# Patient Record
Sex: FEMALE | Race: BLACK OR AFRICAN AMERICAN | NOT HISPANIC OR LATINO | Employment: FULL TIME | URBAN - METROPOLITAN AREA
[De-identification: names, ages, dates, MRNs, and addresses within clinical notes are randomized per-mention and may not be internally consistent; named-entity substitution may affect disease eponyms.]

---

## 2017-06-19 DIAGNOSIS — N89.8 VAGINAL DISCHARGE: ICD-10-CM

## 2017-06-20 ENCOUNTER — OFFICE VISIT (OUTPATIENT)
Dept: OBSTETRICS AND GYNECOLOGY | Facility: CLINIC | Age: 32
End: 2017-06-20
Payer: MEDICAID

## 2017-06-20 VITALS
DIASTOLIC BLOOD PRESSURE: 70 MMHG | SYSTOLIC BLOOD PRESSURE: 120 MMHG | HEIGHT: 64 IN | BODY MASS INDEX: 19.12 KG/M2 | WEIGHT: 112 LBS

## 2017-06-20 DIAGNOSIS — N89.8 VAGINAL DISCHARGE: ICD-10-CM

## 2017-06-20 DIAGNOSIS — Z11.3 SCREENING FOR STDS (SEXUALLY TRANSMITTED DISEASES): ICD-10-CM

## 2017-06-20 DIAGNOSIS — N91.1 AMENORRHEA, SECONDARY: ICD-10-CM

## 2017-06-20 DIAGNOSIS — Z01.419 VISIT FOR GYNECOLOGIC EXAMINATION: Primary | ICD-10-CM

## 2017-06-20 DIAGNOSIS — Z30.431 INTRAUTERINE DEVICE SURVEILLANCE: ICD-10-CM

## 2017-06-20 DIAGNOSIS — Z87.42 HISTORY OF ABNORMAL CERVICAL PAP SMEAR: ICD-10-CM

## 2017-06-20 LAB
CANDIDA RRNA VAG QL PROBE: NEGATIVE
G VAGINALIS RRNA GENITAL QL PROBE: POSITIVE
T VAGINALIS RRNA GENITAL QL PROBE: NEGATIVE

## 2017-06-20 PROCEDURE — 99999 PR PBB SHADOW E&M-EST. PATIENT-LVL III: CPT | Mod: PBBFAC,,, | Performed by: NURSE PRACTITIONER

## 2017-06-20 PROCEDURE — 88141 CYTOPATH C/V INTERPRET: CPT | Mod: ,,, | Performed by: PATHOLOGY

## 2017-06-20 PROCEDURE — 88175 CYTOPATH C/V AUTO FLUID REDO: CPT | Performed by: PATHOLOGY

## 2017-06-20 PROCEDURE — 99213 OFFICE O/P EST LOW 20 MIN: CPT | Mod: PBBFAC | Performed by: NURSE PRACTITIONER

## 2017-06-20 PROCEDURE — 87480 CANDIDA DNA DIR PROBE: CPT

## 2017-06-20 PROCEDURE — 99395 PREV VISIT EST AGE 18-39: CPT | Mod: S$PBB,,, | Performed by: NURSE PRACTITIONER

## 2017-06-20 PROCEDURE — 87624 HPV HI-RISK TYP POOLED RSLT: CPT

## 2017-06-20 PROCEDURE — 87591 N.GONORRHOEAE DNA AMP PROB: CPT

## 2017-06-20 RX ORDER — METRONIDAZOLE 500 MG/1
TABLET ORAL
Qty: 14 TABLET | Refills: 0 | OUTPATIENT
Start: 2017-06-20

## 2017-06-20 RX ORDER — METRONIDAZOLE 500 MG/1
500 TABLET ORAL 2 TIMES DAILY
Qty: 10 TABLET | Refills: 1 | Status: SHIPPED | OUTPATIENT
Start: 2017-06-20 | End: 2017-06-25

## 2017-06-20 NOTE — PROGRESS NOTES
HISTORY OF PRESENT ILLNESS:    Madyson Calle is a 31 y.o. female, , No LMP recorded. Patient is not currently having periods (Reason: Birth Control).,  presents for a routine exam, STD screening and c/o BV.  -Reports a fishy smelling vaginal discharge not associated with fever, pelvic pain, itching, UTI sx or AUB and denies use of vulvovaginal irritants.   -Thinks trigger is unprotected intercourse.    Past Medical History:   Diagnosis Date    Abnormal cervical Papanicolaou smear 2010    Colposcopy    Scoliosis        PAST SURGICAL HISTORY:  History reviewed. No pertinent surgical history.    MEDICATIONS AND ALLERGIES:  Mirena IUD  Review of patient's allergies indicates:  No Known Allergies    Family History   Problem Relation Age of Onset    Breast cancer Neg Hx     Colon cancer Neg Hx     Ovarian cancer Neg Hx        Social History     Social History    Marital status: Single     Spouse name: N/A    Number of children: N/A    Years of education: N/A     Occupational History    Not on file.     Social History Main Topics    Smoking status: Never Smoker    Smokeless tobacco: Never Used    Alcohol use Yes    Drug use: No    Sexual activity: Yes     Partners: Male     Birth control/ protection: None, IUD     Other Topics Concern    Not on file     Social History Narrative    No narrative on file       OB HISTORY: Number of vaginal deliveries:1.     COMPREHENSIVE GYN HISTORY:  PAP History: Reports abnormal Paps. LAST  ASCUS + HR HPV. Treatment Colposcopy with mild dysplasia, ECC neg.  Infection History: Reports STDs: HPV. Denies PID.  Benign History: Denies uterine fibroids. Denies ovarian cysts. Denies endometriosis. Denies other conditions.  Cancer History: Denies cervical cancer. Denies uterine cancer or hyperplasia. Denies ovarian cancer. Denies vulvar cancer or pre-cancer. Denies vaginal cancer or pre-cancer. Denies breast cancer. Denies colon cancer.  Sexual Activity History:  "Reports currently being sexually active  Menstrual History: Denies menses.   Dysmenorrhea History: Denies dysmenorrhea.   Contraception: Mirena IUD 2-26-15 ()    ROS:  GENERAL: No weight changes. No swelling. No fatigue. No fever.  CARDIOVASCULAR: No chest pain. No shortness of breath. No leg cramps.   NEUROLOGICAL: No headaches. No vision changes.  BREASTS: No pain. No lumps. No discharge.  ABDOMEN: No pain. No nausea. No vomiting. No diarrhea. No constipation.  REPRODUCTIVE: No abnormal bleeding.   VULVA: No pain. No lesions. No itching.  VAGINA: No relaxation. No itching. + ODOR and D/C. No lesions.  URINARY: No incontinence. No nocturia. No frequency. No dysuria.    /70   Ht 5' 4" (1.626 m)   Wt 50.8 kg (112 lb)   BMI 19.22 kg/m²     PE:  APPEARANCE: Well nourished, well developed, in no acute distress.  AFFECT: WNL, alert and oriented x 3.  SKIN: No acne or hirsutism.  NECK: Neck symmetric, without masses or thyromegaly.  NODES: No inguinal, cervical, axillary or femoral lymph node enlargement.  CHEST: Good respiratory effort.   ABDOMEN: Soft. No tenderness or masses. No hepatosplenomegaly. No hernias.  BREASTS: Symmetrical, no skin changes, visible lesions, palpable masses or nipple discharge bilaterally.  PELVIC: External female genitalia without lesions.  Female hair distribution. Adequate perineal body, Normal urethral meatus. Vagina moist and well rugated without lesions. FISHY YELLOW D/C present. No significant cystocele or rectocele present. Cervix pink without lesions, discharge or tenderness. IUD STRINGS VISUALIZED AT OS. Uterus is 4-6 week size, regular, mobile and nontender. Adnexa without masses or tenderness.  EXTREMITIES: No edema    DIAGNOSIS:  1. Visit for gynecologic examination    2. Intrauterine device surveillance    3. Amenorrhea, secondary    4. Vaginal discharge    5. Screening for STDs (sexually transmitted diseases)    6. History of abnormal cervical Pap smear  "       PLAN:    Orders Placed This Encounter    C. trachomatis/N. gonorrhoeae by AMP DNA Cervix    Vaginosis Screen by DNA Probe    HPV High Risk Genotypes, PCR    Liquid-based pap smear, screening    metronidazole (FLAGYL) 500 MG tablet   Declined other STD tests    COUNSELING:  The patient was counseled today on:  -IUD danger signs;  -vaginitis and STDs and prevention same as note 1-31-15;  -Flagyl use and potential side effects;  -A.C.S. Pap and pelvic exam guidelines (pap every 3 years);  -to follow up with her PCP for other health maintenance.    FOLLOW-UP with me pending test results and annually.

## 2017-06-21 LAB
C TRACH DNA SPEC QL NAA+PROBE: NOT DETECTED
N GONORRHOEA DNA SPEC QL NAA+PROBE: NOT DETECTED

## 2017-06-23 LAB
HPV HR 12 DNA CVX QL NAA+PROBE: POSITIVE
HPV16 DNA SPEC QL NAA+PROBE: NEGATIVE
HPV18 DNA SPEC QL NAA+PROBE: NEGATIVE

## 2017-07-13 ENCOUNTER — TELEPHONE (OUTPATIENT)
Dept: OBSTETRICS AND GYNECOLOGY | Facility: CLINIC | Age: 32
End: 2017-07-13

## 2017-07-13 ENCOUNTER — PATIENT MESSAGE (OUTPATIENT)
Dept: OBSTETRICS AND GYNECOLOGY | Facility: CLINIC | Age: 32
End: 2017-07-13

## 2017-07-17 ENCOUNTER — TELEPHONE (OUTPATIENT)
Dept: OBSTETRICS AND GYNECOLOGY | Facility: CLINIC | Age: 32
End: 2017-07-17

## 2017-07-25 ENCOUNTER — TELEPHONE (OUTPATIENT)
Dept: OBSTETRICS AND GYNECOLOGY | Facility: CLINIC | Age: 32
End: 2017-07-25

## 2017-07-25 ENCOUNTER — PROCEDURE VISIT (OUTPATIENT)
Dept: OBSTETRICS AND GYNECOLOGY | Facility: CLINIC | Age: 32
End: 2017-07-25
Payer: MEDICAID

## 2017-07-25 VITALS
DIASTOLIC BLOOD PRESSURE: 60 MMHG | HEIGHT: 63 IN | BODY MASS INDEX: 20.55 KG/M2 | SYSTOLIC BLOOD PRESSURE: 120 MMHG | WEIGHT: 116 LBS

## 2017-07-25 DIAGNOSIS — R87.810 ASCUS WITH POSITIVE HIGH RISK HPV CERVICAL: Primary | ICD-10-CM

## 2017-07-25 DIAGNOSIS — R87.610 ASCUS WITH POSITIVE HIGH RISK HPV CERVICAL: Primary | ICD-10-CM

## 2017-07-25 PROCEDURE — 88305 TISSUE EXAM BY PATHOLOGIST: CPT | Performed by: PATHOLOGY

## 2017-07-25 PROCEDURE — 57454 BX/CURETT OF CERVIX W/SCOPE: CPT | Mod: PBBFAC | Performed by: NURSE PRACTITIONER

## 2017-07-25 PROCEDURE — 88342 IMHCHEM/IMCYTCHM 1ST ANTB: CPT | Mod: 26,,, | Performed by: PATHOLOGY

## 2017-07-25 PROCEDURE — 88341 IMHCHEM/IMCYTCHM EA ADD ANTB: CPT | Mod: 26,,, | Performed by: PATHOLOGY

## 2017-07-25 PROCEDURE — 57454 BX/CURETT OF CERVIX W/SCOPE: CPT | Mod: S$PBB,,, | Performed by: NURSE PRACTITIONER

## 2017-07-25 PROCEDURE — 88305 TISSUE EXAM BY PATHOLOGIST: CPT | Mod: 26,,, | Performed by: PATHOLOGY

## 2017-07-25 NOTE — PROCEDURES
Colposcopy W/BIOPSY AND ECC- Today  Date/Time: 2017 10:49 AM  Performed by: RAJ DALEY  Authorized by: RAJ DALEY   Preparation: Patient was prepped and draped in the usual sterile fashion.  Local anesthesia used: no    Anesthesia:  Local anesthesia used: no    Sedation:  Patient sedated: no  Patient tolerance: Patient tolerated the procedure well with no immediate complications      COLPOSCOPY:    Madyson Calle is a 31 y.o. female  No LMP recorded. Patient is not currently having periods (Reason: Birth Control).  presents for colposcopy.  Her most recent papsmear showed ASCUS + HR HPV on 17  She has prior history of abnormal paps () previously treated with colposcopy biopsies and repeat paps.    PRE-COLPOSCOPY PROCEDURE COUNSELING:  Discussed the abnormal pap test findings, HPV, need for colposcopy and possible biopsies to determine a diagnosis and plan of care, treatments available, the minimal risks of bleeding and infection with a colposcopy, alternatives to colposcopy and she agrees to proceed.    COLPOSCOPY EXAM:   UPT negative  A time out was performed  There were were no vulvar lesions suggestive of condyloma present. The cervix was visualized with a speculum. Acetic acid was applied.  The entire tranformation zone could could be adequately visualized.  Squamous Atrophic mucosa was present.  White feathery irregular epithelium was present from 12 noon to 2:00 o'clock.  White flat epithelium was not present.  White thick epithelium was not present.  Cobblestone appearing epithelium was not present.  Mosaic pattern was not present.  Punctation was not present.  Abnormal vessels were not present.  Biopsy performed: Location: 11:00 o'clock.  ECC - Done.  Specimens were placed in formalin and sent to pathology. Monsel's solution was applied at biopsy sites to stop bleeding.  The speculum was removed.  The patient tolerated the procedure      .    IMPRESSION:   ASCUS + HR HPV  PAP  Colposcopy Impression:  Satisfactory: PHYLLIS 1    POST COLPOSCOPY COUNSELING:   Manage post colposcopy cramping with NSAIDs, Tylenol or Rx per [employer].  Avoid anything in vagina (intercourse, douching, tampons) one week after the procedure.  Expect a clumpy blackish vaginal discharge (Monsel's solution) for several days.  Report bleeding heavier than a period, worsening pain, fever > 101.0 F, or foul-smelling vaginal discharge.  HPV vaccine recommended according to FDA age guidelines.  Importance of follow-up stressed.    FOLLOW-UP:   Based on biopsy results.

## 2017-08-07 ENCOUNTER — TELEPHONE (OUTPATIENT)
Dept: OBSTETRICS AND GYNECOLOGY | Facility: CLINIC | Age: 32
End: 2017-08-07

## 2017-08-08 NOTE — TELEPHONE ENCOUNTER
----- Message from Rowena Palacios sent at 8/7/2017 12:44 PM CDT -----  _X  1st Request  _  2nd Request  _  3rd Request        Who: JON ADDISON [[id number]]    Why: Pt is returning a call from Yesenia Pena. Please call back.    What Number to Call Back:[phone]    When to Expect a call back: (With in 24 hours)      PHONE CALL: Advised of HPV effect on biopsies, no cancer or precancer. Repeat pap and HPV test in one year. Yesenia Pena NP

## 2017-08-19 PROCEDURE — 99285 EMERGENCY DEPT VISIT HI MDM: CPT | Mod: ,,, | Performed by: EMERGENCY MEDICINE

## 2017-08-19 PROCEDURE — 99285 EMERGENCY DEPT VISIT HI MDM: CPT | Mod: 25

## 2017-08-20 ENCOUNTER — HOSPITAL ENCOUNTER (EMERGENCY)
Facility: HOSPITAL | Age: 32
Discharge: HOME | End: 2017-08-20
Attending: EMERGENCY MEDICINE
Payer: MEDICAID

## 2017-08-20 VITALS
TEMPERATURE: 99 F | BODY MASS INDEX: 20.38 KG/M2 | DIASTOLIC BLOOD PRESSURE: 86 MMHG | HEIGHT: 63 IN | HEART RATE: 58 BPM | OXYGEN SATURATION: 100 % | WEIGHT: 115 LBS | SYSTOLIC BLOOD PRESSURE: 130 MMHG | RESPIRATION RATE: 15 BRPM

## 2017-08-20 DIAGNOSIS — R10.9 LEFT FLANK PAIN: Primary | ICD-10-CM

## 2017-08-20 LAB
ALBUMIN SERPL BCP-MCNC: 4.4 G/DL
ALP SERPL-CCNC: 44 U/L
ALT SERPL W/O P-5'-P-CCNC: 12 U/L
ANION GAP SERPL CALC-SCNC: 9 MMOL/L
AST SERPL-CCNC: 19 U/L
B-HCG UR QL: NEGATIVE
BACTERIA GENITAL QL WET PREP: ABNORMAL
BASOPHILS # BLD AUTO: 0.03 K/UL
BASOPHILS NFR BLD: 0.6 %
BILIRUB SERPL-MCNC: 0.7 MG/DL
BILIRUB UR QL STRIP: NEGATIVE
BUN SERPL-MCNC: 11 MG/DL
C TRACH DNA SPEC QL NAA+PROBE: NOT DETECTED
CALCIUM SERPL-MCNC: 9.5 MG/DL
CHLORIDE SERPL-SCNC: 105 MMOL/L
CLARITY UR REFRACT.AUTO: CLEAR
CLUE CELLS VAG QL WET PREP: ABNORMAL
CO2 SERPL-SCNC: 26 MMOL/L
COLOR UR AUTO: YELLOW
CREAT SERPL-MCNC: 0.9 MG/DL
CRP SERPL-MCNC: 0.8 MG/L
CTP QC/QA: YES
DIFFERENTIAL METHOD: ABNORMAL
EOSINOPHIL # BLD AUTO: 0.1 K/UL
EOSINOPHIL NFR BLD: 1.8 %
ERYTHROCYTE [DISTWIDTH] IN BLOOD BY AUTOMATED COUNT: 12.1 %
EST. GFR  (AFRICAN AMERICAN): >60 ML/MIN/1.73 M^2
EST. GFR  (NON AFRICAN AMERICAN): >60 ML/MIN/1.73 M^2
FILAMENT FUNGI VAG WET PREP-#/AREA: ABNORMAL
GLUCOSE SERPL-MCNC: 78 MG/DL
GLUCOSE UR QL STRIP: NEGATIVE
HCT VFR BLD AUTO: 37.4 %
HGB BLD-MCNC: 13.1 G/DL
HGB UR QL STRIP: NEGATIVE
KETONES UR QL STRIP: ABNORMAL
LEUKOCYTE ESTERASE UR QL STRIP: NEGATIVE
LYMPHOCYTES # BLD AUTO: 2.8 K/UL
LYMPHOCYTES NFR BLD: 50.7 %
MCH RBC QN AUTO: 31 PG
MCHC RBC AUTO-ENTMCNC: 35 G/DL
MCV RBC AUTO: 88 FL
MICROSCOPIC COMMENT: NORMAL
MONOCYTES # BLD AUTO: 0.4 K/UL
MONOCYTES NFR BLD: 8.1 %
N GONORRHOEA DNA SPEC QL NAA+PROBE: NOT DETECTED
NEUTROPHILS # BLD AUTO: 2.1 K/UL
NEUTROPHILS NFR BLD: 38.6 %
NITRITE UR QL STRIP: NEGATIVE
PH UR STRIP: 5 [PH] (ref 5–8)
PLATELET # BLD AUTO: 255 K/UL
PMV BLD AUTO: 10.6 FL
POTASSIUM SERPL-SCNC: 4.1 MMOL/L
PROT SERPL-MCNC: 8.7 G/DL
PROT UR QL STRIP: NEGATIVE
RBC # BLD AUTO: 4.23 M/UL
RBC #/AREA URNS AUTO: 1 /HPF (ref 0–4)
SODIUM SERPL-SCNC: 140 MMOL/L
SP GR UR STRIP: 1.03 (ref 1–1.03)
SPECIMEN SOURCE: ABNORMAL
SQUAMOUS #/AREA URNS AUTO: 4 /HPF
T VAGINALIS GENITAL QL WET PREP: ABNORMAL
URN SPEC COLLECT METH UR: ABNORMAL
UROBILINOGEN UR STRIP-ACNC: NEGATIVE EU/DL
WBC # BLD AUTO: 5.53 K/UL
WBC #/AREA URNS AUTO: 1 /HPF (ref 0–5)
WBC #/AREA VAG WET PREP: ABNORMAL
YEAST GENITAL QL WET PREP: ABNORMAL

## 2017-08-20 PROCEDURE — 85025 COMPLETE CBC W/AUTO DIFF WBC: CPT

## 2017-08-20 PROCEDURE — 86140 C-REACTIVE PROTEIN: CPT

## 2017-08-20 PROCEDURE — 25000003 PHARM REV CODE 250: Performed by: EMERGENCY MEDICINE

## 2017-08-20 PROCEDURE — 80053 COMPREHEN METABOLIC PANEL: CPT

## 2017-08-20 PROCEDURE — 81001 URINALYSIS AUTO W/SCOPE: CPT

## 2017-08-20 PROCEDURE — 87210 SMEAR WET MOUNT SALINE/INK: CPT

## 2017-08-20 PROCEDURE — 81025 URINE PREGNANCY TEST: CPT | Performed by: EMERGENCY MEDICINE

## 2017-08-20 PROCEDURE — 87591 N.GONORRHOEAE DNA AMP PROB: CPT

## 2017-08-20 RX ORDER — IBUPROFEN 600 MG/1
600 TABLET ORAL EVERY 6 HOURS PRN
Qty: 20 TABLET | Refills: 0 | Status: SHIPPED | OUTPATIENT
Start: 2017-08-20 | End: 2017-11-24

## 2017-08-20 RX ORDER — IBUPROFEN 600 MG/1
600 TABLET ORAL
Status: COMPLETED | OUTPATIENT
Start: 2017-08-20 | End: 2017-08-20

## 2017-08-20 RX ADMIN — IBUPROFEN 600 MG: 600 TABLET, FILM COATED ORAL at 02:08

## 2017-08-20 NOTE — ED NOTES
The patient is awake, alert and acting age appropriately. Family at bedside.    No apparent distress noted. Airway is open and patent.  Respirations with normal effort and rate noted. Explanation of care provided to family and patient. No needs at this time. Will continue to monitor.

## 2017-08-20 NOTE — ED TRIAGE NOTES
Madyson Calle, a 31 y.o. female presents to the ED bed 12      Chief Complaint   Patient presents with    Flank Pain     left sided flank pain x 2 days.  denies hematuria. frequency, and dysuria     Review of patient's allergies indicates:  No Known Allergies  Past Medical History:   Diagnosis Date    Abnormal cervical Papanicolaou smear 2010    Colposcopy    Scoliosis

## 2017-08-20 NOTE — ED PROVIDER NOTES
Encounter Date: 8/19/2017       History     Chief Complaint   Patient presents with    Flank Pain     left sided flank pain x 2 days.  denies hematuria. frequency, and dysuria     30yo female with intermittent LLQ pain.  No flank pain.  No urinary complaint.  No vb/vd.  Has an iud, doesn't have periods.  Had culposcopy about a month ago d/t abnl pap.  No fevers.  No n/v/d.  No blood in stools.  Hasn't taken anything for pain.  It has been intermittent since last night.   No specific alleviating/exacerbating factors.           Review of patient's allergies indicates:  No Known Allergies  Past Medical History:   Diagnosis Date    Abnormal cervical Papanicolaou smear 2010    Colposcopy    Scoliosis      History reviewed. No pertinent surgical history.  Family History   Problem Relation Age of Onset    Breast cancer Neg Hx     Colon cancer Neg Hx     Ovarian cancer Neg Hx      Social History   Substance Use Topics    Smoking status: Never Smoker    Smokeless tobacco: Never Used    Alcohol use Yes     Review of Systems   Constitutional: Negative for chills and fever.   HENT: Negative for sore throat.    Respiratory: Negative for shortness of breath.    Cardiovascular: Negative for chest pain.   Gastrointestinal: Positive for abdominal pain. Negative for diarrhea and vomiting.   Genitourinary: Negative for dysuria, frequency, hematuria and vaginal discharge.   Musculoskeletal: Negative for back pain.   Skin: Negative for rash.       Physical Exam     Initial Vitals [08/19/17 2250]   BP Pulse Resp Temp SpO2   137/89 73 18 98.7 °F (37.1 °C) 100 %      MAP       105         Physical Exam    Constitutional: She appears well-developed and well-nourished. She is not diaphoretic. No distress.   HENT:   Head: Normocephalic and atraumatic.   Right Ear: External ear normal.   Left Ear: External ear normal.   Mouth/Throat: Oropharynx is clear and moist.   Eyes: Conjunctivae are normal. Pupils are equal, round, and reactive  to light. Right eye exhibits no discharge. Left eye exhibits no discharge. No scleral icterus.   Cardiovascular: Normal rate, regular rhythm and intact distal pulses.   No murmur heard.  Pulmonary/Chest: Breath sounds normal. No respiratory distress. She has no wheezes. She has no rhonchi. She has no rales.   Abdominal: Soft. Bowel sounds are normal. She exhibits no distension and no mass. There is tenderness (mild LLQ pain, no peritoneal signs). There is no rebound and no guarding.   Genitourinary: Vagina normal and uterus normal. No vaginal discharge found.   Genitourinary Comments: RN assisting  No bleeding, no discharge, nl external genitalia  No CMT  No adnexal masses, no midline tenderness, L adnexa tender   Musculoskeletal: Normal range of motion. She exhibits no edema or tenderness.   No cvat   Neurological: She is alert and oriented to person, place, and time.   Skin: Skin is warm and dry. No rash noted. No erythema.   Psychiatric: She has a normal mood and affect.         ED Course   Procedures  Labs Reviewed   CBC W/ AUTO DIFFERENTIAL - Abnormal; Notable for the following:        Result Value    Lymph% 50.7 (*)     All other components within normal limits   COMPREHENSIVE METABOLIC PANEL - Abnormal; Notable for the following:     Total Protein 8.7 (*)     Alkaline Phosphatase 44 (*)     All other components within normal limits   URINALYSIS, REFLEX TO URINE CULTURE - Abnormal; Notable for the following:     Ketones, UA Trace (*)     All other components within normal limits   VAGINAL SCREEN - Abnormal; Notable for the following:     Bacteria - Vaginal Screen Moderate (*)     All other components within normal limits   C. TRACHOMATIS/N. GONORRHOEAE BY AMP DNA   C-REACTIVE PROTEIN   URINALYSIS MICROSCOPIC   COMPREHENSIVE METABOLIC PANEL   CBC W/ AUTO DIFFERENTIAL   URINALYSIS, REFLEX TO URINE CULTURE   C-REACTIVE PROTEIN   POCT URINE PREGNANCY             Medical Decision Making:   Initial Assessment:    Left lower abd pain/tenderness, no gi or uti sxs.  No cvat.  ? Stone, colitis, msk, ovarian, other.   Will start with screening labs, determine what imaging to proceed with.       CT abd/pelv w/o acute pathology.  On pelvic has L adnexal tenderness.  Will obtain u/w r/o cyst/torsion/toa.      US w/o acute findings.  Cause remains unclear but abd is now nontender, pt feeling better.  Advised of unclear dx and need to return in a timely manner should new/worse sxs develop.  Did have some constipation on CT though I doubt this explains the pain - advised on how to manage this as well.                     ED Course     Clinical Impression:   The encounter diagnosis was Left flank pain.                           Steven Castañeda MD  08/22/17 1936

## 2017-11-23 ENCOUNTER — PATIENT MESSAGE (OUTPATIENT)
Dept: OBSTETRICS AND GYNECOLOGY | Facility: CLINIC | Age: 32
End: 2017-11-23

## 2017-11-24 ENCOUNTER — OFFICE VISIT (OUTPATIENT)
Dept: OBSTETRICS AND GYNECOLOGY | Facility: CLINIC | Age: 32
End: 2017-11-24
Payer: MEDICAID

## 2017-11-24 VITALS
SYSTOLIC BLOOD PRESSURE: 126 MMHG | WEIGHT: 117.19 LBS | DIASTOLIC BLOOD PRESSURE: 74 MMHG | BODY MASS INDEX: 20.77 KG/M2 | HEIGHT: 63 IN

## 2017-11-24 DIAGNOSIS — Z30.431 FAMILY PLANNING, IUD (INTRAUTERINE DEVICE) CHECK/REINSERTION/REMOVAL: Primary | ICD-10-CM

## 2017-11-24 PROCEDURE — 99999 PR PBB SHADOW E&M-EST. PATIENT-LVL III: CPT | Mod: PBBFAC,,, | Performed by: ADVANCED PRACTICE MIDWIFE

## 2017-11-24 PROCEDURE — 99213 OFFICE O/P EST LOW 20 MIN: CPT | Mod: S$PBB,,, | Performed by: ADVANCED PRACTICE MIDWIFE

## 2017-11-24 PROCEDURE — 99213 OFFICE O/P EST LOW 20 MIN: CPT | Mod: PBBFAC | Performed by: ADVANCED PRACTICE MIDWIFE

## 2017-11-24 NOTE — PROGRESS NOTES
"Madyson Calle is a 32 y.o. female  [facility] with report of her IUD falling out. Pt reports that she found her IUD in her bed 3-4 hrs after intercourse. Pt denies pain, cramping or bleeding. Pt reports taking a Plan B today. Pt desires another IUD. Pt has IUD in bag- IUD appears intact.    Pt sees JUAN CARLOS Pena NP for her OB/GYN care.    ROS:  GENERAL: No fever, chills, fatigability or weight loss.  VULVAR: No pain, no lesions and no itching.  VAGINAL: No relaxation, no abnormal bleeding and no lesions. Reports IUD out  ABDOMEN: No abdominal pain. Denies nausea. Denies vomiting. No diarrhea. No constipation  BREAST: Denies pain. No lumps. No discharge.  URINARY: No incontinence, no nocturia, no frequency and no dysuria.  CARDIOVASCULAR: No chest pain. No shortness of breath. No leg cramps.  NEUROLOGICAL: No headaches. No vision changes.      Review of patient's allergies indicates:  No Known Allergies  No current outpatient prescriptions on file.    Past Medical History:   Diagnosis Date    Abnormal cervical Papanicolaou smear     Colposcopy    Scoliosis      History reviewed. No pertinent surgical history.  Social History   Substance Use Topics    Smoking status: Never Smoker    Smokeless tobacco: Never Used    Alcohol use Yes     OB History    Para Term  AB Living   1 1 1     1   SAB TAB Ectopic Multiple Live Births           1      # Outcome Date GA Lbr Pedro/2nd Weight Sex Delivery Anes PTL Lv   1 Term 11    F Vag-Spont   SIDRA          /74   Ht 5' 3" (1.6 m)   Wt 53.1 kg (117 lb 2.8 oz)   LMP  (LMP Unknown)   BMI 20.76 kg/m²     PHYSICAL EXAM:  GENERAL: Calm and appropriate affect, alert, oriented x4  SKIN: Color appropriate for race, warm and dry, clean and intact with no rashes.  RESP: Even, unlabored breathing  ABDOMEN: Soft, nontender, no masses.  :   Normal external female genitalia without lesions. Normal hair distribution. Adequate perineal body, " normal urethral meatus.  Vagina pink and well rugated, no lesions, vaginal discharge - normal  No significant cystocele or rectocele.  Cervix pink without discharge or lesions, no cervical motion tenderness. No evidence of trauma to tissue  Uterus 4-6 weeks size, regular, mobile and nontender.  Adnexa: normal adnexa in size, nontender and no masses      ASSESSMENT / PLAN:  IUD check    Patient was counseled today on risk of pregnancy. Advised condom use until office appointment for IUD placement.:      FOLLOW UP:   As above

## 2017-12-04 ENCOUNTER — PATIENT MESSAGE (OUTPATIENT)
Dept: OBSTETRICS AND GYNECOLOGY | Facility: CLINIC | Age: 32
End: 2017-12-04

## 2017-12-04 ENCOUNTER — TELEPHONE (OUTPATIENT)
Dept: OBSTETRICS AND GYNECOLOGY | Facility: CLINIC | Age: 32
End: 2017-12-04

## 2017-12-08 ENCOUNTER — TELEPHONE (OUTPATIENT)
Dept: OBSTETRICS AND GYNECOLOGY | Facility: CLINIC | Age: 32
End: 2017-12-08

## 2017-12-08 NOTE — TELEPHONE ENCOUNTER
Phyllis w/ pt and explained to her that I would have to call her on Monday to let her know if she was approved by Mi[employer] for a replacement IUD. Pt verbalized her understanding.

## 2017-12-08 NOTE — TELEPHONE ENCOUNTER
----- Message from Sandrita Mitchell sent at 12/8/2017 11:50 AM CST -----  _x  1st Request  _  2nd Request  _  3rd Request        Who: corwin    Why: pt. Would like to speak with nurse regarding birth control rx.please call to discuss    What Number to Call Back:[phone]      When to Expect a call back: (Before the end of the day)   -- if the call is after 12:00, the call back will be tomorrow.

## 2017-12-19 ENCOUNTER — TELEPHONE (OUTPATIENT)
Dept: OBSTETRICS AND GYNECOLOGY | Facility: CLINIC | Age: 32
End: 2017-12-19

## 2017-12-19 NOTE — TELEPHONE ENCOUNTER
----- Message from Nela Mccray sent at 12/19/2017  8:27 AM CST -----  Contact: pt  x_ 1st Request  _ 2nd Request  _ 3rd Request    Who: pt    Why: is checking the status of her approval for her Mirena. Pt states she has been waiting for a response for 2 weeks with no call back as of yet    What Number to Call Back: [phone]    When to Expect a call back: (Before the end of the day)  -- if call after 3:00 call back will be tomorrow.

## 2017-12-20 ENCOUNTER — TELEPHONE (OUTPATIENT)
Dept: OBSTETRICS AND GYNECOLOGY | Facility: CLINIC | Age: 32
End: 2017-12-20

## 2017-12-20 NOTE — TELEPHONE ENCOUNTER
----- Message from Marylou Webber sent at 12/19/2017  1:28 PM CST -----  Contact: Patient  X _1st Request  _  2nd Request  _  3rd Request    Who:JON ADDISON [[id number]]    Why:Patient was returning a missed call back from the staff     What Number to Call Back:[phone]    When to Expect a call back: (Before the end of the day)   -- if call after 3:00 call back will be tomorrow.

## 2017-12-21 DIAGNOSIS — N88.2 CERVICAL STENOSIS (UTERINE CERVIX): Primary | ICD-10-CM

## 2017-12-21 RX ORDER — MISOPROSTOL 200 UG/1
TABLET ORAL
Qty: 2 TABLET | Refills: 0 | Status: SHIPPED | OUTPATIENT
Start: 2017-12-21 | End: 2018-01-04

## 2017-12-29 ENCOUNTER — PROCEDURE VISIT (OUTPATIENT)
Dept: OBSTETRICS AND GYNECOLOGY | Facility: CLINIC | Age: 32
End: 2017-12-29
Payer: MEDICAID

## 2017-12-29 VITALS
DIASTOLIC BLOOD PRESSURE: 80 MMHG | BODY MASS INDEX: 20.46 KG/M2 | SYSTOLIC BLOOD PRESSURE: 132 MMHG | WEIGHT: 115.5 LBS | HEIGHT: 63 IN

## 2017-12-29 DIAGNOSIS — Z30.430 ENCOUNTER FOR IUD INSERTION: Primary | ICD-10-CM

## 2017-12-29 LAB
B-HCG UR QL: NEGATIVE
CTP QC/QA: YES

## 2017-12-29 PROCEDURE — 81025 URINE PREGNANCY TEST: CPT | Mod: PBBFAC | Performed by: NURSE PRACTITIONER

## 2017-12-29 PROCEDURE — 58300 INSERT INTRAUTERINE DEVICE: CPT | Mod: S$PBB,,, | Performed by: NURSE PRACTITIONER

## 2017-12-29 PROCEDURE — 58300 INSERT INTRAUTERINE DEVICE: CPT | Mod: PBBFAC | Performed by: NURSE PRACTITIONER

## 2017-12-29 RX ADMIN — LEVONORGESTREL 1 INTRA UTERINE DEVICE: 52 INTRAUTERINE DEVICE INTRAUTERINE at 10:12

## 2017-12-29 NOTE — PROCEDURES
INSERTION OF INTRAUTERINE DEVICE  Date/Time: 2017 8:00 AM  Performed by: RAJ DALEY  Authorized by: RAJ DALEY   Preparation: Patient was prepped and draped in the usual sterile fashion.  Local anesthesia used: no    Anesthesia:  Local anesthesia used: no    Sedation:  Patient sedated: no  Patient tolerance: Patient tolerated the procedure well with no immediate complications      IUD PLACEMENT:       Madyson Calle is a 32 y.o. female  Patient's last menstrual period was 2017.presents for an IUD placement. Prior IUD fell out during intercourse.     PRE-IUD PLACEMENT COUNSELING:  All contraceptive options were reviewed and the patient chooses an IUD.  The patient's history was reviewed and there are no contraindications to an IUD. The procedure and minimal risks of pain, bleeding, perforation and infection at the insertion and spontaneous expulsion within the first two weeks was discussed. The benefits of amenorrhea and no systemic side effects were explained. All questions were answered and the patient agrees to proceed. Consent was signed (scanned into computer).    PROCEDURE:  UPT negative  A time out was performed.    PELVIC: External female genitalia without lesions.  Female hair distribution. Adequate perineal body, Normal urethral meatus. Vagina moist and well rugated without lesions or discharge. Cervix is pink without lesions, discharge or tenderness. Uterus is 8 week size, AV position, regular, mobile and nontender. Adnexa without masses or tenderness.    PROCEDURE:  A single tooth tenaculum was placed on the anterior cervical lip.  The uterus was sounded to 6 cm using sterile technique.  A  Mirena IUD was loaded and placed high in uterine fundus without difficulty using sterile technique.  The string was cut to 2cm length from exo cervix.  The tenaculum and speculum were removed.   The patient tolerated the procedure well.    ASSESSMENT:  1. Contraception management / IUD  insertion.     POST IUD PLACEMENT COUNSELING:  Manage post IUD placement pain with NSAIDs, Tylenol or Rx per [employer].  IUD danger signs and how to check the strings.  Removal in 5 years for Mirena IUD and in 10 years for Copper IUD.    FOLLOW-UP: With me in two weeks for a string check.

## 2018-01-04 ENCOUNTER — OFFICE VISIT (OUTPATIENT)
Dept: OBSTETRICS AND GYNECOLOGY | Facility: CLINIC | Age: 33
End: 2018-01-04
Payer: MEDICAID

## 2018-01-04 VITALS
HEIGHT: 63 IN | DIASTOLIC BLOOD PRESSURE: 80 MMHG | SYSTOLIC BLOOD PRESSURE: 132 MMHG | WEIGHT: 115 LBS | BODY MASS INDEX: 20.38 KG/M2

## 2018-01-04 DIAGNOSIS — Z30.431 INTRAUTERINE DEVICE SURVEILLANCE: Primary | ICD-10-CM

## 2018-01-04 PROCEDURE — 99999 PR PBB SHADOW E&M-EST. PATIENT-LVL II: CPT | Mod: PBBFAC,,, | Performed by: NURSE PRACTITIONER

## 2018-01-04 PROCEDURE — 99024 POSTOP FOLLOW-UP VISIT: CPT | Mod: ,,, | Performed by: NURSE PRACTITIONER

## 2018-01-04 PROCEDURE — 99212 OFFICE O/P EST SF 10 MIN: CPT | Mod: PBBFAC | Performed by: NURSE PRACTITIONER

## 2018-01-04 NOTE — PROGRESS NOTES
IUD CHECK:    Madyson Calle is a 32 y.o. female  presents for IUD check following insertion on 17 ()  . She is not having any problems with bleeding, cramping, fever or discharge. She is able to feel the strings.    EXAM:  External female genitalia is without lesions.  Vagina is without lesions or discharge.  Cervix is pink without lesions, discharge or tenderness; IUD strings are visible.  Uterus is nontender and strings are palpable.  Adnexa are nontender and without masses.    ASSESSMENT:  1. IUD check / IUD in situ.    COUNSELING:  IUD danger signs and how to check the strings reviewed.    FOLLOW-UP for annual gyn exam or prn.

## 2018-01-12 ENCOUNTER — PATIENT MESSAGE (OUTPATIENT)
Dept: OBSTETRICS AND GYNECOLOGY | Facility: CLINIC | Age: 33
End: 2018-01-12

## 2018-01-12 DIAGNOSIS — Z30.011 ENCOUNTER FOR INITIAL PRESCRIPTION OF CONTRACEPTIVE PILLS: Primary | ICD-10-CM

## 2018-01-12 RX ORDER — LEVONORGESTREL AND ETHINYL ESTRADIOL 0.15-0.03
1 KIT ORAL DAILY
Qty: 90 TABLET | Refills: 4 | Status: SHIPPED | OUTPATIENT
Start: 2018-01-12 | End: 2018-07-19

## 2018-01-19 ENCOUNTER — OFFICE VISIT (OUTPATIENT)
Dept: URGENT CARE | Facility: CLINIC | Age: 33
End: 2018-01-19
Payer: MEDICAID

## 2018-01-19 VITALS
HEIGHT: 63 IN | SYSTOLIC BLOOD PRESSURE: 138 MMHG | BODY MASS INDEX: 20.38 KG/M2 | RESPIRATION RATE: 18 BRPM | WEIGHT: 115 LBS | TEMPERATURE: 99 F | OXYGEN SATURATION: 98 % | DIASTOLIC BLOOD PRESSURE: 92 MMHG | HEART RATE: 88 BPM

## 2018-01-19 DIAGNOSIS — S39.012A LUMBAR STRAIN, INITIAL ENCOUNTER: ICD-10-CM

## 2018-01-19 DIAGNOSIS — S16.1XXA CERVICAL STRAIN, ACUTE, INITIAL ENCOUNTER: Primary | ICD-10-CM

## 2018-01-19 DIAGNOSIS — S29.019A STRAIN OF THORACIC SPINE, INITIAL ENCOUNTER: ICD-10-CM

## 2018-01-19 DIAGNOSIS — S30.0XXA LUMBAR CONTUSION, INITIAL ENCOUNTER: ICD-10-CM

## 2018-01-19 DIAGNOSIS — S00.03XA CONTUSION OF SCALP, INITIAL ENCOUNTER: ICD-10-CM
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
## 2018-01-19 PROCEDURE — 99214 OFFICE O/P EST MOD 30 MIN: CPT | Mod: S$GLB,,, | Performed by: NURSE PRACTITIONER

## 2018-01-19 RX ORDER — KETOROLAC TROMETHAMINE 30 MG/ML
15 INJECTION, SOLUTION INTRAMUSCULAR; INTRAVENOUS
Status: COMPLETED | OUTPATIENT
Start: 2018-01-19 | End: 2018-01-19

## 2018-01-19 RX ORDER — METHOCARBAMOL 500 MG/1
500 TABLET, FILM COATED ORAL 3 TIMES DAILY
Qty: 30 TABLET | Refills: 0 | Status: SHIPPED | OUTPATIENT
Start: 2018-01-19 | End: 2018-01-29

## 2018-01-19 RX ORDER — NAPROXEN 500 MG/1
500 TABLET ORAL 2 TIMES DAILY WITH MEALS
Qty: 20 TABLET | Refills: 0 | Status: SHIPPED | OUTPATIENT
Start: 2018-01-19 | End: 2018-01-29

## 2018-01-19 RX ADMIN — KETOROLAC TROMETHAMINE 15 MG: 30 INJECTION, SOLUTION INTRAMUSCULAR; INTRAVENOUS at 04:01

## 2018-01-19 NOTE — PATIENT INSTRUCTIONS
SEE HEAD INJURY SHEET FOR REASONS TO GO TO THE EMERGENCY ROOM  Please return here or go to the Emergency Department for any concerns or worsening of condition.  If you were prescribed antibiotics, please take them to completion.  If you were prescribed a narcotic medication, do not drive or operate heavy equipment or machinery while taking these medications.  Please drink plenty of fluids.  Please get plenty of rest.  If not allergic, please take over the counter Tylenol (Acetaminophen) and/or Motrin (Ibuprofen) as directed for control of pain and/or fever.  Please follow up with your primary care doctor or specialist as needed.    If you  smoke, please stop smoking.  Back Sprain or Strain    Injury to the muscles (strain) or ligaments (sprain) around the spine can be troubling. Injury may occur after a sudden forceful twisting or bending force such as in a car accident, after a simple awkward movement, or after lifting something heavy with poor body positioning. In any case, muscle spasm is often present and adds to the pain.  Thankfully, most people feel better in 1 to 2 weeks, and most of the rest in 1 to 2 months. Most people can remain active. Unless you had a forceful or traumatic physical injury such as a car accident or fall, X-rays may not be ordered for the first evaluation of a back sprain or strain. If pain continues and does not respond to medical treatment, your healthcare provider may then order X-rays and other tests.  Home care  The following guidelines will help you care for your injury at home:  · When in bed, try to find a comfortable position. A firm mattress is best. Try lying flat on your back with pillows under your knees. You can also try lying on your side with your knees bent up toward your chest and a pillow between your knees.  · Don't sit for long periods. Try not to take long car rides or take other trips that have you sitting for a long time. This puts more stress on the lower back  than standing or walking.  · During the first 24 to 72 hours after an injury or flare-up, apply an ice pack to the painful area for 20 minutes. Then remove it for 20 minutes. Do this for 60 to 90 minutes, or several times a day. This will reduce swelling and pain. Be sure to wrap the ice pack in a thin towel or plastic to protect your skin.  · You can start with ice, then switch to heat. Heat from a hot shower, hot bath, or heating pad reduces pain and works well for muscle spasms. Put heat on the painful area for 20 minutes, then remove for 20 minutes. Do this for 60 to 90 minutes, or several times a day. Do not use a heating pad while sleeping. It can burn the skin.  · You can alternate the ice and heat. Talk with your healthcare provider to find out the best treatment or therapy for your back pain.  · Therapeutic massage will help relax the back muscles without stretching them.  · Be aware of safe lifting methods. Do not lift anything over 15 pounds until all of the pain is gone.  Medicines  Talk to your healthcare provider before using medicines, especially if you have other health problems or are taking other medicines.  · You may use acetaminophen or ibuprofen to control pain, unless another pain medicine was prescribed. If you have chronic conditions like diabetes, liver or kidney disease, stomach ulcers, or gastrointestinal bleeding, or are taking blood-thinner medicines, talk with your doctor before taking any medicines.  · Be careful if you are given prescription medicines, narcotics, or medicine for muscle spasm. They can cause drowsiness, and affect your coordination, reflexes, and judgment. Do not drive or operate heavy machinery when taking these types of medicines. Only take pain medicine as prescribed by your healthcare provider.  Follow-up care  Follow up with your healthcare provider, or as advised. You may need physical therapy or more tests if your symptoms get worse.  If you had X-rays your  healthcare provider may be checking for any broken bones, breaks, or fractures. Bruises and sprains can sometimes hurt as much as a fracture. These injuries can take time to heal completely. If your symptoms dont improve or they get worse, talk with your healthcare provider. You may need a repeat X-ray or other tests.  Call 911  Call for emergency care if any of the following occur:  · Trouble breathing  · Confused  · Very drowsy or trouble awakening  · Fainting or loss of consciousness  · Rapid or very slow heart rate  · Loss of bowel or bladder control  When to seek medical advice  Call your healthcare provider right away if any of the following occur:  · Pain gets worse or spreads to your arms or legs  · Weakness or numbness in one or both arms or legs  · Numbness in the groin or genital area  Date Last Reviewed: 6/1/2016 © 2000-2017 [employer]. [address] [place] [address]. All rights reserved. This information is not intended as a substitute for professional medical care. Always follow your healthcare professional's instructions.        First Aid: Head Injuries  A strong blow to the head may cause swelling and bleeding inside the skull. The resulting pressure can injure the brain (concussion). If you have any doubts identifying a concussion, have a healthcare provider check the victim.  Seek medical help if any of the following is true:  · The victim loses consciousness.  · The victim has convulsions or seizures.  · The victim has unequal pupil size (the black part in the center of th eye is bigger one one side than the other)  · The victim shows any of the following signs of concussion:  ¨ confusion or inability to follow normal conversation  ¨ slurred speech  ¨ dizziness or vision problems  ¨ nausea or vomiting  ¨ muscle weakness or loss of mobility  ¨ memory loss  ¨ sensitivity to noise  ¨ fatigue  Call 911 immediately if the victim has any of the following:  · Prolonged loss of  consciousness  · A depressed or spongy area in the skull, or visible bone fragments  · Clear fluid draining out of  the ears or nose  While you wait for help:  1. Reassure the person.  2. Treat for shock by maintaining body temperature and keeping the victim calm.  3. Provide rescue breathing or CPR, if needed.  4. If the person has neck or back pain or is unconscious, he or she might have a spine fracture. They should only  be moved with great precaution and if it is absolutely necessary.   Step 1: Control bleeding  · Apply direct pressure to control bleeding. (Wear gloves or use other protection to avoid contact with victim's blood.)  · Wash a minor surface injury with soap and water after the bleeding stops or is reduced.  · Cover the wound with a clean dressing and bandage.  Step 2: Ice bumps and bruises  · Place a cold pack or ice on the injury to reduce swelling and pain. Placing a cloth between the injury and the ice pack helps prevent tissue damage from severe cold.  Step 3: Observe the victim  · Watch for vomiting or changes in mood or alertness. If you notice changes, call for medical help. Signs of concussion may not appear for up to 48 hours.  · Tell the person's partner, parent, or roommate about the injury so he or she can continue to observe the victim.  Stitches  If a cut is deep or continues to bleed, or the edges of skin do not stay together evenly, the wound may need to be closed with stitches, tape, staples, or medical glue. All can help speed healing and reduce the risk of infection and the size of the scar. These may be especially important concerns with large wounds, and wounds on the head or other visible body parts.  If you think a wound may need medical care, visit a health care professional as soon as possible. If stitches are needed, they must be applied in the first few hours. A wound that is not properly closed is at risk of serious infection.  Date Last Reviewed: 10/19/2015  ©  5994-1627 [employer]. [address] [place] [address], [place] [address]. All rights reserved. This information is not intended as a substitute for professional medical care. Always follow your healthcare professional's instructions.
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
## 2018-07-19 ENCOUNTER — TELEPHONE (OUTPATIENT)
Dept: OBSTETRICS AND GYNECOLOGY | Facility: CLINIC | Age: 33
End: 2018-07-19

## 2018-07-19 ENCOUNTER — OFFICE VISIT (OUTPATIENT)
Dept: OBSTETRICS AND GYNECOLOGY | Facility: CLINIC | Age: 33
End: 2018-07-19
Payer: MEDICAID

## 2018-07-19 VITALS
SYSTOLIC BLOOD PRESSURE: 132 MMHG | HEIGHT: 63 IN | WEIGHT: 119.06 LBS | BODY MASS INDEX: 21.09 KG/M2 | DIASTOLIC BLOOD PRESSURE: 88 MMHG

## 2018-07-19 DIAGNOSIS — Z01.419 WELL WOMAN EXAM WITH ROUTINE GYNECOLOGICAL EXAM: Primary | ICD-10-CM

## 2018-07-19 DIAGNOSIS — Z11.3 SCREENING FOR STDS (SEXUALLY TRANSMITTED DISEASES): ICD-10-CM

## 2018-07-19 DIAGNOSIS — Z87.42 HISTORY OF ABNORMAL CERVICAL PAP SMEAR: ICD-10-CM

## 2018-07-19 DIAGNOSIS — Z30.431 INTRAUTERINE DEVICE SURVEILLANCE: ICD-10-CM

## 2018-07-19 PROCEDURE — 99999 PR PBB SHADOW E&M-EST. PATIENT-LVL II: CPT | Mod: PBBFAC,,, | Performed by: NURSE PRACTITIONER

## 2018-07-19 PROCEDURE — 99395 PREV VISIT EST AGE 18-39: CPT | Mod: S$PBB,,, | Performed by: NURSE PRACTITIONER

## 2018-07-19 PROCEDURE — 87624 HPV HI-RISK TYP POOLED RSLT: CPT

## 2018-07-19 PROCEDURE — 99212 OFFICE O/P EST SF 10 MIN: CPT | Mod: PBBFAC | Performed by: NURSE PRACTITIONER

## 2018-07-19 PROCEDURE — 88175 CYTOPATH C/V AUTO FLUID REDO: CPT

## 2018-07-19 PROCEDURE — 87491 CHLMYD TRACH DNA AMP PROBE: CPT

## 2018-07-19 NOTE — PROGRESS NOTES
HISTORY OF PRESENT ILLNESS:    Madyson Calle is a 32 y.o. female, , No LMP recorded. Patient is not currently having periods (Reason: Birth Control).,  presents for a routine exam and has no complaints.  -no further break through bleeding - no longer on OCPs.   -PCP did labs for STDs, requests Genprobe.    Past Medical History:   Diagnosis Date    Abnormal cervical Papanicolaou smear , , 2017    Colposcopy    Scoliosis        PAST SURGICAL HISTORY:  History reviewed. No pertinent surgical history.    MEDICATIONS AND ALLERGIES:  No current outpatient prescriptions on file.    Review of patient's allergies indicates:  No Known Allergies    Family History   Problem Relation Age of Onset    Breast cancer Neg Hx     Colon cancer Neg Hx     Ovarian cancer Neg Hx        Social History     Social History    Marital status: Single     Spouse name: N/A    Number of children: N/A    Years of education: N/A     Occupational History    Not on file.     Social History Main Topics    Smoking status: Never Smoker    Smokeless tobacco: Never Used    Alcohol use Yes    Drug use: No    Sexual activity: Yes     Partners: Male     Birth control/ protection: None     Other Topics Concern    Not on file     Social History Narrative    No narrative on file       OB HISTORY: Number of vaginal deliveries:1.      COMPREHENSIVE GYN HISTORY:  PAP History: Reports abnormal Paps. 2010 PAP 3-1-16 ASCUS + HR HPV. Treatment Colposcopy: mild dysplasia, ECC neg. LAST PAP 6-20-17 ASCUS + HR HPV. Treatment: Colposcopy   Infection History: Reports STDs: HPV. Denies PID.  Benign History: Denies uterine fibroids. Denies ovarian cysts. Denies endometriosis. Denies other conditions.  Cancer History: Denies cervical cancer. Denies uterine cancer or hyperplasia. Denies ovarian cancer. Denies vulvar cancer or pre-cancer. Denies vaginal cancer or pre-cancer. Denies breast cancer. Denies colon cancer.  Sexual Activity History:  "Reports currently being sexually active  Menstrual History: Denies menses.   Dysmenorrhea History: Denies dysmenorrhea.   Contraception: Mirena IUD 2-26-15 () and 12-19-17 ().       ROS:  GENERAL: No weight changes. No swelling. No fatigue. No fever.  CARDIOVASCULAR: No chest pain. No shortness of breath. No leg cramps.   NEUROLOGICAL: No headaches. No vision changes.  BREASTS: No pain. No lumps. No discharge.  ABDOMEN: No pain. No nausea. No vomiting. No diarrhea. No constipation.  REPRODUCTIVE: No abnormal bleeding.   VULVA: No pain. No lesions. No itching.  VAGINA: No relaxation. No itching. No odor. No discharge. No lesions.  URINARY: No incontinence. No nocturia. No frequency. No dysuria.    /88   Ht 5' 3" (1.6 m)   Wt 54 kg (119 lb 0.8 oz)   BMI 21.09 kg/m²     PE:  APPEARANCE: Well nourished, well developed, in no acute distress.  AFFECT: WNL, alert and oriented x 3.  SKIN: No acne or hirsutism.  NECK: Neck symmetric, without masses or thyromegaly.  NODES: No inguinal, cervical, axillary or femoral lymph node enlargement.  CHEST: Good respiratory effort.   ABDOMEN: Soft. No tenderness or masses.   BREASTS: Symmetrical, no skin changes, visible lesions, palpable masses or nipple discharge bilaterally.  PELVIC: External female genitalia without lesions.  Female hair distribution. Adequate perineal body, Normal urethral meatus. Vagina moist and well rugated without lesions or discharge.  No significant cystocele or rectocele present. Cervix pink without lesions, discharge or tenderness. IUD STRINGS VISUALIZED AT OS. Uterus is 4-6 week size, regular, mobile and nontender. Adnexa without masses or tenderness.  EXTREMITIES: No edema    DIAGNOSIS:  1. Well woman exam with routine gynecological exam    2. Intrauterine device surveillance    3. History of abnormal cervical Pap smear    4. Screening for STDs (sexually transmitted diseases)        PLAN:    Orders Placed This Encounter    HPV High Risk " Genotypes, PCR    C. trachomatis/N. gonorrhoeae by AMP DNA Cervix    Liquid-based pap smear, screening       COUNSELING:  The patient was counseled today on:  -IUD danger signs;  -A.C.S. Pap and pelvic exam guidelines (pap every 3 years pending this pap result), recomendations for yearly mammogram;  -to follow up with her PCP for other health maintenance.    FOLLOW-UP with me pending test results and annually.

## 2018-07-22 LAB
C TRACH DNA SPEC QL NAA+PROBE: NOT DETECTED
N GONORRHOEA DNA SPEC QL NAA+PROBE: NOT DETECTED

## 2018-07-24 ENCOUNTER — PATIENT MESSAGE (OUTPATIENT)
Dept: OBSTETRICS AND GYNECOLOGY | Facility: CLINIC | Age: 33
End: 2018-07-24

## 2018-07-24 ENCOUNTER — HOSPITAL ENCOUNTER (EMERGENCY)
Facility: HOSPITAL | Age: 33
Discharge: HOME | End: 2018-07-24
Attending: EMERGENCY MEDICINE
Payer: MEDICAID

## 2018-07-24 VITALS
DIASTOLIC BLOOD PRESSURE: 97 MMHG | SYSTOLIC BLOOD PRESSURE: 172 MMHG | TEMPERATURE: 98 F | BODY MASS INDEX: 21.64 KG/M2 | HEIGHT: 63 IN | WEIGHT: 122.13 LBS | RESPIRATION RATE: 18 BRPM | HEART RATE: 78 BPM | OXYGEN SATURATION: 100 %

## 2018-07-24 DIAGNOSIS — I10 HYPERTENSION: ICD-10-CM

## 2018-07-24 DIAGNOSIS — N89.8 VAGINAL DISCHARGE: Primary | ICD-10-CM

## 2018-07-24 DIAGNOSIS — F41.9 ANXIETY: Primary | ICD-10-CM

## 2018-07-24 LAB
ALBUMIN SERPL BCP-MCNC: 3.8 G/DL
ALP SERPL-CCNC: 45 U/L
ALT SERPL W/O P-5'-P-CCNC: 13 U/L
ANION GAP SERPL CALC-SCNC: 8 MMOL/L
AST SERPL-CCNC: 19 U/L
BACTERIA #/AREA URNS AUTO: NORMAL /HPF
BASOPHILS # BLD AUTO: 0.04 K/UL
BASOPHILS NFR BLD: 0.6 %
BILIRUB SERPL-MCNC: 0.8 MG/DL
BILIRUB UR QL STRIP: NEGATIVE
BUN SERPL-MCNC: 10 MG/DL
CALCIUM SERPL-MCNC: 9.3 MG/DL
CHLORIDE SERPL-SCNC: 105 MMOL/L
CLARITY UR REFRACT.AUTO: CLEAR
CO2 SERPL-SCNC: 25 MMOL/L
COLOR UR AUTO: ABNORMAL
CREAT SERPL-MCNC: 0.8 MG/DL
DIFFERENTIAL METHOD: ABNORMAL
EOSINOPHIL # BLD AUTO: 0 K/UL
EOSINOPHIL NFR BLD: 0.6 %
ERYTHROCYTE [DISTWIDTH] IN BLOOD BY AUTOMATED COUNT: 12.3 %
EST. GFR  (AFRICAN AMERICAN): >60 ML/MIN/1.73 M^2
EST. GFR  (NON AFRICAN AMERICAN): >60 ML/MIN/1.73 M^2
GLUCOSE SERPL-MCNC: 152 MG/DL
GLUCOSE UR QL STRIP: ABNORMAL
HCT VFR BLD AUTO: 34.6 %
HGB BLD-MCNC: 11.8 G/DL
HGB UR QL STRIP: ABNORMAL
IMM GRANULOCYTES # BLD AUTO: 0.01 K/UL
IMM GRANULOCYTES NFR BLD AUTO: 0.2 %
KETONES UR QL STRIP: NEGATIVE
LEUKOCYTE ESTERASE UR QL STRIP: NEGATIVE
LYMPHOCYTES # BLD AUTO: 2.8 K/UL
LYMPHOCYTES NFR BLD: 42.7 %
MCH RBC QN AUTO: 30.9 PG
MCHC RBC AUTO-ENTMCNC: 34.1 G/DL
MCV RBC AUTO: 91 FL
MICROSCOPIC COMMENT: NORMAL
MONOCYTES # BLD AUTO: 0.6 K/UL
MONOCYTES NFR BLD: 9.4 %
NEUTROPHILS # BLD AUTO: 3 K/UL
NEUTROPHILS NFR BLD: 46.5 %
NITRITE UR QL STRIP: NEGATIVE
NRBC BLD-RTO: 0 /100 WBC
PH UR STRIP: 7 [PH] (ref 5–8)
PLATELET # BLD AUTO: 237 K/UL
PMV BLD AUTO: 11.4 FL
POTASSIUM SERPL-SCNC: 3.5 MMOL/L
PROT SERPL-MCNC: 7.5 G/DL
PROT UR QL STRIP: NEGATIVE
RBC # BLD AUTO: 3.82 M/UL
RBC #/AREA URNS AUTO: 3 /HPF (ref 0–4)
SODIUM SERPL-SCNC: 138 MMOL/L
SP GR UR STRIP: 1 (ref 1–1.03)
SQUAMOUS #/AREA URNS AUTO: 1 /HPF
URN SPEC COLLECT METH UR: ABNORMAL
UROBILINOGEN UR STRIP-ACNC: NEGATIVE EU/DL
WBC # BLD AUTO: 6.51 K/UL
WBC #/AREA URNS AUTO: 0 /HPF (ref 0–5)

## 2018-07-24 PROCEDURE — 99283 EMERGENCY DEPT VISIT LOW MDM: CPT | Mod: ,,, | Performed by: PHYSICIAN ASSISTANT

## 2018-07-24 PROCEDURE — 93005 ELECTROCARDIOGRAM TRACING: CPT

## 2018-07-24 PROCEDURE — 93010 ELECTROCARDIOGRAM REPORT: CPT | Mod: ,,, | Performed by: INTERNAL MEDICINE

## 2018-07-24 PROCEDURE — 96360 HYDRATION IV INFUSION INIT: CPT

## 2018-07-24 PROCEDURE — 81001 URINALYSIS AUTO W/SCOPE: CPT

## 2018-07-24 PROCEDURE — 25000003 PHARM REV CODE 250: Performed by: PHYSICIAN ASSISTANT

## 2018-07-24 PROCEDURE — 85025 COMPLETE CBC W/AUTO DIFF WBC: CPT

## 2018-07-24 PROCEDURE — 99284 EMERGENCY DEPT VISIT MOD MDM: CPT | Mod: 25

## 2018-07-24 PROCEDURE — 80053 COMPREHEN METABOLIC PANEL: CPT

## 2018-07-24 RX ORDER — BUSPIRONE HYDROCHLORIDE 15 MG/1
15 TABLET ORAL 3 TIMES DAILY
COMMUNITY
End: 2019-02-07

## 2018-07-24 RX ORDER — OMEPRAZOLE 40 MG/1
40 CAPSULE, DELAYED RELEASE ORAL
Status: ON HOLD | COMMUNITY
End: 2024-03-12 | Stop reason: SDUPTHER

## 2018-07-24 RX ORDER — PROPRANOLOL HYDROCHLORIDE 10 MG/1
20 TABLET ORAL
Status: COMPLETED | OUTPATIENT
Start: 2018-07-24 | End: 2018-07-24

## 2018-07-24 RX ORDER — AMLODIPINE BESYLATE 2.5 MG/1
2.5 TABLET ORAL DAILY
COMMUNITY
End: 2020-10-20

## 2018-07-24 RX ORDER — PROPRANOLOL HYDROCHLORIDE 20 MG/1
10 TABLET ORAL 2 TIMES DAILY
COMMUNITY
End: 2020-10-20

## 2018-07-24 RX ORDER — METRONIDAZOLE 500 MG/1
500 TABLET ORAL 2 TIMES DAILY
Qty: 14 TABLET | Refills: 1 | Status: SHIPPED | OUTPATIENT
Start: 2018-07-24 | End: 2018-07-31

## 2018-07-24 RX ADMIN — SODIUM CHLORIDE 1000 ML: 0.9 INJECTION, SOLUTION INTRAVENOUS at 03:07

## 2018-07-24 RX ADMIN — PROPRANOLOL HYDROCHLORIDE 20 MG: 10 TABLET ORAL at 02:07

## 2018-07-24 NOTE — DISCHARGE INSTRUCTIONS
Follow-up with your primary care doctor  Take all of your prescribed medications including propanolol and Norvasc as directed  Return to the ER for any new symptoms      Our goal in the emergency department is to always give you outstanding care and exceptional service. You may receive a survey by mail or e-mail in the next week regarding your experience in our ED. We would greatly appreciate your completing and returning the survey. Your feedback provides us with a way to recognize our staff who give very good care and it helps us learn how to improve when your experience was below our aspiration of excellence.

## 2018-07-24 NOTE — ED PROVIDER NOTES
"Encounter Date: 7/24/2018       History     Chief Complaint   Patient presents with    Hypertension     pt states her "pressure is high" and her left arm feels numb/tingling      32-year-old female presents to the ER for evaluation high blood pressure and tachycardia.  Patient reports similar presentation earlier this year at outside facility, [facility].  At that time she was started on Norvasc and Inderal, 10 mg and 10 mg respectively.  Since then her Norvasc has been decreased to 2.5 but her Inderal is still at 10.  She reports noncompliance with both these medications.  She is also supposed to be taking buspirone 15 mg which she is not.     Patient reports earlier today headache for which she took Fioricet with some improvement.  She noticed that her blood pressure was elevated this afternoon and subsequently continued to check it through the evening and the blood pressure continued to elevate.  She became more concerned about this prompting the visit to the ER.  Upon arrival the patient's blood pressure is elevated in the 180s and her heart rate in the 120s.     On my initial assessment the patient appears well and nontoxic.  She does endorse anxiety and endorses a panic attack earlier today.  She reports improvement in her headache.  She has no chest pain or shortness of breath at this time.          Review of patient's allergies indicates:  No Known Allergies  Past Medical History:   Diagnosis Date    Abnormal cervical Papanicolaou smear 2010, 2016, 2017    Colposcopy    Anxiety     GERD (gastroesophageal reflux disease)     Hypertension     Scoliosis      History reviewed. No pertinent surgical history.  Family History   Problem Relation Age of Onset    Breast cancer Neg Hx     Colon cancer Neg Hx     Ovarian cancer Neg Hx      Social History   Substance Use Topics    Smoking status: Never Smoker    Smokeless tobacco: Never Used    Alcohol use Yes     Review of Systems "   Constitutional: Negative for fever.   HENT: Negative for sore throat.    Respiratory: Negative for shortness of breath.    Cardiovascular: Positive for palpitations. Negative for chest pain.   Gastrointestinal: Negative for nausea.   Genitourinary: Negative for dysuria.   Musculoskeletal: Negative for back pain.   Skin: Negative for rash.   Neurological: Positive for headaches. Negative for weakness.   Hematological: Does not bruise/bleed easily.   Psychiatric/Behavioral: Negative for suicidal ideas. The patient is nervous/anxious.        Physical Exam     Initial Vitals [07/24/18 0152]   BP Pulse Resp Temp SpO2   (!) 189/107 (!) 127 19 98.3 °F (36.8 °C) 100 %      MAP       --         Physical Exam    Constitutional: Vital signs are normal. She appears well-developed and well-nourished. She is not diaphoretic. No distress.   HENT:   Head: Normocephalic and atraumatic.   Right Ear: External ear normal.   Eyes: Conjunctivae are normal.   Cardiovascular: Regular rhythm and normal heart sounds. Tachycardia present.  Exam reveals no gallop and no friction rub.    No murmur heard.  Pulmonary/Chest: No respiratory distress. She has no wheezes. She has no rhonchi. She has no rales. She exhibits no tenderness.   Abdominal: Soft. Normal appearance and bowel sounds are normal.   Musculoskeletal: Normal range of motion.   Neurological: She is alert and oriented to person, place, and time.   Skin: Skin is warm and intact.   Psychiatric: She has a normal mood and affect. Her speech is normal and behavior is normal. Cognition and memory are normal.         ED Course   Procedures  Labs Reviewed   CBC W/ AUTO DIFFERENTIAL   COMPREHENSIVE METABOLIC PANEL   URINALYSIS, REFLEX TO URINE CULTURE   POCT URINE PREGNANCY          Imaging Results    None          Medical Decision Making:   ED Management:  32-year-old female with high blood pressure and tachycardia and anxiety  I suspect symptoms are secondary to medication noncompliance  and anxiety induced  Symptoms much improved in the ED.  Heart rate improved to the 70s.  Blood pressure is still elevated at 170  Patient no longer has a headache  Plan:  Discharge home for the patient to take her routine medications as directed and follow up with her primary care doctor                      Clinical Impression:   The primary encounter diagnosis was Anxiety. A diagnosis of Hypertension was also pertinent to this visit.      Disposition:   Disposition: Discharged  Condition: Stable                        Mike Lorenzo PA-C  07/24/18 0609

## 2018-07-24 NOTE — PROVIDER PROGRESS NOTES - EMERGENCY DEPT.
Encounter Date: 7/24/2018    ED Physician Progress Notes        Physician Note:   I, Betina Shaikh, am scribing for, and in the presence of, Dr. Lopez. I performed the above scribed service and the documentation accurately describes the services I performed. I attest to the accuracy of the note.      EKG - STEMI Decision  Initial Reading: No STEMI present.

## 2018-07-24 NOTE — ED TRIAGE NOTES
"Madyson Calle, a 32 y.o. female presents to the ED complaining of high blood pressure and left arm tingling and numbness. Patient says she was recently diagnosed with anxiety and htn and prescribed medications. She is awake, alert and oriented. Respirations are even and unlabored with no  Acute distress noted.       Chief Complaint   Patient presents with    Hypertension     pt states her "pressure is high" and her left arm feels numb/tingling      Review of patient's allergies indicates:  No Known Allergies  Past Medical History:   Diagnosis Date    Abnormal cervical Papanicolaou smear 2010, 2016, 2017    Colposcopy    Scoliosis      "

## 2018-07-26 LAB
HPV HR 12 DNA CVX QL NAA+PROBE: NEGATIVE
HPV16 AG SPEC QL: NEGATIVE
HPV18 DNA SPEC QL NAA+PROBE: NEGATIVE

## 2019-02-07 ENCOUNTER — HOSPITAL ENCOUNTER (EMERGENCY)
Facility: HOSPITAL | Age: 34
Discharge: HOME | End: 2019-02-07
Attending: EMERGENCY MEDICINE
Payer: MEDICAID

## 2019-02-07 VITALS
OXYGEN SATURATION: 100 % | TEMPERATURE: 99 F | HEIGHT: 63 IN | RESPIRATION RATE: 16 BRPM | BODY MASS INDEX: 20.38 KG/M2 | SYSTOLIC BLOOD PRESSURE: 188 MMHG | HEART RATE: 67 BPM | WEIGHT: 115 LBS | DIASTOLIC BLOOD PRESSURE: 118 MMHG

## 2019-02-07 DIAGNOSIS — S60.00XA CONTUSION OF FINGER OF RIGHT HAND, INITIAL ENCOUNTER: Primary | ICD-10-CM

## 2019-02-07 DIAGNOSIS — M79.643 HAND PAIN: ICD-10-CM

## 2019-02-07 PROCEDURE — 99284 EMERGENCY DEPT VISIT MOD MDM: CPT | Mod: ,,, | Performed by: EMERGENCY MEDICINE

## 2019-02-07 PROCEDURE — 99284 PR EMERGENCY DEPT VISIT,LEVEL IV: ICD-10-PCS | Mod: ,,, | Performed by: EMERGENCY MEDICINE

## 2019-02-07 PROCEDURE — 25000003 PHARM REV CODE 250: Performed by: EMERGENCY MEDICINE

## 2019-02-07 PROCEDURE — 99283 EMERGENCY DEPT VISIT LOW MDM: CPT

## 2019-02-07 RX ORDER — IBUPROFEN 400 MG/1
800 TABLET ORAL
Status: COMPLETED | OUTPATIENT
Start: 2019-02-07 | End: 2019-02-07

## 2019-02-07 RX ADMIN — IBUPROFEN 800 MG: 400 TABLET, FILM COATED ORAL at 05:02

## 2019-02-07 NOTE — DISCHARGE INSTRUCTIONS
Take tylenol or motrin if needed for pain.  Rest and ice as needed for comfort  Follow up with your doctor.  Return to ED for worsening pain or any other concerns.

## 2019-02-07 NOTE — ED NOTES
Patient identifiers verified and correct for Ms Chugach  C/C Pain to right hand   APPEARANCE: awake and alert in NAD.  SKIN: warm, dry and intact. No breakdown or bruising.  MUSCULOSKELETAL: Patient moving all extremities spontaneously, no obvious swelling or deformities noted. Ambulates independently.  RESPIRATORY: Denies shortness of breath.Respirations unlabored.   CARDIAC: Denies CP, 2+ distal pulses; no peripheral edema  ABDOMEN: S/ND/NT, Denies nausea  : voids spontaneously, denies difficulty  Neurologic: AAO x 4; follows commands equal strength in all extremities; denies numbness/tingling. Denies dizziness Denies weakness, Positive sensation, mvmt and radial pulses.

## 2019-02-07 NOTE — ED TRIAGE NOTES
"Patient states pain to right hand after "play fighting" yesterday, main concern of "stiffness" to 4th and 5th finger.  "

## 2019-02-07 NOTE — ED PROVIDER NOTES
"Encounter Date: 2/7/2019    SCRIBE #1 NOTE: I, Son Leslee, am scribing for, and in the presence of,  Dr. Fu. I have scribed the following portions of the note - Other sections scribed: MADAN BELL .       History     Chief Complaint   Patient presents with    Hand Pain     right hand pain after "play fighting"     Time patient was seen by the provider: 4:17 PM      The patient is a 33 y.o. female with past medical history of anxiety, HTN, scoliosis who presents to the ED with a complaint of right hand pain. Patient states that on Tuesday night she was "play fighting" and accidentally punched someone's elbow. She states that she didn't feel any pain at that time; however, the following day she woke up with severe pain to her hand. She denies any swelling or bruises to the area, but notes that it has been stiff since then. Denies headache or decreased sensation.      The history is provided by the patient and medical records.     Review of patient's allergies indicates:  No Known Allergies  Past Medical History:   Diagnosis Date    Abnormal cervical Papanicolaou smear 2010, 2016, 2017    Colposcopy    Anxiety     Anxiety     GERD (gastroesophageal reflux disease)     Hypertension     Scoliosis      History reviewed. No pertinent surgical history.  Family History   Problem Relation Age of Onset    Breast cancer Neg Hx     Colon cancer Neg Hx     Ovarian cancer Neg Hx      Social History     Tobacco Use    Smoking status: Never Smoker    Smokeless tobacco: Never Used   Substance Use Topics    Alcohol use: Yes     Comment: frequent,3-4 times per week.     Drug use: No     Review of Systems   Constitutional: Negative for chills and fever.   HENT: Negative for congestion.    Eyes: Negative for visual disturbance.   Respiratory: Negative for shortness of breath.    Cardiovascular: Negative for chest pain.   Gastrointestinal: Negative for diarrhea, nausea and vomiting.   Genitourinary: Negative for dysuria. "   Musculoskeletal:        +right hand pain    Skin: Negative for rash.   Neurological: Negative for headaches.       Physical Exam     Initial Vitals [02/07/19 1607]   BP Pulse Resp Temp SpO2   (!) 195/117 89 18 98.3 °F (36.8 °C) 100 %      MAP       --         Physical Exam    Nursing note and vitals reviewed.  Constitutional: She appears well-developed and well-nourished. No distress.   HENT:   Head: Normocephalic and atraumatic.   Eyes: EOM are normal. Pupils are equal, round, and reactive to light.   Neck: Normal range of motion. Neck supple.   Cardiovascular: Normal rate, regular rhythm, normal heart sounds and intact distal pulses.   Pulmonary/Chest: Breath sounds normal. No respiratory distress.   Abdominal: Soft. There is no tenderness.   Musculoskeletal: She exhibits tenderness. She exhibits no edema.   Tenderness to palpation to the right distal 5th metacarpal, no ecchymosis, no deformity   No rotation    Neurological: She is alert and oriented to person, place, and time. She has normal strength. No sensory deficit. GCS score is 15. GCS eye subscore is 4. GCS verbal subscore is 5. GCS motor subscore is 6.   Sensation intact    Skin: Skin is warm and dry. No ecchymosis noted.         ED Course   Procedures  Labs Reviewed - No data to display       Imaging Results          X-Ray Hand 2 View Right (Final result)  Result time 02/07/19 17:54:56    Final result by Mike Gutierrez MD (02/07/19 17:54:56)                 Impression:      1. No acute displaced fracture or dislocation of the hand.      Electronically signed by: Mike Gutierrez MD  Date:    02/07/2019  Time:    17:54             Narrative:    EXAMINATION:  XR HAND 2 VIEW RIGHT    CLINICAL HISTORY:  Pain in unspecified hand    COMPARISON:  None    FINDINGS:  Two views.    No acute displaced fracture or dislocation of the hand.  No radiopaque foreign body.                              X-Rays:   Independently Interpreted Readings:   Other  Readings:  X-ray right hand no acute fracture    Medical Decision Making:   History:   Old Medical Records: I decided to obtain old medical records.  Initial Assessment:   34 y/o F Rt hand dominant with pain after punching and elbow.  Ddx: boxers fracture, fracture, contusion, strain    Markedly elevated BP- pt asymptomatic. Follow up PCP for BP check in 1 week.  Independently Interpreted Test(s):   I have ordered and independently interpreted X-rays - see prior notes.  Clinical Tests:   Radiological Study: Ordered and Reviewed            Scribe Attestation:   Scribe #1: I performed the above scribed service and the documentation accurately describes the services I performed. I attest to the accuracy of the note.    Attending Attestation:             Attending ED Notes:   5:58 PM   X-ray right hand showed no acute fracture. Will discharge home.              Clinical Impression:   The primary encounter diagnosis was Contusion of finger of right hand, initial encounter. A diagnosis of Hand pain was also pertinent to this visit.      Disposition:   Disposition: Discharged  Condition: Stable                        Ada Fu MD  02/08/19 0225

## 2019-05-08 ENCOUNTER — PATIENT MESSAGE (OUTPATIENT)
Dept: OBSTETRICS AND GYNECOLOGY | Facility: CLINIC | Age: 34
End: 2019-05-08

## 2019-05-09 ENCOUNTER — OFFICE VISIT (OUTPATIENT)
Dept: OBSTETRICS AND GYNECOLOGY | Facility: CLINIC | Age: 34
End: 2019-05-09
Payer: MEDICAID

## 2019-05-09 VITALS
SYSTOLIC BLOOD PRESSURE: 151 MMHG | BODY MASS INDEX: 20.73 KG/M2 | WEIGHT: 117 LBS | HEIGHT: 63 IN | DIASTOLIC BLOOD PRESSURE: 107 MMHG

## 2019-05-09 DIAGNOSIS — N89.8 VAGINAL DISCHARGE: Primary | ICD-10-CM

## 2019-05-09 DIAGNOSIS — Z11.3 SCREENING FOR STDS (SEXUALLY TRANSMITTED DISEASES): ICD-10-CM

## 2019-05-09 LAB
C TRACH DNA SPEC QL NAA+PROBE: NOT DETECTED
N GONORRHOEA DNA SPEC QL NAA+PROBE: NOT DETECTED

## 2019-05-09 PROCEDURE — 99213 OFFICE O/P EST LOW 20 MIN: CPT | Mod: S$PBB,,, | Performed by: NURSE PRACTITIONER

## 2019-05-09 PROCEDURE — 99213 PR OFFICE/OUTPT VISIT, EST, LEVL III, 20-29 MIN: ICD-10-PCS | Mod: S$PBB,,, | Performed by: NURSE PRACTITIONER

## 2019-05-09 PROCEDURE — 99213 OFFICE O/P EST LOW 20 MIN: CPT | Mod: PBBFAC | Performed by: NURSE PRACTITIONER

## 2019-05-09 PROCEDURE — 87491 CHLMYD TRACH DNA AMP PROBE: CPT

## 2019-05-09 PROCEDURE — 87510 GARDNER VAG DNA DIR PROBE: CPT

## 2019-05-09 PROCEDURE — 99999 PR PBB SHADOW E&M-EST. PATIENT-LVL III: CPT | Mod: PBBFAC,,, | Performed by: NURSE PRACTITIONER

## 2019-05-09 PROCEDURE — 87480 CANDIDA DNA DIR PROBE: CPT

## 2019-05-09 PROCEDURE — 99999 PR PBB SHADOW E&M-EST. PATIENT-LVL III: ICD-10-PCS | Mod: PBBFAC,,, | Performed by: NURSE PRACTITIONER

## 2019-05-09 RX ORDER — FLUCONAZOLE 150 MG/1
TABLET ORAL
Qty: 2 TABLET | Refills: 4 | Status: SHIPPED | OUTPATIENT
Start: 2019-05-09 | End: 2019-08-15

## 2019-05-09 NOTE — PROGRESS NOTES
"HISTORY OF PRESENT ILLNESS:    Madyson Calle is a 33 y.o. female  No LMP recorded. (Menstrual status: Birth Control). (has Mirena IUD ) presents today complaining of vaginal infection.  -Began itching with white vaginal d/c after intercourse with latex condom, so thinks this is the trigger because has not used any other vulvovaginal irritants.  -Denies associated fever, odor, UTI symptoms, bleeding.   -Vagisil helps.   -Reports left mid quadrant and flank pain with constipation and has an abdominal US scheduled.  -Did not take her BP medication today.    Past Medical History:   Diagnosis Date    Abnormal cervical Papanicolaou smear , , 2017    Colposcopy    Anxiety     Anxiety     GERD (gastroesophageal reflux disease)     Hypertension     Scoliosis        PAST SURGICAL HISTORY:  History reviewed. No pertinent surgical history.    MEDICATIONS AND ALLERGIES:      Current Outpatient Medications:     amLODIPine (NORVASC) 2.5 MG tablet, Take 2.5 mg by mouth once daily., Disp: , Rfl:     fluconazole (DIFLUCAN) 150 MG Tab, Take one tablet by mouth once and may retreat in 3 days, Disp: 2 tablet, Rfl: 4    omeprazole (PRILOSEC) 40 MG capsule, Take 40 mg by mouth once daily., Disp: , Rfl:     propranolol (INDERAL) 20 MG tablet, Take 10 mg by mouth 2 (two) times daily., Disp: , Rfl:     Review of patient's allergies indicates:  No Known Allergies    OB HISTORY: Number of vaginal deliveries:1.      COMPREHENSIVE GYN HISTORY:  PAP History: Reports abnormal Paps.   PAP   Unknown result "abnormal"  PAP 3-1-16 ASCUS + HR HPV. Colposcopy: Bx PHYLLIS 1, ECC neg.   PAP 6-20-17 ASCUS + HR HPV. Treatment: Colposcopy: Bx neg, ECC CIN1   PAP 7-19-18 NORMAL.  Infection History: Reports STDs: HPV. Denies PID.  Benign History: Denies uterine fibroids. Denies ovarian cysts. Denies endometriosis. Denies other conditions.  Cancer History: Denies cervical cancer. Denies uterine cancer or hyperplasia. Denies ovarian " cancer. Denies vulvar cancer or pre-cancer. Denies vaginal cancer or pre-cancer. Denies breast cancer. Denies colon cancer.  Sexual Activity History: Reports currently being sexually active  Menstrual History: Denies menses.   Dysmenorrhea History: Denies dysmenorrhea.   Contraception: Mirena IUD 2-26-15 () and 12-19-17 ().    ROS:  GENERAL: No fever or chills.  ABDOMEN: + PAIN. No nausea. No vomiting. No diarrhea. + CONSTIPATION.  REPRODUCTIVE: No abnormal bleeding.   VULVA: No pain. No lesions. + ITCHING.  VAGINA: No relaxation. No itching. No odor. + D/C. No lesions.  URINARY: No incontinence. No nocturia. No frequency. No dysuria.    PE:  APPEARANCE: Well nourished, well developed, in no acute distress.  AFFECT: WNL, alert and oriented x 3.  PELVIC: ERYTHEMATOUS external female genitalia without lesions. Normal hair distribution. Adequate perineal body, normal urethral meatus. Vagina pink and well rugated without lesions. WHITE CLUMPY D/C present. Cervix pink without lesions, discharge or tenderness. IUD STRINGS VISUALIZED AT OS. No significant cystocele or rectocele. Bimanual exam shows uterus to be 4-6 weeks size, regular, mobile and nontender. Adnexa without masses or tenderness.    DIAGNOSIS:  1. Vaginal discharge    2. Screening for STDs (sexually transmitted diseases)        PLAN:    Orders Placed This Encounter    C. trachomatis/N. gonorrhoeae by AMP DNA    Vaginosis Screen by DNA Probe    fluconazole (DIFLUCAN) 150 MG Tab       COUNSELING:  The patient was counseled today on:  -vaginitis and STDs and prevention same as note 1-31-15;  -Diflucan use and potential side effects.    FOLLOW-UP with me pending test results.

## 2019-05-10 LAB
BACTERIAL VAGINOSIS DNA: NEGATIVE
CANDIDA GLABRATA DNA: NEGATIVE
CANDIDA KRUSEI DNA: NEGATIVE
CANDIDA RRNA VAG QL PROBE: POSITIVE
T VAGINALIS RRNA GENITAL QL PROBE: NEGATIVE

## 2019-05-30 ENCOUNTER — PATIENT MESSAGE (OUTPATIENT)
Dept: OBSTETRICS AND GYNECOLOGY | Facility: CLINIC | Age: 34
End: 2019-05-30

## 2019-05-30 RX ORDER — TERCONAZOLE 8 MG/G
1 CREAM VAGINAL NIGHTLY
Qty: 20 G | Refills: 0 | Status: SHIPPED | OUTPATIENT
Start: 2019-05-30 | End: 2019-06-26 | Stop reason: SDUPTHER

## 2019-06-18 RX ORDER — METRONIDAZOLE 500 MG/1
500 TABLET ORAL 2 TIMES DAILY
Qty: 14 TABLET | Refills: 1 | Status: SHIPPED | OUTPATIENT
Start: 2019-06-18 | End: 2019-06-25

## 2019-06-27 RX ORDER — TERCONAZOLE 8 MG/G
CREAM VAGINAL
Qty: 20 G | Refills: 0 | Status: SHIPPED | OUTPATIENT
Start: 2019-06-27 | End: 2019-08-15

## 2019-08-15 ENCOUNTER — OFFICE VISIT (OUTPATIENT)
Dept: OBSTETRICS AND GYNECOLOGY | Facility: CLINIC | Age: 34
End: 2019-08-15
Payer: MEDICAID

## 2019-08-15 VITALS
SYSTOLIC BLOOD PRESSURE: 137 MMHG | WEIGHT: 118.63 LBS | DIASTOLIC BLOOD PRESSURE: 100 MMHG | BODY MASS INDEX: 21.83 KG/M2 | HEIGHT: 62 IN

## 2019-08-15 DIAGNOSIS — Z01.419 WELL WOMAN EXAM WITH ROUTINE GYNECOLOGICAL EXAM: Primary | ICD-10-CM

## 2019-08-15 DIAGNOSIS — Z30.431 INTRAUTERINE DEVICE SURVEILLANCE: ICD-10-CM

## 2019-08-15 DIAGNOSIS — N89.8 VAGINAL DISCHARGE: ICD-10-CM

## 2019-08-15 DIAGNOSIS — Z87.42 HISTORY OF ABNORMAL CERVICAL PAP SMEAR: ICD-10-CM

## 2019-08-15 PROCEDURE — 99213 OFFICE O/P EST LOW 20 MIN: CPT | Mod: PBBFAC | Performed by: NURSE PRACTITIONER

## 2019-08-15 PROCEDURE — 87624 HPV HI-RISK TYP POOLED RSLT: CPT

## 2019-08-15 PROCEDURE — 99999 PR PBB SHADOW E&M-EST. PATIENT-LVL III: ICD-10-PCS | Mod: PBBFAC,,, | Performed by: NURSE PRACTITIONER

## 2019-08-15 PROCEDURE — 87481 CANDIDA DNA AMP PROBE: CPT | Mod: 59

## 2019-08-15 PROCEDURE — 87661 TRICHOMONAS VAGINALIS AMPLIF: CPT

## 2019-08-15 PROCEDURE — 99395 PREV VISIT EST AGE 18-39: CPT | Mod: S$PBB,,, | Performed by: NURSE PRACTITIONER

## 2019-08-15 PROCEDURE — 99999 PR PBB SHADOW E&M-EST. PATIENT-LVL III: CPT | Mod: PBBFAC,,, | Performed by: NURSE PRACTITIONER

## 2019-08-15 PROCEDURE — 99395 PR PREVENTIVE VISIT,EST,18-39: ICD-10-PCS | Mod: S$PBB,,, | Performed by: NURSE PRACTITIONER

## 2019-08-15 PROCEDURE — 88175 CYTOPATH C/V AUTO FLUID REDO: CPT

## 2019-08-15 PROCEDURE — 87801 DETECT AGNT MULT DNA AMPLI: CPT

## 2019-08-15 RX ORDER — CITALOPRAM 10 MG/1
TABLET ORAL
Refills: 0 | COMMUNITY
Start: 2019-05-23 | End: 2022-07-15

## 2019-08-15 NOTE — PROGRESS NOTES
HISTORY OF PRESENT ILLNESS:    Madysno Calle is a 33 y.o. female, , No LMP recorded. (Menstrual status: Birth Control).,  presents for a routine exam and has persistent vaginal discharge.  -Since May, has been treated with Diflucan, Terazol cream and Metrogel and the odor and itching has resolved but the discharge is worse.   -Denies associated fever, pelvic or vaginal pain, itching, odor, UTI sx, AUB or use of vulvovaginal irritants.    Past Medical History:   Diagnosis Date    Abnormal cervical Papanicolaou smear , , 2017    Colposcopy    Anxiety     Anxiety     GERD (gastroesophageal reflux disease)     Hypertension     Scoliosis        History reviewed. No pertinent surgical history.    MEDICATIONS AND ALLERGIES:      Current Outpatient Medications:     amLODIPine (NORVASC) 2.5 MG tablet, Take 2.5 mg by mouth once daily., Disp: , Rfl:     citalopram (CELEXA) 10 MG tablet, TK 1 T PO  QD, Disp: , Rfl: 0    omeprazole (PRILOSEC) 40 MG capsule, Take 40 mg by mouth once daily., Disp: , Rfl:     propranolol (INDERAL) 20 MG tablet, Take 10 mg by mouth 2 (two) times daily., Disp: , Rfl:     boric acid (bulk) Powd, Insert one Gelatin Capsule filled with 600 mg of Boric Acid Powder H.S., Disp: 100 capsule, Rfl: 4    Review of patient's allergies indicates:  No Known Allergies    Family History   Problem Relation Age of Onset    Breast cancer Neg Hx     Colon cancer Neg Hx     Ovarian cancer Neg Hx        Social History     Socioeconomic History    Marital status: Single     Spouse name: Not on file    Number of children: Not on file    Years of education: Not on file    Highest education level: Not on file   Occupational History    Not on file   Social Needs    Financial resource strain: Not on file    Food insecurity:     Worry: Not on file     Inability: Not on file    Transportation needs:     Medical: Not on file     Non-medical: Not on file   Tobacco Use    Smoking status: Never  "Smoker    Smokeless tobacco: Never Used   Substance and Sexual Activity    Alcohol use: Yes     Comment: frequent,3-4 times per week.     Drug use: No    Sexual activity: Yes     Partners: Male     Birth control/protection: Condom   Lifestyle    Physical activity:     Days per week: Not on file     Minutes per session: Not on file    Stress: Not on file   Relationships    Social connections:     Talks on phone: Not on file     Gets together: Not on file     Attends Adventism service: Not on file     Active member of club or organization: Not on file     Attends meetings of clubs or organizations: Not on file     Relationship status: Not on file   Other Topics Concern    Not on file   Social History Narrative    Not on file       OB HISTORY: Number of vaginal deliveries:1.      COMPREHENSIVE GYN HISTORY:  PAP History: Reports abnormal Paps.   PAP 2010  Unknown result "abnormal"  PAP 3-1-16 ASCUS + HR HPV. Colposcopy: Bx PHYLLIS 1, ECC neg.   PAP 6-20-17 ASCUS + HR HPV. Treatment: Colposcopy: Bx neg, ECC CIN1   PAP 7-19-18 NORMAL.  Infection History: Reports STDs: HPV. Denies PID.  Benign History: Denies uterine fibroids. Denies ovarian cysts. Denies endometriosis. Denies other conditions.  Cancer History: Denies cervical cancer. Denies uterine cancer or hyperplasia. Denies ovarian cancer. Denies vulvar cancer or pre-cancer. Denies vaginal cancer or pre-cancer. Denies breast cancer. Denies colon cancer.  Sexual Activity History: Reports currently being sexually active  Menstrual History: Denies menses.   Dysmenorrhea History: Denies dysmenorrhea.   Contraception: Mirena IUD 2-26-15 () and 12-19-17 ().      ROS:  GENERAL: No weight changes. No swelling. No fatigue. No fever.  CARDIOVASCULAR: No chest pain. No shortness of breath. No leg cramps.   NEUROLOGICAL: No headaches. No vision changes.  BREASTS: No pain. No lumps. No discharge.  ABDOMEN: No pain. No nausea. No vomiting. No diarrhea. No " "constipation.  REPRODUCTIVE: No abnormal bleeding.   VULVA: No pain. No lesions. No itching.  VAGINA: No relaxation. No itching. No odor. + DISCHARGE. No lesions.  URINARY: No incontinence. No nocturia. No frequency. No dysuria.    BP (!) 137/100   Ht 5' 2" (1.575 m)   Wt 53.8 kg (118 lb 9.7 oz)   BMI 21.69 kg/m²   7-19-18 Wt 54 kg (119 lb 0.8 oz)     PE:  APPEARANCE: Well nourished, well developed, in no acute distress.  AFFECT: WNL, alert and oriented x 3.  SKIN: No acne or hirsutism.  NECK: Neck symmetric, without masses or thyromegaly.  NODES: No inguinal, cervical, axillary or femoral lymph node enlargement.  CHEST: Good respiratory effort.   ABDOMEN: Soft. No tenderness or masses.  BREASTS: Symmetrical, no skin changes, visible lesions, palpable masses or nipple discharge bilaterally.  PELVIC: External female genitalia without lesions.  Female hair distribution. Adequate perineal body, Normal urethral meatus. Vagina moist and well rugated without lesions. COPIOUS YELLOW ODORLESS CLUMPY D/C present. No significant cystocele or rectocele present. Cervix pink without lesions, discharge or tenderness. IUD STRINGS VISUALIZED AT OS.  Uterus is 8 week size, regular, mobile and nontender. Adnexa without masses or tenderness.  EXTREMITIES: No edema    DIAGNOSIS:  1. Well woman exam with routine gynecological exam    2. Intrauterine device surveillance    3. Vaginal discharge    4. History of abnormal cervical Pap smear        PLAN:    Orders Placed This Encounter    HPV High Risk Genotypes, PCR    Vaginosis Screen by DNA Probe    Liquid-based pap smear, screening    boric acid (bulk) Powd   Had negative Genprobe 5-9-19.    COUNSELING:  The patient was counseled today on:  -IUD danger signs;  -vaginitis and STD prevention same as note 1-31-15;  -pelvic rest until symptoms resolve;  -Boric Acid Vaginal Suppository use and potential side effects;  -A.C.S. Pap and pelvic exam guidelines (pap every 3 years), " recomendations for yearly mammogram;  -to follow up with her PCP for other health maintenance.    FOLLOW-UP with me pending test results and with Dr. Lee annually.

## 2019-08-20 RX ORDER — FLUCONAZOLE 200 MG/1
200 TABLET ORAL DAILY
Qty: 10 TABLET | Refills: 0 | Status: SHIPPED | OUTPATIENT
Start: 2019-08-20 | End: 2019-08-30

## 2019-10-28 ENCOUNTER — PATIENT MESSAGE (OUTPATIENT)
Dept: OBSTETRICS AND GYNECOLOGY | Facility: CLINIC | Age: 34
End: 2019-10-28

## 2019-10-28 DIAGNOSIS — N89.8 VAGINAL DISCHARGE: ICD-10-CM

## 2019-10-28 RX ORDER — METRONIDAZOLE 500 MG/1
500 TABLET ORAL 2 TIMES DAILY
Qty: 14 TABLET | Refills: 1 | Status: SHIPPED | OUTPATIENT
Start: 2019-10-28 | End: 2020-05-11

## 2019-11-11 ENCOUNTER — OCCUPATIONAL HEALTH (OUTPATIENT)
Dept: URGENT CARE | Facility: CLINIC | Age: 34
End: 2019-11-11

## 2019-11-11 DIAGNOSIS — Z02.83 ENCOUNTER FOR DRUG SCREENING: Primary | ICD-10-CM

## 2019-11-11 LAB
CTP QC/QA: YES
POC 5 PANEL DRUG SCREEN: NEGATIVE

## 2019-11-11 PROCEDURE — 80305 DRUG TEST PRSMV DIR OPT OBS: CPT | Mod: QW,S$GLB,, | Performed by: PREVENTIVE MEDICINE

## 2019-11-11 PROCEDURE — 80305 POCT RAPID DRUG SCREEN 5 PANEL: ICD-10-PCS | Mod: QW,S$GLB,, | Performed by: PREVENTIVE MEDICINE

## 2020-01-23 ENCOUNTER — OFFICE VISIT (OUTPATIENT)
Dept: URGENT CARE | Facility: CLINIC | Age: 35
End: 2020-01-23
Payer: MEDICAID

## 2020-01-23 VITALS
WEIGHT: 118 LBS | DIASTOLIC BLOOD PRESSURE: 98 MMHG | BODY MASS INDEX: 21.71 KG/M2 | OXYGEN SATURATION: 100 % | HEIGHT: 62 IN | SYSTOLIC BLOOD PRESSURE: 147 MMHG | HEART RATE: 84 BPM | RESPIRATION RATE: 16 BRPM | TEMPERATURE: 98 F

## 2020-01-23 DIAGNOSIS — R52 BODY ACHES: Primary | ICD-10-CM

## 2020-01-23 DIAGNOSIS — J11.1 FLU SYNDROME: ICD-10-CM

## 2020-01-23 LAB
CTP QC/QA: YES
FLUAV AG NPH QL: NEGATIVE
FLUBV AG NPH QL: NEGATIVE

## 2020-01-23 PROCEDURE — 87804 INFLUENZA ASSAY W/OPTIC: CPT | Mod: 59,QW,S$GLB, | Performed by: FAMILY MEDICINE

## 2020-01-23 PROCEDURE — 99213 OFFICE O/P EST LOW 20 MIN: CPT | Mod: 25,S$GLB,, | Performed by: FAMILY MEDICINE

## 2020-01-23 PROCEDURE — 87804 POCT INFLUENZA A/B: ICD-10-PCS | Mod: QW,S$GLB,, | Performed by: FAMILY MEDICINE

## 2020-01-23 PROCEDURE — 99213 PR OFFICE/OUTPT VISIT, EST, LEVL III, 20-29 MIN: ICD-10-PCS | Mod: 25,S$GLB,, | Performed by: FAMILY MEDICINE

## 2020-01-23 RX ORDER — OSELTAMIVIR PHOSPHATE 75 MG/1
75 CAPSULE ORAL 2 TIMES DAILY
Qty: 10 CAPSULE | Refills: 0 | Status: SHIPPED | OUTPATIENT
Start: 2020-01-23 | End: 2020-01-28

## 2020-01-23 RX ORDER — LORATADINE 10 MG/1
10 TABLET ORAL DAILY
Qty: 30 TABLET | Refills: 2 | Status: SHIPPED | OUTPATIENT
Start: 2020-01-23 | End: 2023-05-29

## 2020-01-23 NOTE — PROGRESS NOTES
"Subjective:       Patient ID: Madyson Calle is a 34 y.o. female.    Vitals:  height is 5' 2" (1.575 m) and weight is 53.5 kg (118 lb). Her oral temperature is 98.4 °F (36.9 °C). Her blood pressure is 147/98 (abnormal) and her pulse is 84. Her respiration is 16 and oxygen saturation is 100%.     Chief Complaint: Cough    34-year-old female with the complains of sore throat cough congestion body aching shoulder pain neck pain since yesterday no fever yet did not get the flu vaccine this season    Cough   This is a new problem. The current episode started yesterday. The problem has been gradually worsening. The problem occurs every few minutes. The cough is productive of sputum. Associated symptoms include chills, a fever and nasal congestion. Pertinent negatives include no ear pain, eye redness, hemoptysis, myalgias, postnasal drip, rash, sore throat, shortness of breath or wheezing. Nothing aggravates the symptoms. She has tried OTC cough suppressant (Theraflu) for the symptoms. The treatment provided moderate relief. Her past medical history is significant for pneumonia.       Constitution: Positive for chills, sweating, fatigue and fever.   HENT: Positive for sinus pressure. Negative for ear pain, congestion, postnasal drip, sinus pain, sore throat and voice change.    Neck: Negative for painful lymph nodes.   Eyes: Negative for eye redness.   Respiratory: Positive for cough and sputum production. Negative for chest tightness, bloody sputum, COPD, shortness of breath, stridor, wheezing and asthma.    Gastrointestinal: Negative for nausea and vomiting.   Musculoskeletal: Negative for muscle ache.   Skin: Negative for rash.   Allergic/Immunologic: Negative for seasonal allergies and asthma.   Neurological: Positive for dizziness.   Hematologic/Lymphatic: Negative for swollen lymph nodes.       Objective:      Physical Exam   Constitutional: She is oriented to person, place, and time. She appears well-developed and " well-nourished. She is cooperative.  Non-toxic appearance. She does not appear ill.   HENT:   Head: Normocephalic and atraumatic.   Right Ear: Hearing, tympanic membrane, external ear and ear canal normal.   Left Ear: Hearing, tympanic membrane, external ear and ear canal normal.   Nose: Nose normal. No mucosal edema, rhinorrhea or nasal deformity. No epistaxis. Right sinus exhibits no maxillary sinus tenderness and no frontal sinus tenderness. Left sinus exhibits no maxillary sinus tenderness and no frontal sinus tenderness.   Mouth/Throat: Uvula is midline, oropharynx is clear and moist and mucous membranes are normal. No trismus in the jaw. Normal dentition. No uvula swelling. No oropharyngeal exudate or posterior oropharyngeal erythema.   Eyes: Conjunctivae and lids are normal. Right eye exhibits no discharge. Left eye exhibits no discharge. No scleral icterus.   Neck: Trachea normal, normal range of motion, full passive range of motion without pain and phonation normal. Neck supple. No JVD present. No tracheal deviation present.   Cardiovascular: Normal rate, regular rhythm, normal heart sounds, intact distal pulses and normal pulses. Exam reveals no gallop and no friction rub.   No murmur heard.  Pulmonary/Chest: Effort normal and breath sounds normal. No stridor. No respiratory distress. She has no wheezes.   Abdominal: Soft. Normal appearance and bowel sounds are normal. She exhibits no distension, no pulsatile midline mass and no mass. There is no tenderness.   Musculoskeletal: Normal range of motion. She exhibits no edema or deformity.   Lymphadenopathy:     She has no cervical adenopathy.   Neurological: She is alert and oriented to person, place, and time. She exhibits normal muscle tone. Coordination normal.   Skin: Skin is warm, dry, intact, not diaphoretic and not pale.   Psychiatric: She has a normal mood and affect. Her speech is normal and behavior is normal. Judgment and thought content normal.  Cognition and memory are normal.   Nursing note and vitals reviewed.        Assessment:       1. Body aches    2. Flu syndrome        Plan:         Body aches  -     POCT Influenza A/B    Flu syndrome    Other orders  -     oseltamivir (TAMIFLU) 75 MG capsule; Take 1 capsule (75 mg total) by mouth 2 (two) times daily. for 5 days  Dispense: 10 capsule; Refill: 0  -     loratadine (CLARITIN) 10 mg tablet; Take 1 tablet (10 mg total) by mouth once daily.  Dispense: 30 tablet; Refill: 2        patient advised to take ibuprofen to peak p.o. q.6 p.r.n. fever achiness

## 2020-01-23 NOTE — LETTER
January 23, 2020      [facility] [address]  [place] [address]  Phone: [phone]  Fax: [phone]       Patient: Madyson Calle   YOB: 1985  Date of Visit: 01/23/2020    To Whom It May Concern:    Alee Calle  was at [facility] on 01/23/2020. She may return to work/school on 1/25/20  with no restrictions. If you have any questions or concerns, or if I can be of further assistance, please do not hesitate to contact me.    Sincerely,    Tiffanie Whitney MD

## 2020-02-04 ENCOUNTER — TELEPHONE (OUTPATIENT)
Dept: OBSTETRICS AND GYNECOLOGY | Facility: CLINIC | Age: 35
End: 2020-02-04

## 2020-02-04 NOTE — TELEPHONE ENCOUNTER
----- Message from Vidya Jimenez sent at 2/4/2020  2:21 PM CST -----  Contact: JON ADDISON [[id number]]  Name of Who is Calling: JON ADDISON [[id number]]     What is the request in detail:JON ADDISON [[id number]] is requesting a call back in regard s to becoming a new Patient was a Previously [facility] Patient ...    Please contact to further discuss and advise      Can the clinic reply by [facility]: yes     [facility]  [phone] (home)

## 2020-02-12 ENCOUNTER — OFFICE VISIT (OUTPATIENT)
Dept: OBSTETRICS AND GYNECOLOGY | Facility: CLINIC | Age: 35
End: 2020-02-12
Payer: MEDICAID

## 2020-02-12 VITALS
SYSTOLIC BLOOD PRESSURE: 128 MMHG | DIASTOLIC BLOOD PRESSURE: 74 MMHG | WEIGHT: 119.06 LBS | BODY MASS INDEX: 21.77 KG/M2

## 2020-02-12 DIAGNOSIS — Z01.419 VISIT FOR GYNECOLOGIC EXAMINATION: Primary | ICD-10-CM

## 2020-02-12 DIAGNOSIS — Z97.5 INTRAUTERINE DEVICE: ICD-10-CM

## 2020-02-12 PROCEDURE — 99999 PR PBB SHADOW E&M-EST. PATIENT-LVL III: ICD-10-PCS | Mod: PBBFAC,,, | Performed by: OBSTETRICS & GYNECOLOGY

## 2020-02-12 PROCEDURE — 99213 OFFICE O/P EST LOW 20 MIN: CPT | Mod: PBBFAC | Performed by: OBSTETRICS & GYNECOLOGY

## 2020-02-12 PROCEDURE — 99395 PREV VISIT EST AGE 18-39: CPT | Mod: S$PBB,,, | Performed by: OBSTETRICS & GYNECOLOGY

## 2020-02-12 PROCEDURE — 99395 PR PREVENTIVE VISIT,EST,18-39: ICD-10-PCS | Mod: S$PBB,,, | Performed by: OBSTETRICS & GYNECOLOGY

## 2020-02-12 PROCEDURE — 99999 PR PBB SHADOW E&M-EST. PATIENT-LVL III: CPT | Mod: PBBFAC,,, | Performed by: OBSTETRICS & GYNECOLOGY

## 2020-02-12 NOTE — PROGRESS NOTES
HISTORY OF PRESENT ILLNESS:    Madyson Calle is a 34 y.o. female, , Patient's last menstrual period was 2020 (approximate).,  presents for a routine exam and IUD CHECK. PAP DUE .  HAD 2 EPISODES PCB, 2ND WAS JUST SPOTTING.  REASSURED EXAM NORMAL, STRINGS NORMAL LENGTH AND OCCAS MENSES WITH IUD NOT UNUSUAL.  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  A FEW WEEKS AFTER INSERTION HAD SOME OCCASIONAL SHARP LLQ PAIN.  HAS HAD IRREGULAR BLEEDING SINCE INSERTION, NOW STOPPED FOR SEVERAL DAYS AND PAIN HAS RESOLVED.  NEEDS REFILL DIFLUCAN FOR OCCAS YEAST SX, PROB RELATED TO PROLONGED BLEEDING.  CYCLE 1-2 MONTHS ON OCP TO REESTABLISH CYCLE CONTROL - MEG  LAST SEEN ROUTINE VISIT 2015:  HAS NAUSEA WITH PILLS AND DIFFICULTY REMEMBERING. WOULD LIKE AMENORRHEA IF POSSIBLE. WILL RETURN MIRENA INSERTION WITH NMP IN ABOUT 2 WEEKS. DISCUSSED TOPICAL VAGINITIS RISKS.  NOW WORKS [email or web address] - -    Past Medical History:   Diagnosis Date    Abnormal cervical Papanicolaou smear , , 2017    Colposcopy    Anxiety     Anxiety     GERD (gastroesophageal reflux disease)     Hypertension     Scoliosis        History reviewed. No pertinent surgical history.    MEDICATIONS AND ALLERGIES:      Current Outpatient Medications:     amLODIPine (NORVASC) 2.5 MG tablet, Take 2.5 mg by mouth once daily., Disp: , Rfl:     boric acid (bulk) Powd, Insert one Gelatin Capsule filled with 600 mg of Boric Acid Powder H.S., Disp: 100 capsule, Rfl: 4    loratadine (CLARITIN) 10 mg tablet, Take 1 tablet (10 mg total) by mouth once daily., Disp: 30 tablet, Rfl: 2    omeprazole (PRILOSEC) 40 MG capsule, Take 40 mg by mouth once daily., Disp: , Rfl:     propranolol (INDERAL) 20 MG tablet, Take 10 mg by mouth 2 (two) times daily., Disp: , Rfl:     citalopram (CELEXA)  10 MG tablet, TK 1 T PO  QD, Disp: , Rfl: 0    Review of patient's allergies indicates:  No Known Allergies    Family History   Problem Relation Age of Onset    Hypertension Father     No Known Problems Mother     No Known Problems Sister     Breast cancer Neg Hx     Colon cancer Neg Hx     Ovarian cancer Neg Hx        Social History     Socioeconomic History    Marital status: Single     Spouse name: Not on file    Number of children: Not on file    Years of education: Not on file    Highest education level: Not on file   Occupational History    Not on file   Social Needs    Financial resource strain: Not on file    Food insecurity:     Worry: Not on file     Inability: Not on file    Transportation needs:     Medical: Not on file     Non-medical: Not on file   Tobacco Use    Smoking status: Never Smoker    Smokeless tobacco: Never Used   Substance and Sexual Activity    Alcohol use: Yes     Comment: frequent,3-4 times per week.     Drug use: No    Sexual activity: Yes     Partners: Male     Birth control/protection: Condom   Lifestyle    Physical activity:     Days per week: Not on file     Minutes per session: Not on file    Stress: Not on file   Relationships    Social connections:     Talks on phone: Not on file     Gets together: Not on file     Attends Buddhist service: Not on file     Active member of club or organization: Not on file     Attends meetings of clubs or organizations: Not on file     Relationship status: Not on file   Other Topics Concern    Not on file   Social History Narrative    Not on file       COMPREHENSIVE GYN HISTORY:  PAP History: Denies abnormal Paps.  Infection History: Denies STDs. Denies PID.  Benign History: Denies uterine fibroids. Denies ovarian cysts. Denies endometriosis. Denies other conditions.  Cancer History: Denies cervical cancer. Denies uterine cancer or hyperplasia. Denies ovarian cancer. Denies vulvar cancer or pre-cancer. Denies vaginal  cancer or pre-cancer. Denies breast cancer. Denies colon cancer.  Sexual Activity History: Reports currently being sexually active  Menstrual History: Monthly, mild-moderate.  Contraception:IUD    ROS:  GENERAL: No weight changes. No swelling. No fatigue. No fever.  CARDIOVASCULAR: No chest pain. No shortness of breath. No leg cramps.   NEUROLOGICAL: No headaches. No vision changes.  BREASTS: No pain. No lumps. No discharge.  ABDOMEN: No pain. No nausea. No vomiting. No diarrhea. No constipation.  REPRODUCTIVE: No abnormal bleeding.   VULVA: No pain. No lesions. No itching.  VAGINA: No relaxation. No itching. No odor. No discharge. No lesions.  URINARY: No incontinence. No nocturia. No frequency. No dysuria.    /74   Wt 54 kg (119 lb 0.8 oz)   LMP 01/12/2020 (Approximate) Comment: Mirena, spotting after sex   BMI 21.77 kg/m²     PE:  APPEARANCE: Well nourished, well developed, in no acute distress.  AFFECT: WNL, alert and oriented x 3.  SKIN: No acne or hirsutism.  NECK: Neck symmetric, without masses or thyromegaly.  NODES: No inguinal, cervical, axillary or femoral lymph node enlargement.  CHEST: Good respiratory effort.   ABDOMEN: Soft. No tenderness or masses. No hepatosplenomegaly. No hernias.  BREASTS: Symmetrical, no skin changes, visible lesions, palpable masses or nipple discharge bilaterally.  PELVIC: External female genitalia without lesions.  Female hair distribution. Adequate perineal body, Normal urethral meatus. Vagina moist and well rugated without lesions or discharge.  No significant cystocele or rectocele present. Cervix pink without lesions, discharge or tenderness, STRING AT EXTERNAL OS. Uterus is normal size, regular, mobile and nontender. Adnexa without masses or tenderness.  EXTREMITIES: No edema    PROCEDURES:      DIAGNOSIS:  1. Visit for gynecologic examination     2. Intrauterine device         LABS AND TESTS ORDERED:    MEDICATIONS PRESCRIBED:    COUNSELING:   The patient was  counseled today on ACS PAP guidelines, with recommendations for yearly pelvic exams unless their uterus, cervix, and ovaries were removed for benign reasons; in that case, examinations every 3-5 years are recommended.  The patient was also counseled regarding monthly breast self-examination, routine STD screening for at-risk populations, prophylactic immunizations for transmitted infections such as  HPV, Pertussis, or Influenza as appropriate, and yearly mammograms when indicated by ACS guidelines.  She was advised to see her primary care physician for all other health maintenance.    FOLLOW-UP with me annually.

## 2020-05-11 DIAGNOSIS — N89.8 VAGINAL DISCHARGE: ICD-10-CM

## 2020-05-11 RX ORDER — METRONIDAZOLE 500 MG/1
TABLET ORAL
Qty: 14 TABLET | Refills: 1 | Status: SHIPPED | OUTPATIENT
Start: 2020-05-11 | End: 2021-04-15 | Stop reason: SDUPTHER

## 2020-06-26 ENCOUNTER — HOSPITAL ENCOUNTER (EMERGENCY)
Facility: HOSPITAL | Age: 35
Discharge: HOME | End: 2020-06-26
Attending: EMERGENCY MEDICINE
Payer: COMMERCIAL

## 2020-06-26 VITALS
DIASTOLIC BLOOD PRESSURE: 103 MMHG | HEIGHT: 63 IN | TEMPERATURE: 98 F | WEIGHT: 120 LBS | SYSTOLIC BLOOD PRESSURE: 171 MMHG | HEART RATE: 72 BPM | RESPIRATION RATE: 16 BRPM | OXYGEN SATURATION: 100 % | BODY MASS INDEX: 21.26 KG/M2

## 2020-06-26 DIAGNOSIS — R07.89 CHEST TIGHTNESS: ICD-10-CM

## 2020-06-26 DIAGNOSIS — R07.9 CHEST PAIN: ICD-10-CM

## 2020-06-26 LAB
ALBUMIN SERPL BCP-MCNC: 4.4 G/DL (ref 3.5–5.2)
ALP SERPL-CCNC: 39 U/L (ref 55–135)
ALT SERPL W/O P-5'-P-CCNC: 19 U/L (ref 10–44)
ANION GAP SERPL CALC-SCNC: 7 MMOL/L (ref 8–16)
AST SERPL-CCNC: 19 U/L (ref 10–40)
BASOPHILS # BLD AUTO: 0.04 K/UL (ref 0–0.2)
BASOPHILS NFR BLD: 0.8 % (ref 0–1.9)
BILIRUB SERPL-MCNC: 0.6 MG/DL (ref 0.1–1)
BNP SERPL-MCNC: 12 PG/ML (ref 0–99)
BUN SERPL-MCNC: 11 MG/DL (ref 6–20)
CALCIUM SERPL-MCNC: 9.1 MG/DL (ref 8.7–10.5)
CHLORIDE SERPL-SCNC: 104 MMOL/L (ref 95–110)
CO2 SERPL-SCNC: 25 MMOL/L (ref 23–29)
CREAT SERPL-MCNC: 0.8 MG/DL (ref 0.5–1.4)
DIFFERENTIAL METHOD: ABNORMAL
EOSINOPHIL # BLD AUTO: 0.1 K/UL (ref 0–0.5)
EOSINOPHIL NFR BLD: 1 % (ref 0–8)
ERYTHROCYTE [DISTWIDTH] IN BLOOD BY AUTOMATED COUNT: 12.1 % (ref 11.5–14.5)
EST. GFR  (AFRICAN AMERICAN): >60 ML/MIN/1.73 M^2
EST. GFR  (NON AFRICAN AMERICAN): >60 ML/MIN/1.73 M^2
GLUCOSE SERPL-MCNC: 98 MG/DL (ref 70–110)
HCT VFR BLD AUTO: 36.8 % (ref 37–48.5)
HGB BLD-MCNC: 12 G/DL (ref 12–16)
IMM GRANULOCYTES # BLD AUTO: 0.01 K/UL (ref 0–0.04)
IMM GRANULOCYTES NFR BLD AUTO: 0.2 % (ref 0–0.5)
LYMPHOCYTES # BLD AUTO: 2 K/UL (ref 1–4.8)
LYMPHOCYTES NFR BLD: 39 % (ref 18–48)
MCH RBC QN AUTO: 30.5 PG (ref 27–31)
MCHC RBC AUTO-ENTMCNC: 32.6 G/DL (ref 32–36)
MCV RBC AUTO: 94 FL (ref 82–98)
MONOCYTES # BLD AUTO: 0.5 K/UL (ref 0.3–1)
MONOCYTES NFR BLD: 9.3 % (ref 4–15)
NEUTROPHILS # BLD AUTO: 2.6 K/UL (ref 1.8–7.7)
NEUTROPHILS NFR BLD: 49.7 % (ref 38–73)
NRBC BLD-RTO: 0 /100 WBC
PLATELET # BLD AUTO: 235 K/UL (ref 150–350)
PMV BLD AUTO: 10.6 FL (ref 9.2–12.9)
POTASSIUM SERPL-SCNC: 3.8 MMOL/L (ref 3.5–5.1)
PROT SERPL-MCNC: 8.1 G/DL (ref 6–8.4)
RBC # BLD AUTO: 3.93 M/UL (ref 4–5.4)
SODIUM SERPL-SCNC: 136 MMOL/L (ref 136–145)
TROPONIN I SERPL DL<=0.01 NG/ML-MCNC: <0.006 NG/ML (ref 0–0.03)
TROPONIN I SERPL DL<=0.01 NG/ML-MCNC: <0.006 NG/ML (ref 0–0.03)
WBC # BLD AUTO: 5.18 K/UL (ref 3.9–12.7)

## 2020-06-26 PROCEDURE — 85025 COMPLETE CBC W/AUTO DIFF WBC: CPT

## 2020-06-26 PROCEDURE — 93010 ELECTROCARDIOGRAM REPORT: CPT | Mod: ,,, | Performed by: INTERNAL MEDICINE

## 2020-06-26 PROCEDURE — 25000003 PHARM REV CODE 250: Performed by: STUDENT IN AN ORGANIZED HEALTH CARE EDUCATION/TRAINING PROGRAM

## 2020-06-26 PROCEDURE — 93005 ELECTROCARDIOGRAM TRACING: CPT

## 2020-06-26 PROCEDURE — 93010 ELECTROCARDIOGRAM REPORT: CPT | Mod: 77,,, | Performed by: INTERNAL MEDICINE

## 2020-06-26 PROCEDURE — 83880 ASSAY OF NATRIURETIC PEPTIDE: CPT

## 2020-06-26 PROCEDURE — 84484 ASSAY OF TROPONIN QUANT: CPT

## 2020-06-26 PROCEDURE — 99285 EMERGENCY DEPT VISIT HI MDM: CPT | Mod: 25

## 2020-06-26 PROCEDURE — 99284 EMERGENCY DEPT VISIT MOD MDM: CPT | Mod: ,,, | Performed by: EMERGENCY MEDICINE

## 2020-06-26 PROCEDURE — 99284 PR EMERGENCY DEPT VISIT,LEVEL IV: ICD-10-PCS | Mod: ,,, | Performed by: EMERGENCY MEDICINE

## 2020-06-26 PROCEDURE — 80053 COMPREHEN METABOLIC PANEL: CPT

## 2020-06-26 PROCEDURE — 93010 EKG 12-LEAD: ICD-10-PCS | Mod: ,,, | Performed by: INTERNAL MEDICINE

## 2020-06-26 PROCEDURE — 93010 EKG 12-LEAD: ICD-10-PCS | Mod: 77,,, | Performed by: INTERNAL MEDICINE

## 2020-06-26 RX ORDER — AMLODIPINE BESYLATE 5 MG/1
5 TABLET ORAL
Status: COMPLETED | OUTPATIENT
Start: 2020-06-26 | End: 2020-06-26

## 2020-06-26 RX ORDER — ASPIRIN 325 MG
325 TABLET ORAL
Status: COMPLETED | OUTPATIENT
Start: 2020-06-26 | End: 2020-06-26

## 2020-06-26 RX ADMIN — ASPIRIN 325 MG ORAL TABLET 325 MG: 325 PILL ORAL at 01:06

## 2020-06-26 RX ADMIN — AMLODIPINE BESYLATE 5 MG: 5 TABLET ORAL at 01:06

## 2020-06-26 NOTE — ED PROVIDER NOTES
Encounter Date: 6/26/2020       History     Chief Complaint   Patient presents with    Chest Pain     denies cardiac hx      34-year-old with a history of hypertension, recently had antihypertensive medication re-initiated, anxiety, GERD, who presents with intermittent chest pressure for the last 2 days.Yesterday she was sitting and briefly felt the discomfort but it self resolved. Today, one our prior to arrival pt was sitting at desk and felt the chest pressure come on. Feeling is nonradiating, associated with left arm tingling, but non/v, or diaphoresis. Feeling is improved when she lays back. No LE pain or swelling, no hx of blood clots, nonsmoker.        Review of patient's allergies indicates:  No Known Allergies  Past Medical History:   Diagnosis Date    Abnormal cervical Papanicolaou smear 2010, 2016, 2017    Colposcopy    Anxiety     Anxiety     GERD (gastroesophageal reflux disease)     Hypertension     Scoliosis      History reviewed. No pertinent surgical history.  Family History   Problem Relation Age of Onset    Hypertension Father     No Known Problems Mother     No Known Problems Sister     Breast cancer Neg Hx     Colon cancer Neg Hx     Ovarian cancer Neg Hx      Social History     Tobacco Use    Smoking status: Never Smoker    Smokeless tobacco: Never Used   Substance Use Topics    Alcohol use: Yes     Comment: frequent,3-4 times per week.     Drug use: No     Review of Systems   Constitutional: Negative for activity change and appetite change.   HENT: Negative for congestion and drooling.    Eyes: Negative for pain and discharge.   Respiratory: Negative for chest tightness and shortness of breath.    Cardiovascular: Positive for chest pain. Negative for palpitations and leg swelling.   Gastrointestinal: Negative for abdominal distention and abdominal pain.   Endocrine: Negative for cold intolerance and heat intolerance.   Skin: Negative for color change and rash.   Neurological:  Negative for dizziness and headaches.   Psychiatric/Behavioral: Negative for agitation and behavioral problems.       Physical Exam     Initial Vitals [06/26/20 1300]   BP Pulse Resp Temp SpO2   (!) 190/110 85 17 98.2 °F (36.8 °C) 100 %      MAP       --         Physical Exam    Nursing note and vitals reviewed.  Constitutional: She appears well-developed and well-nourished.   HENT:   Head: Normocephalic and atraumatic.   Eyes: Right eye exhibits no discharge. Left eye exhibits no discharge.   Neck: Normal range of motion. Neck supple.   Cardiovascular: Normal rate and regular rhythm.   Pulmonary/Chest: Breath sounds normal. No respiratory distress.   Abdominal: She exhibits no distension. There is no abdominal tenderness.   Neurological: She is alert and oriented to person, place, and time.   Skin: Skin is warm and dry. Capillary refill takes less than 2 seconds.   Psychiatric: She has a normal mood and affect. Thought content normal.         ED Course   Procedures  Labs Reviewed   CBC W/ AUTO DIFFERENTIAL   COMPREHENSIVE METABOLIC PANEL   TROPONIN I   B-TYPE NATRIURETIC PEPTIDE          Imaging Results    None          Medical Decision Making:   Initial Assessment:   34-year-old with a history of hypertension, recently had antihypertensive medication re-initiated, anxiety, GERD, who presents with intermittent chest pressure for the last 2 days.Yesterday she was sitting and briefly felt the discomfort but it self resolved. Today, one our prior to arrival pt was sitting at desk and felt the chest pressure come on. Feeling is nonradiating, associated with left arm tingling, but non/v, or diaphoresis. Feeling is improved when she lays back. No LE pain or swelling, no hx of blood clots, nonsmoker. On exam pt is hypertensive to 190 systolic, otherwise nonfocal exam. Cocnern for ACS spectrum disease vs hypertensive emergency vs PE vs pneumothorax vs other acute or emergent etiology of CP. Given age and relative health  concern for an acute or emergent etiology of CP is very low. HEART score is 1. Will obtain cardiac workup with seriel EKGs and trops and anticipate discharge. Pt only took 2.5 amlodipine today, will give an additional 5.                    ED Course as of Jun 26 1752 Fri Jun 26, 2020   1351 NSR at a rate of 79 bpm, normal axis, good R wave progression, no evidence of acute ischemia or infarction, no evidence of acute RHS, no evidence of acute electrolyte derangement.   EKG 12-lead [RK]   1422 wnl   CBC auto differential(!) [RK]   1423 No significant intrathoracic abnormality.   X-Ray Chest AP Portable [RK]   1451 wnl   B-Type natriuretic peptide (BNP) [RK]   1451 negative   Troponin I #1 [RK]   1529 improving   BP(!): 171/103 [RK]   1617 Repeat EKG unchanged from previous, still no evidence of acute ischemia or infarction.   Repeat EKG 12-lead [RK]   1751 Negative. Concern for an acute or emergent etiology of CP is low at this time. Plan to discharge with strict ED return precautions and instructions to follow closely with her PCP for BP med titration.   Troponin I #2 [RK]      ED Course User Index  [RK] Rj Her MD                Clinical Impression:       ICD-10-CM ICD-9-CM   1. Chest tightness  R07.89 786.59   2. Chest pain  R07.9 786.50                                Rj Her MD  Resident  06/26/20 1752

## 2020-06-26 NOTE — ED NOTES
"Patient identifiers for Madyson PeÃ±uelas 34 y.o. female checked and correct.  Chief Complaint   Patient presents with    Chest Pain     denies cardiac hx      Past Medical History:   Diagnosis Date    Abnormal cervical Papanicolaou smear 2010, 2016, 2017    Colposcopy    Anxiety     Anxiety     GERD (gastroesophageal reflux disease)     Hypertension     Scoliosis      Allergies reported: Review of patient's allergies indicates:  No Known Allergies      LOC: Patient is awake, alert, and aware of environment with an appropriate affect. Patient is oriented x 4 and speaking appropriately. Reports anxiety.  APPEARANCE: Patient resting comfortably and in no acute distress. Patient is clean and well groomed, patient's clothing is properly fastened.  HEENT: Wearing mask.  SKIN: The skin is warm and dry. Patient has normal skin turgor and moist mucus membranes. Denies fever or chills.   MUSKULOSKELETAL: Patient is moving all extremities well, no obvious deformities noted. Pulses intact. Ambulatory by self.  RESPIRATORY: Airway is open and patent. Respirations are spontaneous and non-labored with normal effort and rate. Reports SOB today.  CARDIAC: Patient has a normal rate and rhythm. 85 on cardiac monitor. No peripheral edema noted. Reports chest tightness that "has been happening". Reports had it yesterday, but it resolved and then began again today about an hour ago.   ABDOMEN: No distention noted. Soft and non-tender upon palpation. Denies nausea and vomiting.   NEUROLOGICAL: PERRL. Facial expression is symmetrical. Hand grasps are equal bilaterally. Normal sensation in all extremities when touched with finger. Reports left-sided numbness to arm and leg from time to time. States tingling is present right now. Denies headache.           "

## 2020-09-29 ENCOUNTER — TELEPHONE (OUTPATIENT)
Dept: DERMATOLOGY | Facility: CLINIC | Age: 35
End: 2020-09-29

## 2020-09-29 ENCOUNTER — OFFICE VISIT (OUTPATIENT)
Dept: DERMATOLOGY | Facility: CLINIC | Age: 35
End: 2020-09-29
Payer: COMMERCIAL

## 2020-09-29 DIAGNOSIS — L68.0 HIRSUTISM: Primary | ICD-10-CM

## 2020-09-29 PROCEDURE — 99202 PR OFFICE/OUTPT VISIT, NEW, LEVL II, 15-29 MIN: ICD-10-PCS | Mod: 95,,, | Performed by: STUDENT IN AN ORGANIZED HEALTH CARE EDUCATION/TRAINING PROGRAM

## 2020-09-29 PROCEDURE — 99202 OFFICE O/P NEW SF 15 MIN: CPT | Mod: 95,,, | Performed by: STUDENT IN AN ORGANIZED HEALTH CARE EDUCATION/TRAINING PROGRAM

## 2020-09-29 RX ORDER — TRETINOIN 0.5 MG/G
CREAM TOPICAL NIGHTLY
Qty: 45 G | Refills: 2 | Status: SHIPPED | OUTPATIENT
Start: 2020-09-29 | End: 2022-08-04

## 2020-09-29 NOTE — TELEPHONE ENCOUNTER
Message was sent to [facility] staff for appt     ----- Message from Aaron Allen MD sent at 9/29/2020  1:27 PM CDT -----  Please schedule with Dr. Allison Fernández for cosmetic laser clinic for [facility]. Please send to her nurses. Patient lives in the [place] area. Thanks.

## 2020-09-29 NOTE — TELEPHONE ENCOUNTER
Spoke to pt, she now has an appointment.----- Message from Regine An LPN sent at 9/29/2020  3:17 PM CDT -----  Regarding: Hair removal  Dr Allen want to see about getting this pt appt for laser hair removal, she live in the [facility]

## 2020-09-29 NOTE — PROGRESS NOTES
The patient location is: work  The chief complaint leading to consultation is: unwanted hairs and dark marks    Visit type: audiovisual    Face to Face time with patient: 15mins  15 minutes of total time spent on the encounter, which includes face to face time and non-face to face time preparing to see the patient (eg, review of tests), Obtaining and/or reviewing separately obtained history, Documenting clinical information in the electronic or other health record, Independently interpreting results (not separately reported) and communicating results to the patient/family/caregiver, or Care coordination (not separately reported).         Each patient to whom he or she provides medical services by telemedicine is:  (1) informed of the relationship between the physician and patient and the respective role of any other health care provider with respect to management of the patient; and (2) notified that he or she may decline to receive medical services by telemedicine and may withdraw from such care at any time.    Notes: Patient with unwanted hair and darkening discoloration of the skin on the chin, and reported some in the groin. Present for several months. Reports having hairs which she plucks and shaves, which then become ingrown and has led to discoloration. Not currently using anything for her symptoms.       ROS: Otherwise negative     PE: Const/Neuro - AAOx3, NAD          Skin - multiple terminal hairs in the chin with lichenification     A/P: Hirsutism - Discussed laser hair removal with patient. Tretinoin 0.05% cream nightly.

## 2020-10-20 ENCOUNTER — OFFICE VISIT (OUTPATIENT)
Dept: CARDIOLOGY | Facility: CLINIC | Age: 35
End: 2020-10-20
Payer: COMMERCIAL

## 2020-10-20 ENCOUNTER — HOSPITAL ENCOUNTER (OUTPATIENT)
Dept: CARDIOLOGY | Facility: HOSPITAL | Age: 35
Discharge: HOME | End: 2020-10-20
Attending: INTERNAL MEDICINE
Payer: COMMERCIAL

## 2020-10-20 VITALS
DIASTOLIC BLOOD PRESSURE: 119 MMHG | WEIGHT: 125.25 LBS | BODY MASS INDEX: 21.38 KG/M2 | SYSTOLIC BLOOD PRESSURE: 185 MMHG | HEART RATE: 90 BPM | HEIGHT: 64 IN | OXYGEN SATURATION: 100 %

## 2020-10-20 VITALS
HEART RATE: 63 BPM | HEIGHT: 63 IN | WEIGHT: 125 LBS | SYSTOLIC BLOOD PRESSURE: 160 MMHG | BODY MASS INDEX: 22.15 KG/M2 | DIASTOLIC BLOOD PRESSURE: 114 MMHG

## 2020-10-20 DIAGNOSIS — F41.9 ANXIETY: ICD-10-CM

## 2020-10-20 DIAGNOSIS — R01.1 SYSTOLIC MURMUR: ICD-10-CM

## 2020-10-20 DIAGNOSIS — I15.8 OTHER SECONDARY HYPERTENSION: Primary | ICD-10-CM

## 2020-10-20 LAB
ASCENDING AORTA: 3.08 CM
AV INDEX (PROSTH): 0.76
AV MEAN GRADIENT: 3 MMHG
AV PEAK GRADIENT: 5 MMHG
AV VALVE AREA: 2.48 CM2
AV VELOCITY RATIO: 0.76
BSA FOR ECHO PROCEDURE: 1.59 M2
CV ECHO LV RWT: 0.31 CM
DOP CALC AO PEAK VEL: 1.13 M/S
DOP CALC AO VTI: 25.15 CM
DOP CALC LVOT AREA: 3.3 CM2
DOP CALC LVOT DIAMETER: 2.04 CM
DOP CALC LVOT PEAK VEL: 0.86 M/S
DOP CALC LVOT STROKE VOLUME: 62.4 CM3
DOP CALCLVOT PEAK VEL VTI: 19.1 CM
E WAVE DECELERATION TIME: 172.27 MSEC
E/A RATIO: 1.24
E/E' RATIO: 8 M/S
ECHO LV POSTERIOR WALL: 0.67 CM (ref 0.6–1.1)
FRACTIONAL SHORTENING: 33 % (ref 28–44)
INTERVENTRICULAR SEPTUM: 0.74 CM (ref 0.6–1.1)
IVRT: 99.9 MSEC
LA MAJOR: 4.92 CM
LA MINOR: 5.1 CM
LA WIDTH: 3.54 CM
LEFT ATRIUM SIZE: 3.1 CM
LEFT ATRIUM VOLUME INDEX: 29.5 ML/M2
LEFT ATRIUM VOLUME: 46.72 CM3
LEFT INTERNAL DIMENSION IN SYSTOLE: 2.91 CM (ref 2.1–4)
LEFT VENTRICLE DIASTOLIC VOLUME INDEX: 53.48 ML/M2
LEFT VENTRICLE DIASTOLIC VOLUME: 84.69 ML
LEFT VENTRICLE MASS INDEX: 57 G/M2
LEFT VENTRICLE SYSTOLIC VOLUME INDEX: 20.6 ML/M2
LEFT VENTRICLE SYSTOLIC VOLUME: 32.58 ML
LEFT VENTRICULAR INTERNAL DIMENSION IN DIASTOLE: 4.34 CM (ref 3.5–6)
LEFT VENTRICULAR MASS: 90.76 G
LV LATERAL E/E' RATIO: 7 M/S
LV SEPTAL E/E' RATIO: 9.33 M/S
MV PEAK A VEL: 0.68 M/S
MV PEAK E VEL: 0.84 M/S
MV STENOSIS PRESSURE HALF TIME: 49.96 MS
MV VALVE AREA P 1/2 METHOD: 4.4 CM2
PISA TR MAX VEL: 2.46 M/S
PULM VEIN S/D RATIO: 0.8
PV PEAK D VEL: 0.44 M/S
PV PEAK S VEL: 0.35 M/S
RA MAJOR: 3.87 CM
RA PRESSURE: 3 MMHG
RA WIDTH: 3.44 CM
RIGHT VENTRICULAR END-DIASTOLIC DIMENSION: 3.52 CM
SINUS: 2.88 CM
STJ: 2.7 CM
TDI LATERAL: 0.12 M/S
TDI SEPTAL: 0.09 M/S
TDI: 0.11 M/S
TR MAX PG: 24 MMHG
TRICUSPID ANNULAR PLANE SYSTOLIC EXCURSION: 2.13 CM
TV REST PULMONARY ARTERY PRESSURE: 27 MMHG

## 2020-10-20 PROCEDURE — 99999 PR PBB SHADOW E&M-EST. PATIENT-LVL III: CPT | Mod: PBBFAC,,, | Performed by: INTERNAL MEDICINE

## 2020-10-20 PROCEDURE — 93306 TTE W/DOPPLER COMPLETE: CPT | Mod: 26,,, | Performed by: INTERNAL MEDICINE

## 2020-10-20 PROCEDURE — 93306 ECHO (CUPID ONLY): ICD-10-PCS | Mod: 26,,, | Performed by: INTERNAL MEDICINE

## 2020-10-20 PROCEDURE — 99204 PR OFFICE/OUTPT VISIT, NEW, LEVL IV, 45-59 MIN: ICD-10-PCS | Mod: S$GLB,,, | Performed by: INTERNAL MEDICINE

## 2020-10-20 PROCEDURE — 3008F BODY MASS INDEX DOCD: CPT | Mod: CPTII,S$GLB,, | Performed by: INTERNAL MEDICINE

## 2020-10-20 PROCEDURE — 93306 TTE W/DOPPLER COMPLETE: CPT

## 2020-10-20 PROCEDURE — 3077F SYST BP >= 140 MM HG: CPT | Mod: CPTII,S$GLB,, | Performed by: INTERNAL MEDICINE

## 2020-10-20 PROCEDURE — 3080F PR MOST RECENT DIASTOLIC BLOOD PRESSURE >= 90 MM HG: ICD-10-PCS | Mod: CPTII,S$GLB,, | Performed by: INTERNAL MEDICINE

## 2020-10-20 PROCEDURE — 3080F DIAST BP >= 90 MM HG: CPT | Mod: CPTII,S$GLB,, | Performed by: INTERNAL MEDICINE

## 2020-10-20 PROCEDURE — 3008F PR BODY MASS INDEX (BMI) DOCUMENTED: ICD-10-PCS | Mod: CPTII,S$GLB,, | Performed by: INTERNAL MEDICINE

## 2020-10-20 PROCEDURE — 3077F PR MOST RECENT SYSTOLIC BLOOD PRESSURE >= 140 MM HG: ICD-10-PCS | Mod: CPTII,S$GLB,, | Performed by: INTERNAL MEDICINE

## 2020-10-20 PROCEDURE — 99204 OFFICE O/P NEW MOD 45 MIN: CPT | Mod: S$GLB,,, | Performed by: INTERNAL MEDICINE

## 2020-10-20 PROCEDURE — 99999 PR PBB SHADOW E&M-EST. PATIENT-LVL III: ICD-10-PCS | Mod: PBBFAC,,, | Performed by: INTERNAL MEDICINE

## 2020-10-20 RX ORDER — LISINOPRIL 20 MG/1
TABLET ORAL
COMMUNITY
Start: 2020-10-14 | End: 2020-10-20

## 2020-10-20 RX ORDER — AMLODIPINE BESYLATE 10 MG/1
TABLET ORAL
Qty: 45 TABLET | Refills: 3 | Status: SHIPPED | OUTPATIENT
Start: 2020-10-20 | End: 2022-12-20

## 2020-10-20 RX ORDER — METOPROLOL SUCCINATE 25 MG/1
25 TABLET, EXTENDED RELEASE ORAL NIGHTLY
Qty: 90 TABLET | Refills: 3 | Status: SHIPPED | OUTPATIENT
Start: 2020-10-20 | End: 2020-12-01
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
## 2020-10-23 ENCOUNTER — PATIENT MESSAGE (OUTPATIENT)
Dept: CARDIOLOGY | Facility: CLINIC | Age: 35
End: 2020-10-23

## 2020-10-26 ENCOUNTER — HOSPITAL ENCOUNTER (OUTPATIENT)
Dept: CARDIOLOGY | Facility: HOSPITAL | Age: 35
Discharge: HOME | End: 2020-10-26
Attending: INTERNAL MEDICINE
Payer: COMMERCIAL

## 2020-10-26 DIAGNOSIS — I15.8 OTHER SECONDARY HYPERTENSION: ICD-10-CM

## 2020-10-26 LAB
ABDOMINAL AORTA MID EDV: 0 CM/S
ABDOMINAL AORTA MID PSV: 116 CM/S
LEFT RENAL DIST DIAS: 12 CM/S
LEFT RENAL DIST SYS: 33 CM/S
LEFT RENAL MID DIAS: 20 CM/S
LEFT RENAL MID RAR: 0.61
LEFT RENAL MID SYS: 71 CM/S
LEFT RENAL ORIGIN DIAS: 28 CM/S
LEFT RENAL ORIGIN SYS: 134 CM/S
LEFT RENAL PROX DIAS: 33 CM/S
LEFT RENAL PROX SYS: 98 CM/S
LEFT RENAL ULTRASOUND ACCELERATION TIME MEASUREMENT 1: 59 MS
LEFT RENAL ULTRASOUND ACCELERATION TIME MEASUREMENT 2: 62 MS
LEFT RENAL ULTRASOUND ACCELERATION TIME MEASUREMENT 3: 66 MS
LEFT RENAL ULTRASOUND ACCELERATION TIME MEASUREMENT AVERAGE: 66 MS
LEFT RENAL ULTRASOUND KIDNEY SIZE MEASUREMENT 1: 10.9 CM
LEFT RENAL ULTRASOUND KIDNEY SIZE MEASUREMENT 2: 11 CM
LEFT RENAL ULTRASOUND KIDNEY SIZE MEASUREMENT 3: 10.9 CM
LEFT RENAL ULTRASOUND KIDNEY SIZE MEASUREMENT AVERAGE: 11 CM
LEFT RENAL ULTRASOUND RESISTIVE INDEX MEASUREMENT 1: 0.77
LEFT RENAL ULTRASOUND RESISTIVE INDEX MEASUREMENT 2: 0.63
LEFT RENAL ULTRASOUND RESISTIVE INDEX MEASUREMENT 3: 0.8
LEFT RENAL ULTRASOUND RESISTIVE INDEX MEASUREMENT AVERAGE: 0.8
OHS CV LEFT RENAL RAR: 1.16
OHS CV RIGHT RENAL RAR: 1.72
OHS CV US LEFT RENAL HIGHEST EDV: 33
OHS CV US LEFT RENAL HIGHEST PSV: 134
OHS CV US RIGHT RENAL HIGHEST EDV: 41
OHS CV US RIGHT RENAL HIGHEST PSV: 199
RIGHT RENAL DIST DIAS: 23 CM/S
RIGHT RENAL DIST SYS: 63 CM/S
RIGHT RENAL MID DIAS: 41 CM/S
RIGHT RENAL MID RAR: 1.03
RIGHT RENAL MID SYS: 120 CM/S
RIGHT RENAL ORIGIN DIAS: 38 CM/S
RIGHT RENAL ORIGIN SYS: 199 CM/S
RIGHT RENAL PROX DIAS: 30 CM/S
RIGHT RENAL PROX SYS: 125 CM/S
RIGHT RENAL ULTRASOUND ACCELERATION TIME MEASUREMENT 1: 66 MS
RIGHT RENAL ULTRASOUND ACCELERATION TIME MEASUREMENT 2: 69 MS
RIGHT RENAL ULTRASOUND ACCELERATION TIME MEASUREMENT 3: 54 MS
RIGHT RENAL ULTRASOUND ACCELERATION TIME MEASUREMENT AVERAGE: 69 MS
RIGHT RENAL ULTRASOUND KIDNEY SIZE MEASUREMENT 1: 11.2 CM
RIGHT RENAL ULTRASOUND KIDNEY SIZE MEASUREMENT 2: 11.3 CM
RIGHT RENAL ULTRASOUND KIDNEY SIZE MEASUREMENT 3: 11.3 CM
RIGHT RENAL ULTRASOUND KIDNEY SIZE MEASUREMENT AVERAGE: 11.3 CM
RIGHT RENAL ULTRASOUND RESISTIVE INDEX MEASUREMENT 1: 0.77
RIGHT RENAL ULTRASOUND RESISTIVE INDEX MEASUREMENT 2: 0.63
RIGHT RENAL ULTRASOUND RESISTIVE INDEX MEASUREMENT 3: 0.57
RIGHT RENAL ULTRASOUND RESISTIVE INDEX MEASUREMENT AVERAGE: 0.77

## 2020-10-26 PROCEDURE — 93975 CV US RENAL ARTERY STENOSIS HYPERTENSION COMPLETE (CUPID ONLY): ICD-10-PCS | Mod: 26,,, | Performed by: INTERNAL MEDICINE

## 2020-10-26 PROCEDURE — 93975 VASCULAR STUDY: CPT | Mod: 26,,, | Performed by: INTERNAL MEDICINE

## 2020-10-26 PROCEDURE — 93975 VASCULAR STUDY: CPT

## 2020-10-29 ENCOUNTER — TELEPHONE (OUTPATIENT)
Dept: CARDIOLOGY | Facility: CLINIC | Age: 35
End: 2020-10-29

## 2020-11-02 ENCOUNTER — TELEPHONE (OUTPATIENT)
Dept: CARDIOLOGY | Facility: CLINIC | Age: 35
End: 2020-11-02

## 2020-11-09 ENCOUNTER — OFFICE VISIT (OUTPATIENT)
Dept: URGENT CARE | Facility: CLINIC | Age: 35
End: 2020-11-09
Payer: COMMERCIAL

## 2020-11-09 VITALS
TEMPERATURE: 99 F | HEART RATE: 74 BPM | HEIGHT: 63 IN | WEIGHT: 125 LBS | BODY MASS INDEX: 22.15 KG/M2 | OXYGEN SATURATION: 99 % | SYSTOLIC BLOOD PRESSURE: 117 MMHG | RESPIRATION RATE: 18 BRPM | DIASTOLIC BLOOD PRESSURE: 75 MMHG

## 2020-11-09 DIAGNOSIS — S63.502A WRIST SPRAIN, LEFT, INITIAL ENCOUNTER: Primary | ICD-10-CM

## 2020-11-09 PROCEDURE — 99214 OFFICE O/P EST MOD 30 MIN: CPT | Mod: S$GLB,,, | Performed by: FAMILY MEDICINE

## 2020-11-09 PROCEDURE — 73110 XR WRIST COMPLETE 3 VIEWS LEFT: ICD-10-PCS | Mod: LT,S$GLB,, | Performed by: RADIOLOGY

## 2020-11-09 PROCEDURE — 99214 PR OFFICE/OUTPT VISIT, EST, LEVL IV, 30-39 MIN: ICD-10-PCS | Mod: S$GLB,,, | Performed by: FAMILY MEDICINE

## 2020-11-09 PROCEDURE — 73110 X-RAY EXAM OF WRIST: CPT | Mod: LT,S$GLB,, | Performed by: RADIOLOGY

## 2020-11-09 RX ORDER — IBUPROFEN 600 MG/1
600 TABLET ORAL EVERY 8 HOURS PRN
Qty: 30 TABLET | Refills: 0 | Status: SHIPPED | OUTPATIENT
Start: 2020-11-09 | End: 2021-09-17

## 2020-11-09 NOTE — PATIENT INSTRUCTIONS
Wrist Sprain  A sprain is an injury to the ligaments or capsule that holds a joint together. There are no broken bones. Most sprains take about 3 to 6 weeks to heal. If it a severe sprain where the ligament is completely torn, it can take months to recover.     Most wrist sprains are treated with a splint, wrist brace, or elastic wrap for support. Severe sprains may require surgery.  Home care  · Keep your arm elevated to reduce pain and swelling. This is very important during the first 48 hours.  · Apply an ice pack over the injured area for 15 to 20 minutes every 3 to 6 hours. You should do this for the first 24 to 48 hours. You can make an ice pack by filling a plastic bag that seals at the top with ice cubes and then wrapping it with a thin towel. Continue to use ice packs for relief of pain and swelling as needed. As the ice melts, be careful to avoid getting your wrap, splint, or cast wet. After 48 hours, apply heat (warm shower or warm bath) for 15 to 20 minutes several times a day, or alternate ice and heat.   · You may use over-the-counter pain medicine to control pain, unless another pain medicine was prescribed. If you have chronic liver or kidney disease or ever had a stomach ulcer or GI bleeding, talk with your doctor before using these medicines.  · If you were given a splint or brace, wear it for the time advised by your doctor.  Follow-up care  Follow up with your healthcare provider as advised. Any X-rays you had today dont show any broken bones, breaks, or fractures. Sometimes fractures dont show up on the first X-ray. Bruises and sprains can sometimes hurt as much as a fracture. These injuries can take time to heal completely. If your symptoms dont improve or they get worse, talk with your doctor. You may need a repeat X-ray. If X-rays were taken, you will be told of any new findings that may affect your care.  When to seek medical advice  Call your healthcare provider right away if any of  these occur:  · Pain or swelling increases  · Fingers or hand becomes cold, blue, numb, or tingly  Date Last Reviewed: 11/20/2015  © 1415-3560 [employer]. [address] [place] [address], [place] [address]. All rights reserved. This information is not intended as a substitute for professional medical care. Always follow your healthcare professional's instructions.

## 2020-11-09 NOTE — PROGRESS NOTES
"Subjective:       Patient ID: Madyson Calle is a 35 y.o. female.    Vitals:  height is 5' 3" (1.6 m) and weight is 56.7 kg (125 lb). Her temperature is 99.1 °F (37.3 °C). Her blood pressure is 117/75 and her pulse is 74. Her respiration is 18 and oxygen saturation is 99%.     Chief Complaint: Arm Injury    Arm Injury   The incident occurred more than 1 week ago (3 weeks ago). The incident occurred at home. Injury mechanism: play fighting. The pain is present in the left wrist and left shoulder. The quality of the pain is described as aching. The pain does not radiate. The pain is at a severity of 7/10 (When moving it). The pain is severe. Pertinent negatives include no chest pain, muscle weakness, numbness or tingling. The symptoms are aggravated by lifting. She has tried nothing for the symptoms.       Constitution: Negative for fatigue.   HENT: Negative for facial swelling and facial trauma.    Neck: Negative for neck stiffness.   Cardiovascular: Negative for chest trauma and chest pain.   Eyes: Negative for eye trauma, double vision and blurred vision.   Gastrointestinal: Negative for abdominal trauma, abdominal pain and rectal bleeding.   Genitourinary: Negative for hematuria, missed menses, genital trauma and pelvic pain.   Musculoskeletal: Negative for pain, trauma, joint swelling and abnormal ROM of joint.   Skin: Negative for color change, wound, abrasion, laceration and bruising.   Neurological: Negative for dizziness, history of vertigo, light-headedness, coordination disturbances, altered mental status, loss of consciousness and numbness.   Hematologic/Lymphatic: Negative for history of bleeding disorder.   Psychiatric/Behavioral: Negative for altered mental status.       Objective:      Physical Exam   Constitutional: normal  Cardiovascular: Normal rate, regular rhythm, normal heart sounds and normal pulses.   Abdominal: Normal appearance.   Musculoskeletal:         General: Swelling (ULNAR ASPECT) and " tenderness present.   Neurological: She is alert.   Skin: Skin is warm.   Nursing note and vitals reviewed.        Assessment:       1. Wrist sprain, left, initial encounter        Plan:         Wrist sprain, left, initial encounter  -     X-Ray Wrist Complete 3 views Left; Future; Expected date: 11/09/2020  -     SPLINT FOR HOME USE  -     ibuprofen (ADVIL,MOTRIN) 600 MG tablet; Take 1 tablet (600 mg total) by mouth every 8 (eight) hours as needed.  Dispense: 30 tablet; Refill: 0

## 2020-11-18 ENCOUNTER — OFFICE VISIT (OUTPATIENT)
Dept: OPTOMETRY | Facility: CLINIC | Age: 35
End: 2020-11-18
Payer: COMMERCIAL

## 2020-11-18 DIAGNOSIS — H43.393 VISUAL FLOATERS, BILATERAL: ICD-10-CM

## 2020-11-18 DIAGNOSIS — H52.03 HYPEROPIA WITH ASTIGMATISM, BILATERAL: ICD-10-CM

## 2020-11-18 DIAGNOSIS — H53.19 VITREOUS FLASHES OF BOTH EYES: Primary | ICD-10-CM

## 2020-11-18 DIAGNOSIS — H52.203 HYPEROPIA WITH ASTIGMATISM, BILATERAL: ICD-10-CM

## 2020-11-18 DIAGNOSIS — H10.13 ALLERGIC CONJUNCTIVITIS OF BOTH EYES: ICD-10-CM

## 2020-11-18 PROCEDURE — 92004 PR EYE EXAM, NEW PATIENT,COMPREHESV: ICD-10-PCS | Mod: S$GLB,,, | Performed by: OPTOMETRIST

## 2020-11-18 PROCEDURE — 92004 COMPRE OPH EXAM NEW PT 1/>: CPT | Mod: S$GLB,,, | Performed by: OPTOMETRIST

## 2020-11-18 PROCEDURE — 99999 PR PBB SHADOW E&M-EST. PATIENT-LVL III: ICD-10-PCS | Mod: PBBFAC,,, | Performed by: OPTOMETRIST

## 2020-11-18 PROCEDURE — 99999 PR PBB SHADOW E&M-EST. PATIENT-LVL III: CPT | Mod: PBBFAC,,, | Performed by: OPTOMETRIST

## 2020-11-18 NOTE — PROGRESS NOTES
HPI     SAMMY:2 yrs   Glasses? Yes   Contacts? no  H/o eye surgery, injections or laser: no  H/o eye injury: no  Known eye conditions? no  Family h/o eye conditions? MGGM-cataracts, glaucoma   Eye gtts?no    Pt is being referred due to HTN .    (+) Flashes new within the past two weeks infrequent sees them when eyes   are closed    (+) Floaters new within the past two weeks, infrequent sees them when   looking from one object to the next after focusing    (-) Mucous   (-) Tearing (+) Itching OU intermittent  (-) Burning   (+) Headaches center forehead, noon (-) Eye Pain/discomfort (-) Irritation     (-) Redness (-) Double vision (-) Blurry vision    Diabetic? (-)  A1c?  (No results found for: HGBA1C)        Last edited by Paula Pagan on 11/18/2020  8:39 AM. (History)            Assessment /Plan     For exam results, see Encounter Report.    Vitreous flashes of both eyes    Visual floaters, bilateral    Allergic conjunctivitis of both eyes    Hyperopia with astigmatism, bilateral      1-2. No e/o h/b/t 360 degrees OU. Monitor for worsening of symptoms or S/Sx of RD.  RTC 1 mo DFE.   3. Recommend Zaditor or Alaway bid OU and cool compresses to help soothe itching. Patient advised to RTC if condition gets worse.   4. SRx released to patient. Patient educated on lens options. Normal ocular health. RTC 1 year for routine exam.

## 2020-12-01 ENCOUNTER — OFFICE VISIT (OUTPATIENT)
Dept: CARDIOLOGY | Facility: CLINIC | Age: 35
End: 2020-12-01
Payer: COMMERCIAL

## 2020-12-01 DIAGNOSIS — I10 ESSENTIAL HYPERTENSION: Primary | ICD-10-CM

## 2020-12-01 DIAGNOSIS — F41.9 ANXIETY: ICD-10-CM

## 2020-12-01 DIAGNOSIS — Z82.3 FAMILY HISTORY OF CVA: ICD-10-CM

## 2020-12-01 PROCEDURE — 99214 OFFICE O/P EST MOD 30 MIN: CPT | Mod: 95,,, | Performed by: INTERNAL MEDICINE

## 2020-12-01 PROCEDURE — 99214 PR OFFICE/OUTPT VISIT, EST, LEVL IV, 30-39 MIN: ICD-10-PCS | Mod: 95,,, | Performed by: INTERNAL MEDICINE
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
## 2020-12-02 ENCOUNTER — OFFICE VISIT (OUTPATIENT)
Dept: OBSTETRICS AND GYNECOLOGY | Facility: CLINIC | Age: 35
End: 2020-12-02
Payer: COMMERCIAL

## 2020-12-02 VITALS
WEIGHT: 125.69 LBS | HEIGHT: 63 IN | BODY MASS INDEX: 22.27 KG/M2 | SYSTOLIC BLOOD PRESSURE: 124 MMHG | DIASTOLIC BLOOD PRESSURE: 76 MMHG

## 2020-12-02 DIAGNOSIS — N76.0 BV (BACTERIAL VAGINOSIS): Primary | ICD-10-CM

## 2020-12-02 DIAGNOSIS — N76.0 RECURRENT VAGINITIS: ICD-10-CM

## 2020-12-02 DIAGNOSIS — B96.89 BV (BACTERIAL VAGINOSIS): Primary | ICD-10-CM

## 2020-12-02 DIAGNOSIS — R10.32 LLQ PAIN: ICD-10-CM

## 2020-12-02 PROCEDURE — 3074F PR MOST RECENT SYSTOLIC BLOOD PRESSURE < 130 MM HG: ICD-10-PCS | Mod: CPTII,S$GLB,, | Performed by: OBSTETRICS & GYNECOLOGY

## 2020-12-02 PROCEDURE — 99999 PR PBB SHADOW E&M-EST. PATIENT-LVL III: CPT | Mod: PBBFAC,,, | Performed by: OBSTETRICS & GYNECOLOGY

## 2020-12-02 PROCEDURE — 3074F SYST BP LT 130 MM HG: CPT | Mod: CPTII,S$GLB,, | Performed by: OBSTETRICS & GYNECOLOGY

## 2020-12-02 PROCEDURE — 1126F PR PAIN SEVERITY QUANTIFIED, NO PAIN PRESENT: ICD-10-PCS | Mod: S$GLB,,, | Performed by: OBSTETRICS & GYNECOLOGY

## 2020-12-02 PROCEDURE — 1126F AMNT PAIN NOTED NONE PRSNT: CPT | Mod: S$GLB,,, | Performed by: OBSTETRICS & GYNECOLOGY

## 2020-12-02 PROCEDURE — 3008F BODY MASS INDEX DOCD: CPT | Mod: CPTII,S$GLB,, | Performed by: OBSTETRICS & GYNECOLOGY

## 2020-12-02 PROCEDURE — 3078F PR MOST RECENT DIASTOLIC BLOOD PRESSURE < 80 MM HG: ICD-10-PCS | Mod: CPTII,S$GLB,, | Performed by: OBSTETRICS & GYNECOLOGY

## 2020-12-02 PROCEDURE — 87510 GARDNER VAG DNA DIR PROBE: CPT

## 2020-12-02 PROCEDURE — 3008F PR BODY MASS INDEX (BMI) DOCUMENTED: ICD-10-PCS | Mod: CPTII,S$GLB,, | Performed by: OBSTETRICS & GYNECOLOGY

## 2020-12-02 PROCEDURE — 87480 CANDIDA DNA DIR PROBE: CPT

## 2020-12-02 PROCEDURE — 99214 PR OFFICE/OUTPT VISIT, EST, LEVL IV, 30-39 MIN: ICD-10-PCS | Mod: S$GLB,,, | Performed by: OBSTETRICS & GYNECOLOGY

## 2020-12-02 PROCEDURE — 99214 OFFICE O/P EST MOD 30 MIN: CPT | Mod: S$GLB,,, | Performed by: OBSTETRICS & GYNECOLOGY

## 2020-12-02 PROCEDURE — 3078F DIAST BP <80 MM HG: CPT | Mod: CPTII,S$GLB,, | Performed by: OBSTETRICS & GYNECOLOGY

## 2020-12-02 PROCEDURE — 99999 PR PBB SHADOW E&M-EST. PATIENT-LVL III: ICD-10-PCS | Mod: PBBFAC,,, | Performed by: OBSTETRICS & GYNECOLOGY

## 2020-12-02 RX ORDER — METRONIDAZOLE 7.5 MG/G
1 GEL VAGINAL DAILY
Qty: 70 G | Refills: 0 | Status: SHIPPED | OUTPATIENT
Start: 2020-12-02 | End: 2020-12-07

## 2020-12-03 NOTE — PROGRESS NOTES
HISTORY OF PRESENT ILLNESS:    Madyson Calle is a 35 y.o. female, , No LMP recorded. (Menstrual status: Birth Control).,  presents for a problem visit, complaining of RECURRENT BV AFTER INTERCOURSE WHEN PARTNER EJACULATES IN VAGINA TIMOTHY.  REFILL COMPOUNDED  SUPPOSITORIES BORIC ACID TO ALTER VAGINAL PH AND RETARD GROWTH OF YEAST, BACTERIA ASSOCIATED WITH BV.  TRIMMED SIGNIFICANT AMTS OF IUD STRING NEAR EXTERNAL OS, AFFIRM SUBMITTED, RXN EMPIRIC METROGEL PRN. VAGINAL WHITE D/C NOTED BUT POSSIBLY C/W VAGINAL MEDICATION.  ALSO C/O LLQ PAIN, POSSIBLY OV VERSUS SIMILAR TO PRIOR TO KIDNEY STONE - WILL CHECK PELVIC USG.  ON EXAM NOTHING OBVIOUS CAUSING HER PAIN. EXAM DONE AS AN ANNUAL  NL COTEST  AND MIRENA PLACED 2020:  routine exam and IUD CHECK. PAP DUE .  HAD 2 EPISODES PCB, 2ND WAS JUST SPOTTING.  REASSURED EXAM NORMAL, STRINGS NORMAL LENGTH AND OCCAS MENSES WITH IUD NOT UNUSUAL.  THUY INSERTED THIS ONE IN  AFTER EXPULSION FIRST ONE  FIRST YEAR RIDING  - LUDWIG WITH FRIENDS  LAST VISIT WITH ME :  complaining of LLQ CRAMPING AFTER IUD INSERTION 2015 THUY.  A FEW WEEKS AFTER INSERTION HAD SOME OCCASIONAL SHARP LLQ PAIN.  HAS HAD IRREGULAR BLEEDING SINCE INSERTION, NOW STOPPED FOR SEVERAL DAYS AND PAIN HAS RESOLVED.  NEEDS REFILL DIFLUCAN FOR OCCAS YEAST SX, PROB RELATED TO PROLONGED BLEEDING.  CYCLE 1-2 MONTHS ON OCP TO REESTABLISH CYCLE CONTROL - MEG  LAST SEEN ROUTINE VISIT 2015:  HAS NAUSEA WITH PILLS AND DIFFICULTY REMEMBERING. WOULD LIKE AMENORRHEA IF POSSIBLE. WILL RETURN MIRENA INSERTION WITH NMP IN ABOUT 2 WEEKS. DISCUSSED TOPICAL VAGINITIS RISKS.  NOW WORKS [employer] - -    Past Medical History:   Diagnosis Date    Abnormal cervical Papanicolaou smear , , 2017    Colposcopy    Anxiety     Anxiety     GERD (gastroesophageal reflux disease)     Hypertension     Scoliosis        History reviewed. No pertinent surgical history.    MEDICATIONS AND  ALLERGIES:      Current Outpatient Medications:     amLODIPine (NORVASC) 10 MG tablet, 1/2 tab in AM., Disp: 45 tablet, Rfl: 3    boric acid (bulk) Powd, Insert one Gelatin Capsule filled with 600 mg of Boric Acid Powder H.S., Disp: 100 capsule, Rfl: 4    citalopram (CELEXA) 10 MG tablet, TK 1 T PO  QD, Disp: , Rfl: 0    ibuprofen (ADVIL,MOTRIN) 600 MG tablet, Take 1 tablet (600 mg total) by mouth every 8 (eight) hours as needed., Disp: 30 tablet, Rfl: 0    loratadine (CLARITIN) 10 mg tablet, Take 1 tablet (10 mg total) by mouth once daily., Disp: 30 tablet, Rfl: 2    metroNIDAZOLE (FLAGYL) 500 MG tablet, TAKE 1 TABLET(500 MG) BY MOUTH TWICE DAILY FOR 7 DAYS, Disp: 14 tablet, Rfl: 1    omeprazole (PRILOSEC) 40 MG capsule, Take 40 mg by mouth once daily., Disp: , Rfl:     tretinoin (RETIN-A) 0.05 % cream, Apply topically every evening., Disp: 45 g, Rfl: 2    metroNIDAZOLE (METROGEL VAGINAL) 0.75 % vaginal gel, Place 1 applicator vaginally once daily. for 5 days, Disp: 70 g, Rfl: 0    Review of patient's allergies indicates:  No Known Allergies    Family History   Problem Relation Age of Onset    Hypertension Father     No Known Problems Mother     No Known Problems Sister     Stroke Paternal Aunt     Stroke Maternal Grandfather     Breast cancer Neg Hx     Colon cancer Neg Hx     Ovarian cancer Neg Hx        Social History     Socioeconomic History    Marital status: Single     Spouse name: Not on file    Number of children: Not on file    Years of education: Not on file    Highest education level: Not on file   Occupational History    Not on file   Social Needs    Financial resource strain: Not on file    Food insecurity     Worry: Not on file     Inability: Not on file    Transportation needs     Medical: Not on file     Non-medical: Not on file   Tobacco Use    Smoking status: Never Smoker    Smokeless tobacco: Never Used   Substance and Sexual Activity    Alcohol use: Yes      "Alcohol/week: 7.0 standard drinks     Types: 7 Glasses of wine per week     Comment: frequent,3-4 times per week.     Drug use: No    Sexual activity: Yes     Partners: Male     Birth control/protection: Condom   Lifestyle    Physical activity     Days per week: Not on file     Minutes per session: Not on file    Stress: Not on file   Relationships    Social connections     Talks on phone: Not on file     Gets together: Not on file     Attends Restorationism service: Not on file     Active member of club or organization: Not on file     Attends meetings of clubs or organizations: Not on file     Relationship status: Not on file   Other Topics Concern    Not on file   Social History Narrative    Not on file       COMPREHENSIVE GYN HISTORY:  PAP History: Denies abnormal Paps.  Infection History: Denies STDs. Denies PID.  Benign History: Denies uterine fibroids. Denies ovarian cysts. Denies endometriosis. Denies other conditions.  Cancer History: Denies cervical cancer. Denies uterine cancer or hyperplasia. Denies ovarian cancer. Denies vulvar cancer or pre-cancer. Denies vaginal cancer or pre-cancer. Denies breast cancer. Denies colon cancer.    ROS:  GENERAL: No weight changes. No swelling. No fatigue. No fever.  CARDIOVASCULAR: No chest pain. No shortness of breath. No leg cramps.   NEUROLOGICAL: No headaches. No vision changes.  BREASTS: No pain. No lumps. No discharge.  ABDOMEN: No pain. No nausea. No vomiting. No diarrhea. No constipation.  REPRODUCTIVE: No abnormal bleeding.   VULVA: No pain. No lesions. + itching.  VAGINA: No relaxation. + itching. No odor. + discharge. No lesions.  URINARY: No incontinence. No nocturia. No frequency. No dysuria.    /76   Ht 5' 3" (1.6 m)   Wt 57 kg (125 lb 10.6 oz)   BMI 22.26 kg/m²     PE:  APPEARANCE: Well nourished, well developed, in no acute distress.  ABDOMEN: Soft. No tenderness or masses. No hepatosplenomegaly. No hernias.  BREASTS, no masses, skin changes, " nipple discharge  PELVIC: External female genitalia without lesions.  Female hair distribution. Adequate perineal body, Normal urethral meatus. Vagina moist and well rugated without lesions or discharge.  No significant cystocele or rectocele present. Cervix pink without lesions, discharge or tenderness. Uterus is normal size, regular, mobile and nontender. Adnexa without masses or tenderness.  EXTREMITIES: No edema    PROCEDURES:    DIAGNOSIS:  1. BV (bacterial vaginosis)  Vaginosis Screen by DNA Probe   2. Recurrent vaginitis  Vaginosis Screen by DNA Probe   3. LLQ pain  US Pelvis Comp with Transvag NON-OB (xpd       LABS AND TESTS ORDERED:    MEDICATIONS PRESCRIBED:    COUNSELING:   The patient was counseled on the causes pelvic pain  The patient was also counseled on steps that she can take to avoid vulvar irritation and reduce contact with topical allergens:        FOLLOW-UP with me routine visit.

## 2020-12-04 ENCOUNTER — HOSPITAL ENCOUNTER (OUTPATIENT)
Dept: RADIOLOGY | Facility: OTHER | Age: 35
Discharge: HOME | End: 2020-12-04
Attending: OBSTETRICS & GYNECOLOGY
Payer: COMMERCIAL

## 2020-12-04 DIAGNOSIS — R10.32 LLQ PAIN: ICD-10-CM

## 2020-12-04 PROCEDURE — 76830 US PELVIS COMP WITH TRANSVAG NON-OB (XPD): ICD-10-PCS | Mod: 26,,, | Performed by: RADIOLOGY

## 2020-12-04 PROCEDURE — 76856 US EXAM PELVIC COMPLETE: CPT | Mod: 26,,, | Performed by: RADIOLOGY

## 2020-12-04 PROCEDURE — 76830 TRANSVAGINAL US NON-OB: CPT | Mod: 26,,, | Performed by: RADIOLOGY

## 2020-12-04 PROCEDURE — 76856 US PELVIS COMP WITH TRANSVAG NON-OB (XPD): ICD-10-PCS | Mod: 26,,, | Performed by: RADIOLOGY

## 2020-12-04 PROCEDURE — 76830 TRANSVAGINAL US NON-OB: CPT | Mod: TC

## 2020-12-11 ENCOUNTER — PATIENT MESSAGE (OUTPATIENT)
Dept: OBSTETRICS AND GYNECOLOGY | Facility: CLINIC | Age: 35
End: 2020-12-11

## 2020-12-12 ENCOUNTER — PATIENT MESSAGE (OUTPATIENT)
Dept: OBSTETRICS AND GYNECOLOGY | Facility: CLINIC | Age: 35
End: 2020-12-12

## 2021-01-03 ENCOUNTER — CLINICAL SUPPORT (OUTPATIENT)
Dept: URGENT CARE | Facility: CLINIC | Age: 36
End: 2021-01-03
Payer: COMMERCIAL

## 2021-01-03 VITALS — TEMPERATURE: 99 F

## 2021-01-03 DIAGNOSIS — Z11.59 ENCOUNTER FOR SCREENING FOR OTHER VIRAL DISEASES: Primary | ICD-10-CM

## 2021-01-03 LAB
CTP QC/QA: YES
SARS-COV-2 RDRP RESP QL NAA+PROBE: NEGATIVE

## 2021-01-03 PROCEDURE — 99211 PR OFFICE/OUTPT VISIT, EST, LEVL I: ICD-10-PCS | Mod: S$GLB,,, | Performed by: FAMILY MEDICINE

## 2021-01-03 PROCEDURE — U0002 COVID-19 LAB TEST NON-CDC: HCPCS | Mod: QW,S$GLB,, | Performed by: FAMILY MEDICINE

## 2021-01-03 PROCEDURE — U0002: ICD-10-PCS | Mod: QW,S$GLB,, | Performed by: FAMILY MEDICINE

## 2021-01-03 PROCEDURE — 99211 OFF/OP EST MAY X REQ PHY/QHP: CPT | Mod: S$GLB,,, | Performed by: FAMILY MEDICINE

## 2021-01-05 ENCOUNTER — PATIENT MESSAGE (OUTPATIENT)
Dept: CARDIOLOGY | Facility: CLINIC | Age: 36
End: 2021-01-05

## 2021-03-23 ENCOUNTER — OFFICE VISIT (OUTPATIENT)
Dept: ORTHOPEDICS | Facility: CLINIC | Age: 36
End: 2021-03-23
Payer: COMMERCIAL

## 2021-03-23 ENCOUNTER — HOSPITAL ENCOUNTER (OUTPATIENT)
Dept: RADIOLOGY | Facility: HOSPITAL | Age: 36
Discharge: HOME | End: 2021-03-23
Attending: PHYSICIAN ASSISTANT
Payer: COMMERCIAL

## 2021-03-23 DIAGNOSIS — M25.511 ACUTE PAIN OF RIGHT SHOULDER: Primary | ICD-10-CM

## 2021-03-23 DIAGNOSIS — M25.511 RIGHT SHOULDER PAIN, UNSPECIFIED CHRONICITY: ICD-10-CM

## 2021-03-23 PROCEDURE — 99203 PR OFFICE/OUTPT VISIT, NEW, LEVL III, 30-44 MIN: ICD-10-PCS | Mod: S$GLB,,, | Performed by: PHYSICIAN ASSISTANT

## 2021-03-23 PROCEDURE — 99999 PR PBB SHADOW E&M-EST. PATIENT-LVL III: ICD-10-PCS | Mod: PBBFAC,,, | Performed by: PHYSICIAN ASSISTANT

## 2021-03-23 PROCEDURE — 73030 XR SHOULDER COMPLETE 2 OR MORE VIEWS RIGHT: ICD-10-PCS | Mod: 26,RT,, | Performed by: RADIOLOGY

## 2021-03-23 PROCEDURE — 73030 X-RAY EXAM OF SHOULDER: CPT | Mod: TC,RT

## 2021-03-23 PROCEDURE — 73030 X-RAY EXAM OF SHOULDER: CPT | Mod: 26,RT,, | Performed by: RADIOLOGY

## 2021-03-23 PROCEDURE — 99999 PR PBB SHADOW E&M-EST. PATIENT-LVL III: CPT | Mod: PBBFAC,,, | Performed by: PHYSICIAN ASSISTANT

## 2021-03-23 PROCEDURE — 99203 OFFICE O/P NEW LOW 30 MIN: CPT | Mod: S$GLB,,, | Performed by: PHYSICIAN ASSISTANT

## 2021-04-15 ENCOUNTER — PATIENT MESSAGE (OUTPATIENT)
Dept: RESEARCH | Facility: HOSPITAL | Age: 36
End: 2021-04-15

## 2021-04-20 DIAGNOSIS — N89.8 VAGINAL DISCHARGE: ICD-10-CM

## 2021-04-20 RX ORDER — METRONIDAZOLE 500 MG/1
500 TABLET ORAL EVERY 12 HOURS
Qty: 14 TABLET | Refills: 0 | Status: SHIPPED | OUTPATIENT
Start: 2021-04-20 | End: 2021-04-27

## 2021-06-10 ENCOUNTER — PATIENT MESSAGE (OUTPATIENT)
Dept: OBSTETRICS AND GYNECOLOGY | Facility: CLINIC | Age: 36
End: 2021-06-10

## 2021-06-16 ENCOUNTER — OFFICE VISIT (OUTPATIENT)
Dept: OBSTETRICS AND GYNECOLOGY | Facility: CLINIC | Age: 36
End: 2021-06-16
Payer: COMMERCIAL

## 2021-06-16 VITALS
SYSTOLIC BLOOD PRESSURE: 146 MMHG | BODY MASS INDEX: 22.4 KG/M2 | DIASTOLIC BLOOD PRESSURE: 100 MMHG | WEIGHT: 126.44 LBS

## 2021-06-16 DIAGNOSIS — N83.202 LEFT OVARIAN CYST: ICD-10-CM

## 2021-06-16 DIAGNOSIS — Z30.431 ENCOUNTER FOR ROUTINE CHECKING OF INTRAUTERINE CONTRACEPTIVE DEVICE (IUD): ICD-10-CM

## 2021-06-16 DIAGNOSIS — Z01.419 VISIT FOR GYNECOLOGIC EXAMINATION: Primary | ICD-10-CM

## 2021-06-16 DIAGNOSIS — N94.10 DYSPAREUNIA IN FEMALE: ICD-10-CM

## 2021-06-16 PROCEDURE — 3008F BODY MASS INDEX DOCD: CPT | Mod: CPTII,S$GLB,, | Performed by: OBSTETRICS & GYNECOLOGY

## 2021-06-16 PROCEDURE — 99395 PR PREVENTIVE VISIT,EST,18-39: ICD-10-PCS | Mod: S$GLB,,, | Performed by: OBSTETRICS & GYNECOLOGY

## 2021-06-16 PROCEDURE — 99999 PR PBB SHADOW E&M-EST. PATIENT-LVL III: CPT | Mod: PBBFAC,,, | Performed by: OBSTETRICS & GYNECOLOGY

## 2021-06-16 PROCEDURE — 99395 PREV VISIT EST AGE 18-39: CPT | Mod: S$GLB,,, | Performed by: OBSTETRICS & GYNECOLOGY

## 2021-06-16 PROCEDURE — 99999 PR PBB SHADOW E&M-EST. PATIENT-LVL III: ICD-10-PCS | Mod: PBBFAC,,, | Performed by: OBSTETRICS & GYNECOLOGY

## 2021-06-16 PROCEDURE — 3008F PR BODY MASS INDEX (BMI) DOCUMENTED: ICD-10-PCS | Mod: CPTII,S$GLB,, | Performed by: OBSTETRICS & GYNECOLOGY

## 2021-07-06 ENCOUNTER — NURSE TRIAGE (OUTPATIENT)
Dept: ADMINISTRATIVE | Facility: CLINIC | Age: 36
End: 2021-07-06

## 2021-08-15 ENCOUNTER — OFFICE VISIT (OUTPATIENT)
Dept: URGENT CARE | Facility: CLINIC | Age: 36
End: 2021-08-15
Payer: COMMERCIAL

## 2021-08-15 VITALS
BODY MASS INDEX: 22.32 KG/M2 | SYSTOLIC BLOOD PRESSURE: 148 MMHG | HEIGHT: 63 IN | TEMPERATURE: 98 F | OXYGEN SATURATION: 99 % | DIASTOLIC BLOOD PRESSURE: 99 MMHG | RESPIRATION RATE: 18 BRPM | WEIGHT: 126 LBS | HEART RATE: 102 BPM

## 2021-08-15 DIAGNOSIS — U07.1 COVID-19 VIRUS INFECTION: Primary | ICD-10-CM

## 2021-08-15 DIAGNOSIS — R50.9 FEVER, UNSPECIFIED FEVER CAUSE: ICD-10-CM

## 2021-08-15 LAB
CTP QC/QA: YES
SARS-COV-2 RDRP RESP QL NAA+PROBE: POSITIVE

## 2021-08-15 PROCEDURE — 1159F PR MEDICATION LIST DOCUMENTED IN MEDICAL RECORD: ICD-10-PCS | Mod: CPTII,S$GLB,, | Performed by: NURSE PRACTITIONER

## 2021-08-15 PROCEDURE — 99214 PR OFFICE/OUTPT VISIT, EST, LEVL IV, 30-39 MIN: ICD-10-PCS | Mod: S$GLB,,, | Performed by: NURSE PRACTITIONER

## 2021-08-15 PROCEDURE — 3077F PR MOST RECENT SYSTOLIC BLOOD PRESSURE >= 140 MM HG: ICD-10-PCS | Mod: CPTII,S$GLB,, | Performed by: NURSE PRACTITIONER

## 2021-08-15 PROCEDURE — 3077F SYST BP >= 140 MM HG: CPT | Mod: CPTII,S$GLB,, | Performed by: NURSE PRACTITIONER

## 2021-08-15 PROCEDURE — 3080F DIAST BP >= 90 MM HG: CPT | Mod: CPTII,S$GLB,, | Performed by: NURSE PRACTITIONER

## 2021-08-15 PROCEDURE — 3008F BODY MASS INDEX DOCD: CPT | Mod: CPTII,S$GLB,, | Performed by: NURSE PRACTITIONER

## 2021-08-15 PROCEDURE — U0002 COVID-19 LAB TEST NON-CDC: HCPCS | Mod: QW,S$GLB,, | Performed by: NURSE PRACTITIONER

## 2021-08-15 PROCEDURE — U0002: ICD-10-PCS | Mod: QW,S$GLB,, | Performed by: NURSE PRACTITIONER

## 2021-08-15 PROCEDURE — 3008F PR BODY MASS INDEX (BMI) DOCUMENTED: ICD-10-PCS | Mod: CPTII,S$GLB,, | Performed by: NURSE PRACTITIONER

## 2021-08-15 PROCEDURE — 1160F RVW MEDS BY RX/DR IN RCRD: CPT | Mod: CPTII,S$GLB,, | Performed by: NURSE PRACTITIONER

## 2021-08-15 PROCEDURE — 99214 OFFICE O/P EST MOD 30 MIN: CPT | Mod: S$GLB,,, | Performed by: NURSE PRACTITIONER

## 2021-08-15 PROCEDURE — 3080F PR MOST RECENT DIASTOLIC BLOOD PRESSURE >= 90 MM HG: ICD-10-PCS | Mod: CPTII,S$GLB,, | Performed by: NURSE PRACTITIONER

## 2021-08-15 PROCEDURE — 1159F MED LIST DOCD IN RCRD: CPT | Mod: CPTII,S$GLB,, | Performed by: NURSE PRACTITIONER

## 2021-08-15 PROCEDURE — 1160F PR REVIEW ALL MEDS BY PRESCRIBER/CLIN PHARMACIST DOCUMENTED: ICD-10-PCS | Mod: CPTII,S$GLB,, | Performed by: NURSE PRACTITIONER

## 2021-08-19 ENCOUNTER — INFUSION (OUTPATIENT)
Dept: INFECTIOUS DISEASES | Facility: HOSPITAL | Age: 36
End: 2021-08-19
Attending: INTERNAL MEDICINE
Payer: COMMERCIAL

## 2021-08-19 VITALS
RESPIRATION RATE: 16 BRPM | BODY MASS INDEX: 21.79 KG/M2 | TEMPERATURE: 99 F | SYSTOLIC BLOOD PRESSURE: 122 MMHG | DIASTOLIC BLOOD PRESSURE: 82 MMHG | HEART RATE: 68 BPM | OXYGEN SATURATION: 98 % | WEIGHT: 123 LBS | HEIGHT: 63 IN

## 2021-08-19 DIAGNOSIS — U07.1 COVID-19: Primary | ICD-10-CM

## 2021-08-19 PROCEDURE — 25000003 PHARM REV CODE 250: Performed by: INTERNAL MEDICINE

## 2021-08-19 PROCEDURE — M0243 CASIRIVI AND IMDEVI INFUSION: HCPCS | Performed by: INTERNAL MEDICINE

## 2021-08-19 PROCEDURE — 63600175 PHARM REV CODE 636 W HCPCS: Performed by: INTERNAL MEDICINE

## 2021-08-19 RX ORDER — ACETAMINOPHEN 325 MG/1
650 TABLET ORAL ONCE AS NEEDED
Status: DISCONTINUED | OUTPATIENT
Start: 2021-08-19 | End: 2022-07-15

## 2021-08-19 RX ORDER — ALBUTEROL SULFATE 90 UG/1
2 AEROSOL, METERED RESPIRATORY (INHALATION)
Status: DISCONTINUED | OUTPATIENT
Start: 2021-08-19 | End: 2022-07-15

## 2021-08-19 RX ORDER — DIPHENHYDRAMINE HYDROCHLORIDE 50 MG/ML
25 INJECTION INTRAMUSCULAR; INTRAVENOUS ONCE AS NEEDED
Status: DISCONTINUED | OUTPATIENT
Start: 2021-08-19 | End: 2022-07-15

## 2021-08-19 RX ORDER — EPINEPHRINE 0.3 MG/.3ML
0.3 INJECTION SUBCUTANEOUS
Status: DISCONTINUED | OUTPATIENT
Start: 2021-08-19 | End: 2022-07-15

## 2021-08-19 RX ORDER — ONDANSETRON 4 MG/1
4 TABLET, ORALLY DISINTEGRATING ORAL ONCE AS NEEDED
Status: DISCONTINUED | OUTPATIENT
Start: 2021-08-19 | End: 2022-07-15

## 2021-08-19 RX ORDER — SODIUM CHLORIDE 0.9 % (FLUSH) 0.9 %
10 SYRINGE (ML) INJECTION
Status: DISCONTINUED | OUTPATIENT
Start: 2021-08-19 | End: 2022-07-15

## 2021-08-19 RX ADMIN — CASIRIVIMAB AND IMDEVIMAB 600 MG: 600; 600 INJECTION, SOLUTION, CONCENTRATE INTRAVENOUS at 10:08

## 2021-09-17 ENCOUNTER — OFFICE VISIT (OUTPATIENT)
Dept: INTERNAL MEDICINE | Facility: CLINIC | Age: 36
End: 2021-09-17
Payer: COMMERCIAL

## 2021-09-17 ENCOUNTER — LAB VISIT (OUTPATIENT)
Dept: LAB | Facility: HOSPITAL | Age: 36
End: 2021-09-17
Attending: HOSPITALIST
Payer: COMMERCIAL

## 2021-09-17 VITALS
WEIGHT: 127.63 LBS | HEART RATE: 88 BPM | BODY MASS INDEX: 22.61 KG/M2 | DIASTOLIC BLOOD PRESSURE: 100 MMHG | SYSTOLIC BLOOD PRESSURE: 150 MMHG | RESPIRATION RATE: 16 BRPM | OXYGEN SATURATION: 97 % | HEIGHT: 63 IN

## 2021-09-17 DIAGNOSIS — M25.531 PAIN AND SWELLING OF RIGHT WRIST: Primary | ICD-10-CM

## 2021-09-17 DIAGNOSIS — M25.531 PAIN AND SWELLING OF RIGHT WRIST: ICD-10-CM

## 2021-09-17 DIAGNOSIS — L84 CORN OF TOE: ICD-10-CM

## 2021-09-17 DIAGNOSIS — M25.431 PAIN AND SWELLING OF RIGHT WRIST: ICD-10-CM

## 2021-09-17 DIAGNOSIS — I10 ESSENTIAL HYPERTENSION: ICD-10-CM

## 2021-09-17 DIAGNOSIS — M25.431 PAIN AND SWELLING OF RIGHT WRIST: Primary | ICD-10-CM

## 2021-09-17 LAB — HCG INTACT+B SERPL-ACNC: <2.4 MIU/ML

## 2021-09-17 PROCEDURE — 3077F PR MOST RECENT SYSTOLIC BLOOD PRESSURE >= 140 MM HG: ICD-10-PCS | Mod: CPTII,S$GLB,, | Performed by: HOSPITALIST

## 2021-09-17 PROCEDURE — 3008F PR BODY MASS INDEX (BMI) DOCUMENTED: ICD-10-PCS | Mod: CPTII,S$GLB,, | Performed by: HOSPITALIST

## 2021-09-17 PROCEDURE — 3077F SYST BP >= 140 MM HG: CPT | Mod: CPTII,S$GLB,, | Performed by: HOSPITALIST

## 2021-09-17 PROCEDURE — 1160F RVW MEDS BY RX/DR IN RCRD: CPT | Mod: CPTII,S$GLB,, | Performed by: HOSPITALIST

## 2021-09-17 PROCEDURE — 36415 COLL VENOUS BLD VENIPUNCTURE: CPT | Mod: PO | Performed by: HOSPITALIST

## 2021-09-17 PROCEDURE — 1159F PR MEDICATION LIST DOCUMENTED IN MEDICAL RECORD: ICD-10-PCS | Mod: CPTII,S$GLB,, | Performed by: HOSPITALIST

## 2021-09-17 PROCEDURE — 3080F DIAST BP >= 90 MM HG: CPT | Mod: CPTII,S$GLB,, | Performed by: HOSPITALIST

## 2021-09-17 PROCEDURE — 99999 PR PBB SHADOW E&M-EST. PATIENT-LVL V: ICD-10-PCS | Mod: PBBFAC,,, | Performed by: HOSPITALIST

## 2021-09-17 PROCEDURE — 1159F MED LIST DOCD IN RCRD: CPT | Mod: CPTII,S$GLB,, | Performed by: HOSPITALIST

## 2021-09-17 PROCEDURE — 99213 PR OFFICE/OUTPT VISIT, EST, LEVL III, 20-29 MIN: ICD-10-PCS | Mod: S$GLB,,, | Performed by: HOSPITALIST

## 2021-09-17 PROCEDURE — 3008F BODY MASS INDEX DOCD: CPT | Mod: CPTII,S$GLB,, | Performed by: HOSPITALIST

## 2021-09-17 PROCEDURE — 84702 CHORIONIC GONADOTROPIN TEST: CPT | Performed by: HOSPITALIST

## 2021-09-17 PROCEDURE — 99999 PR PBB SHADOW E&M-EST. PATIENT-LVL V: CPT | Mod: PBBFAC,,, | Performed by: HOSPITALIST

## 2021-09-17 PROCEDURE — 99213 OFFICE O/P EST LOW 20 MIN: CPT | Mod: S$GLB,,, | Performed by: HOSPITALIST

## 2021-09-17 PROCEDURE — 1160F PR REVIEW ALL MEDS BY PRESCRIBER/CLIN PHARMACIST DOCUMENTED: ICD-10-PCS | Mod: CPTII,S$GLB,, | Performed by: HOSPITALIST

## 2021-09-17 PROCEDURE — 3080F PR MOST RECENT DIASTOLIC BLOOD PRESSURE >= 90 MM HG: ICD-10-PCS | Mod: CPTII,S$GLB,, | Performed by: HOSPITALIST

## 2021-09-17 RX ORDER — METOPROLOL SUCCINATE 25 MG/1
25 TABLET, EXTENDED RELEASE ORAL DAILY
COMMUNITY
End: 2022-01-14

## 2021-09-17 RX ORDER — ERGOCALCIFEROL 1.25 MG/1
50000 CAPSULE ORAL
COMMUNITY
End: 2023-05-29 | Stop reason: SDUPTHER

## 2021-09-17 RX ORDER — IBUPROFEN 800 MG/1
800 TABLET ORAL 3 TIMES DAILY PRN
Qty: 30 TABLET | Refills: 0 | Status: SHIPPED | OUTPATIENT
Start: 2021-09-17

## 2021-09-18 ENCOUNTER — HOSPITAL ENCOUNTER (OUTPATIENT)
Dept: RADIOLOGY | Facility: HOSPITAL | Age: 36
Discharge: HOME | End: 2021-09-18
Attending: HOSPITALIST
Payer: COMMERCIAL

## 2021-09-18 DIAGNOSIS — M25.531 PAIN AND SWELLING OF RIGHT WRIST: ICD-10-CM

## 2021-09-18 DIAGNOSIS — M25.431 PAIN AND SWELLING OF RIGHT WRIST: ICD-10-CM

## 2021-09-18 PROCEDURE — 73110 XR WRIST COMPLETE 3 VIEWS RIGHT: ICD-10-PCS | Mod: 26,RT,, | Performed by: RADIOLOGY

## 2021-09-18 PROCEDURE — 73110 X-RAY EXAM OF WRIST: CPT | Mod: 26,RT,, | Performed by: RADIOLOGY

## 2021-09-18 PROCEDURE — 73110 X-RAY EXAM OF WRIST: CPT | Mod: TC,RT

## 2021-09-20 ENCOUNTER — PATIENT MESSAGE (OUTPATIENT)
Dept: INTERNAL MEDICINE | Facility: CLINIC | Age: 36
End: 2021-09-20

## 2021-09-20 DIAGNOSIS — M25.431 PAIN AND SWELLING OF RIGHT WRIST: Primary | ICD-10-CM

## 2021-09-20 DIAGNOSIS — M25.531 PAIN AND SWELLING OF RIGHT WRIST: Primary | ICD-10-CM

## 2021-09-29 ENCOUNTER — HOSPITAL ENCOUNTER (OUTPATIENT)
Dept: RADIOLOGY | Facility: OTHER | Age: 36
Discharge: HOME | End: 2021-09-29
Attending: HOSPITALIST
Payer: COMMERCIAL

## 2021-09-29 DIAGNOSIS — M25.531 PAIN AND SWELLING OF RIGHT WRIST: ICD-10-CM

## 2021-09-29 DIAGNOSIS — M25.431 PAIN AND SWELLING OF RIGHT WRIST: ICD-10-CM

## 2021-09-29 PROCEDURE — 73221 MRI WRIST WITHOUT CONTRAST RIGHT: ICD-10-PCS | Mod: 26,RT,, | Performed by: RADIOLOGY

## 2021-09-29 PROCEDURE — 73221 MRI JOINT UPR EXTREM W/O DYE: CPT | Mod: 26,RT,, | Performed by: RADIOLOGY

## 2021-09-29 PROCEDURE — 73221 MRI JOINT UPR EXTREM W/O DYE: CPT | Mod: TC,RT

## 2021-09-30 ENCOUNTER — PATIENT MESSAGE (OUTPATIENT)
Dept: INTERNAL MEDICINE | Facility: CLINIC | Age: 36
End: 2021-09-30

## 2021-11-19 ENCOUNTER — OFFICE VISIT (OUTPATIENT)
Dept: OPTOMETRY | Facility: CLINIC | Age: 36
End: 2021-11-19
Payer: COMMERCIAL

## 2021-11-19 ENCOUNTER — TELEPHONE (OUTPATIENT)
Dept: OPTOMETRY | Facility: CLINIC | Age: 36
End: 2021-11-19
Payer: COMMERCIAL

## 2021-11-19 ENCOUNTER — PATIENT MESSAGE (OUTPATIENT)
Dept: OBSTETRICS AND GYNECOLOGY | Facility: CLINIC | Age: 36
End: 2021-11-19
Payer: COMMERCIAL

## 2021-11-19 DIAGNOSIS — H52.03 HYPEROPIA WITH ASTIGMATISM, BILATERAL: Primary | ICD-10-CM

## 2021-11-19 DIAGNOSIS — H43.393 VISUAL FLOATERS, BILATERAL: ICD-10-CM

## 2021-11-19 DIAGNOSIS — H10.13 ALLERGIC CONJUNCTIVITIS OF BOTH EYES: ICD-10-CM

## 2021-11-19 DIAGNOSIS — H52.203 HYPEROPIA WITH ASTIGMATISM, BILATERAL: Primary | ICD-10-CM

## 2021-11-19 DIAGNOSIS — H53.19 VITREOUS FLASHES OF BOTH EYES: ICD-10-CM

## 2021-11-19 PROCEDURE — 92014 PR EYE EXAM, EST PATIENT,COMPREHESV: ICD-10-PCS | Mod: S$GLB,,, | Performed by: OPTOMETRIST

## 2021-11-19 PROCEDURE — 99999 PR PBB SHADOW E&M-EST. PATIENT-LVL III: CPT | Mod: PBBFAC,,, | Performed by: OPTOMETRIST

## 2021-11-19 PROCEDURE — 92014 COMPRE OPH EXAM EST PT 1/>: CPT | Mod: S$GLB,,, | Performed by: OPTOMETRIST

## 2021-11-19 PROCEDURE — 92015 PR REFRACTION: ICD-10-PCS | Mod: S$GLB,,, | Performed by: OPTOMETRIST

## 2021-11-19 PROCEDURE — 99999 PR PBB SHADOW E&M-EST. PATIENT-LVL III: ICD-10-PCS | Mod: PBBFAC,,, | Performed by: OPTOMETRIST

## 2021-11-19 PROCEDURE — 92015 DETERMINE REFRACTIVE STATE: CPT | Mod: S$GLB,,, | Performed by: OPTOMETRIST

## 2021-11-20 ENCOUNTER — PATIENT MESSAGE (OUTPATIENT)
Dept: OBSTETRICS AND GYNECOLOGY | Facility: CLINIC | Age: 36
End: 2021-11-20
Payer: COMMERCIAL

## 2021-11-29 ENCOUNTER — PATIENT MESSAGE (OUTPATIENT)
Dept: OBSTETRICS AND GYNECOLOGY | Facility: CLINIC | Age: 36
End: 2021-11-29
Payer: COMMERCIAL

## 2021-12-07 ENCOUNTER — OFFICE VISIT (OUTPATIENT)
Dept: OBSTETRICS AND GYNECOLOGY | Facility: CLINIC | Age: 36
End: 2021-12-07
Payer: COMMERCIAL

## 2021-12-07 VITALS
WEIGHT: 129.94 LBS | HEIGHT: 63 IN | DIASTOLIC BLOOD PRESSURE: 80 MMHG | SYSTOLIC BLOOD PRESSURE: 112 MMHG | BODY MASS INDEX: 23.02 KG/M2

## 2021-12-07 DIAGNOSIS — R10.32 LLQ PAIN: ICD-10-CM

## 2021-12-07 DIAGNOSIS — N93.0 POSTCOITAL BLEEDING: Primary | ICD-10-CM

## 2021-12-07 DIAGNOSIS — N89.8 VAGINAL DISCHARGE: ICD-10-CM

## 2021-12-07 PROCEDURE — 99999 PR PBB SHADOW E&M-EST. PATIENT-LVL III: ICD-10-PCS | Mod: PBBFAC,,, | Performed by: OBSTETRICS & GYNECOLOGY

## 2021-12-07 PROCEDURE — 99213 OFFICE O/P EST LOW 20 MIN: CPT | Mod: S$GLB,,, | Performed by: OBSTETRICS & GYNECOLOGY

## 2021-12-07 PROCEDURE — 99999 PR PBB SHADOW E&M-EST. PATIENT-LVL III: CPT | Mod: PBBFAC,,, | Performed by: OBSTETRICS & GYNECOLOGY

## 2021-12-07 PROCEDURE — 99213 PR OFFICE/OUTPT VISIT, EST, LEVL III, 20-29 MIN: ICD-10-PCS | Mod: S$GLB,,, | Performed by: OBSTETRICS & GYNECOLOGY

## 2021-12-07 RX ORDER — METRONIDAZOLE 7.5 MG/G
1 GEL VAGINAL DAILY
Qty: 70 G | Refills: 0 | Status: SHIPPED | OUTPATIENT
Start: 2021-12-07 | End: 2021-12-12

## 2021-12-08 ENCOUNTER — HOSPITAL ENCOUNTER (OUTPATIENT)
Dept: RADIOLOGY | Facility: HOSPITAL | Age: 36
Discharge: HOME | End: 2021-12-08
Attending: OBSTETRICS & GYNECOLOGY
Payer: COMMERCIAL

## 2021-12-08 DIAGNOSIS — N93.0 POSTCOITAL BLEEDING: ICD-10-CM

## 2021-12-08 DIAGNOSIS — R10.32 LLQ PAIN: ICD-10-CM

## 2021-12-08 PROCEDURE — 76856 US EXAM PELVIC COMPLETE: CPT | Mod: TC

## 2021-12-08 PROCEDURE — 76856 US PELVIS COMP WITH TRANSVAG NON-OB (XPD): ICD-10-PCS | Mod: 26,,, | Performed by: RADIOLOGY

## 2021-12-08 PROCEDURE — 76830 TRANSVAGINAL US NON-OB: CPT | Mod: 26,,, | Performed by: RADIOLOGY

## 2021-12-08 PROCEDURE — 76830 US PELVIS COMP WITH TRANSVAG NON-OB (XPD): ICD-10-PCS | Mod: 26,,, | Performed by: RADIOLOGY

## 2021-12-08 PROCEDURE — 76856 US EXAM PELVIC COMPLETE: CPT | Mod: 26,,, | Performed by: RADIOLOGY

## 2021-12-16 ENCOUNTER — PATIENT MESSAGE (OUTPATIENT)
Dept: OBSTETRICS AND GYNECOLOGY | Facility: CLINIC | Age: 36
End: 2021-12-16
Payer: COMMERCIAL

## 2022-01-08 ENCOUNTER — PATIENT MESSAGE (OUTPATIENT)
Dept: OBSTETRICS AND GYNECOLOGY | Facility: CLINIC | Age: 37
End: 2022-01-08
Payer: COMMERCIAL

## 2022-01-10 ENCOUNTER — PATIENT MESSAGE (OUTPATIENT)
Dept: OBSTETRICS AND GYNECOLOGY | Facility: CLINIC | Age: 37
End: 2022-01-10
Payer: COMMERCIAL

## 2022-01-20 ENCOUNTER — PATIENT MESSAGE (OUTPATIENT)
Dept: OBSTETRICS AND GYNECOLOGY | Facility: CLINIC | Age: 37
End: 2022-01-20
Payer: COMMERCIAL

## 2022-02-14 ENCOUNTER — PATIENT MESSAGE (OUTPATIENT)
Dept: RESEARCH | Facility: HOSPITAL | Age: 37
End: 2022-02-14
Payer: COMMERCIAL

## 2022-02-22 ENCOUNTER — PATIENT MESSAGE (OUTPATIENT)
Dept: RESEARCH | Facility: HOSPITAL | Age: 37
End: 2022-02-22
Payer: COMMERCIAL

## 2022-03-11 ENCOUNTER — OFFICE VISIT (OUTPATIENT)
Dept: URGENT CARE | Facility: CLINIC | Age: 37
End: 2022-03-11
Payer: COMMERCIAL

## 2022-03-11 VITALS
WEIGHT: 129 LBS | DIASTOLIC BLOOD PRESSURE: 91 MMHG | RESPIRATION RATE: 16 BRPM | HEART RATE: 67 BPM | SYSTOLIC BLOOD PRESSURE: 136 MMHG | HEIGHT: 63 IN | OXYGEN SATURATION: 98 % | BODY MASS INDEX: 22.86 KG/M2 | TEMPERATURE: 98 F

## 2022-03-11 DIAGNOSIS — M62.838 TRAPEZIUS MUSCLE SPASM: Primary | ICD-10-CM

## 2022-03-11 PROCEDURE — 3075F PR MOST RECENT SYSTOLIC BLOOD PRESS GE 130-139MM HG: ICD-10-PCS | Mod: CPTII,S$GLB,, | Performed by: STUDENT IN AN ORGANIZED HEALTH CARE EDUCATION/TRAINING PROGRAM

## 2022-03-11 PROCEDURE — 3080F DIAST BP >= 90 MM HG: CPT | Mod: CPTII,S$GLB,, | Performed by: STUDENT IN AN ORGANIZED HEALTH CARE EDUCATION/TRAINING PROGRAM

## 2022-03-11 PROCEDURE — 3008F PR BODY MASS INDEX (BMI) DOCUMENTED: ICD-10-PCS | Mod: CPTII,S$GLB,, | Performed by: STUDENT IN AN ORGANIZED HEALTH CARE EDUCATION/TRAINING PROGRAM

## 2022-03-11 PROCEDURE — 3080F PR MOST RECENT DIASTOLIC BLOOD PRESSURE >= 90 MM HG: ICD-10-PCS | Mod: CPTII,S$GLB,, | Performed by: STUDENT IN AN ORGANIZED HEALTH CARE EDUCATION/TRAINING PROGRAM

## 2022-03-11 PROCEDURE — 1160F PR REVIEW ALL MEDS BY PRESCRIBER/CLIN PHARMACIST DOCUMENTED: ICD-10-PCS | Mod: CPTII,S$GLB,, | Performed by: STUDENT IN AN ORGANIZED HEALTH CARE EDUCATION/TRAINING PROGRAM

## 2022-03-11 PROCEDURE — 1160F RVW MEDS BY RX/DR IN RCRD: CPT | Mod: CPTII,S$GLB,, | Performed by: STUDENT IN AN ORGANIZED HEALTH CARE EDUCATION/TRAINING PROGRAM

## 2022-03-11 PROCEDURE — 1159F PR MEDICATION LIST DOCUMENTED IN MEDICAL RECORD: ICD-10-PCS | Mod: CPTII,S$GLB,, | Performed by: STUDENT IN AN ORGANIZED HEALTH CARE EDUCATION/TRAINING PROGRAM

## 2022-03-11 PROCEDURE — 1159F MED LIST DOCD IN RCRD: CPT | Mod: CPTII,S$GLB,, | Performed by: STUDENT IN AN ORGANIZED HEALTH CARE EDUCATION/TRAINING PROGRAM

## 2022-03-11 PROCEDURE — 99214 PR OFFICE/OUTPT VISIT, EST, LEVL IV, 30-39 MIN: ICD-10-PCS | Mod: S$GLB,,, | Performed by: STUDENT IN AN ORGANIZED HEALTH CARE EDUCATION/TRAINING PROGRAM

## 2022-03-11 PROCEDURE — 3008F BODY MASS INDEX DOCD: CPT | Mod: CPTII,S$GLB,, | Performed by: STUDENT IN AN ORGANIZED HEALTH CARE EDUCATION/TRAINING PROGRAM

## 2022-03-11 PROCEDURE — 3075F SYST BP GE 130 - 139MM HG: CPT | Mod: CPTII,S$GLB,, | Performed by: STUDENT IN AN ORGANIZED HEALTH CARE EDUCATION/TRAINING PROGRAM

## 2022-03-11 PROCEDURE — 99214 OFFICE O/P EST MOD 30 MIN: CPT | Mod: S$GLB,,, | Performed by: STUDENT IN AN ORGANIZED HEALTH CARE EDUCATION/TRAINING PROGRAM

## 2022-03-11 RX ORDER — METHOCARBAMOL 500 MG/1
500 TABLET, FILM COATED ORAL EVERY 6 HOURS PRN
Qty: 40 TABLET | Refills: 0 | Status: SHIPPED | OUTPATIENT
Start: 2022-03-11 | End: 2022-03-21

## 2022-03-11 NOTE — PROGRESS NOTES
"Subjective:       Patient ID: Madyson Calle is a 36 y.o. female.    Vitals:  height is 5' 3" (1.6 m) and weight is 58.5 kg (129 lb). Her temperature is 98.3 °F (36.8 °C). Her blood pressure is 136/91 (abnormal) and her pulse is 67. Her respiration is 16 and oxygen saturation is 98%.     Chief Complaint: Shoulder Pain    Pt states that she has had an injury to the shoulder a year ago falling down some stairs. 3 days ago new pain.  Pt describes the pain quality as a ripping feeling.  Pt state that she would get a knot when the pain is intense. The knot will eventually subside on its own. The pain was the most intense today.     Shoulder Pain   The pain is present in the right shoulder. This is a recurrent problem. The current episode started in the past 7 days (Wednesday ). There has been a history of trauma. The problem occurs constantly. The problem has been gradually worsening. The quality of the pain is described as aching, pounding and sharp. The pain is at a severity of 8/10. The pain is severe. Associated symptoms include an inability to bear weight and a limited range of motion. She has tried nothing for the symptoms. The treatment provided no relief. Family history does not include arthritis. Her past medical history is significant for Injuries to Extremity.       Musculoskeletal: Positive for abnormal ROM of joint.       Objective:      Physical Exam   Constitutional: She is oriented to person, place, and time. She does not appear ill. No distress.   HENT:   Head: Normocephalic.   Pulmonary/Chest: No respiratory distress.   Neurological: She is alert and oriented to person, place, and time. Coordination normal.   Psychiatric: Her behavior is normal. Mood normal.   Nursing note and vitals reviewed.        Assessment:       1. Trapezius muscle spasm          Plan:         Trapezius muscle spasm  -     methocarbamoL (ROBAXIN) 500 MG Tab; Take 1 tablet (500 mg total) by mouth every 6 (six) hours as needed " (muscle spasms).  Dispense: 40 tablet; Refill: 0  -     SLING FOR HOME USE        Recommended f/u with PCP and/or chiropractor. Supportive care. Sling provided at patient request, arm hanging causes more discomfort.

## 2022-06-14 ENCOUNTER — OFFICE VISIT (OUTPATIENT)
Dept: OBSTETRICS AND GYNECOLOGY | Facility: CLINIC | Age: 37
End: 2022-06-14
Payer: COMMERCIAL

## 2022-06-14 VITALS
BODY MASS INDEX: 22.34 KG/M2 | DIASTOLIC BLOOD PRESSURE: 90 MMHG | WEIGHT: 126.13 LBS | SYSTOLIC BLOOD PRESSURE: 140 MMHG

## 2022-06-14 DIAGNOSIS — Z30.431 ENCOUNTER FOR ROUTINE CHECKING OF INTRAUTERINE CONTRACEPTIVE DEVICE (IUD): ICD-10-CM

## 2022-06-14 DIAGNOSIS — N63.20 LEFT BREAST LUMP: ICD-10-CM

## 2022-06-14 DIAGNOSIS — Z01.419 VISIT FOR GYNECOLOGIC EXAMINATION: Primary | ICD-10-CM

## 2022-06-14 PROCEDURE — 99395 PREV VISIT EST AGE 18-39: CPT | Mod: S$GLB,,, | Performed by: OBSTETRICS & GYNECOLOGY

## 2022-06-14 PROCEDURE — 99999 PR PBB SHADOW E&M-EST. PATIENT-LVL III: ICD-10-PCS | Mod: PBBFAC,,, | Performed by: OBSTETRICS & GYNECOLOGY

## 2022-06-14 PROCEDURE — 99999 PR PBB SHADOW E&M-EST. PATIENT-LVL III: CPT | Mod: PBBFAC,,, | Performed by: OBSTETRICS & GYNECOLOGY

## 2022-06-14 PROCEDURE — 3080F PR MOST RECENT DIASTOLIC BLOOD PRESSURE >= 90 MM HG: ICD-10-PCS | Mod: CPTII,S$GLB,, | Performed by: OBSTETRICS & GYNECOLOGY

## 2022-06-14 PROCEDURE — 1159F PR MEDICATION LIST DOCUMENTED IN MEDICAL RECORD: ICD-10-PCS | Mod: CPTII,S$GLB,, | Performed by: OBSTETRICS & GYNECOLOGY

## 2022-06-14 PROCEDURE — 3080F DIAST BP >= 90 MM HG: CPT | Mod: CPTII,S$GLB,, | Performed by: OBSTETRICS & GYNECOLOGY

## 2022-06-14 PROCEDURE — 88175 CYTOPATH C/V AUTO FLUID REDO: CPT | Performed by: OBSTETRICS & GYNECOLOGY

## 2022-06-14 PROCEDURE — 1159F MED LIST DOCD IN RCRD: CPT | Mod: CPTII,S$GLB,, | Performed by: OBSTETRICS & GYNECOLOGY

## 2022-06-14 PROCEDURE — 3077F SYST BP >= 140 MM HG: CPT | Mod: CPTII,S$GLB,, | Performed by: OBSTETRICS & GYNECOLOGY

## 2022-06-14 PROCEDURE — 87624 HPV HI-RISK TYP POOLED RSLT: CPT | Performed by: OBSTETRICS & GYNECOLOGY

## 2022-06-14 PROCEDURE — 3008F BODY MASS INDEX DOCD: CPT | Mod: CPTII,S$GLB,, | Performed by: OBSTETRICS & GYNECOLOGY

## 2022-06-14 PROCEDURE — 99395 PR PREVENTIVE VISIT,EST,18-39: ICD-10-PCS | Mod: S$GLB,,, | Performed by: OBSTETRICS & GYNECOLOGY

## 2022-06-14 PROCEDURE — 3008F PR BODY MASS INDEX (BMI) DOCUMENTED: ICD-10-PCS | Mod: CPTII,S$GLB,, | Performed by: OBSTETRICS & GYNECOLOGY

## 2022-06-14 PROCEDURE — 3077F PR MOST RECENT SYSTOLIC BLOOD PRESSURE >= 140 MM HG: ICD-10-PCS | Mod: CPTII,S$GLB,, | Performed by: OBSTETRICS & GYNECOLOGY

## 2022-06-14 NOTE — PROGRESS NOTES
HISTORY OF PRESENT ILLNESS:    Madyson Calle is a 36 y.o. female, , No LMP recorded (lmp unknown). (Menstrual status: Birth Control).,  presents for a routine exam and EVAL L BREAST LUMP - NOTED 10 DAYS AGO, RESOLVED OVER WEEK AGO AND PATIENT REASSURED.  NOW ONLY SOME IRREGULAR MILD BLEEDING, LESS THAN 4 DAYS PER WEEK.  OCCASIONAL SHARP LEFT LOWER QUADRANT PAIN REASSURED OVARY NOT ENLARGED AND ADVISED TO ADDRESS CONSTIPATION.  PATIENT ALSO WITH HISTORY OF KIDNEY STONE.  COTESTING SUBMITTED.    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  WOULD LIKE TO RETIRE SOON IF POSSIBLE    :  problem visit, 2 MAIN ISSUES.  HAS HAD CONTINUED POSTCOITAL BLEEDING AND LEFT LOWER QUADRANT PAIN AFTER SEX, HISTORY OF FUNCTIONAL OVARY CYST IN FIBROID IN THE PAST.  WILL FOLLOW-UP ULTRASOUND.  UTERUS IS RETROVERTED AND ONLY SOME FULLNESS NOTED ON THE LEFT.  DOES NOT PLAN FUTURE FERTILITY AND ASKING ABOUT POSSIBLE HYSTERECTOMY WITHOUT INCISIONS.  ALSO WITH OCCASIONAL VAGINAL DISCHARGE AND BOUTS OF VAGINAL INFECTION.  FIRST-LINE TREATMENT WOULD BE BORIC ACID SUPPOSITORIES (NOW AVAILABLE OVER-THE-COUNTER), AND PRESCRIPTION FOR METROGEL P.R.N.  2021:  routine exam and has no complaints. COTEST PLANNED  and MIRENA REMOVAL  .  TRIMMING STRING OF IUD WITH SOME BENEFIT REGARDING BV SYMPTOMS.  2-3 WEEKS AGO THEY RECURRED IN THE CONTEXT OF SOME SPOTTING AND OLD BLOOD SMELL.  SYMPTOMS MILDLY DECREASED WITH PRESCRIPTION AND USED BORIC ACID ABOUT 2 DAYS AGO.  ON EXAM POSSIBLY SOME YEAST ONLY AND WE COUNSELED REGARDING MONISTAT 3 SUPPOSITORIES.  PATIENT CAN NOT FEEL STRING AND WAS NOTED ON EXAM.  DEEP DYSPAREUNIA WITH INTERCOURSE, AND SOME CHANGE OF POSITIONS WILL HELP ALTHOUGH RETROVERTED UTERUS PROBABLY PLAYS A ROLE.  HAD A HISTORY OF A LEFT OVARIAN CYST IN DECEMBER AND PAIN IS LOCALIZED  TO THE LEFT SIDE SO WILL FOLLOW-UP ULTRASOUND HAS  MULTIPLE SOMATIC SYMPTOMS THAT ARE SEEMINGLY UNRELATED INCLUDING WEIGHT GAIN, NUMBNESS LEFT FACE AND LEFT BODY, WEIGHT GAIN, AND BLOOD PRESSURE EXACERBATION WHICH OCCURS PRIMARILY IN THE SPRING.  PATIENT ALSO WITH A HISTORY DEPRESSION IN 2017 BUT WAS NONCOMPLIANT LONG-TERM WITH MEDS.  DENIES SI AND HI, AND HAS A RELATIONSHIP WITH A THERAPIST.  URGED PATIENT TO RECONSIDER TREATMENT FOR MIXED DEPRESSION AND ANXIETY AS THEY MAY CONTRIBUTE TO SOME OF HER SOMATIC SYMPTOMS.  12/2020:  complaining of RECURRENT BV AFTER INTERCOURSE WHEN PARTNER EJACULATES IN VAGINA TIMOTHY.  REFILL COMPOUNDED  SUPPOSITORIES BORIC ACID TO ALTER VAGINAL PH AND RETARD GROWTH OF YEAST, BACTERIA ASSOCIATED WITH BV.  TRIMMED SIGNIFICANT AMTS OF IUD STRING NEAR EXTERNAL OS, AFFIRM SUBMITTED, RXN EMPIRIC METROGEL PRN. VAGINAL WHITE D/C NOTED BUT POSSIBLY C/W VAGINAL MEDICATION.  ALSO C/O LLQ PAIN, POSSIBLY OV VERSUS SIMILAR TO PRIOR TO KIDNEY STONE - WILL CHECK PELVIC USG.  ON EXAM NOTHING OBVIOUS CAUSING HER PAIN. EXAM DONE AS AN ANNUAL  NL COTEST 2019 AND MIRENA PLACED 2018 2/2020:  routine exam and IUD CHECK. PAP DUE 2022.  HAD 2 EPISODES PCB, 2ND WAS JUST SPOTTING.  REASSURED EXAM NORMAL, STRINGS NORMAL LENGTH AND OCCAS MENSES WITH IUD NOT UNUSUAL.  DALEY INSERTED THIS ONE IN 2018 AFTER EXPULSION FIRST ONE  FIRST YEAR RIDING  - LUDWIG WITH FRIENDS  LAST VISIT WITH ME 2015:  complaining of LLQ CRAMPING AFTER IUD INSERTION 2/25/2015 THUY.  A FEW WEEKS AFTER INSERTION HAD SOME OCCASIONAL SHARP LLQ PAIN.  HAS HAD IRREGULAR BLEEDING SINCE INSERTION, NOW STOPPED FOR SEVERAL DAYS AND PAIN HAS RESOLVED.  NEEDS REFILL DIFLUCAN FOR OCCAS YEAST SX, PROB RELATED TO PROLONGED BLEEDING.  CYCLE 1-2 MONTHS ON OCP TO REESTABLISH CYCLE CONTROL - MEG  LAST SEEN ROUTINE VISIT 2/2015:  HAS NAUSEA WITH PILLS AND DIFFICULTY REMEMBERING. WOULD LIKE AMENORRHEA IF POSSIBLE. WILL RETURN MIRENA INSERTION WITH NMP IN ABOUT 2 WEEKS. DISCUSSED TOPICAL VAGINITIS  RISKS.  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    Past Medical History:   Diagnosis Date    Abnormal cervical Papanicolaou smear 2010, 2016, 2017    Colposcopy    Anxiety     Anxiety     GERD (gastroesophageal reflux disease)     Hypertension     Scoliosis        History reviewed. No pertinent surgical history.    MEDICATIONS AND ALLERGIES:      Current Outpatient Medications:     amLODIPine (NORVASC) 10 MG tablet, 1/2 tab in AM., Disp: 45 tablet, Rfl: 3    boric acid (bulk) Powd, Insert one Gelatin Capsule filled with 600 mg of Boric Acid Powder H.S., Disp: 100 capsule, Rfl: 4    boric acid (PH-D) 600 mg vaginal suppository, Place 1 suppository (600 mg total) vaginally every evening., Disp: 30 suppository, Rfl: 2    ergocalciferol (ERGOCALCIFEROL) 50,000 unit Cap, Take 50,000 Units by mouth every 7 days., Disp: , Rfl:     ibuprofen (ADVIL,MOTRIN) 800 MG tablet, Take 1 tablet (800 mg total) by mouth 3 (three) times daily as needed for Pain. Take with food, Disp: 30 tablet, Rfl: 0    metoprolol succinate (TOPROL-XL) 25 MG 24 hr tablet, TAKE 1 TABLET(25 MG) BY MOUTH EVERY EVENING, Disp: 90 tablet, Rfl: 3    omeprazole (PRILOSEC) 40 MG capsule, Take 40 mg by mouth once daily., Disp: , Rfl:     citalopram (CELEXA) 10 MG tablet, TK 1 T PO  QD, Disp: , Rfl: 0    loratadine (CLARITIN) 10 mg tablet, Take 1 tablet (10 mg total) by mouth once daily., Disp: 30 tablet, Rfl: 2    tretinoin (RETIN-A) 0.05 % cream, Apply topically every evening. (Patient not taking: Reported on 6/14/2022), Disp: 45 g, Rfl: 2    Current Facility-Administered Medications:     acetaminophen tablet 650 mg, 650 mg, Oral, Once PRN, Chris Blakely MD    albuterol inhaler 2 puff, 2 puff, Inhalation, Q20 Min PRN, Chris Blakely MD    diphenhydrAMINE injection 25 mg, 25 mg, Intravenous, Once PRN, Chris Blakely MD    EPINEPHrine (EPIPEN) 0.3 mg/0.3 mL pen injection 0.3 mg, 0.3 mg, Intramuscular, PRN, Chris Blakely MD     methylPREDNISolone sodium succinate injection 40 mg, 40 mg, Intravenous, Once PRN, Chris Blakely MD    ondansetron disintegrating tablet 4 mg, 4 mg, Oral, Once PRN, Chris Blakely MD    sodium chloride 0.9% 500 mL flush bag, , Intravenous, PRN, Chris Blakely MD    sodium chloride 0.9% flush 10 mL, 10 mL, Intravenous, PRN, Chris Blakely MD    Review of patient's allergies indicates:  No Known Allergies    Family History   Problem Relation Age of Onset    Hypertension Father     No Known Problems Mother     No Known Problems Sister     Stroke Paternal Aunt     Stroke Maternal Grandfather     Breast cancer Neg Hx     Colon cancer Neg Hx     Ovarian cancer Neg Hx        Social History     Socioeconomic History    Marital status: Single   Tobacco Use    Smoking status: Never Smoker    Smokeless tobacco: Never Used   Substance and Sexual Activity    Alcohol use: Yes     Alcohol/week: 7.0 standard drinks     Types: 7 Glasses of wine per week     Comment: frequent,3-4 times per week.     Drug use: Yes     Types: Marijuana    Sexual activity: Yes     Partners: Male     Birth control/protection: I.U.D.       COMPREHENSIVE GYN HISTORY:  PAP History: Denies abnormal Paps.  Infection History: Denies STDs. Denies PID.  Benign History: Denies uterine fibroids. Denies ovarian cysts. Denies endometriosis. Denies other conditions.  Cancer History: Denies cervical cancer. Denies uterine cancer or hyperplasia. Denies ovarian cancer. Denies vulvar cancer or pre-cancer. Denies vaginal cancer or pre-cancer. Denies breast cancer. Denies colon cancer.  Sexual Activity History: Reports currently being sexually active  Menstrual History: Monthly, mild-moderate.  Contraception:IUD    ROS:  GENERAL: No weight changes. No swelling. No fatigue. No fever.  CARDIOVASCULAR: No chest pain. No shortness of breath. No leg cramps.   NEUROLOGICAL: No headaches. No vision changes.  BREASTS: No pain. No lumps. No  discharge.  ABDOMEN: No pain. No nausea. No vomiting. No diarrhea. No constipation.  REPRODUCTIVE: No abnormal bleeding.   VULVA: No pain. No lesions. No itching.  VAGINA: No relaxation. No itching. No odor. No discharge. No lesions.  URINARY: No incontinence. No nocturia. No frequency. No dysuria.    BP (!) 140/90   Wt 57.2 kg (126 lb 1.7 oz)   LMP  (LMP Unknown) Comment: breakthrough bleeding on 6/11/22  BMI 22.34 kg/m²     PE:  APPEARANCE: Well nourished, well developed, in no acute distress.  AFFECT: WNL, alert and oriented x 3.  SKIN: No acne or hirsutism.  NECK: Neck symmetric, without masses or thyromegaly.  NODES: No inguinal, cervical, axillary or femoral lymph node enlargement.  CHEST: Good respiratory effort.   ABDOMEN: Soft. No tenderness or masses. No hepatosplenomegaly. No hernias.  BREASTS: Symmetrical, no skin changes, visible lesions, palpable masses or nipple discharge bilaterally.  PELVIC: External female genitalia without lesions.  Female hair distribution. Adequate perineal body, Normal urethral meatus. Vagina moist and well rugated without lesions or discharge.  No significant cystocele or rectocele present. Cervix pink without lesions, discharge or tenderness, STRING NOT SEEN. Uterus is normal size, regular, mobile and nontender. Adnexa without masses or tenderness.  EXTREMITIES: No edema    PROCEDURES:  Pap    DIAGNOSIS:  1. Visit for gynecologic examination  Liquid-Based Pap Smear, Screening    HPV High Risk Genotypes, PCR   2. Encounter for routine checking of intrauterine contraceptive device (IUD)     3. Left breast lump         LABS AND TESTS ORDERED:    MEDICATIONS PRESCRIBED:    COUNSELING:   The patient was counseled today on ACS PAP guidelines, with recommendations for yearly pelvic exams unless their uterus, cervix, and ovaries were removed for benign reasons; in that case, examinations every 3-5 years are recommended.  The patient was also counseled regarding monthly breast  self-examination, routine STD screening for at-risk populations, prophylactic immunizations for transmitted infections such as  HPV, Pertussis, or Influenza as appropriate, and yearly mammograms when indicated by ACS guidelines.  She was advised to see her primary care physician for all other health maintenance.    FOLLOW-UP with me annually.

## 2022-06-20 LAB
HPV HR 12 DNA SPEC QL NAA+PROBE: NEGATIVE
HPV16 AG SPEC QL: NEGATIVE
HPV18 DNA SPEC QL NAA+PROBE: NEGATIVE

## 2022-06-21 LAB
FINAL PATHOLOGIC DIAGNOSIS: NORMAL
Lab: NORMAL

## 2022-07-03 ENCOUNTER — PATIENT MESSAGE (OUTPATIENT)
Dept: OBSTETRICS AND GYNECOLOGY | Facility: CLINIC | Age: 37
End: 2022-07-03
Payer: COMMERCIAL

## 2022-07-15 ENCOUNTER — LAB VISIT (OUTPATIENT)
Dept: LAB | Facility: HOSPITAL | Age: 37
End: 2022-07-15
Payer: COMMERCIAL

## 2022-07-15 ENCOUNTER — OFFICE VISIT (OUTPATIENT)
Dept: INTERNAL MEDICINE | Facility: CLINIC | Age: 37
End: 2022-07-15
Payer: COMMERCIAL

## 2022-07-15 VITALS
WEIGHT: 127.19 LBS | HEIGHT: 63 IN | DIASTOLIC BLOOD PRESSURE: 100 MMHG | RESPIRATION RATE: 18 BRPM | HEART RATE: 93 BPM | BODY MASS INDEX: 22.54 KG/M2 | SYSTOLIC BLOOD PRESSURE: 140 MMHG | OXYGEN SATURATION: 92 %

## 2022-07-15 DIAGNOSIS — R10.32 LEFT LOWER QUADRANT PAIN: ICD-10-CM

## 2022-07-15 DIAGNOSIS — R10.9 LEFT SIDED ABDOMINAL PAIN: Primary | ICD-10-CM

## 2022-07-15 DIAGNOSIS — I10 HYPERTENSION, UNSPECIFIED TYPE: ICD-10-CM

## 2022-07-15 DIAGNOSIS — G89.29 CHRONIC LEFT-SIDED LOW BACK PAIN, UNSPECIFIED WHETHER SCIATICA PRESENT: ICD-10-CM

## 2022-07-15 DIAGNOSIS — R10.9 LEFT SIDED ABDOMINAL PAIN: ICD-10-CM

## 2022-07-15 DIAGNOSIS — M54.50 CHRONIC LEFT-SIDED LOW BACK PAIN, UNSPECIFIED WHETHER SCIATICA PRESENT: ICD-10-CM

## 2022-07-15 LAB
ALBUMIN SERPL BCP-MCNC: 4.1 G/DL (ref 3.5–5.2)
ALP SERPL-CCNC: 43 U/L (ref 55–135)
ALT SERPL W/O P-5'-P-CCNC: 19 U/L (ref 10–44)
ANION GAP SERPL CALC-SCNC: 4 MMOL/L (ref 8–16)
AST SERPL-CCNC: 19 U/L (ref 10–40)
BASOPHILS # BLD AUTO: 0.04 K/UL (ref 0–0.2)
BASOPHILS NFR BLD: 0.9 % (ref 0–1.9)
BILIRUB SERPL-MCNC: 0.3 MG/DL (ref 0.1–1)
BUN SERPL-MCNC: 10 MG/DL (ref 6–20)
CALCIUM SERPL-MCNC: 9.6 MG/DL (ref 8.7–10.5)
CHLORIDE SERPL-SCNC: 106 MMOL/L (ref 95–110)
CO2 SERPL-SCNC: 30 MMOL/L (ref 23–29)
CREAT SERPL-MCNC: 0.9 MG/DL (ref 0.5–1.4)
DIFFERENTIAL METHOD: ABNORMAL
EOSINOPHIL # BLD AUTO: 0.1 K/UL (ref 0–0.5)
EOSINOPHIL NFR BLD: 1.8 % (ref 0–8)
ERYTHROCYTE [DISTWIDTH] IN BLOOD BY AUTOMATED COUNT: 12.1 % (ref 11.5–14.5)
EST. GFR  (AFRICAN AMERICAN): >60 ML/MIN/1.73 M^2
EST. GFR  (NON AFRICAN AMERICAN): >60 ML/MIN/1.73 M^2
GLUCOSE SERPL-MCNC: 95 MG/DL (ref 70–110)
HCT VFR BLD AUTO: 35.4 % (ref 37–48.5)
HGB BLD-MCNC: 11.7 G/DL (ref 12–16)
IMM GRANULOCYTES # BLD AUTO: 0.01 K/UL (ref 0–0.04)
IMM GRANULOCYTES NFR BLD AUTO: 0.2 % (ref 0–0.5)
LYMPHOCYTES # BLD AUTO: 1.9 K/UL (ref 1–4.8)
LYMPHOCYTES NFR BLD: 42 % (ref 18–48)
MCH RBC QN AUTO: 30.4 PG (ref 27–31)
MCHC RBC AUTO-ENTMCNC: 33.1 G/DL (ref 32–36)
MCV RBC AUTO: 92 FL (ref 82–98)
MONOCYTES # BLD AUTO: 0.4 K/UL (ref 0.3–1)
MONOCYTES NFR BLD: 8.3 % (ref 4–15)
NEUTROPHILS # BLD AUTO: 2.1 K/UL (ref 1.8–7.7)
NEUTROPHILS NFR BLD: 46.8 % (ref 38–73)
NRBC BLD-RTO: 0 /100 WBC
PLATELET # BLD AUTO: 268 K/UL (ref 150–450)
PMV BLD AUTO: 11.5 FL (ref 9.2–12.9)
POTASSIUM SERPL-SCNC: 4.5 MMOL/L (ref 3.5–5.1)
PROT SERPL-MCNC: 7.7 G/DL (ref 6–8.4)
RBC # BLD AUTO: 3.85 M/UL (ref 4–5.4)
SODIUM SERPL-SCNC: 140 MMOL/L (ref 136–145)
WBC # BLD AUTO: 4.57 K/UL (ref 3.9–12.7)

## 2022-07-15 PROCEDURE — 1160F RVW MEDS BY RX/DR IN RCRD: CPT | Mod: CPTII,S$GLB,, | Performed by: PHYSICIAN ASSISTANT

## 2022-07-15 PROCEDURE — 3080F PR MOST RECENT DIASTOLIC BLOOD PRESSURE >= 90 MM HG: ICD-10-PCS | Mod: CPTII,S$GLB,, | Performed by: PHYSICIAN ASSISTANT

## 2022-07-15 PROCEDURE — 99214 OFFICE O/P EST MOD 30 MIN: CPT | Mod: S$GLB,,, | Performed by: PHYSICIAN ASSISTANT

## 2022-07-15 PROCEDURE — 80053 COMPREHEN METABOLIC PANEL: CPT | Performed by: PHYSICIAN ASSISTANT

## 2022-07-15 PROCEDURE — 99999 PR PBB SHADOW E&M-EST. PATIENT-LVL V: ICD-10-PCS | Mod: PBBFAC,,, | Performed by: PHYSICIAN ASSISTANT

## 2022-07-15 PROCEDURE — 85025 COMPLETE CBC W/AUTO DIFF WBC: CPT | Performed by: PHYSICIAN ASSISTANT

## 2022-07-15 PROCEDURE — 99214 PR OFFICE/OUTPT VISIT, EST, LEVL IV, 30-39 MIN: ICD-10-PCS | Mod: S$GLB,,, | Performed by: PHYSICIAN ASSISTANT

## 2022-07-15 PROCEDURE — 3008F BODY MASS INDEX DOCD: CPT | Mod: CPTII,S$GLB,, | Performed by: PHYSICIAN ASSISTANT

## 2022-07-15 PROCEDURE — 3077F SYST BP >= 140 MM HG: CPT | Mod: CPTII,S$GLB,, | Performed by: PHYSICIAN ASSISTANT

## 2022-07-15 PROCEDURE — 1159F MED LIST DOCD IN RCRD: CPT | Mod: CPTII,S$GLB,, | Performed by: PHYSICIAN ASSISTANT

## 2022-07-15 PROCEDURE — 3077F PR MOST RECENT SYSTOLIC BLOOD PRESSURE >= 140 MM HG: ICD-10-PCS | Mod: CPTII,S$GLB,, | Performed by: PHYSICIAN ASSISTANT

## 2022-07-15 PROCEDURE — 99999 PR PBB SHADOW E&M-EST. PATIENT-LVL V: CPT | Mod: PBBFAC,,, | Performed by: PHYSICIAN ASSISTANT

## 2022-07-15 PROCEDURE — 3080F DIAST BP >= 90 MM HG: CPT | Mod: CPTII,S$GLB,, | Performed by: PHYSICIAN ASSISTANT

## 2022-07-15 PROCEDURE — 1160F PR REVIEW ALL MEDS BY PRESCRIBER/CLIN PHARMACIST DOCUMENTED: ICD-10-PCS | Mod: CPTII,S$GLB,, | Performed by: PHYSICIAN ASSISTANT

## 2022-07-15 PROCEDURE — 1159F PR MEDICATION LIST DOCUMENTED IN MEDICAL RECORD: ICD-10-PCS | Mod: CPTII,S$GLB,, | Performed by: PHYSICIAN ASSISTANT

## 2022-07-15 PROCEDURE — 3008F PR BODY MASS INDEX (BMI) DOCUMENTED: ICD-10-PCS | Mod: CPTII,S$GLB,, | Performed by: PHYSICIAN ASSISTANT

## 2022-07-15 PROCEDURE — 36415 COLL VENOUS BLD VENIPUNCTURE: CPT | Performed by: PHYSICIAN ASSISTANT

## 2022-07-15 RX ORDER — DICLOFENAC SODIUM 10 MG/G
2 GEL TOPICAL 3 TIMES DAILY
Qty: 100 G | Refills: 0 | Status: SHIPPED | OUTPATIENT
Start: 2022-07-15 | End: 2024-01-31

## 2022-07-15 RX ORDER — TIZANIDINE 4 MG/1
4 TABLET ORAL NIGHTLY PRN
Qty: 20 TABLET | Refills: 0 | Status: SHIPPED | OUTPATIENT
Start: 2022-07-15 | End: 2022-07-25
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
## 2022-07-15 NOTE — PATIENT INSTRUCTIONS
Lab today     Schedule CT test, if unremarkable we will proceed with physical therapy    Monitor your BP, make sure you are taking BP meds everyday    Schedule follow up with your Cardiologist.     Choose a new PCP        [facility] [address], [place] [address]):  - Reece House M.D.  - Will Centeno M.D.     [place] ([address] [place] [address])  - Bobbi Palafox M.D.  - Moshe Negron M.D.  - Imelda Patiño M.D.  - Latoya Rdoriguez M.D.  - James Botello M.D.  - Emir Galindo D.O.  - DORIAN Bruno M.D     [facility] ([address] [place] [address]):  - Marion Wilcox M.D.  - Steven Garnica M.D.  - Gema Corona M.D.  - Luci Miranda M.D.   - Sue Stone M.D.     [facility] ([address] [facility], [address], [place] [address]):  - Derek Becerra M.D.  - Santiago Allen M.D.  - James Moreno M.D.  - Francisco Meza M.D.      [facility] ([address] [place] [address]):  - Brian Babb M.D.  - Elsa Galo M.D.    [facility] [place] ([address] [place] [address])  -Kranthi Vega M.D.

## 2022-07-18 ENCOUNTER — PATIENT MESSAGE (OUTPATIENT)
Dept: DERMATOLOGY | Facility: CLINIC | Age: 37
End: 2022-07-18
Payer: COMMERCIAL

## 2022-07-22 ENCOUNTER — HOSPITAL ENCOUNTER (OUTPATIENT)
Dept: RADIOLOGY | Facility: HOSPITAL | Age: 37
Discharge: HOME | End: 2022-07-22
Attending: PHYSICIAN ASSISTANT
Payer: COMMERCIAL

## 2022-07-22 DIAGNOSIS — R10.32 LEFT LOWER QUADRANT PAIN: ICD-10-CM

## 2022-07-22 DIAGNOSIS — R10.9 LEFT SIDED ABDOMINAL PAIN: ICD-10-CM

## 2022-07-22 PROCEDURE — 74177 CT ABDOMEN PELVIS WITH CONTRAST: ICD-10-PCS | Mod: 26,,, | Performed by: RADIOLOGY

## 2022-07-22 PROCEDURE — 25500020 PHARM REV CODE 255: Performed by: PHYSICIAN ASSISTANT

## 2022-07-22 PROCEDURE — 74177 CT ABD & PELVIS W/CONTRAST: CPT | Mod: TC

## 2022-07-22 PROCEDURE — 74177 CT ABD & PELVIS W/CONTRAST: CPT | Mod: 26,,, | Performed by: RADIOLOGY

## 2022-07-22 RX ADMIN — IOHEXOL 75 ML: 350 INJECTION, SOLUTION INTRAVENOUS at 03:07

## 2022-07-26 ENCOUNTER — PATIENT MESSAGE (OUTPATIENT)
Dept: INTERNAL MEDICINE | Facility: CLINIC | Age: 37
End: 2022-07-26
Payer: COMMERCIAL

## 2022-08-04 ENCOUNTER — OFFICE VISIT (OUTPATIENT)
Dept: DERMATOLOGY | Facility: CLINIC | Age: 37
End: 2022-08-04
Payer: COMMERCIAL

## 2022-08-04 ENCOUNTER — TELEPHONE (OUTPATIENT)
Dept: DERMATOLOGY | Facility: CLINIC | Age: 37
End: 2022-08-04

## 2022-08-04 DIAGNOSIS — L68.0 HIRSUTISM: ICD-10-CM

## 2022-08-04 DIAGNOSIS — L81.9 DYSCHROMIA: ICD-10-CM

## 2022-08-04 DIAGNOSIS — L70.0 ACNE VULGARIS: ICD-10-CM

## 2022-08-04 DIAGNOSIS — D48.5 NEOPLASM OF UNCERTAIN BEHAVIOR OF SKIN: Primary | ICD-10-CM

## 2022-08-04 PROCEDURE — 99214 OFFICE O/P EST MOD 30 MIN: CPT | Mod: 95,,, | Performed by: DERMATOLOGY

## 2022-08-04 PROCEDURE — 99214 PR OFFICE/OUTPT VISIT, EST, LEVL IV, 30-39 MIN: ICD-10-PCS | Mod: 95,,, | Performed by: DERMATOLOGY

## 2022-08-04 PROCEDURE — 1160F RVW MEDS BY RX/DR IN RCRD: CPT | Mod: CPTII,95,, | Performed by: DERMATOLOGY

## 2022-08-04 PROCEDURE — 1160F PR REVIEW ALL MEDS BY PRESCRIBER/CLIN PHARMACIST DOCUMENTED: ICD-10-PCS | Mod: CPTII,95,, | Performed by: DERMATOLOGY

## 2022-08-04 PROCEDURE — 1159F MED LIST DOCD IN RCRD: CPT | Mod: CPTII,95,, | Performed by: DERMATOLOGY

## 2022-08-04 PROCEDURE — 1159F PR MEDICATION LIST DOCUMENTED IN MEDICAL RECORD: ICD-10-PCS | Mod: CPTII,95,, | Performed by: DERMATOLOGY

## 2022-08-04 RX ORDER — HYDROQUINONE 40 MG/G
CREAM TOPICAL
Qty: 28 G | Refills: 1 | Status: SHIPPED | OUTPATIENT
Start: 2022-08-04

## 2022-08-04 RX ORDER — TRETINOIN 0.25 MG/G
CREAM TOPICAL
Qty: 20 G | Refills: 6 | Status: SHIPPED | OUTPATIENT
Start: 2022-08-04 | End: 2023-05-29
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
## 2022-08-18 ENCOUNTER — OFFICE VISIT (OUTPATIENT)
Dept: CARDIOLOGY | Facility: CLINIC | Age: 37
End: 2022-08-18
Payer: COMMERCIAL

## 2022-08-18 VITALS
BODY MASS INDEX: 22.58 KG/M2 | SYSTOLIC BLOOD PRESSURE: 127 MMHG | DIASTOLIC BLOOD PRESSURE: 79 MMHG | HEIGHT: 63 IN | HEART RATE: 71 BPM | WEIGHT: 127.44 LBS

## 2022-08-18 DIAGNOSIS — Z91.89 AT RISK FOR CORONARY ARTERY DISEASE: Primary | ICD-10-CM

## 2022-08-18 DIAGNOSIS — Z13.6 ENCOUNTER FOR SCREENING FOR CARDIOVASCULAR DISORDERS: ICD-10-CM

## 2022-08-18 DIAGNOSIS — I10 PRIMARY HYPERTENSION: ICD-10-CM

## 2022-08-18 PROCEDURE — 3078F PR MOST RECENT DIASTOLIC BLOOD PRESSURE < 80 MM HG: ICD-10-PCS | Mod: CPTII,S$GLB,, | Performed by: INTERNAL MEDICINE

## 2022-08-18 PROCEDURE — 1160F PR REVIEW ALL MEDS BY PRESCRIBER/CLIN PHARMACIST DOCUMENTED: ICD-10-PCS | Mod: CPTII,S$GLB,, | Performed by: INTERNAL MEDICINE

## 2022-08-18 PROCEDURE — 1160F RVW MEDS BY RX/DR IN RCRD: CPT | Mod: CPTII,S$GLB,, | Performed by: INTERNAL MEDICINE

## 2022-08-18 PROCEDURE — 3008F BODY MASS INDEX DOCD: CPT | Mod: CPTII,S$GLB,, | Performed by: INTERNAL MEDICINE

## 2022-08-18 PROCEDURE — 3078F DIAST BP <80 MM HG: CPT | Mod: CPTII,S$GLB,, | Performed by: INTERNAL MEDICINE

## 2022-08-18 PROCEDURE — 1159F PR MEDICATION LIST DOCUMENTED IN MEDICAL RECORD: ICD-10-PCS | Mod: CPTII,S$GLB,, | Performed by: INTERNAL MEDICINE

## 2022-08-18 PROCEDURE — 99999 PR PBB SHADOW E&M-EST. PATIENT-LVL IV: ICD-10-PCS | Mod: PBBFAC,,, | Performed by: INTERNAL MEDICINE

## 2022-08-18 PROCEDURE — 3074F SYST BP LT 130 MM HG: CPT | Mod: CPTII,S$GLB,, | Performed by: INTERNAL MEDICINE

## 2022-08-18 PROCEDURE — 3008F PR BODY MASS INDEX (BMI) DOCUMENTED: ICD-10-PCS | Mod: CPTII,S$GLB,, | Performed by: INTERNAL MEDICINE

## 2022-08-18 PROCEDURE — 99214 PR OFFICE/OUTPT VISIT, EST, LEVL IV, 30-39 MIN: ICD-10-PCS | Mod: S$GLB,,, | Performed by: INTERNAL MEDICINE

## 2022-08-18 PROCEDURE — 3074F PR MOST RECENT SYSTOLIC BLOOD PRESSURE < 130 MM HG: ICD-10-PCS | Mod: CPTII,S$GLB,, | Performed by: INTERNAL MEDICINE

## 2022-08-18 PROCEDURE — 99999 PR PBB SHADOW E&M-EST. PATIENT-LVL IV: CPT | Mod: PBBFAC,,, | Performed by: INTERNAL MEDICINE

## 2022-08-18 PROCEDURE — 99214 OFFICE O/P EST MOD 30 MIN: CPT | Mod: S$GLB,,, | Performed by: INTERNAL MEDICINE

## 2022-08-18 PROCEDURE — 1159F MED LIST DOCD IN RCRD: CPT | Mod: CPTII,S$GLB,, | Performed by: INTERNAL MEDICINE
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
## 2022-08-22 ENCOUNTER — HOSPITAL ENCOUNTER (OUTPATIENT)
Dept: RADIOLOGY | Facility: HOSPITAL | Age: 37
Discharge: HOME | End: 2022-08-22
Attending: INTERNAL MEDICINE
Payer: COMMERCIAL

## 2022-08-22 DIAGNOSIS — Z13.6 ENCOUNTER FOR SCREENING FOR CARDIOVASCULAR DISORDERS: ICD-10-CM

## 2022-08-22 PROCEDURE — 75571 CT CALCIUM SCORING CARDIAC: ICD-10-PCS | Mod: 26,,, | Performed by: RADIOLOGY

## 2022-08-22 PROCEDURE — 75571 CT HRT W/O DYE W/CA TEST: CPT | Mod: TC

## 2022-08-22 PROCEDURE — 75571 CT HRT W/O DYE W/CA TEST: CPT | Mod: 26,,, | Performed by: RADIOLOGY

## 2022-08-24 ENCOUNTER — LAB VISIT (OUTPATIENT)
Dept: LAB | Facility: HOSPITAL | Age: 37
End: 2022-08-24
Attending: INTERNAL MEDICINE
Payer: COMMERCIAL

## 2022-08-24 DIAGNOSIS — Z91.89 AT RISK FOR CORONARY ARTERY DISEASE: ICD-10-CM

## 2022-08-24 LAB
CHOLEST SERPL-MCNC: 126 MG/DL (ref 120–199)
CHOLEST/HDLC SERPL: 2.6 {RATIO} (ref 2–5)
CRP SERPL-MCNC: 0.6 MG/L (ref 0–8.2)
HDLC SERPL-MCNC: 48 MG/DL (ref 40–75)
HDLC SERPL: 38.1 % (ref 20–50)
LDLC SERPL CALC-MCNC: 60.8 MG/DL (ref 63–159)
NONHDLC SERPL-MCNC: 78 MG/DL
TRIGL SERPL-MCNC: 86 MG/DL (ref 30–150)

## 2022-08-24 PROCEDURE — 83695 ASSAY OF LIPOPROTEIN(A): CPT | Performed by: INTERNAL MEDICINE

## 2022-08-24 PROCEDURE — 86140 C-REACTIVE PROTEIN: CPT | Performed by: INTERNAL MEDICINE

## 2022-08-24 PROCEDURE — 80061 LIPID PANEL: CPT | Performed by: INTERNAL MEDICINE

## 2022-08-26 LAB — LPA SERPL-MCNC: 13 MG/DL (ref 0–30)

## 2022-08-31 ENCOUNTER — TELEPHONE (OUTPATIENT)
Dept: CARDIOLOGY | Facility: CLINIC | Age: 37
End: 2022-08-31
Payer: COMMERCIAL

## 2022-08-31 NOTE — TELEPHONE ENCOUNTER
----- Message from Radha Farooq MD sent at 8/31/2022 10:37 AM CDT -----  Please let patient know that her labs are normal

## 2022-09-19 ENCOUNTER — PATIENT MESSAGE (OUTPATIENT)
Dept: DERMATOLOGY | Facility: CLINIC | Age: 37
End: 2022-09-19
Payer: COMMERCIAL

## 2022-11-14 ENCOUNTER — PATIENT MESSAGE (OUTPATIENT)
Dept: OBSTETRICS AND GYNECOLOGY | Facility: CLINIC | Age: 37
End: 2022-11-14
Payer: COMMERCIAL

## 2022-11-22 ENCOUNTER — HOSPITAL ENCOUNTER (EMERGENCY)
Facility: HOSPITAL | Age: 37
Discharge: HOME | End: 2022-11-22
Attending: EMERGENCY MEDICINE
Payer: COMMERCIAL

## 2022-11-22 VITALS
BODY MASS INDEX: 22.32 KG/M2 | SYSTOLIC BLOOD PRESSURE: 127 MMHG | RESPIRATION RATE: 18 BRPM | WEIGHT: 126 LBS | HEIGHT: 63 IN | TEMPERATURE: 99 F | HEART RATE: 70 BPM | OXYGEN SATURATION: 99 % | DIASTOLIC BLOOD PRESSURE: 73 MMHG

## 2022-11-22 DIAGNOSIS — R51.9 NONINTRACTABLE HEADACHE, UNSPECIFIED CHRONICITY PATTERN, UNSPECIFIED HEADACHE TYPE: ICD-10-CM

## 2022-11-22 DIAGNOSIS — R00.2 PALPITATIONS: ICD-10-CM

## 2022-11-22 DIAGNOSIS — I16.0 HYPERTENSIVE URGENCY: Primary | ICD-10-CM

## 2022-11-22 DIAGNOSIS — R20.2 ARM PARESTHESIA, LEFT: ICD-10-CM

## 2022-11-22 LAB
ANION GAP SERPL CALC-SCNC: 9 MMOL/L (ref 8–16)
B-HCG UR QL: NEGATIVE
BASOPHILS # BLD AUTO: 0.02 K/UL (ref 0–0.2)
BASOPHILS NFR BLD: 0.5 % (ref 0–1.9)
BUN SERPL-MCNC: 5 MG/DL (ref 6–20)
CALCIUM SERPL-MCNC: 9.5 MG/DL (ref 8.7–10.5)
CHLORIDE SERPL-SCNC: 103 MMOL/L (ref 95–110)
CO2 SERPL-SCNC: 24 MMOL/L (ref 23–29)
CREAT SERPL-MCNC: 0.8 MG/DL (ref 0.5–1.4)
CTP QC/QA: YES
DIFFERENTIAL METHOD: ABNORMAL
EOSINOPHIL # BLD AUTO: 0 K/UL (ref 0–0.5)
EOSINOPHIL NFR BLD: 1 % (ref 0–8)
ERYTHROCYTE [DISTWIDTH] IN BLOOD BY AUTOMATED COUNT: 11.6 % (ref 11.5–14.5)
EST. GFR  (NO RACE VARIABLE): >60 ML/MIN/1.73 M^2
GLUCOSE SERPL-MCNC: 98 MG/DL (ref 70–110)
HCT VFR BLD AUTO: 36.6 % (ref 37–48.5)
HCV AB SERPL QL IA: NORMAL
HGB BLD-MCNC: 12.4 G/DL (ref 12–16)
HIV 1+2 AB+HIV1 P24 AG SERPL QL IA: NORMAL
IMM GRANULOCYTES # BLD AUTO: 0 K/UL (ref 0–0.04)
IMM GRANULOCYTES NFR BLD AUTO: 0 % (ref 0–0.5)
LYMPHOCYTES # BLD AUTO: 1.7 K/UL (ref 1–4.8)
LYMPHOCYTES NFR BLD: 43.5 % (ref 18–48)
MCH RBC QN AUTO: 30.8 PG (ref 27–31)
MCHC RBC AUTO-ENTMCNC: 33.9 G/DL (ref 32–36)
MCV RBC AUTO: 91 FL (ref 82–98)
MONOCYTES # BLD AUTO: 0.4 K/UL (ref 0.3–1)
MONOCYTES NFR BLD: 10.2 % (ref 4–15)
NEUTROPHILS # BLD AUTO: 1.7 K/UL (ref 1.8–7.7)
NEUTROPHILS NFR BLD: 44.8 % (ref 38–73)
NRBC BLD-RTO: 0 /100 WBC
PLATELET # BLD AUTO: 235 K/UL (ref 150–450)
PMV BLD AUTO: 10.8 FL (ref 9.2–12.9)
POTASSIUM SERPL-SCNC: 3.9 MMOL/L (ref 3.5–5.1)
RBC # BLD AUTO: 4.03 M/UL (ref 4–5.4)
SODIUM SERPL-SCNC: 136 MMOL/L (ref 136–145)
TROPONIN I SERPL DL<=0.01 NG/ML-MCNC: <0.006 NG/ML (ref 0–0.03)
TSH SERPL DL<=0.005 MIU/L-ACNC: 0.5 UIU/ML (ref 0.4–4)
WBC # BLD AUTO: 3.82 K/UL (ref 3.9–12.7)

## 2022-11-22 PROCEDURE — 99285 PR EMERGENCY DEPT VISIT,LEVEL V: ICD-10-PCS | Mod: ,,, | Performed by: PHYSICIAN ASSISTANT

## 2022-11-22 PROCEDURE — 25000003 PHARM REV CODE 250: Performed by: PHYSICIAN ASSISTANT

## 2022-11-22 PROCEDURE — 93005 ELECTROCARDIOGRAM TRACING: CPT

## 2022-11-22 PROCEDURE — 84443 ASSAY THYROID STIM HORMONE: CPT | Performed by: PHYSICIAN ASSISTANT

## 2022-11-22 PROCEDURE — 81025 URINE PREGNANCY TEST: CPT | Performed by: PHYSICIAN ASSISTANT

## 2022-11-22 PROCEDURE — 87389 HIV-1 AG W/HIV-1&-2 AB AG IA: CPT | Performed by: PHYSICIAN ASSISTANT

## 2022-11-22 PROCEDURE — 80048 BASIC METABOLIC PNL TOTAL CA: CPT | Performed by: PHYSICIAN ASSISTANT

## 2022-11-22 PROCEDURE — 99285 EMERGENCY DEPT VISIT HI MDM: CPT | Mod: ,,, | Performed by: PHYSICIAN ASSISTANT

## 2022-11-22 PROCEDURE — 93010 EKG 12-LEAD: ICD-10-PCS | Mod: ,,, | Performed by: INTERNAL MEDICINE

## 2022-11-22 PROCEDURE — 99285 EMERGENCY DEPT VISIT HI MDM: CPT | Mod: 25

## 2022-11-22 PROCEDURE — 36000 PLACE NEEDLE IN VEIN: CPT

## 2022-11-22 PROCEDURE — 86803 HEPATITIS C AB TEST: CPT | Performed by: PHYSICIAN ASSISTANT

## 2022-11-22 PROCEDURE — 93010 ELECTROCARDIOGRAM REPORT: CPT | Mod: ,,, | Performed by: INTERNAL MEDICINE

## 2022-11-22 PROCEDURE — 85025 COMPLETE CBC W/AUTO DIFF WBC: CPT | Performed by: PHYSICIAN ASSISTANT

## 2022-11-22 PROCEDURE — 84484 ASSAY OF TROPONIN QUANT: CPT | Performed by: PHYSICIAN ASSISTANT

## 2022-11-22 RX ORDER — METOPROLOL SUCCINATE 25 MG/1
25 TABLET, EXTENDED RELEASE ORAL
Status: COMPLETED | OUTPATIENT
Start: 2022-11-22 | End: 2022-11-22

## 2022-11-22 RX ADMIN — METOPROLOL SUCCINATE 25 MG: 25 TABLET, EXTENDED RELEASE ORAL at 11:11

## 2022-11-22 NOTE — ED PROVIDER NOTES
Encounter Date: 11/22/2022       History     Chief Complaint   Patient presents with    Hypertension     Elevated BP since last night. Reports taking BP meds lastnight.. reports intermittent HA and L side numbness.     The patient is a 37 year old female, who has a past medical history of HTN, heart murmur, anxiety, heart palpitations, and scoliosis. She is prescribed Amlodipine and Toprol for HTN and palpitations. She presents to the ER for an emergent evaluation due to elevated blood pressure readings since last night. She reports having a dull intermittent mild global headache, palpitations, and numbness and tingling sensations to her left neck and left arm. She states that she has had similar episodes exactly like this (specifically palpitations, numbness/tingling, and HA) in the past when her blood pressure is high. She states that in the past during these episodes, she will take a Toprol and it resolves. She states that she takes the amlodipine daily but only takes the Toprol as needed for palpitations or when her blood pressure is elevated. She was initially prescribed the Toprol as a daily medication, but found that it was causing her blood pressure to run too low, thus she only takes it prn. Presently, she has been out of the Toprol for more than 1 month. She has a refill at the pharmacy that needs to be picked up. She also states that she did not take the Amlodipine but clarifies that she did not run out of it.     Review of patient's allergies indicates:  No Known Allergies  Past Medical History:   Diagnosis Date    Abnormal cervical Papanicolaou smear 2010, 2016, 2017    Colposcopy    Anxiety     GERD (gastroesophageal reflux disease)     Hypertension     Murmur     Scoliosis      History reviewed. No pertinent surgical history.  Family History   Problem Relation Age of Onset    Hypertension Father     No Known Problems Mother     No Known Problems Sister     Stroke Paternal Aunt     Stroke Maternal  Grandfather     Breast cancer Neg Hx     Colon cancer Neg Hx     Ovarian cancer Neg Hx      Social History     Tobacco Use    Smoking status: Never    Smokeless tobacco: Never   Substance Use Topics    Alcohol use: Yes     Alcohol/week: 7.0 standard drinks     Types: 7 Glasses of wine per week     Comment: SIOCA    Drug use: Not Currently     Types: Marijuana     Review of Systems   Constitutional:  Negative for activity change, appetite change, chills, diaphoresis and fever.   HENT:  Negative for congestion, ear pain, facial swelling, sinus pain, sore throat and tinnitus.    Eyes:  Negative for pain and visual disturbance.   Respiratory:  Negative for cough, chest tightness and shortness of breath.    Cardiovascular:  Positive for palpitations. Negative for chest pain and leg swelling.   Gastrointestinal:  Negative for abdominal pain, blood in stool, diarrhea, nausea and vomiting.   Genitourinary:  Negative for decreased urine volume, difficulty urinating, dysuria, menstrual problem, pelvic pain and urgency.   Musculoskeletal:  Negative for back pain, gait problem, myalgias and neck pain.   Skin:  Negative for color change, rash and wound.   Allergic/Immunologic: Negative for immunocompromised state.   Neurological:  Positive for numbness and headaches. Negative for dizziness, tremors, seizures, syncope, facial asymmetry, speech difficulty, weakness and light-headedness.   Psychiatric/Behavioral:  Negative for confusion.      Physical Exam     Initial Vitals [11/22/22 0931]   BP Pulse Resp Temp SpO2   (!) 167/102 (!) 10 16 98.5 °F (36.9 °C) 100 %      MAP       --         Physical Exam    Nursing note and vitals reviewed.  Constitutional: She appears well-developed and well-nourished. She is not diaphoretic. No distress.   Alert and ambulatory. Accompanied by her . Smiling and pleasant to speak with. Well appearing.    HENT:   Head: Normocephalic.   Eyes: Conjunctivae and EOM are normal. Pupils are equal,  round, and reactive to light.   Neck: Neck supple. No JVD present.   Cardiovascular:  Normal rate.           Pulmonary/Chest: No respiratory distress.   Abdominal: Abdomen is soft. She exhibits no distension. There is no abdominal tenderness. There is no rebound and no guarding.   Musculoskeletal:         General: No tenderness or edema. Normal range of motion.      Cervical back: Neck supple.     Neurological: She is alert and oriented to person, place, and time. She has normal strength. No sensory deficit.   No facial droop. Normal speech. Normal gait. 5/5 strength. No focal deficit.    Skin: Skin is warm and dry. No rash noted.   Psychiatric: She has a normal mood and affect. Her behavior is normal.       ED Course   Procedures  Labs Reviewed   CBC W/ AUTO DIFFERENTIAL - Abnormal; Notable for the following components:       Result Value    WBC 3.82 (*)     Hematocrit 36.6 (*)     Gran # (ANC) 1.7 (*)     All other components within normal limits   BASIC METABOLIC PANEL - Abnormal; Notable for the following components:    BUN 5 (*)     All other components within normal limits   HIV 1 / 2 ANTIBODY    Narrative:     Release to patient->Immediate   HEPATITIS C ANTIBODY    Narrative:     Release to patient->Immediate   TROPONIN I   TSH   POCT URINE PREGNANCY     Results for orders placed or performed during the hospital encounter of 11/22/22   HIV 1/2 Ag/Ab (4th Gen)   Result Value Ref Range    HIV 1/2 Ag/Ab Non-reactive Non-reactive   Hepatitis C Antibody   Result Value Ref Range    Hepatitis C Ab Non-reactive Non-reactive   CBC auto differential   Result Value Ref Range    WBC 3.82 (L) 3.90 - 12.70 K/uL    RBC 4.03 4.00 - 5.40 M/uL    Hemoglobin 12.4 12.0 - 16.0 g/dL    Hematocrit 36.6 (L) 37.0 - 48.5 %    MCV 91 82 - 98 fL    MCH 30.8 27.0 - 31.0 pg    MCHC 33.9 32.0 - 36.0 g/dL    RDW 11.6 11.5 - 14.5 %    Platelets 235 150 - 450 K/uL    MPV 10.8 9.2 - 12.9 fL    Immature Granulocytes 0.0 0.0 - 0.5 %    Gran #  (ANC) 1.7 (L) 1.8 - 7.7 K/uL    Immature Grans (Abs) 0.00 0.00 - 0.04 K/uL    Lymph # 1.7 1.0 - 4.8 K/uL    Mono # 0.4 0.3 - 1.0 K/uL    Eos # 0.0 0.0 - 0.5 K/uL    Baso # 0.02 0.00 - 0.20 K/uL    nRBC 0 0 /100 WBC    Gran % 44.8 38.0 - 73.0 %    Lymph % 43.5 18.0 - 48.0 %    Mono % 10.2 4.0 - 15.0 %    Eosinophil % 1.0 0.0 - 8.0 %    Basophil % 0.5 0.0 - 1.9 %    Differential Method Automated    Basic metabolic panel   Result Value Ref Range    Sodium 136 136 - 145 mmol/L    Potassium 3.9 3.5 - 5.1 mmol/L    Chloride 103 95 - 110 mmol/L    CO2 24 23 - 29 mmol/L    Glucose 98 70 - 110 mg/dL    BUN 5 (L) 6 - 20 mg/dL    Creatinine 0.8 0.5 - 1.4 mg/dL    Calcium 9.5 8.7 - 10.5 mg/dL    Anion Gap 9 8 - 16 mmol/L    eGFR >60.0 >60 mL/min/1.73 m^2   Troponin I   Result Value Ref Range    Troponin I <0.006 0.000 - 0.026 ng/mL   TSH   Result Value Ref Range    TSH 0.498 0.400 - 4.000 uIU/mL   POCT urine pregnancy   Result Value Ref Range    POC Preg Test, Ur Negative Negative     Acceptable Yes           ECG Results              EKG 12-lead (Final result)  Result time 11/22/22 14:00:53      Final result by Interface, Lab In [facility] (11/22/22 14:00:53)                   Narrative:    Test Reason : R20.2,    Vent. Rate : 095 BPM     Atrial Rate : 095 BPM     P-R Int : 146 ms          QRS Dur : 064 ms      QT Int : 382 ms       P-R-T Axes : 083 067 060 degrees     QTc Int : 480 ms    Normal sinus rhythm  Possible Left atrial enlargement  Prolonged QT  Abnormal ECG  When compared with ECG of 26-JUN-2020 15:53,  Vent. rate has increased BY  31 BPM  Confirmed by ELIZABETH BELCHER MD ([id number]) on 11/22/2022 2:00:45 PM    Referred By: [email or web address]   SELF           Confirmed By:ELIZABETH BELCHER MD                                  Imaging Results              CT Head Without Contrast (Final result)  Result time 11/22/22 11:41:22      Final result by Marvin Jimenez DO (11/22/22 11:41:22)                   Impression:     "  No acute intracranial findings specifically without evidence for acute intracranial hemorrhage or sulcal effacement to suggest large territory recent infarction.    Clinical correlation and further evaluation with MRI as warranted if patient compatible.      Electronically signed by: Marvin Jimenez DO  Date:    11/22/2022  Time:    11:41               Narrative:    EXAMINATION:  CT HEAD WITHOUT CONTRAST    CLINICAL HISTORY:  Numbness or tingling, paresthesia (Ped 0-18y);36 yo w/HA and left arm paresthesia;    TECHNIQUE:  Multiple sequential 5 mm axial images of the head without contrast.  Coronal and sagittal reformatted imaging from the axial acquisition.    COMPARISON:  None    FINDINGS:  There is no evidence for acute intracranial hemorrhage or sulcal effacement.  The ventricles are normal in size without hydrocephalus.  There is no midline shift or mass effect.  Small opacification within the visualized left maxillary antra remaining visualized paranasal sinuses and mastoid air cells are clear.                                       Medications   metoprolol succinate (TOPROL-XL) 24 hr tablet 25 mg (25 mg Oral Given 11/22/22 1115)     Vitals:    11/22/22 0931 11/22/22 1030 11/22/22 1155   BP: (!) 167/102  127/73   BP Location:   Right arm   Patient Position:   Sitting   Pulse: (!) 10 96 70   Resp: 16 18    Temp: 98.5 °F (36.9 °C)     TempSrc: Oral     SpO2: 100% 97% 99%   Weight: 57.2 kg (126 lb)     Height: 5' 3" (1.6 m)         Medical Decision Making:   History:   Old Medical Records: I decided to obtain old medical records.  Initial Assessment:   36 yo female, hx of anxiety, HTN, murmur, heart palpitations, presents c/o elevated blood pressure readings since last night with associated headache, palpitations, and left neck/arm numbness tingling sensations. Pt reports having multiple episodes of exact constellation of symptoms during episodes of elevated blood pressure in the past that is usually alleviated by " taking a Toprol tablet, but she ran out of Toprol. Reports that her BP is elevated because she did not take her daily Amlodipine.   Differential Diagnosis:   Hypertensive urgency, CVA, palpitations, Dysrhythmia, ACS, paresthesia, electrolyte derangement, thyroid disorder, etc   Clinical Tests:   Lab Tests: Ordered and Reviewed  Radiological Study: Ordered and Reviewed  Medical Tests: Ordered and Reviewed  ED Management:  Vital signs reviewed - elevated BP noted   EKG reviewed and signed by the ER attending physician   Labs completed - unremarkable   CT brain completed due to paresthesia complaints - no acute findings   BP improved after PO Toprol - pt reports feeling better with resolution of HA, palpitations, and paresthesia   Pt agrees to take Amlodipine daily as prescribed and to refill her Toprol - suggested taking daily half/reduced dose of Toprol - advised to discuss options with her PCP for long term plan at better BP control   Pt advised to follow up with her PCP and/or cardiologist in the next 1-2 days for re-evaluation and further management   Pt advised to return to the ER promptly if unimproved or if worse in any way        Additional MDM:     EKG: I have independently interpreted EKG(s) - see notes.     X-Rays: I have independently interpreted X-Ray(s) - see notes.                      Clinical Impression:   Final diagnoses:  [R20.2] Arm paresthesia, left  [R51.9] Nonintractable headache, unspecified chronicity pattern, unspecified headache type  [I16.0] Hypertensive urgency (Primary)  [R00.2] Palpitations        ED Disposition Condition    Discharge Stable          ED Prescriptions    None       Follow-up Information       Follow up With Specialties Details Why Contact Info    Radha Farooq MD Cardiology Schedule an appointment as soon as possible for a visit in 2 days Follow up with your cardiologist at next available. [address] [facility]  [address]  [place] [address]  [phone]       Gopal Ortega MD Family Medicine Schedule an appointment as soon as possible for a visit in 2 days Follow up with your PCP in the next 1-2 days for re-evaluation and further management. Take blood pressure medications daily as prescribed. [address] [facility]  [address]  [facility] [address]  [phone]      [facility] - Emergency Dept Emergency Medicine  If symptoms worsen in any way or if unimproved [address] [facility] [address] [phone]             Francisco Edgar PA-C  11/23/22 1121       Francisco Edgar PA-C  11/23/22 1122

## 2022-11-22 NOTE — ED NOTES
"Patient identifiers verified and correct for Ms [place]  C/C: Elevated b/p SEE NN  APPEARANCE: awake and alert in NAD. PAIN  */10  SKIN: warm, dry and intact. No breakdown or bruising.  MUSCULOSKELETAL: Patient moving all extremities spontaneously, no obvious swelling or deformities noted. Ambulates independently.  RESPIRATORY: Denies shortness of breath.Respirations unlabored.   CARDIAC: Denies CP, 2+ distal pulses; no peripheral edema  ABDOMEN: S/ND/NT, Denies nausea  : voids spontaneously, denies difficulty  Neurologic: AAO x 4; follows commands equal strength in all extremities; denies numbness/tingling. Denies dizziness  reports "numbness" to left neck and left arm ,   "

## 2022-11-22 NOTE — ED NOTES
"Patient states b/p elevated to 114/94 last night, concerned about the lower number, states during b/p elevtaed left neck " weirdness" down arm in to leg x 2 years, states currently with symptoms since last night. No meds today,Did not take either med, amlodipine yesterday , no Toprol ( out of med)   "

## 2022-11-30 ENCOUNTER — PATIENT MESSAGE (OUTPATIENT)
Dept: OBSTETRICS AND GYNECOLOGY | Facility: CLINIC | Age: 37
End: 2022-11-30
Payer: COMMERCIAL

## 2022-12-01 ENCOUNTER — OFFICE VISIT (OUTPATIENT)
Dept: OPTOMETRY | Facility: CLINIC | Age: 37
End: 2022-12-01
Payer: COMMERCIAL

## 2022-12-01 DIAGNOSIS — H04.123 DRY EYE SYNDROME OF BOTH EYES: ICD-10-CM

## 2022-12-01 DIAGNOSIS — H52.203 HYPEROPIA WITH ASTIGMATISM, BILATERAL: Primary | ICD-10-CM

## 2022-12-01 DIAGNOSIS — H10.13 ALLERGIC CONJUNCTIVITIS OF BOTH EYES: ICD-10-CM

## 2022-12-01 DIAGNOSIS — H52.03 HYPEROPIA WITH ASTIGMATISM, BILATERAL: Primary | ICD-10-CM

## 2022-12-01 PROCEDURE — 99999 PR PBB SHADOW E&M-EST. PATIENT-LVL III: ICD-10-PCS | Mod: PBBFAC,,, | Performed by: OPTOMETRIST

## 2022-12-01 PROCEDURE — 92015 PR REFRACTION: ICD-10-PCS | Mod: S$GLB,,, | Performed by: OPTOMETRIST

## 2022-12-01 PROCEDURE — 92014 COMPRE OPH EXAM EST PT 1/>: CPT | Mod: S$GLB,,, | Performed by: OPTOMETRIST

## 2022-12-01 PROCEDURE — 92015 DETERMINE REFRACTIVE STATE: CPT | Mod: S$GLB,,, | Performed by: OPTOMETRIST

## 2022-12-01 PROCEDURE — 92014 PR EYE EXAM, EST PATIENT,COMPREHESV: ICD-10-PCS | Mod: S$GLB,,, | Performed by: OPTOMETRIST

## 2022-12-01 PROCEDURE — 99999 PR PBB SHADOW E&M-EST. PATIENT-LVL III: CPT | Mod: PBBFAC,,, | Performed by: OPTOMETRIST

## 2022-12-01 RX ORDER — OSELTAMIVIR PHOSPHATE 75 MG/1
75 CAPSULE ORAL 2 TIMES DAILY
COMMUNITY
Start: 2022-10-31 | End: 2022-12-20

## 2022-12-01 NOTE — PROGRESS NOTES
MADAN    SAMMY: 11/21  Chief complaint (CC): Patient is here annual eye exam.  Patient has more   trouble with night driving even with her glasses.  Eyes sometimes itch and   Zaditor helps.  Vision is blurred off and on and clears with blinking.  Glasses? + 1 yr. old  Contacts? -  H/o eye surgery, injections or laser: -  H/o eye injury: -  Known eye conditions? -  Family h/o eye conditions? Maternal GGM with glaucoma  Eye gtts? Zaditor prn      (-) Flashes (-)  Floaters (-) Mucous   (-)  Tearing (-) Itching (-) Burning   (-) Headaches (-) Eye Pain/discomfort (-) Irritation   (-)  Redness (-) Double vision (-) Blurry vision    Diabetic? -  A1c? -      Last edited by Melly Souza on 12/1/2022  4:08 PM.            Assessment /Plan     For exam results, see Encounter Report.      Hyperopia with astigmatism, bilateral  SRx released to patient. Patient educated on lens options. Normal ocular health. RTC 1 year for routine exam.     Allergic conjunctivitis of both eyes  Recommend Zaditor or Alaway bid OU and cool compresses to help soothe itching. Patient advised to RTC if condition gets worse.     Dry eye syndrome of both eyes  Recommend Systane Ultra or Refresh Optive BID-TID OU to aid with symptoms of dry eyes.

## 2022-12-20 ENCOUNTER — LAB VISIT (OUTPATIENT)
Dept: LAB | Facility: HOSPITAL | Age: 37
End: 2022-12-20
Attending: INTERNAL MEDICINE
Payer: COMMERCIAL

## 2022-12-20 ENCOUNTER — OFFICE VISIT (OUTPATIENT)
Dept: CARDIOLOGY | Facility: CLINIC | Age: 37
End: 2022-12-20
Payer: COMMERCIAL

## 2022-12-20 VITALS
DIASTOLIC BLOOD PRESSURE: 92 MMHG | BODY MASS INDEX: 22.23 KG/M2 | HEART RATE: 77 BPM | WEIGHT: 125.44 LBS | HEIGHT: 63 IN | SYSTOLIC BLOOD PRESSURE: 150 MMHG

## 2022-12-20 DIAGNOSIS — I10 PRIMARY HYPERTENSION: Primary | ICD-10-CM

## 2022-12-20 DIAGNOSIS — F41.9 ANXIETY: ICD-10-CM

## 2022-12-20 DIAGNOSIS — R20.0 LEFT SIDED NUMBNESS: ICD-10-CM

## 2022-12-20 DIAGNOSIS — I10 PRIMARY HYPERTENSION: ICD-10-CM

## 2022-12-20 DIAGNOSIS — E55.9 VITAMIN D DEFICIENCY: ICD-10-CM

## 2022-12-20 LAB — PTH-INTACT SERPL-MCNC: 48.4 PG/ML (ref 9–77)

## 2022-12-20 PROCEDURE — 4010F PR ACE/ARB THEARPY RXD/TAKEN: ICD-10-PCS | Mod: CPTII,S$GLB,, | Performed by: INTERNAL MEDICINE

## 2022-12-20 PROCEDURE — 3077F PR MOST RECENT SYSTOLIC BLOOD PRESSURE >= 140 MM HG: ICD-10-PCS | Mod: CPTII,S$GLB,, | Performed by: INTERNAL MEDICINE

## 2022-12-20 PROCEDURE — 99999 PR PBB SHADOW E&M-EST. PATIENT-LVL III: ICD-10-PCS | Mod: PBBFAC,,, | Performed by: INTERNAL MEDICINE

## 2022-12-20 PROCEDURE — 4010F ACE/ARB THERAPY RXD/TAKEN: CPT | Mod: CPTII,S$GLB,, | Performed by: INTERNAL MEDICINE

## 2022-12-20 PROCEDURE — 84244 ASSAY OF RENIN: CPT | Performed by: INTERNAL MEDICINE

## 2022-12-20 PROCEDURE — 83970 ASSAY OF PARATHORMONE: CPT | Performed by: INTERNAL MEDICINE

## 2022-12-20 PROCEDURE — 1159F PR MEDICATION LIST DOCUMENTED IN MEDICAL RECORD: ICD-10-PCS | Mod: CPTII,S$GLB,, | Performed by: INTERNAL MEDICINE

## 2022-12-20 PROCEDURE — 99999 PR PBB SHADOW E&M-EST. PATIENT-LVL III: CPT | Mod: PBBFAC,,, | Performed by: INTERNAL MEDICINE

## 2022-12-20 PROCEDURE — 99214 OFFICE O/P EST MOD 30 MIN: CPT | Mod: S$GLB,,, | Performed by: INTERNAL MEDICINE

## 2022-12-20 PROCEDURE — 3008F BODY MASS INDEX DOCD: CPT | Mod: CPTII,S$GLB,, | Performed by: INTERNAL MEDICINE

## 2022-12-20 PROCEDURE — 36415 COLL VENOUS BLD VENIPUNCTURE: CPT | Performed by: INTERNAL MEDICINE

## 2022-12-20 PROCEDURE — 1160F PR REVIEW ALL MEDS BY PRESCRIBER/CLIN PHARMACIST DOCUMENTED: ICD-10-PCS | Mod: CPTII,S$GLB,, | Performed by: INTERNAL MEDICINE

## 2022-12-20 PROCEDURE — 3077F SYST BP >= 140 MM HG: CPT | Mod: CPTII,S$GLB,, | Performed by: INTERNAL MEDICINE

## 2022-12-20 PROCEDURE — 99214 PR OFFICE/OUTPT VISIT, EST, LEVL IV, 30-39 MIN: ICD-10-PCS | Mod: S$GLB,,, | Performed by: INTERNAL MEDICINE

## 2022-12-20 PROCEDURE — 3008F PR BODY MASS INDEX (BMI) DOCUMENTED: ICD-10-PCS | Mod: CPTII,S$GLB,, | Performed by: INTERNAL MEDICINE

## 2022-12-20 PROCEDURE — 3080F DIAST BP >= 90 MM HG: CPT | Mod: CPTII,S$GLB,, | Performed by: INTERNAL MEDICINE

## 2022-12-20 PROCEDURE — 1159F MED LIST DOCD IN RCRD: CPT | Mod: CPTII,S$GLB,, | Performed by: INTERNAL MEDICINE

## 2022-12-20 PROCEDURE — 3080F PR MOST RECENT DIASTOLIC BLOOD PRESSURE >= 90 MM HG: ICD-10-PCS | Mod: CPTII,S$GLB,, | Performed by: INTERNAL MEDICINE

## 2022-12-20 PROCEDURE — 1160F RVW MEDS BY RX/DR IN RCRD: CPT | Mod: CPTII,S$GLB,, | Performed by: INTERNAL MEDICINE

## 2022-12-20 RX ORDER — LOSARTAN POTASSIUM 25 MG/1
25 TABLET ORAL DAILY
Qty: 90 TABLET | Refills: 3 | Status: SHIPPED | OUTPATIENT
Start: 2022-12-20 | End: 2023-10-19

## 2022-12-20 RX ORDER — AMLODIPINE BESYLATE 10 MG/1
10 TABLET ORAL DAILY
Qty: 30 TABLET | Refills: 11 | Status: SHIPPED | OUTPATIENT
Start: 2022-12-20 | End: 2022-12-20 | Stop reason: SDUPTHER

## 2022-12-20 RX ORDER — AMLODIPINE BESYLATE 5 MG/1
5 TABLET ORAL DAILY
Qty: 90 TABLET | Refills: 3 | Status: SHIPPED | OUTPATIENT
Start: 2022-12-20 | End: 2024-03-12

## 2022-12-20 RX ORDER — METOPROLOL TARTRATE 25 MG/1
25 TABLET, FILM COATED ORAL 2 TIMES DAILY
Qty: 120 TABLET | Refills: 3 | Status: SHIPPED | OUTPATIENT
Start: 2022-12-20 | End: 2023-10-19
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
## 2022-12-22 ENCOUNTER — HOSPITAL ENCOUNTER (OUTPATIENT)
Dept: CARDIOLOGY | Facility: HOSPITAL | Age: 37
Discharge: HOME | End: 2022-12-22
Attending: INTERNAL MEDICINE
Payer: COMMERCIAL

## 2022-12-22 DIAGNOSIS — I10 PRIMARY HYPERTENSION: ICD-10-CM

## 2022-12-22 LAB
ABDOMINAL AORTA MID EDV: 12 CM/S
ABDOMINAL AORTA MID PSV: 118 CM/S
LEFT ACCESSORY RENAL DIST DIAS: 29 CM/S
LEFT ACCESSORY RENAL DIST SYS: 67 CM/S
LEFT ACCESSORY RENAL MID DIAS: 17 CM/S
LEFT ACCESSORY RENAL MID RAR: 0.53
LEFT ACCESSORY RENAL MID SYS: 62 CM/S
LEFT ACCESSORY RENAL ORIGIN DIAS: 34 CM/S
LEFT ACCESSORY RENAL ORIGIN SYS: 119 CM/S
LEFT ACCESSORY RENAL PROX DIAS: 34 CM/S
LEFT ACCESSORY RENAL PROX SYS: 96 CM/S
LEFT RENAL DIST DIAS: 18 CM/S
LEFT RENAL DIST SYS: 49 CM/S
LEFT RENAL MID DIAS: 29 CM/S
LEFT RENAL MID RAR: 0.76
LEFT RENAL MID SYS: 90 CM/S
LEFT RENAL ORIGIN DIAS: 24 CM/S
LEFT RENAL ORIGIN SYS: 116 CM/S
LEFT RENAL PROX DIAS: 42 CM/S
LEFT RENAL PROX SYS: 122 CM/S
LEFT RENAL ULTRASOUND ACCELERATION TIME MEASUREMENT 1: 51 MS
LEFT RENAL ULTRASOUND ACCELERATION TIME MEASUREMENT 2: 23 MS
LEFT RENAL ULTRASOUND ACCELERATION TIME MEASUREMENT 3: 14 MS
LEFT RENAL ULTRASOUND ACCELERATION TIME MEASUREMENT AVERAGE: 51 MS
LEFT RENAL ULTRASOUND KIDNEY SIZE MEASUREMENT 1: 10 CM
LEFT RENAL ULTRASOUND KIDNEY SIZE MEASUREMENT AVERAGE: 10 CM
LEFT RENAL ULTRASOUND RESISTIVE INDEX MEASUREMENT 1: 0.55
LEFT RENAL ULTRASOUND RESISTIVE INDEX MEASUREMENT 2: 0.54
LEFT RENAL ULTRASOUND RESISTIVE INDEX MEASUREMENT 3: 0.52
LEFT RENAL ULTRASOUND RESISTIVE INDEX MEASUREMENT AVERAGE: 0.55
OHS CV LEFT RENAL ACCESSORY RAR: 1.01
OHS CV LEFT RENAL RAR: 1.03
OHS CV RIGHT RENAL RAR: 1.5
OHS CV US LEFT RENAL HIGHEST EDV: 42
OHS CV US LEFT RENAL HIGHEST PSV: 122
OHS CV US RIGHT RENAL HIGHEST EDV: 54
OHS CV US RIGHT RENAL HIGHEST PSV: 177
RIGHT RENAL DIST DIAS: 54 CM/S
RIGHT RENAL DIST SYS: 115 CM/S
RIGHT RENAL MID DIAS: 37 CM/S
RIGHT RENAL MID RAR: 0.79
RIGHT RENAL MID SYS: 93 CM/S
RIGHT RENAL ORIGIN DIAS: 41 CM/S
RIGHT RENAL ORIGIN SYS: 171 CM/S
RIGHT RENAL PROX DIAS: 44 CM/S
RIGHT RENAL PROX SYS: 177 CM/S
RIGHT RENAL ULTRASOUND ACCELERATION TIME MEASUREMENT 1: 26 MS
RIGHT RENAL ULTRASOUND ACCELERATION TIME MEASUREMENT 2: 20 MS
RIGHT RENAL ULTRASOUND ACCELERATION TIME MEASUREMENT 3: 14 MS
RIGHT RENAL ULTRASOUND ACCELERATION TIME MEASUREMENT AVERAGE: 26 MS
RIGHT RENAL ULTRASOUND KIDNEY SIZE MEASUREMENT 1: 11.3 CM
RIGHT RENAL ULTRASOUND KIDNEY SIZE MEASUREMENT AVERAGE: 11.3 CM
RIGHT RENAL ULTRASOUND RESISTIVE INDEX MEASUREMENT 1: 0.58
RIGHT RENAL ULTRASOUND RESISTIVE INDEX MEASUREMENT 2: 0.57
RIGHT RENAL ULTRASOUND RESISTIVE INDEX MEASUREMENT 3: 0.49
RIGHT RENAL ULTRASOUND RESISTIVE INDEX MEASUREMENT AVERAGE: 0.58

## 2022-12-22 PROCEDURE — 93975 CV US RENAL ARTERY STENOSIS HYPERTENSION COMPLETE (CUPID ONLY): ICD-10-PCS | Mod: 26,,, | Performed by: INTERNAL MEDICINE

## 2022-12-22 PROCEDURE — 93975 VASCULAR STUDY: CPT

## 2022-12-22 PROCEDURE — 93975 VASCULAR STUDY: CPT | Mod: 26,,, | Performed by: INTERNAL MEDICINE

## 2022-12-23 ENCOUNTER — TELEPHONE (OUTPATIENT)
Dept: NEUROLOGY | Facility: CLINIC | Age: 37
End: 2022-12-23
Payer: COMMERCIAL

## 2022-12-23 ENCOUNTER — PATIENT MESSAGE (OUTPATIENT)
Dept: NEUROLOGY | Facility: CLINIC | Age: 37
End: 2022-12-23
Payer: COMMERCIAL

## 2022-12-23 LAB — RENIN PLAS-CCNC: <0.6 NG/ML/H

## 2022-12-23 NOTE — TELEPHONE ENCOUNTER
Called and left a message for Addison regarding her appt with RONNA Alarcon. I stated that we have to cancel her appt today at 11:00AM

## 2022-12-27 NOTE — PROGRESS NOTES
plz let pt. Know that ultrasound of kidney was normal and does not show blockage of renal arteries.

## 2023-01-03 ENCOUNTER — PROCEDURE VISIT (OUTPATIENT)
Dept: OBSTETRICS AND GYNECOLOGY | Facility: CLINIC | Age: 38
End: 2023-01-03
Payer: COMMERCIAL

## 2023-01-03 VITALS
BODY MASS INDEX: 22.26 KG/M2 | DIASTOLIC BLOOD PRESSURE: 80 MMHG | SYSTOLIC BLOOD PRESSURE: 140 MMHG | WEIGHT: 125.69 LBS

## 2023-01-03 DIAGNOSIS — Z30.430 ENCOUNTER FOR INSERTION OF MIRENA IUD: ICD-10-CM

## 2023-01-03 DIAGNOSIS — Z30.433 ENCOUNTER FOR REMOVAL AND REINSERTION OF INTRAUTERINE CONTRACEPTIVE DEVICE (IUD): Primary | ICD-10-CM

## 2023-01-03 LAB
B-HCG UR QL: NEGATIVE
CTP QC/QA: YES

## 2023-01-03 PROCEDURE — 58301 REMOVAL OF IUD: ICD-10-PCS | Mod: S$GLB,,, | Performed by: OBSTETRICS & GYNECOLOGY

## 2023-01-03 PROCEDURE — 58300 INSERTION OF IUD: ICD-10-PCS | Mod: S$GLB,,, | Performed by: OBSTETRICS & GYNECOLOGY

## 2023-01-03 PROCEDURE — 58301 REMOVE INTRAUTERINE DEVICE: CPT | Mod: S$GLB,,, | Performed by: OBSTETRICS & GYNECOLOGY

## 2023-01-03 PROCEDURE — 81025 URINE PREGNANCY TEST: CPT | Mod: S$GLB,,, | Performed by: OBSTETRICS & GYNECOLOGY

## 2023-01-03 PROCEDURE — 58300 INSERT INTRAUTERINE DEVICE: CPT | Mod: S$GLB,,, | Performed by: OBSTETRICS & GYNECOLOGY

## 2023-01-03 PROCEDURE — 81025 POCT URINE PREGNANCY: ICD-10-PCS | Mod: S$GLB,,, | Performed by: OBSTETRICS & GYNECOLOGY

## 2023-01-03 NOTE — PROCEDURES
Removal of IUD    Date/Time: 1/3/2023 3:45 PM  Performed by: Juan Curiel MD  Authorized by: Juan Curiel MD     Consent obtained:  Verbal  Consent given by:  Patient  Procedure risks and benefits discussed: yes    Patient questions answered: yes    Patient agrees, verbalizes understanding, and wants to proceed: yes    Educational handouts given: yes    Instructions and paperwork completed: yes    IUD grasped by: IUD hook  Removal due to infection and inflammatory reaction: yes    Removal due to mechanical complications of IUD/Nexplanon: yes    Removed with no complications: yes    Insertion of IUD    Date/Time: 1/3/2023 3:45 PM  Performed by: Juan Curiel MD  Authorized by: Juan Curiel MD     Consent:     Consent obtained:  Verbal    Consent given by:  Patient    Procedure risks and benefits discussed: yes      Patient questions answered: yes      Patient agrees, verbalizes understanding, and wants to proceed: yes      Educational handouts given: yes      Instructions and paperwork completed: yes    Procedure:     Pelvic exam performed: yes      Negative urine pregnancy test: yes      Cervix cleaned and prepped: yes      Speculum placed in vagina: yes      Uterus sounded: yes      Uterus sound depth (cm):  8    IUD inserted with no complications: yes      Strings trimmed: yes    1 Intra Uterine Device levonorgestreL 20 mcg/24 hours (8 yrs) 52 mg     Post-procedure:     Patient tolerated procedure well: yes      Patient will follow up after next period: yes

## 2023-01-10 ENCOUNTER — HOSPITAL ENCOUNTER (EMERGENCY)
Facility: HOSPITAL | Age: 38
Discharge: HOME | End: 2023-01-10
Attending: EMERGENCY MEDICINE
Payer: COMMERCIAL

## 2023-01-10 VITALS
WEIGHT: 125 LBS | SYSTOLIC BLOOD PRESSURE: 141 MMHG | BODY MASS INDEX: 22.15 KG/M2 | HEART RATE: 82 BPM | OXYGEN SATURATION: 100 % | DIASTOLIC BLOOD PRESSURE: 92 MMHG | HEIGHT: 63 IN | RESPIRATION RATE: 18 BRPM | TEMPERATURE: 99 F

## 2023-01-10 DIAGNOSIS — N63.0 BREAST NODULE: Primary | ICD-10-CM

## 2023-01-10 DIAGNOSIS — J10.1 INFLUENZA A: ICD-10-CM

## 2023-01-10 LAB
ALBUMIN SERPL BCP-MCNC: 4.1 G/DL (ref 3.5–5.2)
ALP SERPL-CCNC: 40 U/L (ref 55–135)
ALT SERPL W/O P-5'-P-CCNC: 12 U/L (ref 10–44)
ANION GAP SERPL CALC-SCNC: 8 MMOL/L (ref 8–16)
AST SERPL-CCNC: 14 U/L (ref 10–40)
B-HCG UR QL: NEGATIVE
BASOPHILS # BLD AUTO: 0.03 K/UL (ref 0–0.2)
BASOPHILS NFR BLD: 0.5 % (ref 0–1.9)
BILIRUB SERPL-MCNC: 0.5 MG/DL (ref 0.1–1)
BUN SERPL-MCNC: 7 MG/DL (ref 6–20)
CALCIUM SERPL-MCNC: 9.5 MG/DL (ref 8.7–10.5)
CHLORIDE SERPL-SCNC: 104 MMOL/L (ref 95–110)
CO2 SERPL-SCNC: 24 MMOL/L (ref 23–29)
CREAT SERPL-MCNC: 0.8 MG/DL (ref 0.5–1.4)
CTP QC/QA: YES
D DIMER PPP IA.FEU-MCNC: 0.31 MG/L FEU
DIFFERENTIAL METHOD: ABNORMAL
EOSINOPHIL # BLD AUTO: 0.1 K/UL (ref 0–0.5)
EOSINOPHIL NFR BLD: 1.1 % (ref 0–8)
ERYTHROCYTE [DISTWIDTH] IN BLOOD BY AUTOMATED COUNT: 12.5 % (ref 11.5–14.5)
EST. GFR  (NO RACE VARIABLE): >60 ML/MIN/1.73 M^2
GLUCOSE SERPL-MCNC: 93 MG/DL (ref 70–110)
HCT VFR BLD AUTO: 35.9 % (ref 37–48.5)
HGB BLD-MCNC: 12 G/DL (ref 12–16)
IMM GRANULOCYTES # BLD AUTO: 0.01 K/UL (ref 0–0.04)
IMM GRANULOCYTES NFR BLD AUTO: 0.2 % (ref 0–0.5)
INFLUENZA A, MOLECULAR: DETECTED
INFLUENZA B, MOLECULAR: NOT DETECTED
LYMPHOCYTES # BLD AUTO: 1.1 K/UL (ref 1–4.8)
LYMPHOCYTES NFR BLD: 19.8 % (ref 18–48)
MCH RBC QN AUTO: 30.8 PG (ref 27–31)
MCHC RBC AUTO-ENTMCNC: 33.4 G/DL (ref 32–36)
MCV RBC AUTO: 92 FL (ref 82–98)
MONOCYTES # BLD AUTO: 0.7 K/UL (ref 0.3–1)
MONOCYTES NFR BLD: 12.3 % (ref 4–15)
NEUTROPHILS # BLD AUTO: 3.7 K/UL (ref 1.8–7.7)
NEUTROPHILS NFR BLD: 66.1 % (ref 38–73)
NRBC BLD-RTO: 0 /100 WBC
PLATELET # BLD AUTO: 216 K/UL (ref 150–450)
PMV BLD AUTO: 10.4 FL (ref 9.2–12.9)
POTASSIUM SERPL-SCNC: 3.8 MMOL/L (ref 3.5–5.1)
PROT SERPL-MCNC: 8 G/DL (ref 6–8.4)
RBC # BLD AUTO: 3.89 M/UL (ref 4–5.4)
RSV AG BY MOLECULAR METHOD: NOT DETECTED
SARS-COV-2 RNA RESP QL NAA+PROBE: NOT DETECTED
SODIUM SERPL-SCNC: 136 MMOL/L (ref 136–145)
TROPONIN I SERPL DL<=0.01 NG/ML-MCNC: 0.01 NG/ML (ref 0–0.03)
WBC # BLD AUTO: 5.55 K/UL (ref 3.9–12.7)

## 2023-01-10 PROCEDURE — 84484 ASSAY OF TROPONIN QUANT: CPT | Performed by: PHYSICIAN ASSISTANT

## 2023-01-10 PROCEDURE — 99284 PR EMERGENCY DEPT VISIT,LEVEL IV: ICD-10-PCS | Mod: CS,,, | Performed by: PHYSICIAN ASSISTANT

## 2023-01-10 PROCEDURE — 99285 EMERGENCY DEPT VISIT HI MDM: CPT | Mod: 25

## 2023-01-10 PROCEDURE — 85025 COMPLETE CBC W/AUTO DIFF WBC: CPT | Performed by: PHYSICIAN ASSISTANT

## 2023-01-10 PROCEDURE — 93010 EKG 12-LEAD: ICD-10-PCS | Mod: ,,, | Performed by: INTERNAL MEDICINE

## 2023-01-10 PROCEDURE — 0241U SARS-COV2 (COVID) WITH FLU/RSV BY PCR: CPT | Performed by: PHYSICIAN ASSISTANT

## 2023-01-10 PROCEDURE — 93010 ELECTROCARDIOGRAM REPORT: CPT | Mod: ,,, | Performed by: INTERNAL MEDICINE

## 2023-01-10 PROCEDURE — 93005 ELECTROCARDIOGRAM TRACING: CPT

## 2023-01-10 PROCEDURE — 99284 EMERGENCY DEPT VISIT MOD MDM: CPT | Mod: CS,,, | Performed by: PHYSICIAN ASSISTANT

## 2023-01-10 PROCEDURE — 85379 FIBRIN DEGRADATION QUANT: CPT | Performed by: PHYSICIAN ASSISTANT

## 2023-01-10 PROCEDURE — 80053 COMPREHEN METABOLIC PANEL: CPT | Performed by: PHYSICIAN ASSISTANT

## 2023-01-10 PROCEDURE — 81025 URINE PREGNANCY TEST: CPT | Performed by: PHYSICIAN ASSISTANT

## 2023-01-10 RX ORDER — OSELTAMIVIR PHOSPHATE 75 MG/1
75 CAPSULE ORAL 2 TIMES DAILY
Qty: 10 CAPSULE | Refills: 0 | Status: SHIPPED | OUTPATIENT
Start: 2023-01-10 | End: 2023-01-15

## 2023-01-10 RX ORDER — BENZONATATE 100 MG/1
100 CAPSULE ORAL 3 TIMES DAILY PRN
Qty: 20 CAPSULE | Refills: 0 | Status: SHIPPED | OUTPATIENT
Start: 2023-01-10 | End: 2023-01-20

## 2023-01-10 NOTE — ED PROVIDER NOTES
Encounter Date: 1/10/2023       History     Chief Complaint   Patient presents with    Chest Pain     Hx anxiety     37-year-old female history of GERD, hypertension, anxiety presents to the emergency department URI symptoms chest pain.  Reports yesterday she began having sneezing, runny nose, itchy throat and cough.  Reports shortness of breath today, general malaise and sharp substernal chest pain when breathing.  States her sore throat has resolved and denies any fevers/chills.  No recent sick contacts.  Denies any history of DVT currently has an IUD and denies any recent surgery, history of coagulopathy or prolonged immobilization.  Additionally she notes a small firm nodule in her right breast that she noticed 2 days ago.  Reports history of fibrocystic breast disease but states this is different.      Review of patient's allergies indicates:  No Known Allergies  Past Medical History:   Diagnosis Date    Abnormal cervical Papanicolaou smear 2010, 2016, 2017    Colposcopy    Anxiety     GERD (gastroesophageal reflux disease)     Hypertension     Murmur     Scoliosis      No past surgical history on file.  Family History   Problem Relation Age of Onset    Hypertension Father     No Known Problems Mother     No Known Problems Sister     Stroke Paternal Aunt     Stroke Maternal Grandfather     Breast cancer Neg Hx     Colon cancer Neg Hx     Ovarian cancer Neg Hx      Social History     Tobacco Use    Smoking status: Never    Smokeless tobacco: Never   Substance Use Topics    Alcohol use: Yes     Alcohol/week: 7.0 standard drinks     Types: 7 Glasses of wine per week     Comment: SIOCA    Drug use: Not Currently     Types: Marijuana     Review of Systems   Constitutional:  Negative for chills and fever.   HENT:  Positive for sore throat.    Respiratory:  Positive for cough and shortness of breath.    Cardiovascular:  Positive for chest pain.   Gastrointestinal:  Negative for abdominal pain, nausea and vomiting.    Genitourinary:  Negative for difficulty urinating and dysuria.   Musculoskeletal:  Positive for myalgias.   Skin: Negative.    Neurological:  Negative for weakness.   Psychiatric/Behavioral:  Negative for confusion.      Physical Exam     Initial Vitals [01/10/23 1112]   BP Pulse Resp Temp SpO2   (!) 145/93 92 18 99.6 °F (37.6 °C) 99 %      MAP       --         Physical Exam    Nursing note and vitals reviewed.  Constitutional: She appears well-developed and well-nourished.   HENT:   Head: Normocephalic and atraumatic.   Eyes: Conjunctivae are normal. Pupils are equal, round, and reactive to light.   Neck: Neck supple.   Normal range of motion.  Cardiovascular:  Normal rate, regular rhythm, normal heart sounds and intact distal pulses.     Exam reveals no gallop and no friction rub.       No murmur heard.  Pulmonary/Chest: Breath sounds normal. No respiratory distress. She has no wheezes. She has no rhonchi. She has no rales. She exhibits no tenderness.   Chaperone present for breast exam.  Has a small firm nodule under the right axilla.  Lesions nontender with no overlying skin changes.   Abdominal: Abdomen is soft. Bowel sounds are normal. There is no abdominal tenderness.   Musculoskeletal:         General: Normal range of motion.      Cervical back: Normal range of motion and neck supple.     Neurological: She is alert and oriented to person, place, and time. GCS score is 15. GCS eye subscore is 4. GCS verbal subscore is 5. GCS motor subscore is 6.   Skin: Skin is warm and dry. Capillary refill takes less than 2 seconds.   Psychiatric: She has a normal mood and affect.       ED Course   Procedures  Labs Reviewed   SARS-COV2 (COVID) WITH FLU/RSV BY PCR - Abnormal; Notable for the following components:       Result Value    Influenza A, Molecular Detected (*)     All other components within normal limits   CBC W/ AUTO DIFFERENTIAL - Abnormal; Notable for the following components:    RBC 3.89 (*)     Hematocrit  35.9 (*)     All other components within normal limits   COMPREHENSIVE METABOLIC PANEL - Abnormal; Notable for the following components:    Alkaline Phosphatase 40 (*)     All other components within normal limits   D DIMER, QUANTITATIVE   TROPONIN I   POCT URINE PREGNANCY          Imaging Results              X-Ray Chest PA And Lateral (Final result)  Result time 01/10/23 13:00:45      Final result by Steven Orona MD (01/10/23 13:00:45)                   Impression:      Normal chest      Electronically signed by: Steven Orona MD  Date:    01/10/2023  Time:    13:00               Narrative:    EXAMINATION:  XR CHEST PA AND LATERAL    CLINICAL HISTORY:  Hemoptysis    TECHNIQUE:  PA and lateral views of the chest were performed.    COMPARISON:  06/26/2020    FINDINGS:  Cardiac size is normal.  Lungs are clear and well aerated.                                       Medications - No data to display  Medical Decision Making:   History:   Old Medical Records: I decided to obtain old medical records.  Initial Assessment:   Yesterday began having URI symptoms.  One episode of small volume hemoptysis.  Additionally noticed a small nodule on her right breast.  Differential Diagnosis:   COVID-19, influenza, viral syndrome, pneumonia, PE, lymphadenopathy, breast mass  Independently Interpreted Test(s):   I have ordered and independently interpreted EKG Reading(s) - see summary below       <> Summary of EKG Reading(s): On my individual interpretation Normal sinus rhythm at a rate of 89.  Normal axis, normal intervals, no STEMI.    Clinical Tests:   Lab Tests: Ordered and Reviewed  Radiological Study: Ordered and Reviewed  Medical Tests: Ordered and Reviewed  ED Management:  EKG reassuring is nonischemic.  Troponin negative.  D-dimer negative doubt PE.  Viral screening positive for influenza A.  Chest x-ray with no cardiopulmonary abnormalities or evidence of pneumonia.  Will treat with Tamiflu and antitussives.   Discussed supportive treatment for her symptoms.  No abscess or cellulitis on breast exam.  Has a small mobile nontender nodule that will need to be evaluated outpatient by her PCP.  Patient verbalizes agreement understanding with this plan.  Discussed return to ED precautions for any new or worsening symptoms.           ED Course as of 01/10/23 1341   Tue Suman 10, 2023   1249 D-Dimer: 0.31  Doubt PE [HJ]   1249 Preg Test, Ur: Negative [HJ]   1249 WBC: 5.55 [HJ]   1336 Influenza A, Molecular(!): Detected [HJ]      ED Course User Index  [HJ] Galindo Urena PA-C                 Clinical Impression:   Final diagnoses:  [N63.0] Breast nodule (Primary)  [J10.1] Influenza A        ED Disposition Condition    Discharge Stable          ED Prescriptions       Medication Sig Dispense Start Date End Date Auth. Provider    oseltamivir (TAMIFLU) 75 MG capsule Take 1 capsule (75 mg total) by mouth 2 (two) times daily. for 5 days 10 capsule 1/10/2023 1/15/2023 Galindo Urena PA-C    benzonatate (TESSALON) 100 MG capsule Take 1 capsule (100 mg total) by mouth 3 (three) times daily as needed for Cough. 20 capsule 1/10/2023 1/20/2023 Galindo Urena PA-C          Follow-up Information       Follow up With Specialties Details Why Contact Info    Gopal Ortega MD Family Medicine Schedule an appointment as soon as possible for a visit   [facility]  [address]  [place] [address]  [phone]               Galindo Urena PA-C  01/10/23 1341

## 2023-01-10 NOTE — Clinical Note
"Madyson Bostonra" Stephenson was seen and treated in our emergency department on 1/10/2023.  She may return to work on 01/14/2023.       If you have any questions or concerns, please don't hesitate to call.      Harini SMILEY    "

## 2023-01-10 NOTE — ED NOTES
The patient started feeling like she had a cold coming on yesterday. She took Tova Wyalusing yesterday and had some relief. At work today, she began feeling weak and coughed up some blood. Home covid test yesterday negative.

## 2023-01-12 NOTE — PROGRESS NOTES
As Dr. Farooq requested me to do is to inform Ms. Beltrami about her test results. I called and informed Ms. Beltrami that her labs are normal. She verbalized and understood.

## 2023-01-13 ENCOUNTER — TELEPHONE (OUTPATIENT)
Dept: OBSTETRICS AND GYNECOLOGY | Facility: CLINIC | Age: 38
End: 2023-01-13
Payer: COMMERCIAL

## 2023-01-13 NOTE — TELEPHONE ENCOUNTER
Pt advised she found a lump a little smaller than a gumball in her right breast on Tuesday. Pt advised she has a history or fibrocystic breasts. Appt scheduled with NP       ----- Message from Robbin Kat sent at 1/13/2023 10:14 AM CST -----  Regarding: Self [phone]  Type: Patient Call Back    Who called: Self    What is the request in detail: Pt. Found a lump in her left breast. Wants advice.     Can the clinic reply by [facility]? no    Would the patient rather a call back or a response via [facility]? Call back    Best call back number: [phone]    Additional Information:    Thank you.

## 2023-01-23 ENCOUNTER — OFFICE VISIT (OUTPATIENT)
Dept: OBSTETRICS AND GYNECOLOGY | Facility: CLINIC | Age: 38
End: 2023-01-23
Payer: COMMERCIAL

## 2023-01-23 VITALS
BODY MASS INDEX: 22.24 KG/M2 | DIASTOLIC BLOOD PRESSURE: 78 MMHG | WEIGHT: 125.56 LBS | SYSTOLIC BLOOD PRESSURE: 132 MMHG

## 2023-01-23 DIAGNOSIS — N63.11 BREAST LUMP ON RIGHT SIDE AT 11 O'CLOCK POSITION: Primary | ICD-10-CM

## 2023-01-23 PROCEDURE — 1159F MED LIST DOCD IN RCRD: CPT | Mod: CPTII,S$GLB,, | Performed by: OBSTETRICS & GYNECOLOGY

## 2023-01-23 PROCEDURE — 3078F PR MOST RECENT DIASTOLIC BLOOD PRESSURE < 80 MM HG: ICD-10-PCS | Mod: CPTII,S$GLB,, | Performed by: OBSTETRICS & GYNECOLOGY

## 2023-01-23 PROCEDURE — 3008F PR BODY MASS INDEX (BMI) DOCUMENTED: ICD-10-PCS | Mod: CPTII,S$GLB,, | Performed by: OBSTETRICS & GYNECOLOGY

## 2023-01-23 PROCEDURE — 1160F RVW MEDS BY RX/DR IN RCRD: CPT | Mod: CPTII,S$GLB,, | Performed by: OBSTETRICS & GYNECOLOGY

## 2023-01-23 PROCEDURE — 3075F PR MOST RECENT SYSTOLIC BLOOD PRESS GE 130-139MM HG: ICD-10-PCS | Mod: CPTII,S$GLB,, | Performed by: OBSTETRICS & GYNECOLOGY

## 2023-01-23 PROCEDURE — 3008F BODY MASS INDEX DOCD: CPT | Mod: CPTII,S$GLB,, | Performed by: OBSTETRICS & GYNECOLOGY

## 2023-01-23 PROCEDURE — 1160F PR REVIEW ALL MEDS BY PRESCRIBER/CLIN PHARMACIST DOCUMENTED: ICD-10-PCS | Mod: CPTII,S$GLB,, | Performed by: OBSTETRICS & GYNECOLOGY

## 2023-01-23 PROCEDURE — 3078F DIAST BP <80 MM HG: CPT | Mod: CPTII,S$GLB,, | Performed by: OBSTETRICS & GYNECOLOGY

## 2023-01-23 PROCEDURE — 99999 PR PBB SHADOW E&M-EST. PATIENT-LVL IV: ICD-10-PCS | Mod: PBBFAC,,, | Performed by: OBSTETRICS & GYNECOLOGY

## 2023-01-23 PROCEDURE — 99999 PR PBB SHADOW E&M-EST. PATIENT-LVL IV: CPT | Mod: PBBFAC,,, | Performed by: OBSTETRICS & GYNECOLOGY

## 2023-01-23 PROCEDURE — 1159F PR MEDICATION LIST DOCUMENTED IN MEDICAL RECORD: ICD-10-PCS | Mod: CPTII,S$GLB,, | Performed by: OBSTETRICS & GYNECOLOGY

## 2023-01-23 PROCEDURE — 3075F SYST BP GE 130 - 139MM HG: CPT | Mod: CPTII,S$GLB,, | Performed by: OBSTETRICS & GYNECOLOGY

## 2023-01-23 PROCEDURE — 99213 PR OFFICE/OUTPT VISIT, EST, LEVL III, 20-29 MIN: ICD-10-PCS | Mod: S$GLB,,, | Performed by: OBSTETRICS & GYNECOLOGY

## 2023-01-23 PROCEDURE — 99213 OFFICE O/P EST LOW 20 MIN: CPT | Mod: S$GLB,,, | Performed by: OBSTETRICS & GYNECOLOGY

## 2023-01-26 NOTE — PROGRESS NOTES
History & Physical  Gynecology      SUBJECTIVE:     Chief Complaint: Breast Mass (Patient states she feels a lump in her right breast)       History of Present Illness:  Ms. Calle is a 36 y/o female who presents to clinic for evaluation of breast mass. She noted a small, firm nodule in her right breast over the last 2 days ago. She reports a history of fibrocystic breast disease but states this is different. She denies a personal and family history of breast cancer.      Review of patient's allergies indicates:  No Known Allergies    Past Medical History:   Diagnosis Date    Abnormal cervical Papanicolaou smear , , 2017    Colposcopy    Anxiety     GERD (gastroesophageal reflux disease)     Hypertension     Murmur     Scoliosis      History reviewed. No pertinent surgical history.  OB History          1    Para   1    Term   1            AB        Living   1         SAB        IAB        Ectopic        Multiple        Live Births   1               Family History   Problem Relation Age of Onset    Hypertension Father     No Known Problems Mother     No Known Problems Sister     Stroke Paternal Aunt     Stroke Maternal Grandfather     Breast cancer Neg Hx     Colon cancer Neg Hx     Ovarian cancer Neg Hx      Social History     Tobacco Use    Smoking status: Never    Smokeless tobacco: Never   Substance Use Topics    Alcohol use: Yes     Alcohol/week: 7.0 standard drinks     Types: 7 Glasses of wine per week     Comment: SIOCA    Drug use: Not Currently     Types: Marijuana       Current Outpatient Medications   Medication Sig    amLODIPine (NORVASC) 5 MG tablet Take 1 tablet (5 mg total) by mouth once daily.    boric acid (bulk) Powd Insert one Gelatin Capsule filled with 600 mg of Boric Acid Powder H.S.    boric acid (PH-D) 600 mg vaginal suppository Place 1 suppository (600 mg total) vaginally every evening.    diclofenac sodium (VOLTAREN) 1 % Gel Apply 2 g topically 3 (three) times daily.     ergocalciferol (ERGOCALCIFEROL) 50,000 unit Cap Take 50,000 Units by mouth every 7 days.    hydroquinone 4 % Crea Apply to dark spots once daily. Use with sunscreen if outdoors    ibuprofen (ADVIL,MOTRIN) 800 MG tablet Take 1 tablet (800 mg total) by mouth 3 (three) times daily as needed for Pain. Take with food    losartan (COZAAR) 25 MG tablet Take 1 tablet (25 mg total) by mouth once daily.    metoprolol tartrate (LOPRESSOR) 25 MG tablet Take 1 tablet (25 mg total) by mouth 2 (two) times daily.    omeprazole (PRILOSEC) 40 MG capsule Take 40 mg by mouth as needed.    tretinoin (RETIN-A) 0.025 % cream Apply pea-sized amount to entire face at bedtime.  If dryness, use every third night and increase as tolerated to every night.    loratadine (CLARITIN) 10 mg tablet Take 1 tablet (10 mg total) by mouth once daily. (Patient taking differently: Take 10 mg by mouth daily as needed.)     Current Facility-Administered Medications   Medication    levonorgestreL (MIRENA) 20 mcg/24 hours (8 yrs) 52 mg IUD 1 Intra Uterine Device     Review of Systems:  Review of Systems   Constitutional:  Negative for chills and fever.   Eyes:  Negative for visual disturbance.   Respiratory:  Negative for cough and wheezing.    Cardiovascular:  Negative for chest pain and palpitations.   Gastrointestinal:  Negative for abdominal pain, nausea and vomiting.   Genitourinary:  Negative for dysuria, frequency, hematuria, pelvic pain, vaginal bleeding, vaginal discharge and vaginal pain.   Neurological:  Negative for headaches.   Psychiatric/Behavioral:  Negative for depression.    Breast: Positive for lump.     OBJECTIVE:     Physical Exam:  Physical Exam  Vitals and nursing note reviewed.   Constitutional:       Appearance: Normal appearance. She is well-developed.   Cardiovascular:      Rate and Rhythm: Normal rate.   Pulmonary:      Effort: Pulmonary effort is normal. No respiratory distress.   Chest:   Breasts:     Right: Mass present.       Comments: Small pea size, mobile, non-tender lesion in right breast  Abdominal:      General: There is no distension.      Palpations: Abdomen is soft.      Tenderness: There is no abdominal tenderness.   Genitourinary:     Exam position: Supine.   Skin:     General: Skin is warm and dry.   Neurological:      Mental Status: She is oriented to person, place, and time.     ASSESSMENT:       ICD-10-CM ICD-9-CM    1. Breast lump on right side at 11 o'clock position  N63.11 611.72 Mammo Digital Diagnostic Bilat with Anthony         Plan:      Madyson was seen today for breast mass.    Diagnoses and all orders for this visit:    Breast lump on right side at 11 o'clock position  -     Mammo Digital Diagnostic Bilat with Anthony; Future        Orders Placed This Encounter   Procedures    Mammo Digital Diagnostic Bilat with Anthony       Follow up if symptoms worsen or fail to improve.    Counseling time: 15 minutes    Juan Curiel

## 2023-02-06 ENCOUNTER — HOSPITAL ENCOUNTER (OUTPATIENT)
Dept: RADIOLOGY | Facility: HOSPITAL | Age: 38
Discharge: HOME | End: 2023-02-06
Attending: OBSTETRICS & GYNECOLOGY
Payer: COMMERCIAL

## 2023-02-06 DIAGNOSIS — N63.11 BREAST LUMP ON RIGHT SIDE AT 11 O'CLOCK POSITION: ICD-10-CM

## 2023-02-06 PROCEDURE — 77066 DX MAMMO INCL CAD BI: CPT | Mod: TC

## 2023-02-06 PROCEDURE — 77066 DX MAMMO INCL CAD BI: CPT | Mod: 26,,, | Performed by: RADIOLOGY

## 2023-02-06 PROCEDURE — 76642 ULTRASOUND BREAST LIMITED: CPT | Mod: 26,RT,, | Performed by: RADIOLOGY

## 2023-02-06 PROCEDURE — 77062 MAMMO DIGITAL DIAGNOSTIC BILAT WITH TOMO: ICD-10-PCS | Mod: 26,,, | Performed by: RADIOLOGY

## 2023-02-06 PROCEDURE — 76642 ULTRASOUND BREAST LIMITED: CPT | Mod: TC,RT

## 2023-02-06 PROCEDURE — 77062 BREAST TOMOSYNTHESIS BI: CPT | Mod: 26,,, | Performed by: RADIOLOGY

## 2023-02-06 PROCEDURE — 76642 US BREAST RIGHT LIMITED: ICD-10-PCS | Mod: 26,RT,, | Performed by: RADIOLOGY

## 2023-02-06 PROCEDURE — 77066 MAMMO DIGITAL DIAGNOSTIC BILAT WITH TOMO: ICD-10-PCS | Mod: 26,,, | Performed by: RADIOLOGY

## 2023-03-31 ENCOUNTER — OFFICE VISIT (OUTPATIENT)
Dept: PRIMARY CARE CLINIC | Facility: CLINIC | Age: 38
End: 2023-03-31
Payer: COMMERCIAL

## 2023-03-31 DIAGNOSIS — I10 HYPERTENSION, UNSPECIFIED TYPE: Primary | ICD-10-CM

## 2023-03-31 PROCEDURE — 1160F RVW MEDS BY RX/DR IN RCRD: CPT | Mod: CPTII,95,, | Performed by: NURSE PRACTITIONER

## 2023-03-31 PROCEDURE — 4010F PR ACE/ARB THEARPY RXD/TAKEN: ICD-10-PCS | Mod: CPTII,95,, | Performed by: NURSE PRACTITIONER

## 2023-03-31 PROCEDURE — 1160F PR REVIEW ALL MEDS BY PRESCRIBER/CLIN PHARMACIST DOCUMENTED: ICD-10-PCS | Mod: CPTII,95,, | Performed by: NURSE PRACTITIONER

## 2023-03-31 PROCEDURE — 1159F PR MEDICATION LIST DOCUMENTED IN MEDICAL RECORD: ICD-10-PCS | Mod: CPTII,95,, | Performed by: NURSE PRACTITIONER

## 2023-03-31 PROCEDURE — 99213 PR OFFICE/OUTPT VISIT, EST, LEVL III, 20-29 MIN: ICD-10-PCS | Mod: 95,,, | Performed by: NURSE PRACTITIONER

## 2023-03-31 PROCEDURE — 99213 OFFICE O/P EST LOW 20 MIN: CPT | Mod: 95,,, | Performed by: NURSE PRACTITIONER

## 2023-03-31 PROCEDURE — 1159F MED LIST DOCD IN RCRD: CPT | Mod: CPTII,95,, | Performed by: NURSE PRACTITIONER

## 2023-03-31 PROCEDURE — 4010F ACE/ARB THERAPY RXD/TAKEN: CPT | Mod: CPTII,95,, | Performed by: NURSE PRACTITIONER

## 2023-03-31 NOTE — PROGRESS NOTES
"[facility] Note    Chief Complaint      Chief Complaint   Patient presents with    Hypertension       History of Present Illness      Madyson Calle is a 37 y.o. female who presents today via virtual visit for   Chief Complaint   Patient presents with    Hypertension         Patient reports via virtual to discuss left eye twitching for the past 2 weeks. She believes it may be associated with her disease process of hypertension. She is currently taking amlodipine, losartan, and metoprolol but has not been completely compliant with medication. She reports BP at home is 120's -140's/90 - 109. She reports her BP is constantly "going up and down". She is being followed by a cardiologist for hypertension and currently does not have a PCP. She denies any SOB, chest pain, N/V, unintentional weight loss, loss of appetite, fatigue, diarrhea, constipation. She is active daily and remains independent with ADL's.     She had a f/u visit with her cardiologist on 04/03/23 but states she has to reschedule it.  I have suggested patient also make an appointment with me to establish primary care in order that I can fully evaluate and examine her. Patient verbalizes understanding.       Review of Systems   Constitutional: Negative.    HENT: Negative.     Eyes: Negative.    Respiratory: Negative.     Cardiovascular: Negative.    Gastrointestinal: Negative.    Genitourinary: Negative.    Musculoskeletal: Negative.    Skin: Negative.    Neurological: Negative.    Endo/Heme/Allergies: Negative.    Psychiatric/Behavioral: Negative.     All 12 systems otherwise negative.     Family History:  family history includes Hypertension in her father; No Known Problems in her mother and sister; Ovarian cancer in her paternal aunt and paternal aunt; Stroke in her maternal grandfather and paternal aunt.   Family history was reviewed with patient.     Medications:  Outpatient Encounter Medications as of 3/31/2023   Medication Sig " Dispense Refill    amLODIPine (NORVASC) 5 MG tablet Take 1 tablet (5 mg total) by mouth once daily. 90 tablet 3    boric acid (bulk) Powd Insert one Gelatin Capsule filled with 600 mg of Boric Acid Powder H.S. 100 capsule 4    boric acid (PH-D) 600 mg vaginal suppository Place 1 suppository (600 mg total) vaginally every evening. 30 suppository 2    diclofenac sodium (VOLTAREN) 1 % Gel Apply 2 g topically 3 (three) times daily. 100 g 0    ergocalciferol (ERGOCALCIFEROL) 50,000 unit Cap Take 50,000 Units by mouth every 7 days.      hydroquinone 4 % Crea Apply to dark spots once daily. Use with sunscreen if outdoors 28 g 1    ibuprofen (ADVIL,MOTRIN) 800 MG tablet Take 1 tablet (800 mg total) by mouth 3 (three) times daily as needed for Pain. Take with food 30 tablet 0    loratadine (CLARITIN) 10 mg tablet Take 1 tablet (10 mg total) by mouth once daily. (Patient taking differently: Take 10 mg by mouth daily as needed.) 30 tablet 2    losartan (COZAAR) 25 MG tablet Take 1 tablet (25 mg total) by mouth once daily. 90 tablet 3    metoprolol tartrate (LOPRESSOR) 25 MG tablet Take 1 tablet (25 mg total) by mouth 2 (two) times daily. 120 tablet 3    omeprazole (PRILOSEC) 40 MG capsule Take 40 mg by mouth as needed.      tretinoin (RETIN-A) 0.025 % cream Apply pea-sized amount to entire face at bedtime.  If dryness, use every third night and increase as tolerated to every night. 20 g 6     Facility-Administered Encounter Medications as of 3/31/2023   Medication Dose Route Frequency Provider Last Rate Last Admin    levonorgestreL (MIRENA) 20 mcg/24 hours (8 yrs) 52 mg IUD 1 Intra Uterine Device  1 Intra Uterine Device Intrauterine  Juan Curiel MD   1 Intra Uterine Device at 01/03/23 0207       Allergies:  Review of patient's allergies indicates:  No Known Allergies    Health Maintenance:  Health Maintenance   Topic Date Due    TETANUS VACCINE  Never done    Hepatitis C Screening  Completed    Lipid Panel   Completed     Health Maintenance Topics with due status: Not Due       Topic Last Completion Date    Cervical Cancer Screening 06/14/2022       Physical Exam     Assessment/Plan     Madyson Calle is a 37 y.o.female with:    There are no diagnoses linked to this encounter.    As above, continue current medications and maintain follow up with specialists.  Return to clinic as needed.    I spent 15 minutes on the day of this virtual encounter for preparing, evaluating, treating, and discussing plan of care with this patient.  Greater than 50% of this time was spent face to face via virtual visit with patient.  All questions were answered to patient's satisfaction.          Karen L Spencer, NP-C [facility]                Answers submitted by the patient for this visit:  Review of Systems Questionnaire (Submitted on 3/31/2023)  activity change: No  unexpected weight change: Yes  neck pain: No  hearing loss: No  rhinorrhea: No  trouble swallowing: No  eye discharge: No  visual disturbance: No  chest tightness: No  wheezing: No  chest pain: No  palpitations: Yes  blood in stool: No  constipation: Yes  vomiting: No  diarrhea: No  polydipsia: No  polyuria: No  difficulty urinating: No  hematuria: No  menstrual problem: No  dysuria: No  joint swelling: No  arthralgias: No  headaches: No  weakness: No  confusion: No  dysphoric mood: No

## 2023-04-04 ENCOUNTER — PATIENT MESSAGE (OUTPATIENT)
Dept: RESEARCH | Facility: HOSPITAL | Age: 38
End: 2023-04-04
Payer: COMMERCIAL

## 2023-04-11 ENCOUNTER — OFFICE VISIT (OUTPATIENT)
Dept: NEUROLOGY | Facility: CLINIC | Age: 38
End: 2023-04-11
Payer: COMMERCIAL

## 2023-04-11 ENCOUNTER — LAB VISIT (OUTPATIENT)
Dept: LAB | Facility: HOSPITAL | Age: 38
End: 2023-04-11
Payer: COMMERCIAL

## 2023-04-11 VITALS
HEART RATE: 72 BPM | WEIGHT: 125.69 LBS | DIASTOLIC BLOOD PRESSURE: 102 MMHG | HEIGHT: 63 IN | SYSTOLIC BLOOD PRESSURE: 153 MMHG | BODY MASS INDEX: 22.27 KG/M2

## 2023-04-11 DIAGNOSIS — R20.0 LEFT SIDED NUMBNESS: ICD-10-CM

## 2023-04-11 LAB
ERYTHROCYTE [SEDIMENTATION RATE] IN BLOOD BY PHOTOMETRIC METHOD: 29 MM/HR (ref 0–36)
ESTIMATED AVG GLUCOSE: 103 MG/DL (ref 68–131)
HBA1C MFR BLD: 5.2 % (ref 4–5.6)
TSH SERPL DL<=0.005 MIU/L-ACNC: 0.45 UIU/ML (ref 0.4–4)

## 2023-04-11 PROCEDURE — 3077F PR MOST RECENT SYSTOLIC BLOOD PRESSURE >= 140 MM HG: ICD-10-PCS | Mod: CPTII,S$GLB,, | Performed by: PHYSICIAN ASSISTANT

## 2023-04-11 PROCEDURE — 1160F RVW MEDS BY RX/DR IN RCRD: CPT | Mod: CPTII,S$GLB,, | Performed by: PHYSICIAN ASSISTANT

## 2023-04-11 PROCEDURE — 3008F BODY MASS INDEX DOCD: CPT | Mod: CPTII,S$GLB,, | Performed by: PHYSICIAN ASSISTANT

## 2023-04-11 PROCEDURE — 83036 HEMOGLOBIN GLYCOSYLATED A1C: CPT | Performed by: PHYSICIAN ASSISTANT

## 2023-04-11 PROCEDURE — 1159F MED LIST DOCD IN RCRD: CPT | Mod: CPTII,S$GLB,, | Performed by: PHYSICIAN ASSISTANT

## 2023-04-11 PROCEDURE — 86340 INTRINSIC FACTOR ANTIBODY: CPT | Performed by: PHYSICIAN ASSISTANT

## 2023-04-11 PROCEDURE — 84252 ASSAY OF VITAMIN B-2: CPT | Performed by: PHYSICIAN ASSISTANT

## 2023-04-11 PROCEDURE — 3080F DIAST BP >= 90 MM HG: CPT | Mod: CPTII,S$GLB,, | Performed by: PHYSICIAN ASSISTANT

## 2023-04-11 PROCEDURE — 4010F ACE/ARB THERAPY RXD/TAKEN: CPT | Mod: CPTII,S$GLB,, | Performed by: PHYSICIAN ASSISTANT

## 2023-04-11 PROCEDURE — 36415 COLL VENOUS BLD VENIPUNCTURE: CPT | Performed by: PHYSICIAN ASSISTANT

## 2023-04-11 PROCEDURE — 82607 VITAMIN B-12: CPT | Performed by: PHYSICIAN ASSISTANT

## 2023-04-11 PROCEDURE — 80321 ALCOHOLS BIOMARKERS 1OR 2: CPT | Performed by: PHYSICIAN ASSISTANT

## 2023-04-11 PROCEDURE — 85652 RBC SED RATE AUTOMATED: CPT | Performed by: PHYSICIAN ASSISTANT

## 2023-04-11 PROCEDURE — 3077F SYST BP >= 140 MM HG: CPT | Mod: CPTII,S$GLB,, | Performed by: PHYSICIAN ASSISTANT

## 2023-04-11 PROCEDURE — 99999 PR PBB SHADOW E&M-EST. PATIENT-LVL IV: ICD-10-PCS | Mod: PBBFAC,,, | Performed by: PHYSICIAN ASSISTANT

## 2023-04-11 PROCEDURE — 84425 ASSAY OF VITAMIN B-1: CPT | Performed by: PHYSICIAN ASSISTANT

## 2023-04-11 PROCEDURE — 82300 ASSAY OF CADMIUM: CPT | Performed by: PHYSICIAN ASSISTANT

## 2023-04-11 PROCEDURE — 83921 ORGANIC ACID SINGLE QUANT: CPT | Performed by: PHYSICIAN ASSISTANT

## 2023-04-11 PROCEDURE — 84443 ASSAY THYROID STIM HORMONE: CPT | Performed by: PHYSICIAN ASSISTANT

## 2023-04-11 PROCEDURE — 84436 ASSAY OF TOTAL THYROXINE: CPT | Performed by: PHYSICIAN ASSISTANT

## 2023-04-11 PROCEDURE — 99204 OFFICE O/P NEW MOD 45 MIN: CPT | Mod: S$GLB,,, | Performed by: PHYSICIAN ASSISTANT

## 2023-04-11 PROCEDURE — 82941 ASSAY OF GASTRIN: CPT | Performed by: PHYSICIAN ASSISTANT

## 2023-04-11 PROCEDURE — 4010F PR ACE/ARB THEARPY RXD/TAKEN: ICD-10-PCS | Mod: CPTII,S$GLB,, | Performed by: PHYSICIAN ASSISTANT

## 2023-04-11 PROCEDURE — 3080F PR MOST RECENT DIASTOLIC BLOOD PRESSURE >= 90 MM HG: ICD-10-PCS | Mod: CPTII,S$GLB,, | Performed by: PHYSICIAN ASSISTANT

## 2023-04-11 PROCEDURE — 1160F PR REVIEW ALL MEDS BY PRESCRIBER/CLIN PHARMACIST DOCUMENTED: ICD-10-PCS | Mod: CPTII,S$GLB,, | Performed by: PHYSICIAN ASSISTANT

## 2023-04-11 PROCEDURE — 87522 HEPATITIS C REVRS TRNSCRPJ: CPT | Performed by: PHYSICIAN ASSISTANT

## 2023-04-11 PROCEDURE — 99204 PR OFFICE/OUTPT VISIT, NEW, LEVL IV, 45-59 MIN: ICD-10-PCS | Mod: S$GLB,,, | Performed by: PHYSICIAN ASSISTANT

## 2023-04-11 PROCEDURE — 84207 ASSAY OF VITAMIN B-6: CPT | Performed by: PHYSICIAN ASSISTANT

## 2023-04-11 PROCEDURE — 1159F PR MEDICATION LIST DOCUMENTED IN MEDICAL RECORD: ICD-10-PCS | Mod: CPTII,S$GLB,, | Performed by: PHYSICIAN ASSISTANT

## 2023-04-11 PROCEDURE — 86592 SYPHILIS TEST NON-TREP QUAL: CPT | Performed by: PHYSICIAN ASSISTANT

## 2023-04-11 PROCEDURE — 99999 PR PBB SHADOW E&M-EST. PATIENT-LVL IV: CPT | Mod: PBBFAC,,, | Performed by: PHYSICIAN ASSISTANT

## 2023-04-11 PROCEDURE — 3008F PR BODY MASS INDEX (BMI) DOCUMENTED: ICD-10-PCS | Mod: CPTII,S$GLB,, | Performed by: PHYSICIAN ASSISTANT

## 2023-04-11 RX ORDER — DAPSONE 75 MG/G
GEL TOPICAL
COMMUNITY
Start: 2023-01-27
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
## 2023-04-12 LAB
ANNOTATION COMMENT IMP: NORMAL
ARSENIC BLD-MCNC: 1 NG/ML
CADMIUM BLD-MCNC: 0.4 NG/ML
CITY: NORMAL
COUNTY: NORMAL
GASTRIN P FAST SERPL-MCNC: 23 PG/ML
GUARDIAN FIRST NAME: NORMAL
GUARDIAN LAST NAME: NORMAL
HCV RNA SERPL QL NAA+PROBE: NOT DETECTED
HCV RNA SPEC NAA+PROBE-ACNC: <12 IU/ML
HOME PHONE: NORMAL
IF BLOCK AB SER QL: NEGATIVE
LEAD BLD-MCNC: <1 MCG/DL
MERCURY BLD-MCNC: 2 NG/ML
RACE: NORMAL
RPR SER QL: NORMAL
STATE: NORMAL
STREET ADDRESS: NORMAL
T4 SERPL-MCNC: 6 UG/DL (ref 4.5–11.5)
VENOUS/CAPILLARY: NORMAL
VIT B12 SERPL-MCNC: 132 NG/L (ref 180–914)
ZIP: NORMAL

## 2023-04-13 DIAGNOSIS — E53.8 B12 DEFICIENCY: Primary | ICD-10-CM

## 2023-04-13 LAB — VIT B2 SERPL-MCNC: 11 MCG/L (ref 1–19)

## 2023-04-13 RX ORDER — CYANOCOBALAMIN 1000 UG/ML
INJECTION, SOLUTION INTRAMUSCULAR; SUBCUTANEOUS
Qty: 1 ML | Refills: 5 | Status: SHIPPED | OUTPATIENT
Start: 2023-04-13 | End: 2023-05-29

## 2023-04-14 LAB
CLINICAL BIOCHEMIST REVIEW: NORMAL
PLPETH BLD-MCNC: 146 NG/ML
POPETH BLD-MCNC: 173 NG/ML

## 2023-04-15 LAB — METHYLMALONATE SERPL-SCNC: 0.1 UMOL/L

## 2023-04-17 LAB
PYRIDOXAL SERPL-MCNC: 15 UG/L (ref 5–50)
VIT B1 BLD-MCNC: 47 UG/L (ref 38–122)

## 2023-05-02 ENCOUNTER — NURSE TRIAGE (OUTPATIENT)
Dept: ADMINISTRATIVE | Facility: CLINIC | Age: 38
End: 2023-05-02
Payer: COMMERCIAL

## 2023-05-02 NOTE — TELEPHONE ENCOUNTER
Per request from provider - called pt and she is still having chest and neck pain- advised pt to go to ED now.  She is at work and will ask if someone can drive her there now and if she can chew baby ASA.

## 2023-05-02 NOTE — TELEPHONE ENCOUNTER
"[place]    PCP:  None    Call received from [facility] reporting appt scheduled for pt today at 1820 for C/O "chest tighting and around neck area.".  [facility] does not have the pt on the phone; asking NT to call pt back for triage.  She reports chest pain for 2 days that radiates into Lt shoulder and neck area, numbness all the entire on the Lt side, and intermittent palpitations (feels tachy).  She is able to walk without unsteady gait.  H/O HTN.  Per protocol, care advised is call  now.  Pt VU.  Advised to call for worsening/questions/concerns.  VU.     Reason for Disposition   Chest pain lasting longer than 5 minutes and ANY of the following:* Over 44 years old* Over 30 years old and at least one cardiac risk factor (e.g., diabetes mellitus, high blood pressure, high cholesterol, smoker, or strong family history of heart disease)* History of heart disease (i.e., angina, heart attack, heart failure, bypass surgery, takes nitroglycerin)* Pain is crushing, pressure-like, or heavy    Additional Information   Negative: SEVERE difficulty breathing (e.g., struggling for each breath, speaks in single words)   Negative: Passed out (i.e., fainted, collapsed and was not responding)   Negative: Difficult to awaken or acting confused (e.g., disoriented, slurred speech)   Negative: Shock suspected (e.g., cold/pale/clammy skin, too weak to stand, low BP, rapid pulse)    Protocols used: Chest Pain-A-[place]    "

## 2023-05-24 ENCOUNTER — OFFICE VISIT (OUTPATIENT)
Dept: URGENT CARE | Facility: CLINIC | Age: 38
End: 2023-05-24
Payer: COMMERCIAL

## 2023-05-24 VITALS
HEIGHT: 63 IN | RESPIRATION RATE: 18 BRPM | DIASTOLIC BLOOD PRESSURE: 89 MMHG | OXYGEN SATURATION: 98 % | HEART RATE: 77 BPM | BODY MASS INDEX: 22.15 KG/M2 | SYSTOLIC BLOOD PRESSURE: 135 MMHG | WEIGHT: 125 LBS | TEMPERATURE: 99 F

## 2023-05-24 DIAGNOSIS — H66.003 NON-RECURRENT ACUTE SUPPURATIVE OTITIS MEDIA OF BOTH EARS WITHOUT SPONTANEOUS RUPTURE OF TYMPANIC MEMBRANES: Primary | ICD-10-CM

## 2023-05-24 PROCEDURE — 99213 PR OFFICE/OUTPT VISIT, EST, LEVL III, 20-29 MIN: ICD-10-PCS | Mod: S$GLB,,, | Performed by: NURSE PRACTITIONER

## 2023-05-24 PROCEDURE — 99213 OFFICE O/P EST LOW 20 MIN: CPT | Mod: S$GLB,,, | Performed by: NURSE PRACTITIONER

## 2023-05-24 RX ORDER — AMOXICILLIN 875 MG/1
875 TABLET, FILM COATED ORAL EVERY 12 HOURS
Qty: 14 TABLET | Refills: 0 | Status: SHIPPED | OUTPATIENT
Start: 2023-05-24 | End: 2023-05-31

## 2023-05-24 NOTE — PATIENT INSTRUCTIONS
Non-recurrent acute suppurative otitis media of both ears without spontaneous rupture of tympanic membranes    -     amoxicillin (AMOXIL) 875 MG tablet; Take 1 tablet (875 mg total) by mouth every 12 (twelve) hours. for 7 days  Dispense: 14 tablet; Refill: 0      - Rest at home.     - Drink plenty of fluids so you won't get dehydrated.    - you can take plain Mucinex (guaifenesin) twice a day (or as directed) to help loosen mucous.     - Cough recommendations:      Dextromethorphan (DM) is a cough suppressant over the counter (ie. mucinex DM, robitussin, delsym; dayquil/nyquil has DM as well.).      Warm tea with honey can help with cough. Honey is a natural cough suppressant.    -Sore throat recommendations: Warm fluids, warm salt water gargles, throat lozenges, tea, honey, soup, or drinking something cold or frozen.  Throat lozenges or sprays help reduce pain. Gargling with warm saltwater (1/4 teaspoon of salt in 1/2 cup of warm water) or an OTC anesthetic gargle may be useful for irritation.    - Fever/Pain recommendations:      Alternate Tylenol or Ibuprofen as directed for fever/pain.   Take ibuprofen 600-800 mg every 6-8 hours for pain and inflammation. Do not take ibuprofen if you have a history of GI bleeding, kidney disease, or if you take blood thinners.    Tylenol/acetaminophen 650-1000 mg every 6-8 hours for added pain relief.  Avoid tylenol if you have a history of liver disease.     When to seek medical advice  Call your healthcare provider right away if any of these occur:  Fever that is poorly controlled with OTC fever reducing medication  New or worsening ear pain, sinus pain, or headache  Stiff neck  You can't swallow liquids or you can't open your mouth wide because of throat pain  Signs of dehydration. These include very dark urine or no urine, sunken eyes, and dizziness.  Trouble breathing or noisy breathing  Muffled voice  Rash     If your symptoms worsen or fail to improve you should go to  Emergency Department.

## 2023-05-24 NOTE — PROGRESS NOTES
"Subjective:      Patient ID: Madyson Calle is a 37 y.o. female.    Vitals:  height is 5' 3" (1.6 m) and weight is 56.7 kg (125 lb). Her oral temperature is 98.9 °F (37.2 °C). Her blood pressure is 135/89 and her pulse is 77. Her respiration is 18 and oxygen saturation is 98%.     Chief Complaint: Otalgia      Provider note begins below:    Pt is a 37 yr old female presenting with bilateral ear pain (right worse than left) and swollen glands bilaterally. Pt states symptoms started 1 week ago and have been worsening.  Pt reports she had congestion, sneezing, and rhinorrhea 7 days ago but the resolved after 1 day.    Otalgia   There is pain in both ears. This is a new problem. The problem occurs constantly. The problem has been gradually worsening. There has been no fever. The pain is at a severity of 6/10. The pain is mild. Pertinent negatives include no abdominal pain, coughing, diarrhea, ear discharge, headaches, hearing loss, neck pain, rash, rhinorrhea, sore throat or vomiting. She has tried nothing for the symptoms.     HENT:  Positive for ear pain (right worse than left) and congestion (resolved after 1 day). Negative for ear discharge, hearing loss and sore throat.    Neck: Negative for neck pain.   Respiratory:  Negative for cough.    Gastrointestinal:  Negative for abdominal pain, vomiting and diarrhea.   Skin:  Negative for rash.   Allergic/Immunologic: Positive for sneezing (and rhinorrhea - resolved after 1 day).   Neurological:  Negative for headaches.    Objective:     Physical Exam   Constitutional: She is oriented to person, place, and time.   HENT:   Head: Normocephalic.   Ears:   Right Ear: Hearing and external ear normal. No no drainage, swelling or tenderness. Tympanic membrane is erythematous and bulging. Tympanic membrane is not retracted. A middle ear effusion is present.   Left Ear: Hearing and external ear normal. No no drainage, swelling or tenderness. Tympanic membrane is erythematous and " bulging. Tympanic membrane is not retracted. A middle ear effusion is present.   Nose: Nose normal. No mucosal edema, rhinorrhea or purulent discharge. Right sinus exhibits no maxillary sinus tenderness and no frontal sinus tenderness. Left sinus exhibits no maxillary sinus tenderness and no frontal sinus tenderness.   Mouth/Throat: Uvula is midline, oropharynx is clear and moist and mucous membranes are normal. No uvula swelling. No oropharyngeal exudate, posterior oropharyngeal edema or posterior oropharyngeal erythema.   Cardiovascular: Normal rate and regular rhythm.   Pulmonary/Chest: Effort normal and breath sounds normal.   Neurological: She is alert and oriented to person, place, and time.   Skin: Skin is warm and dry.   Nursing note and vitals reviewed.    Assessment:     1. Non-recurrent acute suppurative otitis media of both ears without spontaneous rupture of tympanic membranes        Plan:       Non-recurrent acute suppurative otitis media of both ears without spontaneous rupture of tympanic membranes  -     amoxicillin (AMOXIL) 875 MG tablet; Take 1 tablet (875 mg total) by mouth every 12 (twelve) hours. for 7 days  Dispense: 14 tablet; Refill: 0      Patient Instructions   Non-recurrent acute suppurative otitis media of both ears without spontaneous rupture of tympanic membranes    -     amoxicillin (AMOXIL) 875 MG tablet; Take 1 tablet (875 mg total) by mouth every 12 (twelve) hours. for 7 days  Dispense: 14 tablet; Refill: 0      - Rest at home.     - Drink plenty of fluids so you won't get dehydrated.    - you can take plain Mucinex (guaifenesin) twice a day (or as directed) to help loosen mucous.     - Cough recommendations:      Dextromethorphan (DM) is a cough suppressant over the counter (ie. mucinex DM, robitussin, delsym; dayquil/nyquil has DM as well.).      Warm tea with honey can help with cough. Honey is a natural cough suppressant.    -Sore throat recommendations: Warm fluids, warm salt  water gargles, throat lozenges, tea, honey, soup, or drinking something cold or frozen.  Throat lozenges or sprays help reduce pain. Gargling with warm saltwater (1/4 teaspoon of salt in 1/2 cup of warm water) or an OTC anesthetic gargle may be useful for irritation.    - Fever/Pain recommendations:      Alternate Tylenol or Ibuprofen as directed for fever/pain.   Take ibuprofen 600-800 mg every 6-8 hours for pain and inflammation. Do not take ibuprofen if you have a history of GI bleeding, kidney disease, or if you take blood thinners.    Tylenol/acetaminophen 650-1000 mg every 6-8 hours for added pain relief.  Avoid tylenol if you have a history of liver disease.     When to seek medical advice  Call your healthcare provider right away if any of these occur:  Fever that is poorly controlled with OTC fever reducing medication  New or worsening ear pain, sinus pain, or headache  Stiff neck  You can't swallow liquids or you can't open your mouth wide because of throat pain  Signs of dehydration. These include very dark urine or no urine, sunken eyes, and dizziness.  Trouble breathing or noisy breathing  Muffled voice  Rash     If your symptoms worsen or fail to improve you should go to Emergency Department.

## 2023-05-29 ENCOUNTER — HOSPITAL ENCOUNTER (OUTPATIENT)
Dept: RADIOLOGY | Facility: HOSPITAL | Age: 38
Discharge: HOME | End: 2023-05-29
Attending: NURSE PRACTITIONER
Payer: COMMERCIAL

## 2023-05-29 ENCOUNTER — OFFICE VISIT (OUTPATIENT)
Dept: PRIMARY CARE CLINIC | Facility: CLINIC | Age: 38
End: 2023-05-29
Payer: COMMERCIAL

## 2023-05-29 VITALS
DIASTOLIC BLOOD PRESSURE: 88 MMHG | OXYGEN SATURATION: 98 % | HEIGHT: 63 IN | HEART RATE: 84 BPM | WEIGHT: 130.06 LBS | SYSTOLIC BLOOD PRESSURE: 126 MMHG | BODY MASS INDEX: 23.04 KG/M2

## 2023-05-29 DIAGNOSIS — M25.519 SHOULDER PAIN, UNSPECIFIED CHRONICITY, UNSPECIFIED LATERALITY: ICD-10-CM

## 2023-05-29 DIAGNOSIS — J30.2 SEASONAL ALLERGIES: ICD-10-CM

## 2023-05-29 DIAGNOSIS — E55.9 VITAMIN D DEFICIENCY: Primary | ICD-10-CM

## 2023-05-29 PROCEDURE — 99999 PR PBB SHADOW E&M-EST. PATIENT-LVL IV: ICD-10-PCS | Mod: PBBFAC,,, | Performed by: NURSE PRACTITIONER

## 2023-05-29 PROCEDURE — 3044F PR MOST RECENT HEMOGLOBIN A1C LEVEL <7.0%: ICD-10-PCS | Mod: CPTII,S$GLB,, | Performed by: NURSE PRACTITIONER

## 2023-05-29 PROCEDURE — 3008F BODY MASS INDEX DOCD: CPT | Mod: CPTII,S$GLB,, | Performed by: NURSE PRACTITIONER

## 2023-05-29 PROCEDURE — 73030 XR SHOULDER COMPLETE 2 OR MORE VIEWS LEFT: ICD-10-PCS | Mod: 26,LT,, | Performed by: RADIOLOGY

## 2023-05-29 PROCEDURE — 1159F MED LIST DOCD IN RCRD: CPT | Mod: CPTII,S$GLB,, | Performed by: NURSE PRACTITIONER

## 2023-05-29 PROCEDURE — 3074F PR MOST RECENT SYSTOLIC BLOOD PRESSURE < 130 MM HG: ICD-10-PCS | Mod: CPTII,S$GLB,, | Performed by: NURSE PRACTITIONER

## 2023-05-29 PROCEDURE — 3079F PR MOST RECENT DIASTOLIC BLOOD PRESSURE 80-89 MM HG: ICD-10-PCS | Mod: CPTII,S$GLB,, | Performed by: NURSE PRACTITIONER

## 2023-05-29 PROCEDURE — 1159F PR MEDICATION LIST DOCUMENTED IN MEDICAL RECORD: ICD-10-PCS | Mod: CPTII,S$GLB,, | Performed by: NURSE PRACTITIONER

## 2023-05-29 PROCEDURE — 1160F PR REVIEW ALL MEDS BY PRESCRIBER/CLIN PHARMACIST DOCUMENTED: ICD-10-PCS | Mod: CPTII,S$GLB,, | Performed by: NURSE PRACTITIONER

## 2023-05-29 PROCEDURE — 73030 X-RAY EXAM OF SHOULDER: CPT | Mod: 26,LT,, | Performed by: RADIOLOGY

## 2023-05-29 PROCEDURE — 73030 X-RAY EXAM OF SHOULDER: CPT | Mod: TC,PN,LT

## 2023-05-29 PROCEDURE — 99214 OFFICE O/P EST MOD 30 MIN: CPT | Mod: S$GLB,,, | Performed by: NURSE PRACTITIONER

## 2023-05-29 PROCEDURE — 3074F SYST BP LT 130 MM HG: CPT | Mod: CPTII,S$GLB,, | Performed by: NURSE PRACTITIONER

## 2023-05-29 PROCEDURE — 4010F ACE/ARB THERAPY RXD/TAKEN: CPT | Mod: CPTII,S$GLB,, | Performed by: NURSE PRACTITIONER

## 2023-05-29 PROCEDURE — 3008F PR BODY MASS INDEX (BMI) DOCUMENTED: ICD-10-PCS | Mod: CPTII,S$GLB,, | Performed by: NURSE PRACTITIONER

## 2023-05-29 PROCEDURE — 1160F RVW MEDS BY RX/DR IN RCRD: CPT | Mod: CPTII,S$GLB,, | Performed by: NURSE PRACTITIONER

## 2023-05-29 PROCEDURE — 99214 PR OFFICE/OUTPT VISIT, EST, LEVL IV, 30-39 MIN: ICD-10-PCS | Mod: S$GLB,,, | Performed by: NURSE PRACTITIONER

## 2023-05-29 PROCEDURE — 99999 PR PBB SHADOW E&M-EST. PATIENT-LVL IV: CPT | Mod: PBBFAC,,, | Performed by: NURSE PRACTITIONER

## 2023-05-29 PROCEDURE — 3044F HG A1C LEVEL LT 7.0%: CPT | Mod: CPTII,S$GLB,, | Performed by: NURSE PRACTITIONER

## 2023-05-29 PROCEDURE — 3079F DIAST BP 80-89 MM HG: CPT | Mod: CPTII,S$GLB,, | Performed by: NURSE PRACTITIONER

## 2023-05-29 PROCEDURE — 4010F PR ACE/ARB THEARPY RXD/TAKEN: ICD-10-PCS | Mod: CPTII,S$GLB,, | Performed by: NURSE PRACTITIONER

## 2023-05-29 RX ORDER — LORATADINE 10 MG/1
10 TABLET ORAL DAILY
Qty: 30 TABLET | Refills: 11 | Status: SHIPPED | OUTPATIENT
Start: 2023-05-29 | End: 2024-05-28

## 2023-05-29 RX ORDER — ERGOCALCIFEROL 1.25 MG/1
50000 CAPSULE ORAL
Qty: 4 CAPSULE | Refills: 11 | Status: SHIPPED | OUTPATIENT
Start: 2023-05-29 | End: 2024-01-31

## 2023-05-29 NOTE — PROGRESS NOTES
[facility] Primary Care Clinic Note    Chief Complaint      Chief Complaint   Patient presents with    Shoulder Pain    Annual Exam    Establish Care       History of Present Illness      Madyson Calle is a 37 y.o. female who presents today for   Chief Complaint   Patient presents with    Shoulder Pain    Annual Exam    Establish Care         Patient presents to clinic as a f/u visit for HTN and to establish primary care with me. She reports left shoulder/upper chest pain. She denies SOB. She denies any trauma to this shoulder/chest. She does carry an extremely heavy tote/purse on her left shoulder. Her BP is stabilized now and she reports numbness in hands and eye twitching have decreased. Will continue to monitor.       Review of Systems   Musculoskeletal:  Positive for joint pain.        + left shoulder pain   All 12 systems otherwise negative.     Family History:  family history includes Bipolar disorder in her sister; Colon cancer in her paternal aunt; Diabetes in her father and maternal grandfather; Heart attack in her maternal grandfather; Heart murmur in her father; Hyperlipidemia in her maternal grandfather; Hypertension in her father and maternal grandmother; No Known Problems in her daughter, mother, paternal grandfather, and paternal grandmother; Prostate cancer in her father; Stroke in her maternal grandfather.   Family history was reviewed with patient.     Medications:  Outpatient Encounter Medications as of 5/29/2023   Medication Sig Dispense Refill    amLODIPine (NORVASC) 5 MG tablet Take 1 tablet (5 mg total) by mouth once daily. 90 tablet 3    amoxicillin (AMOXIL) 875 MG tablet Take 1 tablet (875 mg total) by mouth every 12 (twelve) hours. for 7 days 14 tablet 0    boric acid (bulk) Powd Insert one Gelatin Capsule filled with 600 mg of Boric Acid Powder H.S. 100 capsule 4    dapsone (ACZONE) 7.5 % GlwP       diclofenac sodium (VOLTAREN) 1 % Gel Apply 2 g topically 3 (three) times daily. 100 g 0     hydroquinone 4 % Crea Apply to dark spots once daily. Use with sunscreen if outdoors 28 g 1    ibuprofen (ADVIL,MOTRIN) 800 MG tablet Take 1 tablet (800 mg total) by mouth 3 (three) times daily as needed for Pain. Take with food 30 tablet 0    insulin syringes, disposable, 1 mL Syrg 1 Units by Misc.(Non-Drug; Combo Route) route every 14 (fourteen) days for 30 days, THEN 1 Units every 28 days. 1 each 5    losartan (COZAAR) 25 MG tablet Take 1 tablet (25 mg total) by mouth once daily. 90 tablet 3    metoprolol tartrate (LOPRESSOR) 25 MG tablet Take 1 tablet (25 mg total) by mouth 2 (two) times daily. 120 tablet 3    omeprazole (PRILOSEC) 40 MG capsule Take 40 mg by mouth as needed.      ergocalciferol (ERGOCALCIFEROL) 50,000 unit Cap Take 1 capsule (50,000 Units total) by mouth every 7 days. 4 capsule 11    loratadine (CLARITIN) 10 mg tablet Take 1 tablet (10 mg total) by mouth once daily. 30 tablet 11    [DISCONTINUED] boric acid (PH-D) 600 mg vaginal suppository Place 1 suppository (600 mg total) vaginally every evening. (Patient not taking: Reported on 5/24/2023) 30 suppository 2    [DISCONTINUED] cyanocobalamin 1,000 mcg/mL injection Inject 1 mL (1,000 mcg total) into the muscle every 14 (fourteen) days for 30 days, THEN 1 mL (1,000 mcg total) every 28 days. (Patient not taking: Reported on 5/24/2023) 1 mL 5    [DISCONTINUED] ergocalciferol (ERGOCALCIFEROL) 50,000 unit Cap Take 50,000 Units by mouth every 7 days.      [DISCONTINUED] loratadine (CLARITIN) 10 mg tablet Take 1 tablet (10 mg total) by mouth once daily. (Patient not taking: Reported on 5/29/2023) 30 tablet 2    [DISCONTINUED] tretinoin (RETIN-A) 0.025 % cream Apply pea-sized amount to entire face at bedtime.  If dryness, use every third night and increase as tolerated to every night. (Patient not taking: Reported on 5/24/2023) 20 g 6     Facility-Administered Encounter Medications as of 5/29/2023   Medication Dose Route Frequency Provider Last Rate  "Last Admin    levonorgestreL (MIRENA) 20 mcg/24 hours (8 yrs) 52 mg IUD 1 Intra Uterine Device  1 Intra Uterine Device Intrauterine  Juan Curiel MD   1 Intra Uterine Device at 01/03/23 1545       Allergies:  Review of patient's allergies indicates:  No Known Allergies    Health Maintenance:  Health Maintenance   Topic Date Due    TETANUS VACCINE  05/29/2023 (Originally 9/4/2005)    Hepatitis C Screening  Completed    Lipid Panel  Completed     Health Maintenance Topics with due status: Not Due       Topic Last Completion Date    Cervical Cancer Screening 06/14/2022    Influenza Vaccine Not Due       Physical Exam      Vital Signs  Pulse: 84  SpO2: 98 %  BP: 126/88  BP Location: Right arm  Patient Position: Sitting  Pain Score: 0-No pain  Height and Weight  Height: 5' 3" (160 cm)  Weight: 59 kg (130 lb 1.1 oz)  BSA (Calculated - sq m): 1.62 sq meters  BMI (Calculated): 23  Weight in (lb) to have BMI = 25: 140.8]    Physical Exam  Vitals reviewed.   Constitutional:       Appearance: Normal appearance. She is normal weight.   HENT:      Head: Normocephalic and atraumatic.      Nose: Nose normal.      Mouth/Throat:      Mouth: Mucous membranes are moist.      Pharynx: Oropharynx is clear.   Eyes:      Extraocular Movements: Extraocular movements intact.      Conjunctiva/sclera: Conjunctivae normal.      Pupils: Pupils are equal, round, and reactive to light.   Cardiovascular:      Rate and Rhythm: Normal rate and regular rhythm.      Pulses: Normal pulses.      Heart sounds: Normal heart sounds.   Pulmonary:      Effort: Pulmonary effort is normal.      Breath sounds: Normal breath sounds.   Musculoskeletal:         General: Normal range of motion.      Cervical back: Normal range of motion and neck supple.   Skin:     General: Skin is warm and dry.      Capillary Refill: Capillary refill takes less than 2 seconds.   Neurological:      General: No focal deficit present.      Mental Status: She is alert and " oriented to person, place, and time. Mental status is at baseline.   Psychiatric:         Mood and Affect: Mood normal.         Behavior: Behavior normal.         Thought Content: Thought content normal.         Judgment: Judgment normal.          Assessment/Plan     Madyson Calle is a 37 y.o.female with:    Vitamin D deficiency  -     Calcitriol; Future; Expected date: 05/29/2023  -     ergocalciferol (ERGOCALCIFEROL) 50,000 unit Cap; Take 1 capsule (50,000 Units total) by mouth every 7 days.  Dispense: 4 capsule; Refill: 11    Shoulder pain, unspecified chronicity, unspecified laterality  -     X-Ray Shoulder 2 or More Views Left; Future; Expected date: 05/29/2023    Seasonal allergies  -     loratadine (CLARITIN) 10 mg tablet; Take 1 tablet (10 mg total) by mouth once daily.  Dispense: 30 tablet; Refill: 11    - RTC 6 months for lab work and f/u with HTN.    As above, continue current medications and maintain follow up with specialists.  Return to clinic as needed.    Greater than 50% of visit was spent face to face with patient.  All questions were answered to patient's satisfaction.            Karen L Spencer, NP-C [facility]

## 2023-05-30 ENCOUNTER — PROCEDURE VISIT (OUTPATIENT)
Dept: NEUROLOGY | Facility: CLINIC | Age: 38
End: 2023-05-30
Payer: COMMERCIAL

## 2023-05-30 DIAGNOSIS — R20.0 LEFT SIDED NUMBNESS: ICD-10-CM

## 2023-05-30 PROCEDURE — 95886 MUSC TEST DONE W/N TEST COMP: CPT | Mod: S$GLB,,, | Performed by: PSYCHIATRY & NEUROLOGY

## 2023-05-30 PROCEDURE — 95912 NRV CNDJ TEST 11-12 STUDIES: CPT | Mod: S$GLB,,, | Performed by: PSYCHIATRY & NEUROLOGY

## 2023-05-30 PROCEDURE — 95912 PR NERVE CONDUCTION STUDY; 11 -12 STUDIES: ICD-10-PCS | Mod: S$GLB,,, | Performed by: PSYCHIATRY & NEUROLOGY

## 2023-05-30 PROCEDURE — 95886 PR EMG COMPLETE, W/ NERVE CONDUCTION STUDIES, 5+ MUSCLES: ICD-10-PCS | Mod: S$GLB,,, | Performed by: PSYCHIATRY & NEUROLOGY

## 2023-06-12 ENCOUNTER — HOSPITAL ENCOUNTER (EMERGENCY)
Facility: HOSPITAL | Age: 38
Discharge: HOME | End: 2023-06-12
Attending: EMERGENCY MEDICINE
Payer: COMMERCIAL

## 2023-06-12 VITALS
HEIGHT: 63 IN | OXYGEN SATURATION: 100 % | DIASTOLIC BLOOD PRESSURE: 106 MMHG | RESPIRATION RATE: 18 BRPM | TEMPERATURE: 99 F | HEART RATE: 76 BPM | WEIGHT: 128 LBS | SYSTOLIC BLOOD PRESSURE: 165 MMHG | BODY MASS INDEX: 22.68 KG/M2

## 2023-06-12 DIAGNOSIS — S06.0X0A CONCUSSION WITHOUT LOSS OF CONSCIOUSNESS, INITIAL ENCOUNTER: ICD-10-CM

## 2023-06-12 DIAGNOSIS — S09.90XA TRAUMATIC INJURY OF HEAD, INITIAL ENCOUNTER: Primary | ICD-10-CM

## 2023-06-12 PROCEDURE — 99283 PR EMERGENCY DEPT VISIT,LEVEL III: ICD-10-PCS | Mod: ,,, | Performed by: EMERGENCY MEDICINE

## 2023-06-12 PROCEDURE — 99283 EMERGENCY DEPT VISIT LOW MDM: CPT

## 2023-06-12 PROCEDURE — 99283 EMERGENCY DEPT VISIT LOW MDM: CPT | Mod: ,,, | Performed by: EMERGENCY MEDICINE

## 2023-06-12 RX ORDER — TAZAROTENE 0.45 MG/G
LOTION TOPICAL
COMMUNITY
Start: 2023-02-01

## 2023-06-12 NOTE — Clinical Note
"Madyson Marshkira" Holt was seen and treated in our emergency department on 6/12/2023.  She may return to work on 06/12/2023.       If you have any questions or concerns, please don't hesitate to call.      Mey Cruz PA-C"

## 2023-06-12 NOTE — DISCHARGE INSTRUCTIONS
Avoid heavy duty/concentration.   You can take acetaminophen/tylenol 650 mg every 6 hours or 1000 mg every 8 hours for added relief.  Apply ice to the area for 10-20 minutes every 4 hours.  Follow up with [facility] for reevaluation or if you get worse.  Return to the ER for new or worsening symptoms.

## 2023-06-12 NOTE — ED PROVIDER NOTES
Encounter Date: 6/12/2023       History     Chief Complaint   Patient presents with    Fall     Tripped and fell hit head on wall at 1 am, no loc     12:58 PM  Patient is a 37 year female with a history of HTN, anxiety who presents to [facility] ED via POV for emergent evaluation of head trauma.     Patient reports that Sunday morning at 0100, approximately, 36 hours ago, she got up from bed to use the restroom. She is unsure what she tripped/fell on, but fell forward and hit her forehead on the wall.  She states she had a small hematoma and redness to her left forehead that resolved because there are no signs of trauma on my exam. She states that she rested all day yesterday because she had head pressure and pain with touching the area. She has noted symptoms of dizziness, talking slow, and lethargy while at work today. She has had photophobia and is wearing glasses for comfort. No epistaxis. She does not take asa or other antiplatelet or anticoagulants. She complains of 8/10 pain to her L forehead. She drove herself to the ED.         Review of patient's allergies indicates:  No Known Allergies  Past Medical History:   Diagnosis Date    Abnormal cervical Papanicolaou smear 2010, 2016, 2017    Colposcopy    Anxiety     GERD (gastroesophageal reflux disease)     Hypertension     Murmur     Scoliosis      History reviewed. No pertinent surgical history.  Family History   Problem Relation Age of Onset    No Known Problems Mother     Hypertension Father     Diabetes Father     Heart murmur Father     Prostate cancer Father     Bipolar disorder Sister     Hypertension Maternal Grandmother     Stroke Maternal Grandfather     Heart attack Maternal Grandfather     Diabetes Maternal Grandfather     Hyperlipidemia Maternal Grandfather     No Known Problems Paternal Grandmother     No Known Problems Paternal Grandfather     Colon cancer Paternal Aunt     No Known Problems Daughter     Breast cancer Neg Hx      Social History      Tobacco Use    Smoking status: Never     Passive exposure: Never    Smokeless tobacco: Never   Substance Use Topics    Alcohol use: Yes     Alcohol/week: 7.0 standard drinks     Types: 7 Glasses of wine per week     Comment: SIOCA    Drug use: Not Currently     Types: Marijuana     Review of Systems   Constitutional:  Positive for fatigue. Negative for fever.   HENT:  Negative for sore throat.    Eyes:  Positive for photophobia.   Respiratory:  Negative for shortness of breath.    Cardiovascular:  Negative for chest pain.   Gastrointestinal:  Negative for abdominal pain, constipation, diarrhea, nausea and vomiting.   Genitourinary:  Negative for dysuria.   Musculoskeletal:  Negative for arthralgias, back pain and gait problem.   Skin:  Negative for rash.   Neurological:  Positive for dizziness and headaches. Negative for seizures, syncope, speech difficulty, weakness and light-headedness.   Hematological:  Does not bruise/bleed easily.     Physical Exam     Initial Vitals [06/12/23 1237]   BP Pulse Resp Temp SpO2   (!) 165/106 76 18 98.8 °F (37.1 °C) 100 %      MAP       --         Physical Exam    Vitals reviewed.  Constitutional: She appears well-developed and well-nourished. She is not diaphoretic. She is cooperative.  Non-toxic appearance. She does not have a sickly appearance. She does not appear ill. No distress.   HENT:   Head: Normocephalic and atraumatic. Head is without raccoon's eyes, without abrasion, without contusion and without laceration. Hair is normal.   Right Ear: Hearing, tympanic membrane, external ear and ear canal normal. No hemotympanum.   Left Ear: Hearing, tympanic membrane, external ear and ear canal normal. No hemotympanum.   Nose: Nose normal. No mucosal edema, sinus tenderness, nasal deformity, septal deviation or nasal septal hematoma. No epistaxis.   Mouth/Throat: No trismus in the jaw.   Eyes: Conjunctivae and EOM are normal. Pupils are equal, round, and reactive to light. Right  conjunctiva is not injected. Left conjunctiva is not injected. No scleral icterus. Right eye exhibits no nystagmus. Left eye exhibits no nystagmus.   Wearing glasses.    Neck:   Normal range of motion.  Pulmonary/Chest: No accessory muscle usage. No tachypnea. No respiratory distress.   Abdominal: She exhibits no distension.   Musculoskeletal:         General: Normal range of motion.      Cervical back: Normal range of motion. No rigidity. Normal range of motion.      Thoracic back: No bony tenderness. Normal range of motion.      Lumbar back: No bony tenderness. Normal range of motion.      Comments: FROM throughout. No difficulty bearing weight or ambulating.     Neurological: She is alert. She has normal strength.   Provides full history. Answers questions appropriately.    Skin: Skin is warm and dry. No erythema. No pallor.       ED Course   Procedures  Labs Reviewed - No data to display         Imaging Results    None          Medications - No data to display  Medical Decision Making:   Initial Assessment:   Patient is a 37 year female with a history of HTN, anxiety who [place] [facility] [place] for emergent evaluation of head trauma.   Differential Diagnosis:   Includes but is not limited to concussion, contusion, tension headache, migraine, cluster headache. No signs of basilar skull fracture. She is 36 hours out from injury. She has not criteria met on Nexus CT head. I have low suspicion of intracranial abn.   ED Management:  Given history and physical exam I suspect patient has a concussion from her minor head injury. I discussed pathophysiology.  Acetaminophen and ice prn. Follow up closely with concussion clinic if symptoms do not improve for re-evaluation.  Fly next Friday (about 12 days from now) if symptoms are the same or improved.  Avoid heavy concentration. All questions answered. Patient comfortable with plan and stable for discharge.            ED Course as of 06/12/23 1330   Mon Jun 12, 2023    1255 BP(!): 165/106 [CL]   1255 Temp: 98.8 °F (37.1 °C) [CL]   1255 Pulse: 76 [CL]   1255 Resp: 18 [CL]   1255 SpO2: 100 % [CL]      ED Course User Index  [CL] Mey Cruz PA-C                 Clinical Impression:   Final diagnoses:  [S09.90XA] Traumatic injury of head, initial encounter (Primary)  [S06.0X0A] Concussion without loss of consciousness, initial encounter        ED Disposition Condition    Discharge Stable          ED Prescriptions    None       Follow-up Information       Follow up With Specialties Details Why Contact Info    [facility]  Call   [address] [facility] [address] [address]  [phone]      [facility] - Emergency Dept Emergency Medicine  If symptoms worsen [facility] [facility] [id number] [phone]          Future Appointments   Date Time Provider Department Center   6/15/2023 12:45 PM Nidhi Camilo DPM [facility] [address]   11/29/2023  3:00 PM Karin Oneill NP [facility]          Mey Cruz PA-C  06/12/23 133

## 2023-06-14 DIAGNOSIS — S06.0XAA CONCUSSION WITH UNKNOWN LOSS OF CONSCIOUSNESS STATUS, INITIAL ENCOUNTER: Primary | ICD-10-CM

## 2023-06-15 ENCOUNTER — OFFICE VISIT (OUTPATIENT)
Dept: PODIATRY | Facility: CLINIC | Age: 38
End: 2023-06-15
Payer: COMMERCIAL

## 2023-06-15 VITALS
BODY MASS INDEX: 22.68 KG/M2 | DIASTOLIC BLOOD PRESSURE: 119 MMHG | HEIGHT: 63 IN | HEART RATE: 70 BPM | WEIGHT: 128 LBS | SYSTOLIC BLOOD PRESSURE: 165 MMHG

## 2023-06-15 DIAGNOSIS — Q72.811: ICD-10-CM

## 2023-06-15 DIAGNOSIS — M79.674 TOE PAIN, RIGHT: Primary | ICD-10-CM

## 2023-06-15 PROCEDURE — 3077F PR MOST RECENT SYSTOLIC BLOOD PRESSURE >= 140 MM HG: ICD-10-PCS | Mod: CPTII,S$GLB,, | Performed by: PODIATRIST

## 2023-06-15 PROCEDURE — 4010F ACE/ARB THERAPY RXD/TAKEN: CPT | Mod: CPTII,S$GLB,, | Performed by: PODIATRIST

## 2023-06-15 PROCEDURE — 99999 PR PBB SHADOW E&M-EST. PATIENT-LVL IV: ICD-10-PCS | Mod: PBBFAC,,, | Performed by: PODIATRIST

## 2023-06-15 PROCEDURE — 99203 OFFICE O/P NEW LOW 30 MIN: CPT | Mod: S$GLB,,, | Performed by: PODIATRIST

## 2023-06-15 PROCEDURE — 3080F DIAST BP >= 90 MM HG: CPT | Mod: CPTII,S$GLB,, | Performed by: PODIATRIST

## 2023-06-15 PROCEDURE — 4010F PR ACE/ARB THEARPY RXD/TAKEN: ICD-10-PCS | Mod: CPTII,S$GLB,, | Performed by: PODIATRIST

## 2023-06-15 PROCEDURE — 99203 PR OFFICE/OUTPT VISIT, NEW, LEVL III, 30-44 MIN: ICD-10-PCS | Mod: S$GLB,,, | Performed by: PODIATRIST

## 2023-06-15 PROCEDURE — 1160F PR REVIEW ALL MEDS BY PRESCRIBER/CLIN PHARMACIST DOCUMENTED: ICD-10-PCS | Mod: CPTII,S$GLB,, | Performed by: PODIATRIST

## 2023-06-15 PROCEDURE — 1159F PR MEDICATION LIST DOCUMENTED IN MEDICAL RECORD: ICD-10-PCS | Mod: CPTII,S$GLB,, | Performed by: PODIATRIST

## 2023-06-15 PROCEDURE — 3008F PR BODY MASS INDEX (BMI) DOCUMENTED: ICD-10-PCS | Mod: CPTII,S$GLB,, | Performed by: PODIATRIST

## 2023-06-15 PROCEDURE — 3080F PR MOST RECENT DIASTOLIC BLOOD PRESSURE >= 90 MM HG: ICD-10-PCS | Mod: CPTII,S$GLB,, | Performed by: PODIATRIST

## 2023-06-15 PROCEDURE — 3008F BODY MASS INDEX DOCD: CPT | Mod: CPTII,S$GLB,, | Performed by: PODIATRIST

## 2023-06-15 PROCEDURE — 99999 PR PBB SHADOW E&M-EST. PATIENT-LVL IV: CPT | Mod: PBBFAC,,, | Performed by: PODIATRIST

## 2023-06-15 PROCEDURE — 3044F PR MOST RECENT HEMOGLOBIN A1C LEVEL <7.0%: ICD-10-PCS | Mod: CPTII,S$GLB,, | Performed by: PODIATRIST

## 2023-06-15 PROCEDURE — 1159F MED LIST DOCD IN RCRD: CPT | Mod: CPTII,S$GLB,, | Performed by: PODIATRIST

## 2023-06-15 PROCEDURE — 3077F SYST BP >= 140 MM HG: CPT | Mod: CPTII,S$GLB,, | Performed by: PODIATRIST

## 2023-06-15 PROCEDURE — 1160F RVW MEDS BY RX/DR IN RCRD: CPT | Mod: CPTII,S$GLB,, | Performed by: PODIATRIST

## 2023-06-15 PROCEDURE — 3044F HG A1C LEVEL LT 7.0%: CPT | Mod: CPTII,S$GLB,, | Performed by: PODIATRIST

## 2023-06-15 NOTE — PROGRESS NOTES
PODIATRY NOTE     PATIENT ID:  Madyson Calle is a 37 y.o. female.     CHIEF CONCERN:   Toe Pain (Right 4th Toe)           MEDICAL DECISION MAKING:        ICD-10-CM ICD-9-CM    1. Toe pain, right  M79.674 729.5 X-Ray Foot Complete Right      2. Brachydactyly of toes, right  Q72.811 755.39           I counseled the patient on the patient's conditions, their implications and medical management.    I ordered xrays and reviewed the xrays with the patient.     Referral Dr. Moore to discuss any possible surgical intervention.   Shoe and activity modification as needed for relief in the meantime.                 HISTORY OF PRESENT ILLNESS:  Madyson Calle is a 37 y.o. female with concerns regarding: right 4th brachydactyly.   She noted deformity around age 6-8.  Not sure if it's congenital (only uncle with bilateral similar condition). She does recall bumping her toes a lot when she was younger.   No self treatment yet.   Toe painful in shoes with direct pressure.          Patient Active Problem List   Diagnosis    Scoliosis (and kyphoscoliosis), idiopathic    Intrauterine device           Current Outpatient Medications on File Prior to Visit   Medication Sig Dispense Refill    amLODIPine (NORVASC) 5 MG tablet Take 1 tablet (5 mg total) by mouth once daily. 90 tablet 3    ARAZLO 0.045 % Lotn       boric acid (bulk) Powd Insert one Gelatin Capsule filled with 600 mg of Boric Acid Powder H.S. 100 capsule 4    dapsone (ACZONE) 7.5 % GlwP       diclofenac sodium (VOLTAREN) 1 % Gel Apply 2 g topically 3 (three) times daily. 100 g 0    ergocalciferol (ERGOCALCIFEROL) 50,000 unit Cap Take 1 capsule (50,000 Units total) by mouth every 7 days. 4 capsule 11    hydroquinone 4 % Crea Apply to dark spots once daily. Use with sunscreen if outdoors 28 g 1    ibuprofen (ADVIL,MOTRIN) 800 MG tablet Take 1 tablet (800 mg total) by mouth 3 (three) times daily as needed for Pain. Take with food 30 tablet 0    insulin syringes, disposable, 1 mL  "Syrg 1 Units by Misc.(Non-Drug; Combo Route) route every 14 (fourteen) days for 30 days, THEN 1 Units every 28 days. 1 each 5    loratadine (CLARITIN) 10 mg tablet Take 1 tablet (10 mg total) by mouth once daily. 30 tablet 11    losartan (COZAAR) 25 MG tablet Take 1 tablet (25 mg total) by mouth once daily. 90 tablet 3    metoprolol tartrate (LOPRESSOR) 25 MG tablet Take 1 tablet (25 mg total) by mouth 2 (two) times daily. 120 tablet 3    omeprazole (PRILOSEC) 40 MG capsule Take 40 mg by mouth as needed.       Current Facility-Administered Medications on File Prior to Visit   Medication Dose Route Frequency Provider Last Rate Last Admin    levonorgestreL (MIRENA) 20 mcg/24 hours (8 yrs) 52 mg IUD 1 Intra Uterine Device  1 Intra Uterine Device Intrauterine  Juan Curiel MD   1 Intra Uterine Device at 01/03/23 4555           Review of patient's allergies indicates:  No Known Allergies            ROS:   General ROS: negative for - chills, fever or night sweats  Respiratory ROS: no cough, shortness of breath, or wheezing  Cardiovascular ROS: no chest pain or dyspnea on exertion  Musculoskeletal ROS: positive for - right toe pain  Neurological ROS: negative for - impaired coordination/balance and numbness/tingling  Dermatological ROS: negative for pruritus and rash      EXAM:     Vitals:    06/15/23 1247   BP: (!) 165/119   Pulse: 70   Weight: 58.1 kg (128 lb)   Height: 5' 3" (1.6 m)        General:  Alert and Oriented x 3;  No acute distress      Bilateral  Lower extremity exam:    Vascular:   Dorsalis Pedis:  present   Posterior Tibial:  present  Capillary refill time:  3 seconds  Temperature of toes warm to touch  Edema:  none       Neurological:     Sharp touch:  normal  Light touch: normal  Tinels Sign:  Absent  Mulders Click:   Absent        Dermatological:   Skin: warm, moist, and appropriate for age  Wounds/Ulcers:  Absent  Bruising:  Absent  Erythema:  Absent      Musculoskeletal:   Metatarsophalangeal " range of motion:   full range of motion  Subtalar joint range of motion: full range of motion  Ankle joint range of motion:  full range of motion  right 4th brachydactyly               right Foot Xray Imaging: pending

## 2023-06-19 ENCOUNTER — TELEPHONE (OUTPATIENT)
Dept: NEUROLOGY | Facility: CLINIC | Age: 38
End: 2023-06-19
Payer: COMMERCIAL

## 2023-06-19 NOTE — TELEPHONE ENCOUNTER
----- Message from Diana Judd sent at 6/19/2023 12:29 PM CDT -----  Contact: pt  Pt calling to get a later time for her appt o  tomorrow , would like  anytime after 11am as well as Wednesday or Thursday       Confirmed patient's contact info below:  Contact Name: Madyson Alva  Phone Number: [phone]

## 2023-06-20 ENCOUNTER — CLINICAL SUPPORT (OUTPATIENT)
Dept: REHABILITATION | Facility: HOSPITAL | Age: 38
End: 2023-06-20
Attending: PSYCHIATRY & NEUROLOGY
Payer: COMMERCIAL

## 2023-06-20 ENCOUNTER — OFFICE VISIT (OUTPATIENT)
Dept: NEUROLOGY | Facility: CLINIC | Age: 38
End: 2023-06-20
Payer: COMMERCIAL

## 2023-06-20 VITALS
HEIGHT: 63 IN | WEIGHT: 125 LBS | BODY MASS INDEX: 22.15 KG/M2 | DIASTOLIC BLOOD PRESSURE: 102 MMHG | HEART RATE: 68 BPM | SYSTOLIC BLOOD PRESSURE: 154 MMHG

## 2023-06-20 DIAGNOSIS — S06.9XAS COGNITIVE AND NEUROBEHAVIORAL DYSFUNCTION FOLLOWING BRAIN INJURY: ICD-10-CM

## 2023-06-20 DIAGNOSIS — G44.86 CERVICOGENIC HEADACHE: ICD-10-CM

## 2023-06-20 DIAGNOSIS — H55.81 SACCADIC EYE MOVEMENT DEFICIENCY: ICD-10-CM

## 2023-06-20 DIAGNOSIS — S06.0XAA CONCUSSION WITH UNKNOWN LOSS OF CONSCIOUSNESS STATUS, INITIAL ENCOUNTER: ICD-10-CM

## 2023-06-20 DIAGNOSIS — G31.89 COGNITIVE AND NEUROBEHAVIORAL DYSFUNCTION FOLLOWING BRAIN INJURY: ICD-10-CM

## 2023-06-20 DIAGNOSIS — F09 COGNITIVE AND NEUROBEHAVIORAL DYSFUNCTION FOLLOWING BRAIN INJURY: ICD-10-CM

## 2023-06-20 DIAGNOSIS — G43.409 HEMIPLEGIC MIGRAINE WITHOUT STATUS MIGRAINOSUS, NOT INTRACTABLE: ICD-10-CM

## 2023-06-20 DIAGNOSIS — H81.90 VESTIBULOPATHY, UNSPECIFIED LATERALITY: ICD-10-CM

## 2023-06-20 DIAGNOSIS — G43.901 STATUS MIGRAINOSUS: ICD-10-CM

## 2023-06-20 DIAGNOSIS — S06.0X0A CONCUSSION WITHOUT LOSS OF CONSCIOUSNESS, INITIAL ENCOUNTER: Primary | ICD-10-CM

## 2023-06-20 DIAGNOSIS — S13.4XXA WHIPLASH, INITIAL ENCOUNTER: ICD-10-CM

## 2023-06-20 DIAGNOSIS — G44.319 ACUTE POST-TRAUMATIC HEADACHE, NOT INTRACTABLE: ICD-10-CM

## 2023-06-20 DIAGNOSIS — M54.81 BILATERAL OCCIPITAL NEURALGIA: ICD-10-CM

## 2023-06-20 PROCEDURE — 97162 PT EVAL MOD COMPLEX 30 MIN: CPT

## 2023-06-20 PROCEDURE — 3077F SYST BP >= 140 MM HG: CPT | Mod: CPTII,S$GLB,, | Performed by: PSYCHIATRY & NEUROLOGY

## 2023-06-20 PROCEDURE — 1159F MED LIST DOCD IN RCRD: CPT | Mod: CPTII,S$GLB,, | Performed by: PSYCHIATRY & NEUROLOGY

## 2023-06-20 PROCEDURE — 3044F HG A1C LEVEL LT 7.0%: CPT | Mod: CPTII,S$GLB,, | Performed by: PSYCHIATRY & NEUROLOGY

## 2023-06-20 PROCEDURE — 3080F PR MOST RECENT DIASTOLIC BLOOD PRESSURE >= 90 MM HG: ICD-10-PCS | Mod: CPTII,S$GLB,, | Performed by: PSYCHIATRY & NEUROLOGY

## 2023-06-20 PROCEDURE — 96125 COGNITIVE TEST BY HC PRO: CPT

## 2023-06-20 PROCEDURE — 99215 OFFICE O/P EST HI 40 MIN: CPT | Mod: S$GLB,,, | Performed by: PSYCHIATRY & NEUROLOGY

## 2023-06-20 PROCEDURE — 3008F BODY MASS INDEX DOCD: CPT | Mod: CPTII,S$GLB,, | Performed by: PSYCHIATRY & NEUROLOGY

## 2023-06-20 PROCEDURE — 3008F PR BODY MASS INDEX (BMI) DOCUMENTED: ICD-10-PCS | Mod: CPTII,S$GLB,, | Performed by: PSYCHIATRY & NEUROLOGY

## 2023-06-20 PROCEDURE — 3080F DIAST BP >= 90 MM HG: CPT | Mod: CPTII,S$GLB,, | Performed by: PSYCHIATRY & NEUROLOGY

## 2023-06-20 PROCEDURE — 4010F ACE/ARB THERAPY RXD/TAKEN: CPT | Mod: CPTII,S$GLB,, | Performed by: PSYCHIATRY & NEUROLOGY

## 2023-06-20 PROCEDURE — 3077F PR MOST RECENT SYSTOLIC BLOOD PRESSURE >= 140 MM HG: ICD-10-PCS | Mod: CPTII,S$GLB,, | Performed by: PSYCHIATRY & NEUROLOGY

## 2023-06-20 PROCEDURE — 3044F PR MOST RECENT HEMOGLOBIN A1C LEVEL <7.0%: ICD-10-PCS | Mod: CPTII,S$GLB,, | Performed by: PSYCHIATRY & NEUROLOGY

## 2023-06-20 PROCEDURE — 97112 NEUROMUSCULAR REEDUCATION: CPT

## 2023-06-20 PROCEDURE — 99999 PR PBB SHADOW E&M-EST. PATIENT-LVL III: CPT | Mod: PBBFAC,,,

## 2023-06-20 PROCEDURE — 99215 PR OFFICE/OUTPT VISIT, EST, LEVL V, 40-54 MIN: ICD-10-PCS | Mod: S$GLB,,, | Performed by: PSYCHIATRY & NEUROLOGY

## 2023-06-20 PROCEDURE — 1159F PR MEDICATION LIST DOCUMENTED IN MEDICAL RECORD: ICD-10-PCS | Mod: CPTII,S$GLB,, | Performed by: PSYCHIATRY & NEUROLOGY

## 2023-06-20 PROCEDURE — 99367 TEAM CONF W/O PAT BY PHYS: CPT | Mod: S$GLB,,, | Performed by: PSYCHIATRY & NEUROLOGY

## 2023-06-20 PROCEDURE — 99367 PR TEAM CONFERENCE NON-FACE-TO-FACE PHYSICIAN: ICD-10-PCS | Mod: S$GLB,,, | Performed by: PSYCHIATRY & NEUROLOGY

## 2023-06-20 PROCEDURE — 4010F PR ACE/ARB THEARPY RXD/TAKEN: ICD-10-PCS | Mod: CPTII,S$GLB,, | Performed by: PSYCHIATRY & NEUROLOGY

## 2023-06-20 PROCEDURE — 99999 PR PBB SHADOW E&M-EST. PATIENT-LVL III: ICD-10-PCS | Mod: PBBFAC,,,

## 2023-06-20 RX ORDER — METHYLPREDNISOLONE 4 MG/1
TABLET ORAL
Qty: 1 EACH | Refills: 0 | Status: SHIPPED | OUTPATIENT
Start: 2023-06-20 | End: 2023-09-26 | Stop reason: CLARIF

## 2023-06-20 RX ORDER — ONDANSETRON 4 MG/1
4 TABLET, ORALLY DISINTEGRATING ORAL EVERY 8 HOURS PRN
Qty: 20 TABLET | Refills: 6 | Status: SHIPPED | OUTPATIENT
Start: 2023-06-20

## 2023-06-20 NOTE — PROGRESS NOTES
Subjective:       Patient ID: Madyson Calle is a 37 y.o. female.    Reason for Consult: Consult      Interval History:  Madyson Calle is here for follow up. Their condition has changed since she saw my colleague.  She was in an accident at home when she woke up in the middle of the night on 06/11 and tripped on her way to the bathroom striking her head on the wall.  She notes that she struck her left frontal area of her scalp against the wall.  She notes that she may have lost consciousness very briefly but does not have amnesia.  She notes that she has been dealing with significant fatigue and headaches and dizziness since her injury as well as light sensitivity.  She notes that when she tried to go back to work she had significant light sensitivity.  She notes that she could work for a while and then by the end of the day have significant in terrible headaches.  She does have a history of migraine and a family history of migraine as well.  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  She believes that her paternal grandmother was the 1 that passed this down and her family.  She notes that it did seem to skip degeneration for her.  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  She has a trip to [place] coming up this weekend but notes she usually lies in the evening and goes to sleep during the flight.    She has filled out a postconcussion symptom questionnaire and scored the following:  Four, severe problem for sleep disturbance   Two, mild problem for headaches, fatigue, forgetfulness, poor concentration, taking longer to think, light sensitivity   One, no more problem for noise sensitivity, feeling depressed, feeling frustrated   0, not experience at all for dizziness, nausea, being irritable, double vision, restlessness      Objective:     Vitals:    06/20/23 1025   BP: (!) 154/102    Pulse: 68     Tinel sign positive bilateral greater and lesser occipital nerve.  Tenderness to palpation of bilateral suboccipital musculature with positive muscle twitch response, splenius capitis.  Point of convergence is abnormal, greater than 20 cm.  Bilateral horizontal end gaze nystagmus and diplopia especially in lower gaze.  Otherwise cranial nerves 2-12 without focal deficit.  Gait and station within normal limits.  Photophobia noted on exam.  Focused examination was undertaken today. Most of the visit time was spent giving guidance, counseling and discussing treatment options.    Results for orders placed or performed during the hospital encounter of 11/22/22   CT Head Without Contrast    Narrative    EXAMINATION:  CT HEAD WITHOUT CONTRAST    CLINICAL HISTORY:  Numbness or tingling, paresthesia (Ped 0-18y);36 yo w/HA and left arm paresthesia;    TECHNIQUE:  Multiple sequential 5 mm axial images of the head without contrast.  Coronal and sagittal reformatted imaging from the axial acquisition.    COMPARISON:  None    FINDINGS:  There is no evidence for acute intracranial hemorrhage or sulcal effacement.  The ventricles are normal in size without hydrocephalus.  There is no midline shift or mass effect.  Small opacification within the visualized left maxillary antra remaining visualized paranasal sinuses and mastoid air cells are clear.      Impression    No acute intracranial findings specifically without evidence for acute intracranial hemorrhage or sulcal effacement to suggest large territory recent infarction.    Clinical correlation and further evaluation with MRI as warranted if patient compatible.      Electronically signed by: Marvin Jimenez DO  Date:    11/22/2022  Time:    11:41       Assessment/Plan:     Problem List Items Addressed This Visit          Neuro    Hemiplegic migraine without status migrainosus, not intractable    Relevant Medications    ondansetron (ZOFRAN-ODT) 4 MG TbDL     Other  Visit Diagnoses       Concussion without loss of consciousness, initial encounter    -  Primary    Whiplash, initial encounter        Acute post-traumatic headache, not intractable        Relevant Medications    methylPREDNISolone (MEDROL DOSEPACK) 4 mg tablet    Vestibulopathy, unspecified laterality        Cognitive and neurobehavioral dysfunction following brain injury        Status migrainosus        Relevant Medications    methylPREDNISolone (MEDROL DOSEPACK) 4 mg tablet    Bilateral occipital neuralgia        Cervicogenic headache              37-year-old female presents for evaluation of concussion sustained at home when she tripped and struck her head on the wall on 06/11/2023.  At this time we have discussed that she is still within the window spontaneous recovery however her density of headaches as severe.  We have discussed a Medrol Dosepak to break her current headache cycle.  We have also discussed Zofran to use for nausea p.r.n..  She is going on a flight on Fridays I have told her to bring the Zofran and to continue the steroid pack through this flight.  We have discussed over-the-counter scopolamine patches as needed for nausea and air sickness.  I will write a note for her for work to give her hourly breaks from her screen for 5-10 minutes at a time.  I will see the patient back in about 3 months.  We have discussed that she likely has a hemiplegic sensory variant.  We will continue to monitor her headaches moving forward as she notes that these current headaches she is dealing with after her injury are specifically more related to occipital neuralgia and concussion.  I have discussed her case in our multidisciplinary clinic today with cognitive rehab with speech therapy and vestibular rehab with physical therapy.  I will see her back in 3 months.    The patient verbalizes understanding and agreement with the treatment plan. I have discussed risks, benefits and alternatives to the treatment plan.  Questions were sought and answered to her stated verbal satisfaction.          Koko Vanessa MD, FAAN    This note is dictated on M*Modal Fluency Direct word recognition program. There are word recognition mistakes that are occasionally missed on review.

## 2023-06-20 NOTE — PLAN OF CARE
[facility]  Speech Therapy Evaluation - [facility]    Date: 6/20/2023     Name: Madyson Calle   MRN: [id number]    Therapy Diagnosis:   Encounter Diagnosis   Name Primary?    Concussion with unknown loss of consciousness status, initial encounter     Physician: Huber Vanessa III, MD  Physician Orders: Ambulatory Referral to Speech Therapy   Medical Diagnosis: Concussion with unknown loss of consciousness status, initial encounter [S06.0XAA]    Visit # / Visits Authorized:  1 / 1   Date of Evaluation:  6/20/2023   Insurance Authorization Period: 6/14/2023 to 6/13/2024  Plan of Care Certification:    6/20/2023 to 8/4/2023      Time In:1145   Time Out: 1240  Procedure Min.   Cognitive Communication Evaluation - including administration, scoring and interpretation   95     Precautions: standard and concussion   Subjective   Date of Onset: 10 days ago  History of Current Condition:  Madyson Calle is a 37 y.o. female who presents to [facility] for evaluation and treatment secondary to post-concussion syndrome. Patient was referred to therapy by Dr. Vanessa at the [facility]. Patient's concussion occurred last Sunday after early morning in the middle of the night and fell while going to the bathroom/unplugging the TV. She fell into the wall (wall dented) with the center forehead. Pt reported initial symptoms included memory loss, fatigue, dizziness. Currently, pt is complaining of occasional headaches, dizziness (mild), sleep disturbance, and occasional word finding.  Patient denied changes in mood. Patient endorsed fatigue. Patient does feel as though she has returned to baseline cognitive communication functioning.    Previous history of:  Previous concussions: denied  Anxiety: endorsed  Depression: endorsed  Learning Disabilities: denied  ADHD: denied  Headaches and/or Migraines: endorsed    Past Medical History: Madyson Calle  has a past medical  history of Abnormal cervical Papanicolaou smear (2010, 2016, 2017), Anxiety, GERD (gastroesophageal reflux disease), Hypertension, Murmur, and Scoliosis.  Madyson Calle  has no past surgical history on file.  Medical Hx and Allergies: Madyson has a current medication list which includes the following prescription(s): amlodipine, arazlo, boric acid (bulk) Powd, dapsone, diclofenac sodium, ergocalciferol, hydroquinone, ibuprofen, insulin syringes (disposable), loratadine, losartan, metoprolol tartrate, and omeprazole, and the following Facility-Administered Medications: levonorgestrel. 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 This questionnaire screens for dysfunction in six domains: Functional Daily Communication, Auditory Comprehension & Information Processing, Expression, Discourse & Social Communication, Reading Comprehension, Written Expression, and Executive Functions & Self-Regulation. The results of the questionnaire are presented below.    Patient completed the questionnaire and 8/45 cognitive communication concerns were identified.  These are listed below.    For the below categories only the patients self-identified complaints are listed for each domain.    Domain Patient Self-Identified Complaints   Functional Daily Communications Difficulties with:  Workplace communications   Auditory Comprehension & Information Processing Difficulties with:  Focusing attention on what is said  Staying on track with the conversation, staying on topic  Holding thoughts in mind while talking or listening  Remembering new conversations, events, and new information   Expression, Discourse & Social Communication Difficulties with:  Word finding, word retrieval, thinking of the word, vocabulary, word choice  Sentence planning, sentence construction, grammar   Reading Comprehension Difficulties with:  Physical difficulties  Retaining read information over time, remembering, organizing   Written Expression Difficulties with:  No difficulties reported in this domain   Thinking, Reasoning, Problem Solving, Executive Functions, Self-Regulation Difficulties with:  No difficulties reported in this domain    Reference: Edith Atkinson (2015) Cognitive Communication Checklist for Acquired Brain Injury (CCCABI) [employer] [facility]; [facility], [place], N1H 6J2 , [email or web address]      The Repeatable Battery for the Assessment of Neuropsychological Status (RBANS) Version B was administered to measure the patient's attention, language, visuospatial/constructional abilities, and immediate and delayed memory. The results are outlined below:    Domain Subtest Total Score Index Score   Immediate Memory List Learning 28   85    Story Memory 17    Visuospatial/  Constructional Figure Copy 19   96    Line Orientation 18    Language Picture Naming 10   85    Semantic Fluency 16    Attention Digit Span 11   88    Coding 48      Delayed Memory List Recall 3     64    List Recognition 18     Story Recall 8     Figure Recall 17        Total Scale   79     Percentile   8      Descriptor   Borderline    Interpretation:  Scaled score: mean of 10, standard deviation of 3. Therefore, 16+ = Very Superior; 14-15 = Superior; 12-13 = High Average; 8-11 = Average; 6-7 = Low Average; 4-5 = Borderline; 3 and below = Extremely Low    Index score: mean of 100, standard deviation of 15. Therefore, 130+ = Very Superior; 120-129 = Superior; 110-119 = High average;  = Average; 80-89 = Low Average; 70-79 = Borderline; 69 and below = Extremely Low. Apparently the most reliable score; factor in education level.    Immediate Memory Score: Recalling information following immediate presentation is assessed through the List Learning and Story Memory subtests. In the List Learning subtest, the patient is given 10 words to remember. This list is presented four times overall. In this subtest, the patient did demonstrate learning over the 4 trials. The patient recalled 6 items on trial one, 6 items on trial two, 8 items on trial three, and 8 items in trial 4. In the Story Memory subtest, the patient recalled 6 details on the first presentation and 9 details on the second presentation. Results of this domain indicate Low average performance.  Visuospatial score: Perceiving spatial relations and constructing a spatially accurate copy of a drawing is assessed through the Figure Copy and Line Orientation subtests. The Figure Copy subtest asks the patient to copy a complex line drawing. The patient completed accurately with one change in orientation. The Line Orientation subtest the presents the patient with 12 line displays and asks the patient to match two given lines at the bottom to the display at the top.  The patient correctly identified 18/20. Results of this domain indicate average performance.  Language score: Naming common items and retrieving learned material is assessed through the Picture Naming and Semantic Fluency subtests. The Picture Naming subtest asks the patient to name 10 line drawings.  The patient was able to accurately name 10/10 items. The Semantic Fluency subtest asks the patient to name as many animals as she can in 60 seconds. The patient was able to name 16 animals. These results indicate Low average performance..   Attention score: Attending to, holding and manipulating information presented visually and orally in working memory is assessed with use of the Digit Span and Coding subtests. The Digit Span subtest asks the patient to repeat progressively lengthening strings of numbers. The Coding subtest asks the patient to alternate attention between a key the given work and then to decode symbols to numbers. The patients results on these subtest indicate Low average performance..  Delayed Memory score: Anterograde memory capacity is assessed through the List Recall, List Recognition, Story Recall, and Figure Recall subtests. The List Recall subtest asks the patient to recall items from the list presented at the beginning of the test. The patient was able to recall 3/10 items. The List Recognition subtest has the patient recall whether a word was or was not in the original list. The patient accurately identified whether a word was or was not on the list in 18 of 20 items. On the Story Recall subtest, the patient was able to recall 8 details. Finally, on the Figure Recall, the patient recalled 17 details. Results of this domain indicate Extremely low performance.    Treatment   Total Treatment Time Separate from Evaluation: not applicable   No treatment performed secondary to time to complete evaluation.     Education provided:   -role of Speech Therapy, goals/plan of care, scheduling/cancellations, insurance limitations with patient    Patient expressed understanding.     Home Program: not yet established, discussed breaks, computer settings and cognitive fatigue briefly.   Assessment     Madyson presents to [facility] status post medical diagnosis of  Concussion with unknown loss of consciousness status, initial encounter [S06.0XAA].      Interpretation of objective assessment:   Overall, according to the RBANS research, total Scale index is a good indicator of general cognitive functioning. The patient presents with a mild cognitive communication disorder charaterized by deficits in memory and resulting in some high level executive functions deficits and cognitive fatigue. Deficits are likely secondary to oculomotor dysfunction and cognitive fatigue; hwoever, best to address these to prevent falling behind at work.    Demonstrates impairments including limitations as described in the problem list.     Positive prognostic factors: time since onset  Negative prognostic factors: comorbid oculomotor dysfunction  Barriers to therapy: No barriers to therapy identified.     Patient's spiritual, cultural, and educational needs considered and patient agreeable to plan of care and goals.    Patient will benefit from skilled therapy.    Rehab Potential: good    Short Term Goals: (4 weeks) Current Progress:   1. Patient will use attention shifting strategies to shift attention between two tasks every 15 seconds with 90% accuracy to improve alternating attention.     Progressing/ Not Met 6/20/2023   Established this date    2. Patient will use Goal Plan Action Review strategy to complete moderate to complex reasoning, planning, or organization tasks with 90% accuracy independently to improve functional executive function skills.    Progressing/ Not Met 6/20/2023   Established this date    3. Patient will complete short term recall tasks after a 15 minutes delay with 90% accuracy  independently  with use of memory strategies to improve recall of information and generalization of memory strategies.    Progressing/ Not Met 6/20/2023   Established this date    4. Patient will complete high level problem solving based tasks with 90% accuracy independently.    Progressing/ Not Met  6/20/2023   Established this date    5.Patient will independently implement cognitive/sensory rest periods throughout day after identifying cognitive fatigue including limiting sensory input (sound, light, etc.)     Progressing/ Not Met 6/20/2023   Established this date    6.Patient will independently generate strategies to improve ability to complete tasks with enhanced accuracy and time, based on review of objective previous performance on trials of functional tasks with 80% accuracy.     Progressing/ Not Met 6/20/2023   Established this date        Long Term Goals: (6 weeks) Current Progress:   1. Patient will demonstrate use of self awareness,  goal setting, planning, and  self-monitoring during daily living activities to improve safety and awareness in functional living environment.    Established this date     2. Patient will predict objective performance on completion of actual tasks to increase independence with required return to daily living environment.     Established this date     3. Patient will use appropriate memory strategies to schedule and recall weekly activities, express needs and recall names to maintain safety and participate socially in functional living environment.     Established this date       Plan     Recommended Treatment Plan:  Patient will participate in the [facility] program for speech therapy 1 times per week for 6 weeks to address her Cognition deficits, to educate patient and their family, and to participate in a home exercise program.    Follow up will occur at [facility] for skilled Speech Therapy services.    Therapist's Name:   Samira Perez CCC-SLP   6/20/2023

## 2023-06-20 NOTE — PLAN OF CARE
"[facility]  Physical Therapy Neurological Rehabilitation Initial Evaluation  [facility]    Name: Madyson Calle  Clinic Number: [id number]    Therapy Diagnosis:   Encounter Diagnoses   Name Primary?    Concussion with unknown loss of consciousness status, initial encounter     Cervicogenic headache     Vestibulopathy, unspecified laterality     Saccadic eye movement deficiency      Physician: Huber Vanessa III, MD    Physician Orders: PT Eval and Treat   Medical Diagnosis from Referral: Concussion with unknown loss of consciousness status, initial encounter [S06.0XAA]  Evaluation Date: 6/20/2023  Authorization Period Expiration: 6/13/2024  Plan of Care Expiration: 8/18/2023  Visit # / Visits authorized: 1/ 1    Time In: 10:35AM  Time Out: 11:30AM  Total Billable Time: 55 minutes (1 mod eval; 1 NMR)    Precautions: Standard    Subjective   Date of onset: June 11  History of current condition - Madyson reports: Got up in the middle of the night to use bathroom; went to turn off TV, tripped and hit her head on the wall. Left frontal head injury. Initial symptoms included dizziness, fatigue, and memory loss. Persistent symptoms include "stinging" headache, low motion tolerance, milder dizziness, and fatigue. Some persistent issues with memory/attention as well. No history of prior concussion. Presented to ED day after injury; no imaging performed. Previously diagnosed depression and anxiety. Does have previous history of migraine that were well controlled before recent head injury.      Medical History:   Past Medical History:   Diagnosis Date    Abnormal cervical Papanicolaou smear 2010, 2016, 2017    Colposcopy    Anxiety     GERD (gastroesophageal reflux disease)     Hypertension     Murmur     Scoliosis        Surgical History:   Madyson Calle  has no past surgical history on file.    Medications:   Madyson has a current medication list which includes the following prescription(s): " amlodipine, arazlo, boric acid (bulk) Powd, dapsone, diclofenac sodium, ergocalciferol, hydroquinone, ibuprofen, insulin syringes (disposable), loratadine, losartan, methylprednisolone, metoprolol tartrate, omeprazole, and ondansetron, and the following Facility-Administered Medications: levonorgestrel.    Allergies:   Review of patient's allergies indicates:  No Known Allergies     Imaging: No imaging performed in ED    Prior Therapy: First time in therapy  Social History: Lives with boyfriend and daughter (11 years old)  Home Environment: 2nd floor apartment   Exercise Routine / History: No formal exercise program prior   Occupation: accounting (uses 2 monitors)  Highest Level of Education: Bachelor's  Prior Level of Function: Independent  Current Level of Function: Independent and driving but with post-concussion deficits noted above    Pain:  Current 0/10, worst 10/10, best 0/10   Location: crown of head   Description: pulsating  Aggravating Factors: light, computer time  Easing Factors: turning off lights    Patients goals: address post-concussion deficits    Objective     Mental status: alert, oriented to person, place, and time, normal mood, behavior, speech, dress, motor activity, and thought processes  Appearance: Casually dressed  Behavior: calm, cooperative, and adequate rapport can be established  Attention Span and Concentration: Normal    CLEARING TESTS:   Sharp Pulsar Test: Negative    Alar ligament test: Negative     OCULOMOTOR ASSESSMENT: baseline headache 3/10 and baseline fogginess 4/10  Visual/ Ocular Motor Test:  Headache (0-10) Dizziness (0-10) Nausea (0-10) Fogginess (0-10) Comments    Smooth pursuits (1.5 feet left/right of center; 2 times; 30 bpm each direction; horizontal and vertical) 3 3 0 4 Normal movement quality but increased dizziness with leftward motion   Saccades- horizontal (1.5 feet left/right of center; 3 feet away; 10 times; no specific speed) 3 3 0 4 Decreased speed with  leftward gaze   Saccades- vertical (1.5 feet up/down of center; 3 feet away; 10 times; no specific speed) 5 3 0 4 Decreased speed for upward and downward motion; compensatory blinking    Convergence (14 pt font; normal 5 cm) 3 0 0 4 Near point (cm):   WNL  2. WNL  3. WNL   VOR- horizontal (14 pt font; 20 degrees rotation left/right; 10 reps; 180 bpm) 5 7 4 4 No slippage    VOR- vertical (14 pt font; 20 degrees rotation up/down; 10 times; 180 bpm) 5 7 4 4 Could not keep pace   Visual motion sensitivity test (80 degrees left/right; 5 times each direction; 50 bpm) 2 6 0 4 Could not keep pace; greater symptoms on the left     Head Impulse Test: Intact quality but increased dizziness especially with head impulse on the left  Acuity: Farsighted  Visual Dynamic Acuity: Not assessed   Head-Neck Differentiation Test: Not assessed    VESTIBULAR ASSESSMENT:   Hallpike-Raimundo Test: negative bilaterally  Roll Test: negative bilaterally    ORTHOPEDIC ASSESSMENT:  Muscle Tone: increased tissue density suboccipitals  TTP: increased tenderness in suboccipital region and with left jarrell's horn headache recreation    RANGE OF MOTION:  CERVICAL SPINE AROM:   Flexion: (norm: 80-90 deg) 53 deg   Extension: (norm: 70-80 deg) 50 deg   Left Sidebend: (norm: 20-45 deg) 25 deg   Right Sidebend: (norm: 20-45 deg) 27 deg   Left Rotation: (norm: 70-90 deg) 63 deg   Right Rotation: (norm: 70-90 deg) 75 deg   Joint restrictions: decreased upper cervical unilateral PA with headache recreation on left side  Craniocervical flexion test: + left for increased headache but range WNL          Deep Neck Flexors Endurance Test: Able to hold <5 seconds    POSTURAL CONTROL:  Armen Sensory Testing: Out of time today; perform next (or m-CTSIB)  FGA: Out of time today; perform next    TREATMENT   Treatment Time In: 11:21AM  Treatment Time Out: 11:30AM  Total Treatment time separate from Evaluation: 9 minutes    Madyson participated in neuromuscular re-education  activities to improve: vestibular and oculomotor function for 9 minutes. The following activities were included:    Patient education with return demonstration of the following exercises  - VORx1 (horizontal and vertical)  - Saccades (horizontal and vertical)  - Smooth pursuits (horizontal and vertical)    Patient education on symptom modulation techniques (speed of exercise, environment, length of individual reps, etc.)     Home Exercises and Patient Education Provided    Education provided:   - PT plan of care  - Role of PT in post-concussion management  - HEP instruction    Written Home Exercises Provided: yes.  Exercises were reviewed and Madyson was able to demonstrate them prior to the end of the session.  Madyson demonstrated good  understanding of the education provided.     See EMR under Patient Instructions for exercises provided 6/20/2023.    Assessment   Madyson is a 37 y.o. female referred to outpatient Physical Therapy with a medical diagnosis of Concussion with unknown loss of consciousness status, initial encounter [S06.0XAA]. Patient presents with decreased cervical range of motion, decreased deep cervical flexor endurance, impaired arthrokinematics of upper cervical spine; signs/symptoms of cervicogenic headache component; impaired vestibular and oculomotor function; decreased head/eye motion tolerance; and decreased quality of life secondary to impairments. Clinical presentation consistent with post-concussion deficits layered on whiplash associated disorder. She would benefit from skilled physical therapy services to address listed deficits and improve overall quality of life.     Patient prognosis is Good.   Patient will benefit from skilled outpatient Physical Therapy to address the deficits stated above and in the chart below, provide pt/family education, and to maximize pt's level of independence.     Plan of care discussed with patient: Yes  Patient's spiritual, cultural and educational  needs considered and patient is agreeable to the plan of care and goals as stated below:     Anticipated Barriers for therapy: Work schedule    Medical Necessity is demonstrated by the following  History  Co-morbidities and personal factors that may impact the plan of care Co-morbidities:   Anxiety   GERD (gastroesophageal reflux disease)   Hypertension   Murmur   Scoliosis     Personal Factors:   no deficits     high   Examination  Body Structures and Functions, activity limitations and participation restrictions that may impact the plan of care Body Regions:   head  neck  trunk    Body Systems:    ROM  strength  gross coordinated movement  balance  transfers  transitions  motor control  motor learning    Participation Restrictions:   Decreased quality of life secondary to impairments    Activity limitations:   Learning and applying knowledge  solving problems    General Tasks and Commands  undertaking multiple tasks    Communication  no deficits    Mobility  no deficits    Self care  no deficits    Domestic Life  assisting others    Interactions/Relationships  no deficits    Life Areas  no deficits    Community and Social Life  community life  recreation and leisure         high   Clinical Presentation evolving clinical presentation with changing clinical characteristics moderate   Decision Making/ Complexity Score: moderate     Goals:  Short Term Goals: 4 weeks   Patient will be 100% independent with HEP in order to maximize physical therapy benefits  Patient will improve score on Deep Flexor Endurance Test to >/=15 seconds in order to improve postural endurance for upright ADLs  Patient will complete >/=30 seconds of saccade activity at >/=100 bpm in order to improve oculomotor function for reading tasks    Long Term Goals: 8 weeks   Patient will be 100% independent with HEP in order to maximize physical therapy benefits  Patient will improve score on Deep Flexor Endurance Test to >/=25 seconds in order to  improve postural endurance for upright ADLs  Patient will improve cervical rotation AROM to >/=75 degrees bilaterally in order to improve functional mobility for activities such as driving  Patient will complete >/=30 seconds of saccade activity at >/=115 bpm in order to improve oculomotor function for reading tasks  Patient will complete >/=30 seconds of VOR activity at >/=130 bpm in order to improve vestibular function for fixation tasks    Plan   Plan of care Certification: 6/20/2023 to 8/18/2023.    Outpatient Physical Therapy 2 times weekly for 8 weeks to include the following interventions: Gait Training, Manual Therapy, Moist Heat/ Ice, Neuromuscular Re-ed, Patient Education, Self Care, Therapeutic Activities, Therapeutic Exercise, and Modalities PRN; Functional Dry Needling PRN .     Patient will follow up at [facility] Location    SUSANNE FRANCES PT, DPT, CBIS

## 2023-06-20 NOTE — PROGRESS NOTES
[facility]  Speech Therapy Evaluation - [facility]ussion [facility]    Date: 6/20/2023     Name: Madyson Calle   MRN: [id number]    Therapy Diagnosis:   Encounter Diagnosis   Name Primary?    Concussion with unknown loss of consciousness status, initial encounter     Physician: Huber Vanessa III, MD  Physician Orders: Ambulatory Referral to Speech Therapy   Medical Diagnosis:  Concussion with unknown loss of consciousness status, initial encounter [S06.0XAA]    Visit # / Visits Authorized:  1 / 1   Date of Evaluation:  6/20/2023   Insurance Authorization Period: 6/14/2023 to 6/13/2024  Plan of Care Certification:    6/20/2023 to 8/4/2023      Time In:1145   Time Out: 1240  Procedure Min.   Cognitive Communication Evaluation - including administration, scoring and interpretation   95     Precautions: standard and concussion   Subjective   Date of Onset: 10 days ago  History of Current Condition:  Madyson Calle is a 37 y.o. female who presents to [facility] for evaluation and treatment secondary to post-concussion syndrome. Patient was referred to therapy by Dr. Vanessa at the [facility]. Patient's concussion occurred last Sunday after early morning in the middle of the night and fell while going to the bathroom/unplugging the TV. She fell into the wall (wall dented) with the center forehead. Pt reported initial symptoms included memory loss, fatigue, dizziness. Currently, pt is complaining of occasional headaches, dizziness (mild), sleep disturbance, and occasional word finding.  Patient denied changes in mood. Patient endorsed fatigue. Patient does feel as though she has returned to baseline cognitive communication functioning.    Previous history of:  Previous concussions: denied  Anxiety: endorsed  Depression: endorsed  Learning Disabilities: denied  ADHD: denied  Headaches and/or Migraines: endorsed    Past Medical History: Madyson Calle  has a past medical  history of Abnormal cervical Papanicolaou smear (2010, 2016, 2017), Anxiety, GERD (gastroesophageal reflux disease), Hypertension, Murmur, and Scoliosis.  Madyson Calle  has no past surgical history on file.  Medical Hx and Allergies: Madyson has a current medication list which includes the following prescription(s): amlodipine, arazlo, boric acid (bulk) Powd, dapsone, diclofenac sodium, ergocalciferol, hydroquinone, ibuprofen, insulin syringes (disposable), loratadine, losartan, metoprolol tartrate, and omeprazole, and the following Facility-Administered Medications: levonorgestrel. 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 This questionnaire screens for dysfunction in six domains: Functional Daily Communication, Auditory Comprehension & Information Processing, Expression, Discourse & Social Communication, Reading Comprehension, Written Expression, and Executive Functions & Self-Regulation. The results of the questionnaire are presented below.    Patient completed the questionnaire and  8/45 cognitive communication concerns were  identified. These are listed below.    For the below categories only the patients self-identified complaints are listed for each domain.    Domain Patient Self-Identified Complaints   Functional Daily Communications Difficulties with:  Workplace communications   Auditory Comprehension & Information Processing Difficulties with:  Focusing attention on what is said  Staying on track with the conversation, staying on topic  Holding thoughts in mind while talking or listening  Remembering new conversations, events, and new information   Expression, Discourse & Social Communication Difficulties with:  Word finding, word retrieval, thinking of the word, vocabulary, word choice  Sentence planning, sentence construction, grammar   Reading Comprehension Difficulties with:  Physical difficulties  Retaining read information over time, remembering, organizing   Written Expression Difficulties with:  No difficulties reported in this domain   Thinking, Reasoning, Problem Solving, Executive Functions, Self-Regulation Difficulties with:  No difficulties reported in this domain    Reference: Edith Atkinson (2015) Cognitive Communication Checklist for Acquired Brain Injury (CCCABI) [employer] [facility]; [facility] [place], N1H 6J2 , [email or web address]      The Repeatable Battery for the Assessment of Neuropsychological Status (RBANS) Version B was administered to measure the patient's attention, language, visuospatial/constructional abilities, and immediate and delayed memory. The results are outlined below:    Domain Subtest Total Score Index Score   Immediate Memory List Learning 28   85    Story Memory 17    Visuospatial/  Constructional Figure Copy 19   96    Line Orientation 18    Language Picture Naming 10   85    Semantic Fluency 16    Attention Digit Span 11   88    Coding 48      Delayed Memory List Recall 3     64    List Recognition 18     Story Recall 8     Figure Recall 17        Total Scale   79     Percentile   8      Descriptor   Borderline    Interpretation:  Scaled score: mean of 10, standard deviation of 3. Therefore, 16+ = Very Superior; 14-15 = Superior; 12-13 = High Average; 8-11 = Average; 6-7 = Low Average; 4-5 = Borderline; 3 and below = Extremely Low    Index score: mean of 100, standard deviation of 15. Therefore, 130+ = Very Superior; 120-129 = Superior; 110-119 = High average;  = Average; 80-89 = Low Average; 70-79 = Borderline; 69 and below = Extremely Low. Apparently the most reliable score; factor in education level.    Immediate Memory Score: Recalling information following immediate presentation is assessed through the List Learning and Story Memory subtests. In the List Learning subtest, the patient is given 10 words to remember. This list is presented four times overall. In this subtest, the patient did demonstrate learning over the 4 trials. The patient recalled 6 items on trial one, 6 items on trial two, 8 items on trial three, and 8 items in trial 4. In the Story Memory subtest, the patient recalled 6 details on the first presentation and 9 details on the second presentation. Results of this domain indicate Low average performance.  Visuospatial score: Perceiving spatial relations and constructing a spatially accurate copy of a drawing is assessed through the Figure Copy and Line Orientation subtests. The Figure Copy subtest asks the patient to copy a complex line drawing. The patient completed accurately with one change in orientation. The Line Orientation subtest the presents the patient with 12 line displays and asks the patient to match two given lines at the bottom to the display at the top.  The patient correctly identified 18/20. Results of this domain indicate average performance.  Language score: Naming common items and retrieving learned material is assessed through the Picture Naming and Semantic Fluency subtests. The Picture Naming subtest asks the patient to name 10 line drawings.  The patient was able to accurately name 10/10 items. The Semantic Fluency subtest asks the patient to name as many animals as she can in 60 seconds. The patient was able to name 16 animals. These results indicate Low average performance..   Attention score: Attending to, holding and manipulating information presented visually and orally in working memory is assessed with use of the Digit Span and Coding subtests. The Digit Span subtest asks the patient to repeat progressively lengthening strings of numbers. The Coding subtest asks the patient to alternate attention between a key the given work and then to decode symbols to numbers. The patients results on these subtest indicate Low average performance..  Delayed Memory score: Anterograde memory capacity is assessed through the List Recall, List Recognition, Story Recall, and Figure Recall subtests. The List Recall subtest asks the patient to recall items from the list presented at the beginning of the test. The patient was able to recall 3/10 items. The List Recognition subtest has the patient recall whether a word was or was not in the original list. The patient accurately identified whether a word was or was not on the list in 18 of 20 items. On the Story Recall subtest, the patient was able to recall 8 details. Finally, on the Figure Recall, the patient recalled 17 details. Results of this domain indicate Extremely low performance.    Treatment   Total Treatment Time Separate from Evaluation: not applicable   No treatment performed secondary to time to complete evaluation.     Education provided:   -role of Speech Therapy, goals/plan of care, scheduling/cancellations, insurance limitations with patient    Patient expressed understanding.     Home Program: not yet established, discussed breaks, computer settings and cognitive fatigue briefly.   Assessment     Madyson presents to [facility] status post medical diagnosis of   Concussion with unknown loss of consciousness status, initial encounter [S06.0XAA] .      Interpretation of objective assessment:   Overall, according to the RBANS research, total Scale index is a good indicator of general cognitive functioning. The patient presents with a mild cognitive communication disorder charaterized by deficits in memory and resulting in some high level executive functions deficits and cognitive fatigue. Deficits are likely secondary to oculomotor dysfunction and cognitive fatigue; hwoever, best to address these to prevent falling behind at work.    Demonstrates impairments including limitations as described in the problem list.     Positive prognostic factors: time since onset  Negative prognostic factors: comorbid oculomotor dysfunction  Barriers to therapy: No barriers to therapy identified.     Patient's spiritual, cultural, and educational needs considered and patient agreeable to plan of care and goals.    Patient will benefit from skilled therapy.    Rehab Potential: good    Short Term Goals: (4 weeks) Current Progress:   1. Patient will use attention shifting strategies to shift attention between two tasks every 15 seconds with 90% accuracy to improve alternating attention.     Progressing/ Not Met 6/20/2023   Established this date    2. Patient will use Goal Plan Action Review strategy to complete moderate to complex reasoning, planning, or organization tasks with 90% accuracy independently to improve functional executive function skills.    Progressing/ Not Met 6/20/2023   Established this date    3. Patient will complete short term recall tasks after a 15 minutes delay with 90% accuracy  independently  with use of memory strategies to improve recall of information and generalization of memory strategies.    Progressing/ Not Met 6/20/2023   Established this date    4. Patient will complete high level problem solving based tasks with 90% accuracy independently.    Progressing/ Not  Met 6/20/2023   Established this date    5.Patient will independently implement cognitive/sensory rest periods throughout day after identifying cognitive fatigue including limiting sensory input (sound, light, etc.)     Progressing/ Not Met 6/20/2023   Established this date    6.Patient will independently generate strategies to improve ability to complete tasks with enhanced accuracy and time, based on review of objective previous performance on trials of functional tasks with 80% accuracy.     Progressing/ Not Met 6/20/2023   Established this date        Long Term Goals: (6 weeks) Current Progress:   1. Patient will demonstrate use of self awareness,  goal setting, planning, and  self-monitoring during daily living activities to improve safety and awareness in functional living environment.    Established this date     2. Patient will predict objective performance on completion of actual tasks to increase independence with required return to daily living environment.     Established this date     3. Patient will use appropriate memory strategies to schedule and recall weekly activities, express needs and recall names to maintain safety and participate socially in functional living environment.     Established this date       Plan     Recommended Treatment Plan:  Patient will participate in the [facility] program for speech therapy 1 times per week for 6 weeks to address her Cognition deficits, to educate patient and their family, and to participate in a home exercise program.    Follow up will occur at [facility] for skilled Speech Therapy services.    Therapist's Name:   Samira Perez CCC-SLP   6/20/2023

## 2023-06-30 ENCOUNTER — PATIENT MESSAGE (OUTPATIENT)
Dept: NEUROLOGY | Facility: CLINIC | Age: 38
End: 2023-06-30
Payer: COMMERCIAL

## 2023-07-03 ENCOUNTER — CLINICAL SUPPORT (OUTPATIENT)
Dept: REHABILITATION | Facility: HOSPITAL | Age: 38
End: 2023-07-03
Payer: COMMERCIAL

## 2023-07-03 DIAGNOSIS — G44.86 CERVICOGENIC HEADACHE: Primary | ICD-10-CM

## 2023-07-03 DIAGNOSIS — H55.81 SACCADIC EYE MOVEMENT DEFICIENCY: ICD-10-CM

## 2023-07-03 DIAGNOSIS — H81.90 VESTIBULOPATHY, UNSPECIFIED LATERALITY: ICD-10-CM

## 2023-07-03 PROCEDURE — 97112 NEUROMUSCULAR REEDUCATION: CPT | Mod: PO

## 2023-07-03 PROCEDURE — 97140 MANUAL THERAPY 1/> REGIONS: CPT | Mod: PO

## 2023-07-03 NOTE — PROGRESS NOTES
[facility]   Physical Therapy Treatment Note      Name: Madyson Calle  Clinic Number: [id number]    Therapy Diagnosis:   Encounter Diagnoses   Name Primary?    Cervicogenic headache Yes    Vestibulopathy, unspecified laterality     Saccadic eye movement deficiency      Physician: Huber Vanessa III, MD    Visit Date: 7/3/2023  Physician Orders: PT Eval and Treat   Medical Diagnosis from Referral: Concussion with unknown loss of consciousness status, initial encounter [S06.0XAA]  Evaluation Date: 6/20/2023  Authorization Period Expiration: 6/13/2024  Plan of Care Expiration: 8/18/2023  Visit # / Visits authorized: 1/ 20    Time In:11:15  Time Out: 12:10  Total Billable Time: 55    PTA Visit #: 0/5       Subjective     Pt reports: I have been feeling more off balance, bent down and felt like she was she going to fall over.  Flew out last Friday to [place], had ear ringing with little dizziness.  [place] ok in the airport. Waking up every 30 or so minutes at night, not sleeping well.  Denies any dizziness today.   She was not compliant with home exercise program.  Response to previous treatment: did not feel well after eval  Functional change: on going     Pain: 1/10  Location: headache     Objective      Objective Measures updated at progress report unless specified.     Treatment     Madyson received the treatments listed below:      Vitals at beginning of session:  /103  HR 72    manual therapy techniques: Joint mobilizations, Myofacial release, and Soft tissue Mobilization were applied to the: cervical for 15 minutes, including:  Left cervical rotation ~50% pre manual therapy    Left first rib mob with deep breathing  Left first rib mob over pressure of left elbow on right shoulder   Fascial release of lower cervical spine with flexion  Grade I-II left C4-6 sidebend/rotation    Educated and demo'd Seated left first rib self mobilization with towel stretch    Left cervical rotation ~75%  post manual     neuromuscular re-education activities to improve: Balance, Coordination, Kinesthetic, and Proprioception for 40 minutes. The following activities were included:     1 x 30 sec VOR x 1 horizontal, seated, clean background small A   1 x 30 sec VOR x 1 vertical, seated, clean background +headache, dizzy    X 45 sec Gaze shift horizontal  X 45 sec Gaze shift vertical + dizzy when looking up 5/10    On airex pad:  X 30 sec feet together eyes open  X 30 sec feet together head rotation + dizziness   X 30 sec feet together head nods + nausea  X 30 sec feet together eyes closed    Time spend on sleep education, symptoms following concussion, balance integration, purpose of interventions    therapeutic activities to improve functional performance for 0  minutes, including:        Patient Education and Home Exercises       Education provided:   - sleep education, symptoms following concussion, balance integration, purpose of interventions  HEP: VOR x 1 and first rib mob    Written Home Exercises Provided: yes. Exercises were reviewed and Madyson was able to demonstrate them prior to the end of the session.  Madyson demonstrated good  understanding of the education provided. See EMR under Patient Instructions for exercises provided during therapy sessions    Assessment     Ms. Calle tolerated session fairly well today.  She initially had elevated blood pressure, for which is being followed by neurology.  She had increased head pressure, dizziness and nausea with vestibular exercises today limiting amount of reps performed and with rest breaks between activities.  Her dizziness increased from 0 to a 5/10 with VOR x 1 and standing on foam pad activity. She also demo's limited cervical rotation range of motion to the left, which improved to about 75% of full range of motion post session.  She also notes decreased dizziness after laying down for rest post session.  She will continue to benefit from skilled Physical  Therapy to address impairments and maximize mobility.     Madyson Is progressing well towards her goals.   Pt prognosis is Good.     Pt will continue to benefit from skilled outpatient physical therapy to address the deficits listed in the problem list box on initial evaluation, provide pt/family education and to maximize pt's level of independence in the home and community environment.     Pt's spiritual, cultural and educational needs considered and pt agreeable to plan of care and goals.     Anticipated barriers to physical therapy: none    Goals: Short Term Goals: 4 weeks   Patient will be 100% independent with HEP in order to maximize physical therapy benefits on going   Patient will improve score on Deep Flexor Endurance Test to >/=15 seconds in order to improve postural endurance for upright ADLs on going   Patient will complete >/=30 seconds of saccade activity at >/=100 bpm in order to improve oculomotor function for reading tasks on going      Long Term Goals: 8 weeks   Patient will be 100% independent with HEP in order to maximize physical therapy benefits on going   Patient will improve score on Deep Flexor Endurance Test to >/=25 seconds in order to improve postural endurance for upright ADLs on going   Patient will improve cervical rotation AROM to >/=75 degrees bilaterally in order to improve functional mobility for activities such as driving on going   Patient will complete >/=30 seconds of saccade activity at >/=115 bpm in order to improve oculomotor function for reading tasks on going   Patient will complete >/=30 seconds of VOR activity at >/=130 bpm in order to improve vestibular function for fixation tasks on going     Plan     Cont to progress Vestibular exercises as tolerated    Nathalia Lima, PT

## 2023-07-06 NOTE — PROGRESS NOTES
"  [facility]   Physical Therapy Treatment Note      Name: Madyson Calle  Clinic Number: [id number]    Therapy Diagnosis:   Encounter Diagnoses   Name Primary?    Cervicogenic headache Yes    Vestibulopathy, unspecified laterality     Saccadic eye movement deficiency        Physician: Huber Vanessa III, MD    Visit Date: 7/7/2023  Physician Orders: PT Eval and Treat   Medical Diagnosis from Referral: Concussion with unknown loss of consciousness status, initial encounter [S06.0XAA]  Evaluation Date: 6/20/2023  Authorization Period Expiration: 6/13/2024  Plan of Care Expiration: 8/18/2023  Visit # / Visits authorized: 2/ 20    Time In:11:45  Time Out: 12:30  Total Billable Time: 45 minutes     PTA Visit #: 0/5       Subjective     Pt reports:  this week she has been feeling a lot better, headaches and sleeping has improved. The patient reports the main thing she notices now is that she is still very sensitive to light.     She was not compliant with home exercise program.  Response to previous treatment: did not feel well after eval  Functional change: on going     Pain: 1/10  Location: headache     Objective      Objective Measures updated at progress report unless specified.     Functional Gait Assessment:   1. Gait on level surface =  3  2. Change in Gait Speed = 3  3. Gait with horizontal head turns  = 2 slight dizziness reported   4. Gait with vertical head turns = 2, moderate dizziness   5. Gait with pivot turns = 2  6. Step over obstacle = 3  7. Gait with Narrow GRETCHEN = 2  8. Gait with eyes closed = 3  9. Ambulating Backwards = 2  10. Steps = 3    Score 25/30       JULIA SENSORY TESTING:  (P= Pass, F= Fail; note any sway; hold each position for 30")  Condition 1: (firm surface/feet together/eyes open) P   Condition 2: (firm surface/feet together/eyes closed) P  Condition 3: (firm surface/feet in tandem/eyes open) P BLE leading  Condition 4: (firm surface/feet in tandem/eyes closed) 12 " seconds with the LLE leading, P with RLE leading   Condition 5: (soft surface/feet together/eyes open) P  Condition 6: (soft surface/feet together/eyes closed) P  Condition 7: (Fakuda step test), measure distance varied from center starting position NT      Treatment     Madyson received the treatments listed below:      manual therapy techniques: Joint mobilizations, Myofacial release, and Soft tissue Mobilization were applied to the: cervical for 0 minutes, including:      neuromuscular re-education activities to improve: Balance, Coordination, Kinesthetic, and Proprioception for 15  minutes. The following activities were included:     1 x 30 sec VOR x 1 horizontal, seated, clean background small A, no dizziness but slight headache    1 x 30 sec VOR x 1 vertical, seated, clean background no headache, dizzy    2 x 30 sec Gaze shift horizontal, patient report nausea and pressure in head but no headache or dizziness   2 x 45  sec Gaze shift vertical slight dizziness and pressure       therapeutic activities to improve functional performance for 30  minutes, including:    Time above includes time to complete objective measures       Patient Education and Home Exercises       Education provided:   - sleep education, symptoms following concussion, balance integration, purpose of interventions  HEP: VOR x 1 and first rib mob    Written Home Exercises Provided: yes. Exercises were reviewed and Madyson was able to demonstrate them prior to the end of the session.  Madyson demonstrated good  understanding of the education provided. See EMR under Patient Instructions for exercises provided during therapy sessions    Assessment     Ms. Calle tolerated session fairly well today.  Patient requires frequent rest breaks due to headache/ dizziness sensation. Large portion of session spent discussing the patient's symptoms and recent changes. She reports she no longer has been experiencing neck pain. She will continue to benefit  from skilled Physical Therapy to address impairments and maximize mobility.    Madyson Is progressing well towards her goals.   Pt prognosis is Good.     Pt will continue to benefit from skilled outpatient physical therapy to address the deficits listed in the problem list box on initial evaluation, provide pt/family education and to maximize pt's level of independence in the home and community environment.     Pt's spiritual, cultural and educational needs considered and pt agreeable to plan of care and goals.     Anticipated barriers to physical therapy: none    Goals: Short Term Goals: 4 weeks   Patient will be 100% independent with HEP in order to maximize physical therapy benefits on going   Patient will improve score on Deep Flexor Endurance Test to >/=15 seconds in order to improve postural endurance for upright ADLs on going   Patient will complete >/=30 seconds of saccade activity at >/=100 bpm in order to improve oculomotor function for reading tasks on going      Long Term Goals: 8 weeks   Patient will be 100% independent with HEP in order to maximize physical therapy benefits on going   Patient will improve score on Deep Flexor Endurance Test to >/=25 seconds in order to improve postural endurance for upright ADLs on going   Patient will improve cervical rotation AROM to >/=75 degrees bilaterally in order to improve functional mobility for activities such as driving on going   Patient will complete >/=30 seconds of saccade activity at >/=115 bpm in order to improve oculomotor function for reading tasks on going   Patient will complete >/=30 seconds of VOR activity at >/=130 bpm in order to improve vestibular function for fixation tasks on going   Patient will improve FGA score to 28/30 for decreased risk of falls and improved functional mobility.     Plan     Cont to progress Vestibular exercises as tolerated    Rita Hardwick PT

## 2023-07-07 ENCOUNTER — CLINICAL SUPPORT (OUTPATIENT)
Dept: REHABILITATION | Facility: HOSPITAL | Age: 38
End: 2023-07-07
Payer: COMMERCIAL

## 2023-07-07 DIAGNOSIS — H81.90 VESTIBULOPATHY, UNSPECIFIED LATERALITY: ICD-10-CM

## 2023-07-07 DIAGNOSIS — G44.86 CERVICOGENIC HEADACHE: Primary | ICD-10-CM

## 2023-07-07 DIAGNOSIS — H55.81 SACCADIC EYE MOVEMENT DEFICIENCY: ICD-10-CM

## 2023-07-07 PROCEDURE — 97530 THERAPEUTIC ACTIVITIES: CPT | Mod: PO

## 2023-07-07 PROCEDURE — 97112 NEUROMUSCULAR REEDUCATION: CPT | Mod: PO

## 2023-07-11 ENCOUNTER — TELEPHONE (OUTPATIENT)
Dept: REHABILITATION | Facility: HOSPITAL | Age: 38
End: 2023-07-11

## 2023-07-11 ENCOUNTER — DOCUMENTATION ONLY (OUTPATIENT)
Dept: REHABILITATION | Facility: HOSPITAL | Age: 38
End: 2023-07-11

## 2023-07-11 NOTE — TELEPHONE ENCOUNTER
Talked to Ms. Coughlin today regarding speech therapy appointment. She reports she forgot she had an appointment today, but would like to reschedule. She states she will reschedule the next time she comes into the clinic.     BHARAT San (Ali), CCC-SLP  Speech Language Pathologist   7/11/2023

## 2023-07-11 NOTE — PROGRESS NOTES
No Show Note/Documentation    Patient: Madyson Calle  Date of Session: 7/11/2023  Diagnosis: No diagnosis found.   MRN: [id number]    Madyson Calle did not attend her  scheduled therapy appointment today. She did not call to cancel nor reschedule. This is the 1st appointment that she has not attended. Next appointment is scheduled for possible 7/14 (or will be rescheduled to a later date) and will follow up with patient at that time. No charges have been posted today.     BHARAT San, CCC-SLP   7/11/2023

## 2023-07-13 ENCOUNTER — OFFICE VISIT (OUTPATIENT)
Dept: PODIATRY | Facility: CLINIC | Age: 38
End: 2023-07-13
Payer: COMMERCIAL

## 2023-07-13 ENCOUNTER — HOSPITAL ENCOUNTER (OUTPATIENT)
Dept: RADIOLOGY | Facility: HOSPITAL | Age: 38
Discharge: HOME | End: 2023-07-13
Attending: PODIATRIST
Payer: COMMERCIAL

## 2023-07-13 VITALS
HEIGHT: 63 IN | DIASTOLIC BLOOD PRESSURE: 103 MMHG | WEIGHT: 125 LBS | HEART RATE: 80 BPM | BODY MASS INDEX: 22.15 KG/M2 | SYSTOLIC BLOOD PRESSURE: 149 MMHG

## 2023-07-13 DIAGNOSIS — M20.5X1 CONTRACTURE OF TOE OF RIGHT FOOT: ICD-10-CM

## 2023-07-13 DIAGNOSIS — Q72.891 BRACHYMETATARSIA OF RIGHT FOOT: ICD-10-CM

## 2023-07-13 DIAGNOSIS — Q72.891 BRACHYMETATARSIA OF RIGHT FOOT: Primary | ICD-10-CM

## 2023-07-13 PROCEDURE — 73630 X-RAY EXAM OF FOOT: CPT | Mod: TC,50,PN

## 2023-07-13 PROCEDURE — 3080F DIAST BP >= 90 MM HG: CPT | Mod: CPTII,S$GLB,, | Performed by: PODIATRIST

## 2023-07-13 PROCEDURE — 3077F SYST BP >= 140 MM HG: CPT | Mod: CPTII,S$GLB,, | Performed by: PODIATRIST

## 2023-07-13 PROCEDURE — 3008F BODY MASS INDEX DOCD: CPT | Mod: CPTII,S$GLB,, | Performed by: PODIATRIST

## 2023-07-13 PROCEDURE — 3044F PR MOST RECENT HEMOGLOBIN A1C LEVEL <7.0%: ICD-10-PCS | Mod: CPTII,S$GLB,, | Performed by: PODIATRIST

## 2023-07-13 PROCEDURE — 4010F PR ACE/ARB THEARPY RXD/TAKEN: ICD-10-PCS | Mod: CPTII,S$GLB,, | Performed by: PODIATRIST

## 2023-07-13 PROCEDURE — 1159F PR MEDICATION LIST DOCUMENTED IN MEDICAL RECORD: ICD-10-PCS | Mod: CPTII,S$GLB,, | Performed by: PODIATRIST

## 2023-07-13 PROCEDURE — 1159F MED LIST DOCD IN RCRD: CPT | Mod: CPTII,S$GLB,, | Performed by: PODIATRIST

## 2023-07-13 PROCEDURE — 73630 X-RAY EXAM OF FOOT: CPT | Mod: 26,LT,, | Performed by: RADIOLOGY

## 2023-07-13 PROCEDURE — 4010F ACE/ARB THERAPY RXD/TAKEN: CPT | Mod: CPTII,S$GLB,, | Performed by: PODIATRIST

## 2023-07-13 PROCEDURE — 3008F PR BODY MASS INDEX (BMI) DOCUMENTED: ICD-10-PCS | Mod: CPTII,S$GLB,, | Performed by: PODIATRIST

## 2023-07-13 PROCEDURE — 99999 PR PBB SHADOW E&M-EST. PATIENT-LVL IV: ICD-10-PCS | Mod: PBBFAC,,, | Performed by: PODIATRIST

## 2023-07-13 PROCEDURE — 73630 PR  X-RAY FOOT 3+ VW: ICD-10-PCS | Mod: 26,LT,, | Performed by: RADIOLOGY

## 2023-07-13 PROCEDURE — 99214 OFFICE O/P EST MOD 30 MIN: CPT | Mod: S$GLB,,, | Performed by: PODIATRIST

## 2023-07-13 PROCEDURE — 3044F HG A1C LEVEL LT 7.0%: CPT | Mod: CPTII,S$GLB,, | Performed by: PODIATRIST

## 2023-07-13 PROCEDURE — 99999 PR PBB SHADOW E&M-EST. PATIENT-LVL IV: CPT | Mod: PBBFAC,,, | Performed by: PODIATRIST

## 2023-07-13 PROCEDURE — 73630 X-RAY EXAM OF FOOT: CPT | Mod: 26,RT,, | Performed by: RADIOLOGY

## 2023-07-13 PROCEDURE — 99214 PR OFFICE/OUTPT VISIT, EST, LEVL IV, 30-39 MIN: ICD-10-PCS | Mod: S$GLB,,, | Performed by: PODIATRIST

## 2023-07-13 PROCEDURE — 1160F PR REVIEW ALL MEDS BY PRESCRIBER/CLIN PHARMACIST DOCUMENTED: ICD-10-PCS | Mod: CPTII,S$GLB,, | Performed by: PODIATRIST

## 2023-07-13 PROCEDURE — 3080F PR MOST RECENT DIASTOLIC BLOOD PRESSURE >= 90 MM HG: ICD-10-PCS | Mod: CPTII,S$GLB,, | Performed by: PODIATRIST

## 2023-07-13 PROCEDURE — 1160F RVW MEDS BY RX/DR IN RCRD: CPT | Mod: CPTII,S$GLB,, | Performed by: PODIATRIST

## 2023-07-13 PROCEDURE — 3077F PR MOST RECENT SYSTOLIC BLOOD PRESSURE >= 140 MM HG: ICD-10-PCS | Mod: CPTII,S$GLB,, | Performed by: PODIATRIST

## 2023-07-13 NOTE — PROGRESS NOTES
"Subjective:     Patient ID: Madyson Calle is a 37 y.o. female.    Chief Complaint: Foot Problem (Toe deformity to right 4th toe)    Referral from Dr. 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  Patient relates pain in enclosed shoes secondary to rubbing and irritation against the 4th toe.  Relates that she stubbed her toe when she was younger and this may have contributed to it.  She has an uncle that has a similar condition in both feet.  For more relates that she would a slip and fall in the middle of the night and suffered a concussion several weeks ago.  She was receiving physical therapy twice weekly which has helped in the symptoms are gradually improving.    Vitals:    07/13/23 0855   BP: (!) 149/103   Pulse: 80   Weight: 56.7 kg (125 lb)   Height: 5' 3" (1.6 m)   PainSc: 0-No pain   PainLoc: Toe      Past Medical History:   Diagnosis Date    Abnormal cervical Papanicolaou smear 2010, 2016, 2017    Colposcopy    Anxiety     GERD (gastroesophageal reflux disease)     Hypertension     Murmur     Scoliosis        History reviewed. No pertinent surgical history.    Family History   Problem Relation Age of Onset    No Known Problems Mother     Hypertension Father     Diabetes Father     Heart murmur Father     Prostate cancer Father     Bipolar disorder Sister     Hypertension Maternal Grandmother     Stroke Maternal Grandfather     Heart attack Maternal Grandfather     Diabetes Maternal Grandfather     Hyperlipidemia Maternal Grandfather     No Known Problems Paternal Grandmother     No Known Problems Paternal Grandfather     Colon cancer Paternal Aunt     No Known Problems Daughter     Breast cancer Neg Hx        Social History     Socioeconomic History    Marital status: Single   Tobacco Use    Smoking status: Never     Passive exposure: Never    Smokeless tobacco: Never   Substance and Sexual Activity    Alcohol use: Yes     Alcohol/week: 7.0 standard drinks     Types: 7 Glasses of wine per week "     Comment: SIOCA    Drug use: Not Currently     Types: Marijuana    Sexual activity: Yes     Partners: Male     Birth control/protection: I.U.D.       Current Outpatient Medications   Medication Sig Dispense Refill    amLODIPine (NORVASC) 5 MG tablet Take 1 tablet (5 mg total) by mouth once daily. 90 tablet 3    ARAZLO 0.045 % Lotn       boric acid (bulk) Powd Insert one Gelatin Capsule filled with 600 mg of Boric Acid Powder H.S. 100 capsule 4    dapsone (ACZONE) 7.5 % GlwP       diclofenac sodium (VOLTAREN) 1 % Gel Apply 2 g topically 3 (three) times daily. 100 g 0    ergocalciferol (ERGOCALCIFEROL) 50,000 unit Cap Take 1 capsule (50,000 Units total) by mouth every 7 days. 4 capsule 11    hydroquinone 4 % Crea Apply to dark spots once daily. Use with sunscreen if outdoors 28 g 1    ibuprofen (ADVIL,MOTRIN) 800 MG tablet Take 1 tablet (800 mg total) by mouth 3 (three) times daily as needed for Pain. Take with food 30 tablet 0    insulin syringes, disposable, 1 mL Syrg 1 Units by Misc.(Non-Drug; Combo Route) route every 14 (fourteen) days for 30 days, THEN 1 Units every 28 days. 1 each 5    loratadine (CLARITIN) 10 mg tablet Take 1 tablet (10 mg total) by mouth once daily. 30 tablet 11    losartan (COZAAR) 25 MG tablet Take 1 tablet (25 mg total) by mouth once daily. 90 tablet 3    methylPREDNISolone (MEDROL DOSEPACK) 4 mg tablet use as directed 1 each 0    metoprolol tartrate (LOPRESSOR) 25 MG tablet Take 1 tablet (25 mg total) by mouth 2 (two) times daily. 120 tablet 3    omeprazole (PRILOSEC) 40 MG capsule Take 40 mg by mouth as needed.      ondansetron (ZOFRAN-ODT) 4 MG TbDL Take 1 tablet (4 mg total) by mouth every 8 (eight) hours as needed (nausea). 20 tablet 6     Current Facility-Administered Medications   Medication Dose Route Frequency Provider Last Rate Last Admin    levonorgestreL (MIRENA) 20 mcg/24 hours (8 yrs) 52 mg IUD 1 Intra Uterine Device  1 Intra Uterine Device Intrauterine  Juan  MD Veena   1 Intra Uterine Device at 01/03/23 9640       Review of patient's allergies indicates:  No Known Allergies      Review of Systems   Constitutional: Negative for chills, fever and malaise/fatigue.   Cardiovascular:  Negative for chest pain, claudication and leg swelling.   Respiratory:  Negative for cough and shortness of breath.    Musculoskeletal:  Negative for back pain, joint pain, muscle cramps and muscle weakness.   Gastrointestinal:  Negative for nausea and vomiting.   Neurological:  Negative for numbness, paresthesias and weakness.   Psychiatric/Behavioral:  Negative for altered mental status.       Objective:     Physical Exam  Constitutional:       General: She is not in acute distress.     Appearance: Normal appearance. She is not ill-appearing.   Cardiovascular:      Pulses:           Dorsalis pedis pulses are 2+ on the right side and 2+ on the left side.        Posterior tibial pulses are 2+ on the right side and 2+ on the left side.      Comments: No lower extremity edema bilateral.  Skin temp is warm to foot bilateral.  No rubor on dependency bilateral foot.  Musculoskeletal:      Comments: Shortened appearance to the right 4th toe with dorsal contracture at the MTP.  Upon palpation of 4th metatarsal head along the plantar aspect is noted to be more proximal than the 3rd and 5th metatarsals right foot.  No localized pain on palpation or with range of motion right foot.  Overall rectus appearing foot type bilateral.   Skin:     General: Skin is warm.      Capillary Refill: Capillary refill takes less than 2 seconds.      Findings: No ecchymosis or erythema.      Nails: There is no clubbing.   Neurological:      Mental Status: She is alert and oriented to person, place, and time.      Sensory: Sensation is intact.      Motor: Motor function is intact.           Assessment:      Encounter Diagnoses   Name Primary?    Brachymetatarsia of right foot Yes    Contracture of toe of right  foot      Plan:     Madyson was seen today for foot problem.    Diagnoses and all orders for this visit:    Brachymetatarsia of right foot  -     X-Ray Foot Complete 3 view Bilateral; Future    Contracture of toe of right foot      I counseled the patient on her conditions, their implications and medical management.    Ordered bilateral foot weight-bearing x-ray to assess the underlying osseous pathology.    We discussed that most likely she has breaking metatarsalgia of the right 4th metatarsal.  We briefly discussed possible surgical intervention consisting of lengthening the 4th metatarsal either gradually or acutely depending on the length.    Continue conservative care for now.      RTC p.r.n. as discussed follow her weight-bearing x-ray to further discuss.    A portion of this note was generated by voice recognition software and may contain spelling and grammar errors.      .

## 2023-07-13 NOTE — PROGRESS NOTES
[facility]   Physical Therapy Treatment Note      Name: Madyson Calle  Clinic Number: [id number]    Therapy Diagnosis:   Encounter Diagnoses   Name Primary?    Cervicogenic headache Yes    Vestibulopathy, unspecified laterality     Saccadic eye movement deficiency          Physician: Huber Vanessa III, MD    Visit Date: 7/14/2023  Physician Orders: PT Eval and Treat   Medical Diagnosis from Referral: Concussion with unknown loss of consciousness status, initial encounter [S06.0XAA]  Evaluation Date: 6/20/2023  Authorization Period Expiration: 6/13/2024  Plan of Care Expiration: 8/18/2023  Visit # / Visits authorized: 3/ 20    Time In:11:45  Time Out: 12:25  Total Billable Time: 40  minutes     PTA Visit #: 0/5       Subjective     Pt reports:  that she has not experienced any dizziness or headache symptoms in the past week and has been feeling a lot better recently    She was not compliant with home exercise program.  Response to previous treatment: did not feel well after eval  Functional change: on going     Pain: 1/10  Location: headache     Objective      Objective Measures updated at progress report unless specified.     Treatment     Madyson received the treatments listed below:      Therapeutic exercise for strength, mobility and posture for 15 minutes:      3 x 30 seconds levator scap stretch   3 x 30 seconds upper trap stretch     X 10 reps with 5 second holds chin tucks seated EOM     2 x 10 Seated scapular retractions     neuromuscular re-education activities to improve: Balance, Coordination, Kinesthetic, and Proprioception for 10  minutes. The following activities were included:       3 x 30 seconds static standing on foam fitter, eyes closed, CGA    2 x 30 seconds BLE tandem stance with eyes closed, CGA     therapeutic activities to improve functional performance for 15  minutes, including:    2 x 100 feet ambulation with vertical head turns, CGA, slight dizziness   2 x 100  feet ambulation with horizontal head turns, CGA, mod dizziness   2 x 100 feet ambulation with L<>R diagonal head turns, CGA, significant dizziness  - patient  requires multiple standing rest breaks when performing activity due to dizziness       Time above includes time to complete objective measures       Patient Education and Home Exercises       Education provided:   - sleep education, symptoms following concussion, balance integration, purpose of interventions  HEP: VOR x 1 and first rib mob    Written Home Exercises Provided: yes. Exercises were reviewed and Madyson was able to demonstrate them prior to the end of the session.  Madyson demonstrated good  understanding of the education provided. See EMR under Patient Instructions for exercises provided during therapy sessions    Assessment     Ms. Calle tolerated session fairly well today. Patient reports her symptoms have continued to decrease since the previous week. Patient reports significant dizziness when performing diagonal head turns with ambulation and required multiple standing rest breaks. Patient was also given neck stretches to perform for home exercise program.  She will continue to benefit from skilled Physical Therapy to address impairments and maximize mobility.      Madyson Is progressing well towards her goals.   Pt prognosis is Good.     Pt will continue to benefit from skilled outpatient physical therapy to address the deficits listed in the problem list box on initial evaluation, provide pt/family education and to maximize pt's level of independence in the home and community environment.     Pt's spiritual, cultural and educational needs considered and pt agreeable to plan of care and goals.     Anticipated barriers to physical therapy: none    Goals: Short Term Goals: 4 weeks   Patient will be 100% independent with HEP in order to maximize physical therapy benefits on going   Patient will improve score on Deep Flexor Endurance Test to  >/=15 seconds in order to improve postural endurance for upright ADLs on going   Patient will complete >/=30 seconds of saccade activity at >/=100 bpm in order to improve oculomotor function for reading tasks on going      Long Term Goals: 8 weeks   Patient will be 100% independent with HEP in order to maximize physical therapy benefits on going   Patient will improve score on Deep Flexor Endurance Test to >/=25 seconds in order to improve postural endurance for upright ADLs on going   Patient will improve cervical rotation AROM to >/=75 degrees bilaterally in order to improve functional mobility for activities such as driving on going   Patient will complete >/=30 seconds of saccade activity at >/=115 bpm in order to improve oculomotor function for reading tasks on going   Patient will complete >/=30 seconds of VOR activity at >/=130 bpm in order to improve vestibular function for fixation tasks on going   Patient will improve FGA score to 28/30 for decreased risk of falls and improved functional mobility.     Plan     Cont to progress Vestibular exercises as tolerated    Rita Hardwick, PT

## 2023-07-14 ENCOUNTER — CLINICAL SUPPORT (OUTPATIENT)
Dept: REHABILITATION | Facility: HOSPITAL | Age: 38
End: 2023-07-14
Payer: COMMERCIAL

## 2023-07-14 DIAGNOSIS — H81.90 VESTIBULOPATHY, UNSPECIFIED LATERALITY: ICD-10-CM

## 2023-07-14 DIAGNOSIS — H55.81 SACCADIC EYE MOVEMENT DEFICIENCY: ICD-10-CM

## 2023-07-14 DIAGNOSIS — G44.86 CERVICOGENIC HEADACHE: Primary | ICD-10-CM

## 2023-07-14 PROCEDURE — 97110 THERAPEUTIC EXERCISES: CPT | Mod: PO

## 2023-07-14 PROCEDURE — 97530 THERAPEUTIC ACTIVITIES: CPT | Mod: PO

## 2023-07-14 PROCEDURE — 97112 NEUROMUSCULAR REEDUCATION: CPT | Mod: PO

## 2023-07-20 ENCOUNTER — HOSPITAL ENCOUNTER (EMERGENCY)
Facility: HOSPITAL | Age: 38
Discharge: HOME | End: 2023-07-20
Attending: EMERGENCY MEDICINE
Payer: COMMERCIAL

## 2023-07-20 VITALS
RESPIRATION RATE: 16 BRPM | HEART RATE: 65 BPM | SYSTOLIC BLOOD PRESSURE: 124 MMHG | DIASTOLIC BLOOD PRESSURE: 66 MMHG | TEMPERATURE: 98 F | OXYGEN SATURATION: 100 %

## 2023-07-20 DIAGNOSIS — R51.9 NONINTRACTABLE HEADACHE, UNSPECIFIED CHRONICITY PATTERN, UNSPECIFIED HEADACHE TYPE: Primary | ICD-10-CM

## 2023-07-20 PROCEDURE — 63600175 PHARM REV CODE 636 W HCPCS: Performed by: PHYSICIAN ASSISTANT

## 2023-07-20 PROCEDURE — 63600175 PHARM REV CODE 636 W HCPCS: Performed by: EMERGENCY MEDICINE

## 2023-07-20 PROCEDURE — 25000003 PHARM REV CODE 250: Performed by: EMERGENCY MEDICINE

## 2023-07-20 PROCEDURE — 96372 THER/PROPH/DIAG INJ SC/IM: CPT | Performed by: PHYSICIAN ASSISTANT

## 2023-07-20 PROCEDURE — 99284 EMERGENCY DEPT VISIT MOD MDM: CPT

## 2023-07-20 RX ORDER — KETOROLAC TROMETHAMINE 30 MG/ML
30 INJECTION, SOLUTION INTRAMUSCULAR; INTRAVENOUS
Status: COMPLETED | OUTPATIENT
Start: 2023-07-20 | End: 2023-07-20

## 2023-07-20 RX ORDER — PROCHLORPERAZINE MALEATE 5 MG
10 TABLET ORAL
Status: COMPLETED | OUTPATIENT
Start: 2023-07-20 | End: 2023-07-20

## 2023-07-20 RX ORDER — ACETAMINOPHEN 500 MG
500 TABLET ORAL
Status: COMPLETED | OUTPATIENT
Start: 2023-07-20 | End: 2023-07-20

## 2023-07-20 RX ORDER — METOCLOPRAMIDE 10 MG/1
10 TABLET ORAL EVERY 6 HOURS
Qty: 30 TABLET | Refills: 0 | Status: SHIPPED | OUTPATIENT
Start: 2023-07-20 | End: 2024-01-31

## 2023-07-20 RX ADMIN — KETOROLAC TROMETHAMINE 30 MG: 30 INJECTION, SOLUTION INTRAMUSCULAR; INTRAVENOUS at 07:07

## 2023-07-20 RX ADMIN — PROCHLORPERAZINE MALEATE 10 MG: 5 TABLET ORAL at 08:07

## 2023-07-20 RX ADMIN — ACETAMINOPHEN 500 MG: 500 TABLET ORAL at 08:07

## 2023-07-20 NOTE — PROGRESS NOTES
[facility]   Physical Therapy Treatment Note      Name: Madyson Calle  Clinic Number: [id number]    Therapy Diagnosis:   Encounter Diagnoses   Name Primary?    Cervicogenic headache Yes    Vestibulopathy, unspecified laterality     Saccadic eye movement deficiency            Physician: Huber Vanessa III, MD    Visit Date: 7/21/2023  Physician Orders: PT Eval and Treat   Medical Diagnosis from Referral: Concussion with unknown loss of consciousness status, initial encounter [S06.0XAA]  Evaluation Date: 6/20/2023  Authorization Period Expiration: 6/13/2024  Plan of Care Expiration: 8/18/2023  Visit # / Visits authorized: 4/ 20    Time In:11:45  Time Out: 12:30  Total Billable Time: 45  minutes     PTA Visit #: 0/5       Subjective     Pt reports:  that she went to the hospital last night because she had a headache that lasted three days. She reports they were not helpful and just gave her medicine and sent her home     She was not compliant with home exercise program.  Response to previous treatment: did not feel well after eval  Functional change: on going     Pain: 1/10  Location: headache     Objective      Objective Measures updated at progress report unless specified.     Treatment     Madyson received the treatments listed below:      Therapeutic exercise for strength, mobility and posture for 15  minutes:      3 x 30 seconds levator scap stretch   3 x 30 seconds upper trap stretch     X 10 with 3 ' holds of shoulder flexion with dowel while supine on mat    X 10 bilateral directions open books while sidelying     Manual therapy for 8 minutes:     8 minutes with:   - with suboccipital release  - STM of upper trap, SCM, suboccipital region, levator scap   - passive range of motion of rotation and side bending     neuromuscular re-education activities to improve: Balance, Coordination, Kinesthetic, and Proprioception for 10  minutes. The following activities were included:     3 x 30  seconds static standing on foam fitter, eyes closed, CGA    2 x 30 seconds BLE tandem stance with eyes closed, CGA     2 x 30 seconds split stance with leading leg on foam fitter board, CGA    therapeutic activities to improve functional performance for 10 minutes, including:    2 x 100 feet ambulation with vertical head turns, CGA   2 x 100 feet ambulation with horizontal head turns, CGA   - multiple rest breaks required throughout trials due to significant dizziness        Patient Education and Home Exercises       Education provided:   - sleep education, symptoms following concussion, balance integration, purpose of interventions  HEP: VOR x 1 and first rib mob    Written Home Exercises Provided: yes. Exercises were reviewed and Madyson was able to demonstrate them prior to the end of the session.  Madyson demonstrated good  understanding of the education provided. See EMR under Patient Instructions for exercises provided during therapy sessions    Assessment     Ms. Calle tolerated session fairly well today. The patient reports significant flare up of headache symptoms that caused her to go to the ER last nigth. Patient reports she has been feeling better since she took medicine. She reports significant dizziness with walking and horizontal/ vertical head turns. Patient required multiple breaks throughout session due to high symptoms. She will continue to benefit from skilled Physical Therapy to address impairments and maximize mobility.      Madyson Is progressing well towards her goals.   Pt prognosis is Good.     Pt will continue to benefit from skilled outpatient physical therapy to address the deficits listed in the problem list box on initial evaluation, provide pt/family education and to maximize pt's level of independence in the home and community environment.     Pt's spiritual, cultural and educational needs considered and pt agreeable to plan of care and goals.     Anticipated barriers to physical  therapy: none    Goals: Short Term Goals: 4 weeks   Patient will be 100% independent with HEP in order to maximize physical therapy benefits on going   Patient will improve score on Deep Flexor Endurance Test to >/=15 seconds in order to improve postural endurance for upright ADLs on going   Patient will complete >/=30 seconds of saccade activity at >/=100 bpm in order to improve oculomotor function for reading tasks on going      Long Term Goals: 8 weeks   Patient will be 100% independent with HEP in order to maximize physical therapy benefits on going   Patient will improve score on Deep Flexor Endurance Test to >/=25 seconds in order to improve postural endurance for upright ADLs on going   Patient will improve cervical rotation AROM to >/=75 degrees bilaterally in order to improve functional mobility for activities such as driving on going   Patient will complete >/=30 seconds of saccade activity at >/=115 bpm in order to improve oculomotor function for reading tasks on going   Patient will complete >/=30 seconds of VOR activity at >/=130 bpm in order to improve vestibular function for fixation tasks on going   Patient will improve FGA score to 28/30 for decreased risk of falls and improved functional mobility.     Plan     Cont to progress Vestibular exercises as tolerated    Rita Hardwick, PT

## 2023-07-20 NOTE — ED TRIAGE NOTES
Patient states that she hit her head on a wall 1 MONTH AGO sustaining what she was told was a concussion. Over past 2-3 days, she has had a headache with nausea that has been unrelieved with OTC meds. Denies fever or URI symptoms. No sick contacts. Presents in no distress.

## 2023-07-21 ENCOUNTER — CLINICAL SUPPORT (OUTPATIENT)
Dept: REHABILITATION | Facility: HOSPITAL | Age: 38
End: 2023-07-21
Payer: COMMERCIAL

## 2023-07-21 ENCOUNTER — PATIENT MESSAGE (OUTPATIENT)
Dept: NEUROLOGY | Facility: CLINIC | Age: 38
End: 2023-07-21
Payer: COMMERCIAL

## 2023-07-21 DIAGNOSIS — G44.86 CERVICOGENIC HEADACHE: Primary | ICD-10-CM

## 2023-07-21 DIAGNOSIS — H81.90 VESTIBULOPATHY, UNSPECIFIED LATERALITY: ICD-10-CM

## 2023-07-21 DIAGNOSIS — H55.81 SACCADIC EYE MOVEMENT DEFICIENCY: ICD-10-CM

## 2023-07-21 PROCEDURE — 97530 THERAPEUTIC ACTIVITIES: CPT | Mod: PO

## 2023-07-21 PROCEDURE — 97110 THERAPEUTIC EXERCISES: CPT | Mod: PO

## 2023-07-21 PROCEDURE — 97140 MANUAL THERAPY 1/> REGIONS: CPT | Mod: PO

## 2023-07-21 PROCEDURE — 97112 NEUROMUSCULAR REEDUCATION: CPT | Mod: PO

## 2023-07-21 NOTE — ED PROVIDER NOTES
Encounter Date: 7/20/2023       History     Chief Complaint   Patient presents with    Headache     Pt states that she has a headache post concussion from last week. Pt states that her head is pounding and has pressure.      Madyson Calle is a 37 y.o. female who  has a past medical history of Abnormal cervical Papanicolaou smear (2010, 2016, 2017), Anxiety, GERD (gastroesophageal reflux disease), Hypertension, Murmur, and Scoliosis.    The patient presents to the ED due to to headaches.  Patient was evaluated last month and diagnosed with a concussion.  She has since followed up with Neurology and goes to physical therapy for concussion.  She states over the past couple days her headache has returned.  She reports no new symptoms at this time no vision changes new numbness, weakness or other concerns.    The history is provided by the patient.   Review of patient's allergies indicates:  No Known Allergies  Past Medical History:   Diagnosis Date    Abnormal cervical Papanicolaou smear 2010, 2016, 2017    Colposcopy    Anxiety     GERD (gastroesophageal reflux disease)     Hypertension     Murmur     Scoliosis      No past surgical history on file.  Family History   Problem Relation Age of Onset    No Known Problems Mother     Hypertension Father     Diabetes Father     Heart murmur Father     Prostate cancer Father     Bipolar disorder Sister     Hypertension Maternal Grandmother     Stroke Maternal Grandfather     Heart attack Maternal Grandfather     Diabetes Maternal Grandfather     Hyperlipidemia Maternal Grandfather     No Known Problems Paternal Grandmother     No Known Problems Paternal Grandfather     Colon cancer Paternal Aunt     No Known Problems Daughter     Breast cancer Neg Hx      Social History     Tobacco Use    Smoking status: Never     Passive exposure: Never    Smokeless tobacco: Never   Substance Use Topics    Alcohol use: Yes     Alcohol/week: 7.0 standard drinks     Types: 7 Glasses of wine per  week     Comment: SIOCA    Drug use: Not Currently     Types: Marijuana     Review of Systems   Constitutional:  Negative for chills and fever.   HENT:  Negative for sore throat.    Respiratory:  Negative for cough and shortness of breath.    Cardiovascular:  Negative for chest pain.   Gastrointestinal:  Negative for nausea and vomiting.   Genitourinary:  Negative for dysuria, frequency and urgency.   Musculoskeletal:  Negative for back pain, neck pain and neck stiffness.   Skin:  Negative for rash and wound.   Neurological:  Positive for headaches. Negative for syncope and weakness.   Hematological:  Does not bruise/bleed easily.   Psychiatric/Behavioral:  Negative for agitation, behavioral problems and confusion.      Physical Exam     Initial Vitals [07/20/23 1608]   BP Pulse Resp Temp SpO2   136/84 61 16 97.7 °F (36.5 °C) 100 %      MAP       --         Physical Exam    Constitutional: No distress.   HENT:   Head: Normocephalic and atraumatic.   Eyes: Conjunctivae are normal.   No visual field deficit   Cardiovascular:  Regular rhythm, normal heart sounds and intact distal pulses.           Pulmonary/Chest: No respiratory distress.     Neurological: She is alert.   CN 2-12 intact  Finger to nose within normal limits  Equal strength to bilateral upper extremities, SILT  Equal strength to bilateral lower extremities, SILT       Skin: Skin is warm and dry.   Psychiatric: She has a normal mood and affect.       ED Course   Procedures  Labs Reviewed - No data to display         Imaging Results    None          Medications   ketorolac injection 30 mg (30 mg Intramuscular Given 7/20/23 1935)   prochlorperazine tablet 10 mg (10 mg Oral Given 7/20/23 2005)   acetaminophen tablet 500 mg (500 mg Oral Given 7/20/23 2005)     Medical Decision Making:   Differential Diagnosis:   Differential Diagnosis includes, but is not limited to:  Ischemic stroke, hemorrhagic stroke, subarachnoid hemorrhage/ruptured aneurysm, intracranial  lesion/mass, meningitis/encephalitis, epidural hematoma, subdural hematoma, pseudotumor cerebri, venous sinus thrombosis, CO poisoning, hypertensive encephalopathy, MI/ACS, head trauma/contusion, concussion, sinus headache, dehydration, anxiety, medication non-compliance, primary headache (tension/cluster/migraine).    ED Management:  Patient is a nontoxic-appearing 37-year-old woman with a history of concussion presenting with headache.  Her vital signs are stable.  Her neuro exam is very reassuring.     Patient was given treatment for headache.  On my reassessment patient is sleeping.  No distress noted after waking up.  Reports her headache has resolved.  She is stable for discharge at this time recommended she follow-up with her neurologist and return if her symptoms worsen or she has any new symptoms of concern.    After complete evaluation, including thorough history and physical exam, the patient's symptoms are most likely due to primary headache syndrome (including, but not limited to, tension/cluster/migraine headache). The patient's headaches are similar in character to prior. The history does not suggest sudden/maximal onset of pain consistent with SAH/intracranial bleed. Physical exam is benign without focal weakness, sensory deficit, or cerebellar signs to suggest stroke or intracranial mass. There is no meningismus, fever, or evidence of infection to suggest meningitis/encephalitis. The patient was treated with supportive care  and improved.      After taking into careful account the historical factors and physical exam findings of the patient's presentation today, in conjunction with the empirical and objective data obtained on ED workup, no acute emergent medical condition has been identified. The patient appears to be low risk for an emergent medical condition and I feel it is safe and appropriate at this time for the patient to be discharged to follow-up as detailed in their discharge instructions  for reevaluation and possible continued outpatient workup and management. I have discussed the specifics of the workup with the patient and the patient has verbalized understanding of the details of the workup, the diagnosis, the treatment plan, and the need for outpatient follow-up.  Although the patient has no emergent etiology today this does not preclude the development of an emergent condition so in addition, I have advised the patient that they can return to the ED and/or activate EMS at any time with worsening of their symptoms, change of their symptoms, or with any other medical complaint.  The patient remained comfortable and stable during their visit in the ED.  Discharge and follow-up instructions discussed with the patient who expressed understanding and willingness to comply with my recommendations.                          Clinical Impression:   Final diagnoses:  [R51.9] Nonintractable headache, unspecified chronicity pattern, unspecified headache type (Primary)        ED Disposition Condition    Discharge Stable          ED Prescriptions       Medication Sig Dispense Start Date End Date Auth. Provider    metoclopramide HCl (REGLAN) 10 MG tablet Take 1 tablet (10 mg total) by mouth every 6 (six) hours. 30 tablet 7/20/2023 -- Shaji Robles Jr., MD          Follow-up Information       Follow up With Specialties Details Why Contact Info Additional Information    Karin Oneill NP Internal Medicine   [address] [facility] [address]  [phone]       [facility] - Neurology [facility] [place] [facility]   [address] [id number]  [phone] Neuroscience [place] [facility], 7th Floor Please park in [facility] and take Clinic elevator          Portions of this note were dictated using voice recognition software and may contain dictation related errors in spelling/grammar/syntax not found on text review       Shaji Robles Jr., MD  07/22/23 8260

## 2023-07-21 NOTE — DISCHARGE INSTRUCTIONS

## 2023-07-25 NOTE — PROGRESS NOTES
[facility]   Physical Therapy Treatment Note      Name: Madyson Calle  Clinic Number: [id number]    Therapy Diagnosis:   Encounter Diagnoses   Name Primary?    Cervicogenic headache Yes    Vestibulopathy, unspecified laterality     Saccadic eye movement deficiency          Physician: Huber Vanessa III, MD    Visit Date: 7/26/2023  Physician Orders: PT Eval and Treat   Medical Diagnosis from Referral: Concussion with unknown loss of consciousness status, initial encounter [S06.0XAA]  Evaluation Date: 6/20/2023  Authorization Period Expiration: 6/13/2024  Plan of Care Expiration: 8/18/2023  Visit # / Visits authorized: 5/ 20    Time In:1300  Time Out: 1345  Total Billable Time: 45  minutes     PTA Visit #: 0/5       Subjective     Pt reports:  that she feels fine and has not had an episode since the previous episode. The patient reports it continues to be too much stimulation with light and noise that irritates her     She was not compliant with home exercise program.  Response to previous treatment: did not feel well after eval  Functional change: on going     Pain: 1/10  Location: headache     Objective      Objective Measures updated at progress report unless specified.     Treatment     Madyson received the treatments listed below:      Therapeutic exercise for strength, mobility and posture for 8 minutes:      3 x 30 seconds levator scap stretch   3 x 30 seconds upper trap stretch       Manual therapy for 0 minutes:     neuromuscular re-education activities to improve: Balance, Coordination, Kinesthetic, and Proprioception for 10  minutes. The following activities were included:     2 x 30 sec VOR x 1 horizontal, seated, clean background small A, slight dizziness     2 x 30 sec VOR x 1 vertical, seated, clean background no headache, no dizziness     4 laps tandem ambulation with no UE support   4 laps marching with three second holds, no UE support     therapeutic activities to  improve functional performance for 27 minutes, including:    Around gym  2 x 100 feet ambulation with vertical head turns, CGA   2 x 100 feet ambulation with horizontal head turns, CGA   - multiple rest breaks required throughout trials due to moderate dizziness    In hallway:   2 x 100 feet ambulation with vertical self ball toss  2 x 100 feet backwards ambulation     Time above includes time to for multiple rest beaks throughout session     Patient Education and Home Exercises       Education provided:   - sleep education, symptoms following concussion, balance integration, purpose of interventions  HEP: VOR x 1 and first rib mob    Written Home Exercises Provided: yes. Exercises were reviewed and Madyson was able to demonstrate them prior to the end of the session.  Madyson demonstrated good  understanding of the education provided. See EMR under Patient Instructions for exercises provided during therapy sessions    Assessment     Ms. Calle tolerated session fairly well today. Patient continues to require frequent rest beaks due to significant dizziness, headache symptoms and nausea. The patient reports she has not been performing her home exercise program consistently but tries to perform it when she can. No recent episodes reported. She will continue to benefit from skilled Physical Therapy to address impairments and maximize mobility.    Madyson Is progressing well towards her goals.   Pt prognosis is Good.     Pt will continue to benefit from skilled outpatient physical therapy to address the deficits listed in the problem list box on initial evaluation, provide pt/family education and to maximize pt's level of independence in the home and community environment.     Pt's spiritual, cultural and educational needs considered and pt agreeable to plan of care and goals.     Anticipated barriers to physical therapy: none    Goals: Short Term Goals: 4 weeks   Patient will be 100% independent with HEP in order to  maximize physical therapy benefits on going   Patient will improve score on Deep Flexor Endurance Test to >/=15 seconds in order to improve postural endurance for upright ADLs on going   Patient will complete >/=30 seconds of saccade activity at >/=100 bpm in order to improve oculomotor function for reading tasks on going      Long Term Goals: 8 weeks   Patient will be 100% independent with HEP in order to maximize physical therapy benefits on going   Patient will improve score on Deep Flexor Endurance Test to >/=25 seconds in order to improve postural endurance for upright ADLs on going   Patient will improve cervical rotation AROM to >/=75 degrees bilaterally in order to improve functional mobility for activities such as driving on going   Patient will complete >/=30 seconds of saccade activity at >/=115 bpm in order to improve oculomotor function for reading tasks on going   Patient will complete >/=30 seconds of VOR activity at >/=130 bpm in order to improve vestibular function for fixation tasks on going   Patient will improve FGA score to 28/30 for decreased risk of falls and improved functional mobility.     Plan     Cont to progress Vestibular exercises as tolerated    Rita Hardwick, PT

## 2023-07-26 ENCOUNTER — CLINICAL SUPPORT (OUTPATIENT)
Dept: REHABILITATION | Facility: HOSPITAL | Age: 38
End: 2023-07-26
Payer: COMMERCIAL

## 2023-07-26 DIAGNOSIS — G44.86 CERVICOGENIC HEADACHE: Primary | ICD-10-CM

## 2023-07-26 DIAGNOSIS — H81.90 VESTIBULOPATHY, UNSPECIFIED LATERALITY: ICD-10-CM

## 2023-07-26 DIAGNOSIS — H55.81 SACCADIC EYE MOVEMENT DEFICIENCY: ICD-10-CM

## 2023-07-26 PROCEDURE — 97110 THERAPEUTIC EXERCISES: CPT | Mod: PO

## 2023-07-26 PROCEDURE — 97530 THERAPEUTIC ACTIVITIES: CPT | Mod: PO

## 2023-07-26 PROCEDURE — 97112 NEUROMUSCULAR REEDUCATION: CPT | Mod: PO

## 2023-07-27 NOTE — PROGRESS NOTES
[facility]   Physical Therapy Treatment Note      Name: Madyson Calle  Clinic Number: [id number]    Therapy Diagnosis:   Encounter Diagnoses   Name Primary?    Cervicogenic headache Yes    Vestibulopathy, unspecified laterality     Saccadic eye movement deficiency            Physician: Huber Vanessa III, MD    Visit Date: 7/28/2023  Physician Orders: PT Eval and Treat   Medical Diagnosis from Referral: Concussion with unknown loss of consciousness status, initial encounter [S06.0XAA]  Evaluation Date: 6/20/2023  Authorization Period Expiration: 6/13/2024  Plan of Care Expiration: 8/18/2023  Visit # / Visits authorized: 6/ 20    Time In:1330  Time Out: 1415  Total Billable Time: 45  minutes     PTA Visit #: 0/5       Subjective     Pt reports: that she had a slight headache yesterday but has been feeling fine     She was not compliant with home exercise program.  Response to previous treatment: did not feel well after eval  Functional change: on going     Pain: 1/10  Location: headache     Objective      Objective Measures updated at progress report unless specified.     Treatment     Madyson received the treatments listed below:      Therapeutic exercise for strength, mobility and posture for 23 minutes:      3 x 30 seconds levator scap stretch   3 x 30 seconds upper trap stretch     Two minutes supine on half wedge for thoracic/pec stretch     2 x 10 reps bilateral open books     2 x 10 reps 3# dowel shoulder flexion in supine     Manual therapy for 0 minutes:     neuromuscular re-education activities to improve: Balance, Coordination, Kinesthetic, and Proprioception for 8  minutes. The following activities were included:     2 x 30 sec VOR x 1 horizontal, seated, clean background small A, slight dizziness     2 x 30 sec VOR x 1 vertical, seated, clean background no headache, no dizziness     4 laps tandem ambulation with no UE support       therapeutic activities to improve functional  performance for 14 minutes, including:    Around gym  2 x 100 feet ambulation with vertical head turns, CGA   2 x 100 feet ambulation with horizontal head turns, CGA   - multiple rest breaks required throughout trials due to moderate dizziness  2 x 100 feet ambulation while holding tidal tank     Time above includes time to for multiple rest beaks throughout session     Patient Education and Home Exercises       Education provided:   - sleep education, symptoms following concussion, balance integration, purpose of interventions  HEP: VOR x 1 and first rib mob    Written Home Exercises Provided: yes. Exercises were reviewed and Madyson was able to demonstrate them prior to the end of the session.  Madyson demonstrated good  understanding of the education provided. See EMR under Patient Instructions for exercises provided during therapy sessions    Assessment     Ms. Calle tolerated session fairly well today. Patient reports no significant symptoms with VOR exercise. Patient with less symptoms noted with ambulation with head turns and decreased frequency of standing rest breaks required when performing exercise. She will continue to benefit from skilled Physical Therapy to address impairments and maximize mobility.      Madyson Is progressing well towards her goals.   Pt prognosis is Good.     Pt will continue to benefit from skilled outpatient physical therapy to address the deficits listed in the problem list box on initial evaluation, provide pt/family education and to maximize pt's level of independence in the home and community environment.     Pt's spiritual, cultural and educational needs considered and pt agreeable to plan of care and goals.     Anticipated barriers to physical therapy: none    Goals: Short Term Goals: 4 weeks   Patient will be 100% independent with HEP in order to maximize physical therapy benefits on going   Patient will improve score on Deep Flexor Endurance Test to >/=15 seconds in order  to improve postural endurance for upright ADLs on going   Patient will complete >/=30 seconds of saccade activity at >/=100 bpm in order to improve oculomotor function for reading tasks on going      Long Term Goals: 8 weeks   Patient will be 100% independent with HEP in order to maximize physical therapy benefits on going   Patient will improve score on Deep Flexor Endurance Test to >/=25 seconds in order to improve postural endurance for upright ADLs on going   Patient will improve cervical rotation AROM to >/=75 degrees bilaterally in order to improve functional mobility for activities such as driving on going   Patient will complete >/=30 seconds of saccade activity at >/=115 bpm in order to improve oculomotor function for reading tasks on going   Patient will complete >/=30 seconds of VOR activity at >/=130 bpm in order to improve vestibular function for fixation tasks on going   Patient will improve FGA score to 28/30 for decreased risk of falls and improved functional mobility.     Plan     Cont to progress Vestibular exercises as tolerated    Rita Hardwick, PT

## 2023-07-28 ENCOUNTER — CLINICAL SUPPORT (OUTPATIENT)
Dept: REHABILITATION | Facility: HOSPITAL | Age: 38
End: 2023-07-28
Payer: COMMERCIAL

## 2023-07-28 DIAGNOSIS — H55.81 SACCADIC EYE MOVEMENT DEFICIENCY: ICD-10-CM

## 2023-07-28 DIAGNOSIS — G44.86 CERVICOGENIC HEADACHE: Primary | ICD-10-CM

## 2023-07-28 DIAGNOSIS — H81.90 VESTIBULOPATHY, UNSPECIFIED LATERALITY: ICD-10-CM

## 2023-07-28 PROCEDURE — 97530 THERAPEUTIC ACTIVITIES: CPT | Mod: PO

## 2023-07-28 PROCEDURE — 97112 NEUROMUSCULAR REEDUCATION: CPT | Mod: PO

## 2023-07-28 PROCEDURE — 97110 THERAPEUTIC EXERCISES: CPT | Mod: PO

## 2023-08-01 NOTE — PROGRESS NOTES
[facility]   Physical Therapy Treatment Note      Name: Madyson Calle  Clinic Number: [id number]    Therapy Diagnosis:   Encounter Diagnoses   Name Primary?    Cervicogenic headache Yes    Vestibulopathy, unspecified laterality     Saccadic eye movement deficiency        Physician: Huber Vanessa III, MD    Visit Date: 8/2/2023  Physician Orders: PT Eval and Treat   Medical Diagnosis from Referral: Concussion with unknown loss of consciousness status, initial encounter [S06.0XAA]  Evaluation Date: 6/20/2023  Authorization Period Expiration: 6/13/2024  Plan of Care Expiration: 8/18/2023  Visit # / Visits authorized: 6/ 20    Time In:1120- patient arrived 5 minutes late   Time Out: 1201  Total Billable Time: 41  minutes     PTA Visit #: 0/5       Subjective     Pt reports: that she feels good and not been having symptoms recently     She was not compliant with home exercise program.  Response to previous treatment: did not feel well after eval  Functional change: on going     Pain: 1/10  Location: headache     Objective      Objective Measures updated at progress report unless specified.     Treatment     Madyson received the treatments listed below:      Therapeutic exercise for strength, mobility and posture for 23 minutes:      3 x 30 seconds levator scap stretch   3 x 30 seconds upper trap stretch     2 x 10 reps bilateral open books     X 10 reps chin tucks with 5 second holds    2 x 10 reps 3# dowel shoulder flexion in supine     Manual therapy for 0 minutes:     neuromuscular re-education activities to improve: Balance, Coordination, Kinesthetic, and Proprioception for 8  minutes. The following activities were included:     2 x 30 sec VOR x 1 horizontal, seated, clean background small A, no dizziness     2 x 30 sec VOR x 1 vertical, seated, clean background no headache, no dizziness         therapeutic activities to improve functional performance for 10 minutes, including:    Around  gym  2 x 100 feet ambulation with vertical head turns, CGA   2 x 100 feet ambulation with horizontal head turns, CGA   - multiple rest breaks required throughout trials due to moderate dizziness  2 x 100 ambulation with diagonal head turns, CGA    Time above includes time to for multiple rest beaks throughout session     Patient Education and Home Exercises       Education provided:   - sleep education, symptoms following concussion, balance integration, purpose of interventions  HEP: VOR x 1 and first rib mob    Written Home Exercises Provided: yes. Exercises were reviewed and Madyson was able to demonstrate them prior to the end of the session.  Madyson demonstrated good  understanding of the education provided. See EMR under Patient Instructions for exercises provided during therapy sessions    Assessment     Ms. Calle tolerated session well today. Patient requires less frequent rest breaks with ambulation with head turns today and decreased reports of dizziness. The patient reports no dizziness with VOR1 exercises. She will continue to benefit from skilled Physical Therapy to address impairments and maximize mobility.    Madyson Is progressing well towards her goals.   Pt prognosis is Good.     Pt will continue to benefit from skilled outpatient physical therapy to address the deficits listed in the problem list box on initial evaluation, provide pt/family education and to maximize pt's level of independence in the home and community environment.     Pt's spiritual, cultural and educational needs considered and pt agreeable to plan of care and goals.     Anticipated barriers to physical therapy: none    Goals: Short Term Goals: 4 weeks   Patient will be 100% independent with HEP in order to maximize physical therapy benefits on going   Patient will improve score on Deep Flexor Endurance Test to >/=15 seconds in order to improve postural endurance for upright ADLs on going   Patient will complete >/=30 seconds of  saccade activity at >/=100 bpm in order to improve oculomotor function for reading tasks on going      Long Term Goals: 8 weeks   Patient will be 100% independent with HEP in order to maximize physical therapy benefits on going   Patient will improve score on Deep Flexor Endurance Test to >/=25 seconds in order to improve postural endurance for upright ADLs on going   Patient will improve cervical rotation AROM to >/=75 degrees bilaterally in order to improve functional mobility for activities such as driving on going   Patient will complete >/=30 seconds of saccade activity at >/=115 bpm in order to improve oculomotor function for reading tasks on going   Patient will complete >/=30 seconds of VOR activity at >/=130 bpm in order to improve vestibular function for fixation tasks on going   Patient will improve FGA score to 28/30 for decreased risk of falls and improved functional mobility.     Plan     Cont to progress Vestibular exercises as tolerated    Rita Hardwick, PT

## 2023-08-02 ENCOUNTER — CLINICAL SUPPORT (OUTPATIENT)
Dept: REHABILITATION | Facility: HOSPITAL | Age: 38
End: 2023-08-02
Payer: COMMERCIAL

## 2023-08-02 DIAGNOSIS — H81.90 VESTIBULOPATHY, UNSPECIFIED LATERALITY: ICD-10-CM

## 2023-08-02 DIAGNOSIS — H55.81 SACCADIC EYE MOVEMENT DEFICIENCY: ICD-10-CM

## 2023-08-02 DIAGNOSIS — G44.86 CERVICOGENIC HEADACHE: Primary | ICD-10-CM

## 2023-08-02 PROCEDURE — 97530 THERAPEUTIC ACTIVITIES: CPT | Mod: PO

## 2023-08-02 PROCEDURE — 97110 THERAPEUTIC EXERCISES: CPT | Mod: PO

## 2023-08-02 PROCEDURE — 97112 NEUROMUSCULAR REEDUCATION: CPT | Mod: PO

## 2023-08-04 ENCOUNTER — CLINICAL SUPPORT (OUTPATIENT)
Dept: REHABILITATION | Facility: HOSPITAL | Age: 38
End: 2023-08-04
Payer: COMMERCIAL

## 2023-08-04 DIAGNOSIS — G44.86 CERVICOGENIC HEADACHE: Primary | ICD-10-CM

## 2023-08-04 DIAGNOSIS — H81.90 VESTIBULOPATHY, UNSPECIFIED LATERALITY: ICD-10-CM

## 2023-08-04 DIAGNOSIS — H55.81 SACCADIC EYE MOVEMENT DEFICIENCY: ICD-10-CM

## 2023-08-04 PROCEDURE — 97112 NEUROMUSCULAR REEDUCATION: CPT | Mod: PO

## 2023-08-04 PROCEDURE — 97530 THERAPEUTIC ACTIVITIES: CPT | Mod: PO

## 2023-08-04 NOTE — PROGRESS NOTES
"  [facility]   Physical Therapy Treatment Note      Name: Madyson Calle  Clinic Number: [id number]    Therapy Diagnosis:   Encounter Diagnoses   Name Primary?    Cervicogenic headache Yes    Vestibulopathy, unspecified laterality     Saccadic eye movement deficiency          Physician: Huber Vanessa III, MD    Visit Date: 8/4/2023  Physician Orders: PT Eval and Treat   Medical Diagnosis from Referral: Concussion with unknown loss of consciousness status, initial encounter [S06.0XAA]  Evaluation Date: 6/20/2023  Authorization Period Expiration: 6/13/2024  Plan of Care Expiration: 8/18/2023  Visit # / Visits authorized: 7/ 20    Time In:1150  Time Out: 1232  Total Billable Time: 42  minutes     PTA Visit #: 0/5       Subjective     Pt reports: I have not had a headaches since that time I had to go to the ED. Mostly having difficulty with balance. Still having sensitivity with sound.    She was compliant with home exercise program.  Response to previous treatment: has been feeling better  Functional change: on going     Pain: 1/10  Location: headache     Objective      Objective Measures updated at progress report unless specified.     Treatment     Madyson received the treatments listed below:      Therapeutic exercise for strength, mobility and posture for 0 minutes:      Manual therapy for 0 minutes:     neuromuscular re-education activities to improve: Balance, Coordination, Kinesthetic, and Proprioception for 32 minutes. The following activities were included:     Quadruped threading the needle X 8 each side + dizziness  Quadruped cat/cow X 10   Half kneeling upper extremity diagonals holding ball x 10 each side      2 x 30 sec VOR x 1 horizontal, standing on foam feet apart, striped background "x", no dizziness     2 x 30 sec VOR x 1 vertical, standing on foam feet apart, striped background "x", no dizziness     2 x 30 sec standing on foam, feet together eyes closed    2 x 5 convergence " with erik string, 3 bead + eye strain        therapeutic activities to improve functional performance for 10 minutes, including:    Ambulation in hallway:  2 x 100 feet ambulation with vertical head turns, CGA   2 x 100 feet ambulation with horizontal head turns, CGA   2 x 100 ambulation with diagonal head turns, CGA    Time above includes time to for multiple rest beaks throughout session     Patient Education and Home Exercises       Education provided:   - decreasing stimulus in airport, progression of HEP  HEP: VOR x 1 and first rib mob    Written Home Exercises Provided: yes. Exercises were reviewed and Madyson was able to demonstrate them prior to the end of the session.  Madyson demonstrated good  understanding of the education provided. See EMR under Patient Instructions for exercises provided during therapy sessions    Assessment     Ms. Calle tolerated session well today. Progressed VOR x 1 today with striped background and standing on foam, she was able to perform with minimal increase in dizziness and instability.  She continues to have challenge with diagonal movement, but symptoms subside within 1-2 minutes.  Will also incorporate convergence activity, due to eye strain with erik string.  Discussed techniques to use in airport with escalators, and busy environments.  She continues to make good progress in Physical Therapy.      Madyson Is progressing well towards her goals.   Pt prognosis is Good.     Pt will continue to benefit from skilled outpatient physical therapy to address the deficits listed in the problem list box on initial evaluation, provide pt/family education and to maximize pt's level of independence in the home and community environment.     Pt's spiritual, cultural and educational needs considered and pt agreeable to plan of care and goals.     Anticipated barriers to physical therapy: none    Goals: Short Term Goals: 4 weeks   Patient will be 100% independent with HEP in order to  maximize physical therapy benefits on going   Patient will improve score on Deep Flexor Endurance Test to >/=15 seconds in order to improve postural endurance for upright ADLs on going   Patient will complete >/=30 seconds of saccade activity at >/=100 bpm in order to improve oculomotor function for reading tasks on going      Long Term Goals: 8 weeks   Patient will be 100% independent with HEP in order to maximize physical therapy benefits on going   Patient will improve score on Deep Flexor Endurance Test to >/=25 seconds in order to improve postural endurance for upright ADLs on going   Patient will improve cervical rotation AROM to >/=75 degrees bilaterally in order to improve functional mobility for activities such as driving on going   Patient will complete >/=30 seconds of saccade activity at >/=115 bpm in order to improve oculomotor function for reading tasks on going   Patient will complete >/=30 seconds of VOR activity at >/=130 bpm in order to improve vestibular function for fixation tasks on going   Patient will improve FGA score to 28/30 for decreased risk of falls and improved functional mobility.     Plan     Cont to progress Vestibular exercises as tolerated    Nathalia Lima, PT

## 2023-08-16 NOTE — PROGRESS NOTES
[facility]  Speech Therapy Treatment Note- Neurological Rehabilitation     Date: 8/17/2023     Name: Madyson Calle   MRN: [id number]   Therapy Diagnosis:   Encounter Diagnoses   Name Primary?    Cervicogenic headache Yes    Vestibulopathy, unspecified laterality     Saccadic eye movement deficiency     Post concussion syndrome      Physician: Huber Vanessa III, MD  Physician Orders: Ambulatory Referral to Speech Therapy   Medical Diagnosis: Concussion with unknown loss of consciousness status, initial encounter [S06.0XAA]    Visit #/ Visits Authorized: 1/ 20  Date of Evaluation:  6/20/23  Insurance Authorization Period: 8/17/23-12/29/23  Plan of Care Expiration Date:    6/20/2023 to 8/4/2023   Extended Plan of Care:  8/17/23- 9/14/23  Progress Note: today     Time In:  7:45am   Time Out:  8:23am   Total Billable Time: 38 mins      Precautions: Standard    Subjective     Patient reports: only difficulty at this time includes memory and word finding difficulties      She was compliant to home exercise program.   Response to previous treatment: good  Pain Scale: no pain indicated throughout session    Objective     TIMED  Procedure Min.   Cognitive Therapeutic Interventions, first 15 minutes CPT 94440  15   Cognitive Therapeutic Interventions, each additional 15 minutes CPT 21957  30         UNTIMED  Procedure           Short Term Goals: (4 weeks) Current Progress:   1. Patient will use attention shifting strategies to shift attention between two tasks every 15 seconds with 90% accuracy to improve alternating attention.     Progressing/ Not Met 8/17/2023   Not formally addressed      2. Patient will use Goal Plan Action Review strategy to complete moderate to complex reasoning, planning, or organization tasks with 90% accuracy independently to improve functional executive function skills.      Progressing/ Not Met 8/17/2023   Patient reports no difficulties with problem solving    Goal Not  Met / Discontinue      3. Patient will complete short term recall tasks after a 15 minutes delay with 90% accuracy independently with use of memory strategies to improve recall of information and generalization of memory strategies.      Progressing/ Not Met 2023   Completed N-back L-2 with 91% accuracy Independently      Completed N-back L-3 with 77 % accuracy Independently      4. Patient will complete high level problem solving based tasks with 90% accuracy independently.      Progressing/ Not Met 2023   Patient reports no difficulties with problem solving    Goal Not Met / Discontinue      5.Patient will independently implement cognitive/sensory rest periods throughout day after identifying cognitive fatigue including limiting sensory input (sound, light, etc.)      Progressing/ Not Met 2023   Discussed however patient reports no change in fatigue.     Goal Met / Discontinue      6.Patient will independently generate strategies to improve ability to complete tasks with enhanced accuracy and time, based on review of objective previous performance on trials of functional tasks with 80% accuracy.      Progressing/ Not Met 2023   Discussed memory and word finding strategies to aid in everyday conversations and function.     7. Patient will complete word finding tasks with semantic relationships (i.e. Similarities and differences, synonyms, antonyms, semantic feature analysis) using word finding strategies with 90% accuracy given minimum cues to improve word finding.         Progressing/ Not Met 2023   Extensive disucssion on strtagy use to aid in everyday conversation      NEW GOAL      Patient Education and Home Program   Patient educated regarding the followin. Memory Strategies:   Write: make a list or utilize a calendar   Repeat:  Repeat information out loud or internally   Associate:  Connect desired information with something you already recall. For example, when in the grocery,  group items by meal or taste (sweet vs salty) or by category (vegetables, cold items, etc).  Picture: try to mentally picture what you are trying to remember  Mental Rehearsal: think through how you're going to complete a task before completing it.     CEDRICK So., TOMMIE Luevano, & ROSALVA Vazquez (2012). Cognitive rehabilitation manual: Translating evidence-based recommendations into practice. [place], [place]: [facility].      Home program established: Program modified based on patient progress  Patient verbalized understanding to all above education provided.     See Electronic Medical Record under Patient Instructions for exercises provided throughout therapy.    Assessment     Madyson  participated well today in today's session which focused on memory, sustained attention, and alternating attention. Today strengths were noted in awareness of deficits. Improvement continues to be noted in memory and strategy use. Deficits remain in continued memory and word finding. Extensive education on memory and word finding strategies today. Focus following sessions on memory and word finding.  Cognitive, Physical, and Emotional fatigue was not believed to have been a barrier to the session.    To date, Madyson has met 1 goals.      Madyson is progressing well towards her goals. Current goals remain appropriate. Goals to be updated as necessary.     Patient prognosis is Good. Patient will continue to benefit from skilled outpatient speech and language therapy to address the deficits listed in the problem list on initial evaluation, provide patient/family education and to maximize patient's level of independence in the home and community environment.   Medical necessity is demonstrated by the following IMPAIRMENTS:  Cognition: Deficits in executive functioning and memory prevent the pt from returning to work, and place minimal at risk of unsafe behavior and a decline in quality of life.    Barriers to Therapy:  none  Patient's spiritual, cultural and educational needs considered and patient agreeable to plan of care and goals.      Plan     Continue Plan of Care with focus on rehabilitation and compensation for memory and word finding.     BHARAT San, CCC-SLP   8/17/2023

## 2023-08-17 ENCOUNTER — CLINICAL SUPPORT (OUTPATIENT)
Dept: REHABILITATION | Facility: HOSPITAL | Age: 38
End: 2023-08-17
Payer: COMMERCIAL

## 2023-08-17 ENCOUNTER — TELEPHONE (OUTPATIENT)
Dept: PODIATRY | Facility: CLINIC | Age: 38
End: 2023-08-17
Payer: COMMERCIAL

## 2023-08-17 ENCOUNTER — OFFICE VISIT (OUTPATIENT)
Dept: PODIATRY | Facility: CLINIC | Age: 38
End: 2023-08-17
Payer: COMMERCIAL

## 2023-08-17 VITALS
HEART RATE: 77 BPM | DIASTOLIC BLOOD PRESSURE: 98 MMHG | SYSTOLIC BLOOD PRESSURE: 149 MMHG | BODY MASS INDEX: 23 KG/M2 | HEIGHT: 62 IN | WEIGHT: 125 LBS

## 2023-08-17 DIAGNOSIS — H81.90 VESTIBULOPATHY, UNSPECIFIED LATERALITY: ICD-10-CM

## 2023-08-17 DIAGNOSIS — F07.81 POST CONCUSSION SYNDROME: ICD-10-CM

## 2023-08-17 DIAGNOSIS — Q72.891 BRACHYMETATARSIA OF RIGHT FOOT: Primary | ICD-10-CM

## 2023-08-17 DIAGNOSIS — G44.86 CERVICOGENIC HEADACHE: Primary | ICD-10-CM

## 2023-08-17 DIAGNOSIS — M20.5X1 CONTRACTURE OF TOE OF RIGHT FOOT: ICD-10-CM

## 2023-08-17 DIAGNOSIS — H55.81 SACCADIC EYE MOVEMENT DEFICIENCY: ICD-10-CM

## 2023-08-17 PROCEDURE — 3077F SYST BP >= 140 MM HG: CPT | Mod: CPTII,S$GLB,, | Performed by: PODIATRIST

## 2023-08-17 PROCEDURE — 3077F PR MOST RECENT SYSTOLIC BLOOD PRESSURE >= 140 MM HG: ICD-10-PCS | Mod: CPTII,S$GLB,, | Performed by: PODIATRIST

## 2023-08-17 PROCEDURE — 3080F PR MOST RECENT DIASTOLIC BLOOD PRESSURE >= 90 MM HG: ICD-10-PCS | Mod: CPTII,S$GLB,, | Performed by: PODIATRIST

## 2023-08-17 PROCEDURE — 99999 PR PBB SHADOW E&M-EST. PATIENT-LVL IV: CPT | Mod: PBBFAC,,, | Performed by: PODIATRIST

## 2023-08-17 PROCEDURE — 1160F RVW MEDS BY RX/DR IN RCRD: CPT | Mod: CPTII,S$GLB,, | Performed by: PODIATRIST

## 2023-08-17 PROCEDURE — 97130 THER IVNTJ EA ADDL 15 MIN: CPT | Mod: PO

## 2023-08-17 PROCEDURE — 4010F ACE/ARB THERAPY RXD/TAKEN: CPT | Mod: CPTII,S$GLB,, | Performed by: PODIATRIST

## 2023-08-17 PROCEDURE — 3044F PR MOST RECENT HEMOGLOBIN A1C LEVEL <7.0%: ICD-10-PCS | Mod: CPTII,S$GLB,, | Performed by: PODIATRIST

## 2023-08-17 PROCEDURE — 1159F PR MEDICATION LIST DOCUMENTED IN MEDICAL RECORD: ICD-10-PCS | Mod: CPTII,S$GLB,, | Performed by: PODIATRIST

## 2023-08-17 PROCEDURE — 1160F PR REVIEW ALL MEDS BY PRESCRIBER/CLIN PHARMACIST DOCUMENTED: ICD-10-PCS | Mod: CPTII,S$GLB,, | Performed by: PODIATRIST

## 2023-08-17 PROCEDURE — 3008F PR BODY MASS INDEX (BMI) DOCUMENTED: ICD-10-PCS | Mod: CPTII,S$GLB,, | Performed by: PODIATRIST

## 2023-08-17 PROCEDURE — 99999 PR PBB SHADOW E&M-EST. PATIENT-LVL IV: ICD-10-PCS | Mod: PBBFAC,,, | Performed by: PODIATRIST

## 2023-08-17 PROCEDURE — 3044F HG A1C LEVEL LT 7.0%: CPT | Mod: CPTII,S$GLB,, | Performed by: PODIATRIST

## 2023-08-17 PROCEDURE — 3080F DIAST BP >= 90 MM HG: CPT | Mod: CPTII,S$GLB,, | Performed by: PODIATRIST

## 2023-08-17 PROCEDURE — 99213 PR OFFICE/OUTPT VISIT, EST, LEVL III, 20-29 MIN: ICD-10-PCS | Mod: S$GLB,,, | Performed by: PODIATRIST

## 2023-08-17 PROCEDURE — 3008F BODY MASS INDEX DOCD: CPT | Mod: CPTII,S$GLB,, | Performed by: PODIATRIST

## 2023-08-17 PROCEDURE — 4010F PR ACE/ARB THEARPY RXD/TAKEN: ICD-10-PCS | Mod: CPTII,S$GLB,, | Performed by: PODIATRIST

## 2023-08-17 PROCEDURE — 99213 OFFICE O/P EST LOW 20 MIN: CPT | Mod: S$GLB,,, | Performed by: PODIATRIST

## 2023-08-17 PROCEDURE — 97129 THER IVNTJ 1ST 15 MIN: CPT | Mod: PO

## 2023-08-17 PROCEDURE — 1159F MED LIST DOCD IN RCRD: CPT | Mod: CPTII,S$GLB,, | Performed by: PODIATRIST

## 2023-08-17 NOTE — TELEPHONE ENCOUNTER
----- Message from Samira Upton sent at 8/17/2023 12:57 PM CDT -----  Type:  Needs Medical Advice    Who Called: pt  Would the patient rather a call back or a response via [place]? call  Best Call Back Number: [phone]  Additional Information: pt wanted to inform office that she is 10 minutes away lost track of time

## 2023-08-17 NOTE — PLAN OF CARE
[facility]  Speech Therapy Updated Plan of Care-Neurological Rehabilitation         Date: 8/17/2023   Name: Madyson Calle  Clinic Number: [id number]    Therapy Diagnosis:   Encounter Diagnoses   Name Primary?    Cervicogenic headache Yes    Vestibulopathy, unspecified laterality     Saccadic eye movement deficiency     Post concussion syndrome      Physician: Huber Vanessa III, MD    Physician Orders: Ambulatory Referral to Speech Therapy   Medical Diagnosis: Concussion with unknown loss of consciousness status, initial encounter [S06.0XAA]    Visit #/ Visits Authorized:  1 /20   Evaluation Date: 6/20/23  Insurance Authorization Period: 8/17/23-12/29/23  Plan of Care Expiration:     6/20/2023 to 8/4/2023   New POC Certification Period:  8/17/23- 9/14/23    Total Visits Received: 1    Precautions:Standard  Subjective     Update: Patient has only attending one treatment session since evaluation. She reports overall progress since evaluation, however continues to have difficulties with memory and word finding.  Continue next plan of care with focus on memory and word finding.     Objective     Update: see follow up note dated 8/17/2023    Assessment     Update: Madyson Calle presents to [facility] status post concussion. Deficits are likely secondary to oculomotor dysfunction and cognitive fatigue; hwoever, best to address these to prevent falling behind at work.. Demonstrates impairments including limitations as described in the problem list. Positive prognostic factors include motivation. Negative prognostic factors include none. 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 No barriers to therapy identified.. Patient will benefit from skilled, outpatient rehabilitation speech therapy.    Rehab Potential: good   Pt's spiritual,  cultural, and educational needs considered and patient agreeable to plan of care and goals.    Education: Plan of Care, role of SLP in care, memory strategies, and word finding strategies       Previous Short Term Goals Status: 4 weeks  Short Term Goals: (4 weeks) Current Progress:   1. Patient will use attention shifting strategies to shift attention between two tasks every 15 seconds with 90% accuracy to improve alternating attention.      Progressing/ Not Met 8/17/2023   Goal Not Met / Continue     2. Patient will use Goal Plan Action Review strategy to complete moderate to complex reasoning, planning, or organization tasks with 90% accuracy independently to improve functional executive function skills.       Progressing/ Not Met 8/17/2023   Goal Not Met / Discontinue      3. Patient will complete short term recall tasks after a 15 minutes delay with 90% accuracy independently with use of memory strategies to improve recall of information and generalization of memory strategies.       Progressing/ Not Met 8/17/2023   Goal Not Met / Continue    4. Patient will complete high level problem solving based tasks with 90% accuracy independently.       Progressing/ Not Met 8/17/2023        Goal Not Met / Discontinue      5.Patient will independently implement cognitive/sensory rest periods throughout day after identifying cognitive fatigue including limiting sensory input (sound, light, etc.)       Progressing/ Not Met 8/17/2023     Goal Met / Discontinue      6.Patient will independently generate strategies to improve ability to complete tasks with enhanced accuracy and time, based on review of objective previous performance on trials of functional tasks with 80% accuracy.       Progressing/ Not Met 8/17/2023   Goal Not Met / Continue    7. Patient will complete word finding tasks with semantic relationships (i.e. Similarities and differences, synonyms, antonyms, semantic feature analysis) using word finding strategies  with 90% accuracy given minimum cues to improve word finding.          Progressing/ Not Met 8/17/2023   Goal Not Met / Continue          New Short Term Goals: 4 weeks  Short Term Goals: (4 weeks) Current Progress:   1. Patient will use attention shifting strategies to shift attention between two tasks every 15 seconds with 90% accuracy to improve alternating attention.      Progressing/ Not Met 8/17/2023   Not formally addressed      2. Patient will complete short term recall tasks after a 15 minutes delay with 90% accuracy independently with use of memory strategies to improve recall of information and generalization of memory strategies.       Progressing/ Not Met 8/17/2023   Completed N-back L-2 with 91% accuracy Independently       Completed N-back L-3 with 77 % accuracy Independently      3.Patient will independently generate strategies to improve ability to complete tasks with enhanced accuracy and time, based on review of objective previous performance on trials of functional tasks with 80% accuracy.       Progressing/ Not Met 8/17/2023   Discussed memory and word finding strategies to aid in everyday conversations and function.     4. Patient will complete word finding tasks with semantic relationships (i.e. Similarities and differences, synonyms, antonyms, semantic feature analysis) using word finding strategies with 90% accuracy given minimum cues to improve word finding.          Progressing/ Not Met 8/17/2023   Extensive disucssion on strtagy use to aid in everyday conversation      NEW GOAL         Long Term Goal Status:  6 weeks  Long Term Goals: (6 weeks) Current Progress:   1. Patient will demonstrate use of self awareness,  goal setting, planning, and  self-monitoring during daily living activities to improve safety and awareness in functional living environment.    Established this date     2. Patient will predict objective performance on completion of actual tasks to increase independence with  required return to daily living environment.     Established this date     3. Patient will use appropriate memory strategies to schedule and recall weekly activities, express needs and recall names to maintain safety and participate socially in functional living environment.     Established this date          Reasons for Recertification of Therapy: Deficits in executive functioning and memory prevent the pt from returning to work, and place at risk of unsafe behavior and a decline in quality of life.      Plan     Updated Certification Period: 8/17/2023 to 9/14/23    Recommended Treatment Plan: Patient will participate in the [facility] rehabilitation program for speech therapy 1 times per week to address her Cognition deficits, to educate patient and their family, and to participate in a home exercise program.     Other recommendations: not at this time      Therapist's Name:  BHARAT San, CCC-SLP   8/17/2023      I CERTIFY THE NEED FOR THESE SERVICES FURNISHED UNDER THIS PLAN OF TREATMENT AND WHILE UNDER MY CARE      Physician Name: _______________________________    Physician Signature: ____________________________

## 2023-08-17 NOTE — PATIENT INSTRUCTIONS
"Word Retrieval Strategies:   Visualization: try to see the thing in your head. Concentrate on the details of the picture and sometimes the word will come  Association:  Think of things that go with the word. For example, bread goes with butter, so if you are trying to think of the word "butter", you may think of the word "bread".  Gesture: use the hand motion you would use with the thing you are thinking of. For example, if you are thinking of the word "wash", you might make a motion as if you are washing your hands.   Description:  Describe the thing to the other person you are talking to. Even if the word does not come to you, the other person may be able to guess what you are trying to say.   First Letter or Sound:  Try to think of the first letter of the word. Sometimes you can think of the first letter even if you cannot think of the word. The letter may "lead" you to the word. You might even try going down the alphabet to find the first letter.   "

## 2023-08-17 NOTE — PROGRESS NOTES
"Subjective:     Patient ID: Madyson Calle is a 37 y.o. female.    Chief Complaint: Foot Problem (Skin irritation to right 4th toe)    Referral from Dr. 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  Patient relates pain in enclosed shoes secondary to rubbing and irritation against the 4th toe.  Relates that she stubbed her toe when she was younger and this may have contributed to it.  She has an uncle that has a similar condition in both feet.  For more relates that she would a slip and fall in the middle of the night and suffered a concussion several weeks ago.  She was receiving physical therapy twice weekly which has helped in the symptoms are gradually improving.    08/17/2023: Returns to review bilateral foot x-ray and discuss possible surgical intervention for breaking metatarsalgia 4th metatarsal right foot.  No pain experienced.    Vitals:    08/17/23 1317   BP: (!) 149/98   Pulse: 77   Weight: 56.7 kg (125 lb)   Height: 5' 2" (1.575 m)   PainSc: 0-No pain      Past Medical History:   Diagnosis Date    Abnormal cervical Papanicolaou smear 2010, 2016, 2017    Colposcopy    Anxiety     GERD (gastroesophageal reflux disease)     Hypertension     Murmur     Scoliosis        History reviewed. No pertinent surgical history.    Family History   Problem Relation Age of Onset    No Known Problems Mother     Hypertension Father     Diabetes Father     Heart murmur Father     Prostate cancer Father     Bipolar disorder Sister     Hypertension Maternal Grandmother     Stroke Maternal Grandfather     Heart attack Maternal Grandfather     Diabetes Maternal Grandfather     Hyperlipidemia Maternal Grandfather     No Known Problems Paternal Grandmother     No Known Problems Paternal Grandfather     Colon cancer Paternal Aunt     No Known Problems Daughter     Breast cancer Neg Hx        Social History     Socioeconomic History    Marital status: Single   Tobacco Use    Smoking status: Never     Passive exposure: " Never    Smokeless tobacco: Never   Substance and Sexual Activity    Alcohol use: Yes     Alcohol/week: 7.0 standard drinks of alcohol     Types: 7 Glasses of wine per week     Comment: SIOCA    Drug use: Not Currently     Types: Marijuana    Sexual activity: Yes     Partners: Male     Birth control/protection: I.U.D.       Current Outpatient Medications   Medication Sig Dispense Refill    amLODIPine (NORVASC) 5 MG tablet Take 1 tablet (5 mg total) by mouth once daily. 90 tablet 3    ARAZLO 0.045 % Lotn       boric acid (bulk) Powd Insert one Gelatin Capsule filled with 600 mg of Boric Acid Powder H.S. 100 capsule 4    dapsone (ACZONE) 7.5 % GlwP       diclofenac sodium (VOLTAREN) 1 % Gel Apply 2 g topically 3 (three) times daily. 100 g 0    ergocalciferol (ERGOCALCIFEROL) 50,000 unit Cap Take 1 capsule (50,000 Units total) by mouth every 7 days. 4 capsule 11    hydroquinone 4 % Crea Apply to dark spots once daily. Use with sunscreen if outdoors 28 g 1    ibuprofen (ADVIL,MOTRIN) 800 MG tablet Take 1 tablet (800 mg total) by mouth 3 (three) times daily as needed for Pain. Take with food 30 tablet 0    insulin syringes, disposable, 1 mL Syrg 1 Units by Misc.(Non-Drug; Combo Route) route every 14 (fourteen) days for 30 days, THEN 1 Units every 28 days. 1 each 5    loratadine (CLARITIN) 10 mg tablet Take 1 tablet (10 mg total) by mouth once daily. 30 tablet 11    losartan (COZAAR) 25 MG tablet Take 1 tablet (25 mg total) by mouth once daily. 90 tablet 3    methylPREDNISolone (MEDROL DOSEPACK) 4 mg tablet use as directed 1 each 0    metoclopramide HCl (REGLAN) 10 MG tablet Take 1 tablet (10 mg total) by mouth every 6 (six) hours. 30 tablet 0    metoprolol tartrate (LOPRESSOR) 25 MG tablet Take 1 tablet (25 mg total) by mouth 2 (two) times daily. 120 tablet 3    omeprazole (PRILOSEC) 40 MG capsule Take 40 mg by mouth as needed.      ondansetron (ZOFRAN-ODT) 4 MG TbDL Take 1 tablet (4 mg total) by mouth every 8 (eight)  hours as needed (nausea). 20 tablet 6     Current Facility-Administered Medications   Medication Dose Route Frequency Provider Last Rate Last Admin    levonorgestreL (MIRENA) 20 mcg/24 hours (8 yrs) 52 mg IUD 1 Intra Uterine Device  1 Intra Uterine Device Intrauterine  Juan Curiel MD   1 Intra Uterine Device at 01/03/23 4896       Review of patient's allergies indicates:  No Known Allergies      Review of Systems   Constitutional: Negative for chills, fever and malaise/fatigue.   Cardiovascular:  Negative for chest pain, claudication and leg swelling.   Respiratory:  Negative for cough and shortness of breath.    Musculoskeletal:  Negative for back pain, joint pain, muscle cramps and muscle weakness.   Gastrointestinal:  Negative for nausea and vomiting.   Neurological:  Negative for numbness, paresthesias and weakness.   Psychiatric/Behavioral:  Negative for altered mental status.         Objective:     Physical Exam  Constitutional:       General: She is not in acute distress.     Appearance: Normal appearance. She is not ill-appearing.   Cardiovascular:      Pulses:           Dorsalis pedis pulses are 2+ on the right side and 2+ on the left side.        Posterior tibial pulses are 2+ on the right side and 2+ on the left side.      Comments: No lower extremity edema bilateral.  Skin temp is warm to foot bilateral.  No rubor on dependency bilateral foot.  Musculoskeletal:      Comments: Shortened appearance to the right 4th toe with dorsal contracture at the MTP.  Upon palpation of 4th metatarsal head along the plantar aspect is noted to be more proximal than the 3rd and 5th metatarsals right foot.  No localized pain on palpation or with range of motion right foot.  Overall rectus appearing foot type bilateral.   Skin:     General: Skin is warm.      Capillary Refill: Capillary refill takes less than 2 seconds.      Findings: No ecchymosis or erythema.      Nails: There is no clubbing.   Neurological:       Mental Status: She is alert and oriented to person, place, and time.      Sensory: Sensation is intact.      Motor: Motor function is intact.             Assessment:      Encounter Diagnoses   Name Primary?    Brachymetatarsia of right foot Yes    Contracture of toe of right foot      Plan:     Madyson was seen today for foot problem.    Diagnoses and all orders for this visit:    Brachymetatarsia of right foot    Contracture of toe of right foot      I counseled the patient on her conditions, their implications and medical management.    Reviewed bilateral foot x-ray in detail.  Note approximate 1.3 cm of shortening to the right 4th metatarsal comparison to left.  There is some malformation to the 4th metatarsal head.  The 4th toe is also over riding the metatarsal.    We discussed continued conservative care versus surgical intervention consisting of 2 separate approaches.  We discussed open acute lengthening of the 4th metatarsal with autologous bone graft from the ipsilateral calcaneus with extensor tendon lengthening and pinning of the 4th toe versus gradual callus distraction of the 4th metatarsal and pitting of the 4th toe.  Risks, benefits and anticipated postoperative course were discussed in detail.  No guarantees were given or implied.  Patient would like to strongly consider the acute correction and will return in approximately 4-5 months after the new year to plan for surgical correction following Thelma Garcia.    RTC p.r.n. as discussed.    A portion of this note was generated by voice recognition software and may contain spelling and grammar errors.      .

## 2023-08-24 ENCOUNTER — DOCUMENTATION ONLY (OUTPATIENT)
Dept: REHABILITATION | Facility: HOSPITAL | Age: 38
End: 2023-08-24

## 2023-08-24 NOTE — PROGRESS NOTES
No Show Note/Documentation    Patient: Madyson Calle  Date of Session: 8/24/2023  Diagnosis: No diagnosis found.   MRN: [id number]    Madyson Calle did not attend her  scheduled therapy appointment today. She did not call to cancel nor reschedule. This is the 2nd appointment that she has not attended. Next appointment is scheduled for 9/1/23 and will follow up with patient at that time. No charges have been posted today.     BHARAT San, CCC-SLP   8/24/2023

## 2023-09-26 ENCOUNTER — HOSPITAL ENCOUNTER (EMERGENCY)
Facility: HOSPITAL | Age: 38
Discharge: HOME | End: 2023-09-26
Attending: EMERGENCY MEDICINE
Payer: COMMERCIAL

## 2023-09-26 VITALS
BODY MASS INDEX: 23.92 KG/M2 | RESPIRATION RATE: 15 BRPM | DIASTOLIC BLOOD PRESSURE: 79 MMHG | TEMPERATURE: 98 F | SYSTOLIC BLOOD PRESSURE: 127 MMHG | OXYGEN SATURATION: 100 % | HEIGHT: 62 IN | HEART RATE: 71 BPM | WEIGHT: 130 LBS

## 2023-09-26 DIAGNOSIS — R07.9 CHEST PAIN: ICD-10-CM

## 2023-09-26 DIAGNOSIS — R51.9 HEADACHE: ICD-10-CM

## 2023-09-26 DIAGNOSIS — I10 HYPERTENSION, UNSPECIFIED TYPE: Primary | ICD-10-CM

## 2023-09-26 LAB
ALBUMIN SERPL BCP-MCNC: 4.4 G/DL (ref 3.5–5.2)
ALP SERPL-CCNC: 37 U/L (ref 55–135)
ALT SERPL W/O P-5'-P-CCNC: 21 U/L (ref 10–44)
ANION GAP SERPL CALC-SCNC: 9 MMOL/L (ref 8–16)
AST SERPL-CCNC: 29 U/L (ref 10–40)
B-HCG UR QL: NEGATIVE
BASOPHILS # BLD AUTO: 0.04 K/UL (ref 0–0.2)
BASOPHILS NFR BLD: 0.7 % (ref 0–1.9)
BILIRUB SERPL-MCNC: 0.5 MG/DL (ref 0.1–1)
BUN SERPL-MCNC: 6 MG/DL (ref 6–20)
CALCIUM SERPL-MCNC: 9.7 MG/DL (ref 8.7–10.5)
CHLORIDE SERPL-SCNC: 105 MMOL/L (ref 95–110)
CO2 SERPL-SCNC: 25 MMOL/L (ref 23–29)
CREAT SERPL-MCNC: 0.8 MG/DL (ref 0.5–1.4)
CTP QC/QA: YES
DIFFERENTIAL METHOD: NORMAL
EOSINOPHIL # BLD AUTO: 0 K/UL (ref 0–0.5)
EOSINOPHIL NFR BLD: 0.5 % (ref 0–8)
ERYTHROCYTE [DISTWIDTH] IN BLOOD BY AUTOMATED COUNT: 12.3 % (ref 11.5–14.5)
EST. GFR  (NO RACE VARIABLE): >60 ML/MIN/1.73 M^2
GLUCOSE SERPL-MCNC: 100 MG/DL (ref 70–110)
HCT VFR BLD AUTO: 39.7 % (ref 37–48.5)
HGB BLD-MCNC: 13.3 G/DL (ref 12–16)
IMM GRANULOCYTES # BLD AUTO: 0.01 K/UL (ref 0–0.04)
IMM GRANULOCYTES NFR BLD AUTO: 0.2 % (ref 0–0.5)
LYMPHOCYTES # BLD AUTO: 2.1 K/UL (ref 1–4.8)
LYMPHOCYTES NFR BLD: 37.4 % (ref 18–48)
MCH RBC QN AUTO: 31 PG (ref 27–31)
MCHC RBC AUTO-ENTMCNC: 33.5 G/DL (ref 32–36)
MCV RBC AUTO: 93 FL (ref 82–98)
MONOCYTES # BLD AUTO: 0.4 K/UL (ref 0.3–1)
MONOCYTES NFR BLD: 7 % (ref 4–15)
NEUTROPHILS # BLD AUTO: 3 K/UL (ref 1.8–7.7)
NEUTROPHILS NFR BLD: 54.2 % (ref 38–73)
NRBC BLD-RTO: 0 /100 WBC
PLATELET # BLD AUTO: 274 K/UL (ref 150–450)
PMV BLD AUTO: 11.1 FL (ref 9.2–12.9)
POTASSIUM SERPL-SCNC: 4 MMOL/L (ref 3.5–5.1)
PROT SERPL-MCNC: 8.9 G/DL (ref 6–8.4)
RBC # BLD AUTO: 4.29 M/UL (ref 4–5.4)
SODIUM SERPL-SCNC: 139 MMOL/L (ref 136–145)
TROPONIN I SERPL DL<=0.01 NG/ML-MCNC: <0.006 NG/ML (ref 0–0.03)
WBC # BLD AUTO: 5.59 K/UL (ref 3.9–12.7)

## 2023-09-26 PROCEDURE — 25000003 PHARM REV CODE 250

## 2023-09-26 PROCEDURE — 81025 URINE PREGNANCY TEST: CPT

## 2023-09-26 PROCEDURE — 93010 EKG 12-LEAD: ICD-10-PCS | Mod: ,,, | Performed by: INTERNAL MEDICINE

## 2023-09-26 PROCEDURE — 93005 ELECTROCARDIOGRAM TRACING: CPT

## 2023-09-26 PROCEDURE — 85025 COMPLETE CBC W/AUTO DIFF WBC: CPT | Performed by: PHYSICIAN ASSISTANT

## 2023-09-26 PROCEDURE — 80053 COMPREHEN METABOLIC PANEL: CPT | Performed by: PHYSICIAN ASSISTANT

## 2023-09-26 PROCEDURE — 99284 EMERGENCY DEPT VISIT MOD MDM: CPT

## 2023-09-26 PROCEDURE — 84484 ASSAY OF TROPONIN QUANT: CPT | Performed by: PHYSICIAN ASSISTANT

## 2023-09-26 PROCEDURE — 93010 ELECTROCARDIOGRAM REPORT: CPT | Mod: ,,, | Performed by: INTERNAL MEDICINE

## 2023-09-26 RX ORDER — METOPROLOL TARTRATE 25 MG/1
25 TABLET, FILM COATED ORAL
Status: COMPLETED | OUTPATIENT
Start: 2023-09-26 | End: 2023-09-26

## 2023-09-26 RX ORDER — AMLODIPINE BESYLATE 5 MG/1
5 TABLET ORAL
Status: COMPLETED | OUTPATIENT
Start: 2023-09-26 | End: 2023-09-26

## 2023-09-26 RX ORDER — LOSARTAN POTASSIUM 25 MG/1
25 TABLET ORAL
Status: COMPLETED | OUTPATIENT
Start: 2023-09-26 | End: 2023-09-26

## 2023-09-26 RX ADMIN — LOSARTAN POTASSIUM 25 MG: 25 TABLET, FILM COATED ORAL at 01:09

## 2023-09-26 RX ADMIN — AMLODIPINE BESYLATE 5 MG: 5 TABLET ORAL at 01:09

## 2023-09-26 RX ADMIN — METOPROLOL TARTRATE 25 MG: 25 TABLET, FILM COATED ORAL at 01:09

## 2023-09-26 NOTE — FIRST PROVIDER EVALUATION
Emergency Department TeleTriage Encounter Note      CHIEF COMPLAINT    Chief Complaint   Patient presents with    Hypertension     On 3 different blood pressure meds feeling bad, having high blood pressure,     Headache    Nausea       VITAL SIGNS   Initial Vitals [09/26/23 1041]   BP Pulse Resp Temp SpO2   (!) 162/111 82 16 99.6 °F (37.6 °C) 100 %      MAP       --            ALLERGIES    Review of patient's allergies indicates:  No Known Allergies    PROVIDER TRIAGE NOTE  Patient presents with complaint of elevated BP, headache and tightness in chest and back. Denied SOB. No LE Swelling. States intermittent symptoms for a few weeks. Compliant with medications.      Phy:   Constitutional: well nourished, well developed, appearing stated age, NAD   HEENT: NCAT, symmetrical lids, No obvious facial deformity.  Normal phonation. Normal Conjunctiva   Neck: NAROM   Respiratory: Normal effort.  No obvious use of accessory muscles   Musculoskeletal: Moved upper extremities well   Neuro: Alert, answers questions appropriately    Psych: appropriate mood and affect      Initial orders will be placed and care will be transferred to an alternate provider when patient is roomed for a full evaluation. Any additional orders and the final disposition will be determined by that provider.        ORDERS  Labs Reviewed   CBC W/ AUTO DIFFERENTIAL   COMPREHENSIVE METABOLIC PANEL   TROPONIN I   POCT TROPONIN       ED Orders (720h ago, onward)      Start Ordered     Status Ordering Provider    09/26/23 1201 09/26/23 1201  Vital signs  Every 15 min         Ordered ELOY OBANDO    09/26/23 1201 09/26/23 1201  Cardiac Monitoring - Adult  Continuous        Comments: Notify Physician If:    Ordered ELOY OBANDO    09/26/23 1201 09/26/23 1201  Pulse Oximetry Continuous  Continuous         Ordered ELOY OBANDO    09/26/23 1201 09/26/23 1201  Diet NPO  Diet effective now         Ordered AMAYA  ELOY ENCARNACION    09/26/23 1201 09/26/23 1201  Saline lock IV  Once         Ordered ELOY OBANDO    09/26/23 1201 09/26/23 1201  EKG 12-lead  Once        Comments: Do not perform if previously done during this visit/ triage    Ordered ELOY OBANDO    09/26/23 1201 09/26/23 1201  CBC auto differential  STAT         Ordered ELOY OBANDO    09/26/23 1201 09/26/23 1201  Comprehensive metabolic panel  STAT         Ordered ELOY OBANDO    09/26/23 1201 09/26/23 1201  Troponin I #1  STAT        See Hyperspace for full Linked Orders Report.    Ordered ELOY OBANDO    09/26/23 1201 09/26/23 1201  POCT Troponin #1  Once        See Hyperspace for full Linked Orders Report.    Ordered ELOY OBANDO    09/26/23 1044 09/26/23 1044  EKG 12-lead  Once         Completed by ALEXEI GONZALEZ on 9/26/2023 at 10:49 AM WILLIS DANIELS              Virtual Visit Note: The provider triage portion of this emergency department evaluation and documentation was performed via [employer], a HIPAA-compliant telemedicine application, in concert with a tele-presenter in the room. A face to face patient evaluation with one of my colleagues will occur once the patient is placed in an emergency department room.      DISCLAIMER: This note was prepared with [employer] voice recognition transcription software. Garbled syntax, mangled pronouns, and other bizarre constructions may be attributed to that software system.
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
## 2023-09-26 NOTE — ED NOTES
Patient identifiers verified and correct for  MS [place]  C/C:  Multiple concerns SEE NN  APPEARANCE: awake and alert in NAD. PAIN  7/10  SKIN: warm, dry and intact. No breakdown or bruising.  MUSCULOSKELETAL: Patient moving all extremities spontaneously, no obvious swelling or deformities noted. Ambulates independently.  RESPIRATORY: Denies shortness of breath.Respirations unlabored.   CARDIAC: Denies CP, 2+ distal pulses; no peripheral edema  ABDOMEN: S/ND/NT, Denies nausea  : voids spontaneously, denies difficulty  Neurologic: AAO x 4; follows commands equal strength in all extremities; denies numbness/tingling. Denies dizziness reports left neck pain , gen weakness, intermittetn headache that moves,

## 2023-09-26 NOTE — DISCHARGE INSTRUCTIONS
Continue take blood pressure medication as directed  No signs of stress of the heart, kidney injury

## 2023-09-26 NOTE — ED NOTES
"Patient states b/p elevated x 2 weeks, reports headache. Reports left neck pain with decr sensation,intermittent cheek numbness, reports headache " moves"  CP and headache worse with lying down, positive nausea. Did not take any of 3 b/p meds today   "

## 2023-10-11 ENCOUNTER — DOCUMENTATION ONLY (OUTPATIENT)
Dept: REHABILITATION | Facility: HOSPITAL | Age: 38
End: 2023-10-11
Payer: COMMERCIAL

## 2023-10-11 NOTE — PROGRESS NOTES
OUTPATIENT PHYSICAL THERAPY DISCHARGE SUMMARY     Name: Madyson Calle  [facility] Number: [id number]    Diagnosis: No diagnosis found.  Physician: No ref. provider found  Treatment Orders: PT Eval and Treat  Past Medical History:   Diagnosis Date    Abnormal cervical Papanicolaou smear 2010, 2016, 2017    Colposcopy    Anxiety     GERD (gastroesophageal reflux disease)     Hypertension     Murmur     Scoliosis        Initial visit: 6/20/2023  Date of Last visit: 08/04/2023  Date of Discharge Note:  10/11/2023  Total Visits Received: 7  ASSESSMENT   Status Towards Goals Met:   please refer to 8/04/2023 for most recent note and goal status. Patient was contacted due to canceling multiple appointments and reports she is no longer having significant dizziness or headaches and would like to discharge from therapy.     Discharge reason : Patient self discharge    PLAN   This patient is discharged from Outpatient Physical Therapy Services.     Rita Hardwick, PT  10/11/2023

## 2023-10-18 NOTE — PROGRESS NOTES
[facility] Note    Chief Complaint      Chief Complaint   Patient presents with    Hypertension       History of Present Illness      Madyson Calle is a 38 y.o. female who presents today via virtual visit for   Chief Complaint   Patient presents with    Hypertension         Patient is known to me.  She presents via virtual visit to discuss hypertension.  She visited ER previously where her blood pressure was elevated at 116/111. BP medications at the time were amlodipine 5 mg daily, losartan 25 mg daily, and metoprolol 25 mg daily.  Blood pressure stabilized during the ER.  ECG was normal.  We discussed a future plan of care with adjustment to future plan of care with antihypertensives being the following:  Amlodipine 5 mg daily  Valsartan 80 mg daily  Metoprolol 50 mg daily (q HS).  I reviewed plan of care with patient and she agrees with this plan of care.  She will keep a blood pressure diary with readings done in the a.m. and the p.m. and send readings to me through her portal.    Hypertension  This is a chronic problem. The current episode started more than 1 year ago. The problem has been waxing and waning since onset. The problem is resistant. Associated symptoms include chest pain, headaches, malaise/fatigue, neck pain, orthopnea and palpitations. Pertinent negatives include no anxiety, blurred vision, peripheral edema, PND, shortness of breath or sweats. There are no associated agents to hypertension. The current treatment provides no improvement.        Review of Systems   Constitutional:  Positive for malaise/fatigue.   Eyes:  Negative for blurred vision.   Respiratory:  Negative for shortness of breath.    Cardiovascular:  Positive for chest pain, palpitations and orthopnea. Negative for PND.   Musculoskeletal:  Positive for neck pain.   Neurological:  Positive for headaches.        Family History:  family history includes Bipolar disorder in her sister; Colon cancer in her paternal  aunt; Diabetes in her father and maternal grandfather; Heart attack in her maternal grandfather; Heart murmur in her father; Hyperlipidemia in her maternal grandfather; Hypertension in her father and maternal grandmother; No Known Problems in her daughter, mother, paternal grandfather, and paternal grandmother; Prostate cancer in her father; Stroke in her maternal grandfather.   Family history was reviewed with patient.     Medications:  Outpatient Encounter Medications as of 10/19/2023   Medication Sig Dispense Refill    amLODIPine (NORVASC) 5 MG tablet Take 1 tablet (5 mg total) by mouth once daily. 90 tablet 3    ARAZLO 0.045 % Lotn       boric acid (bulk) Powd Insert one Gelatin Capsule filled with 600 mg of Boric Acid Powder H.S. 100 capsule 4    dapsone (ACZONE) 7.5 % GlwP       diclofenac sodium (VOLTAREN) 1 % Gel Apply 2 g topically 3 (three) times daily. 100 g 0    ergocalciferol (ERGOCALCIFEROL) 50,000 unit Cap Take 1 capsule (50,000 Units total) by mouth every 7 days. 4 capsule 11    hydroquinone 4 % Crea Apply to dark spots once daily. Use with sunscreen if outdoors 28 g 1    ibuprofen (ADVIL,MOTRIN) 800 MG tablet Take 1 tablet (800 mg total) by mouth 3 (three) times daily as needed for Pain. Take with food 30 tablet 0    loratadine (CLARITIN) 10 mg tablet Take 1 tablet (10 mg total) by mouth once daily. 30 tablet 11    metoclopramide HCl (REGLAN) 10 MG tablet Take 1 tablet (10 mg total) by mouth every 6 (six) hours. 30 tablet 0    metoprolol succinate (TOPROL-XL) 50 MG 24 hr tablet Take 1 tablet (50 mg total) by mouth once daily. 30 tablet 5    omeprazole (PRILOSEC) 40 MG capsule Take 40 mg by mouth as needed.      ondansetron (ZOFRAN-ODT) 4 MG TbDL Take 1 tablet (4 mg total) by mouth every 8 (eight) hours as needed (nausea). 20 tablet 6    valsartan (DIOVAN) 80 MG tablet Take 1 tablet (80 mg total) by mouth once daily. 90 tablet 3    [DISCONTINUED] losartan (COZAAR) 25 MG tablet Take 1 tablet (25 mg  total) by mouth once daily. 90 tablet 3    [DISCONTINUED] metoprolol tartrate (LOPRESSOR) 25 MG tablet Take 1 tablet (25 mg total) by mouth 2 (two) times daily. 120 tablet 3     Facility-Administered Encounter Medications as of 10/19/2023   Medication Dose Route Frequency Provider Last Rate Last Admin    levonorgestreL (MIRENA) 20 mcg/24 hours (8 yrs) 52 mg IUD 1 Intra Uterine Device  1 Intra Uterine Device Intrauterine  uJan Curiel MD   1 Intra Uterine Device at 01/03/23 1545       Allergies:  Review of patient's allergies indicates:  No Known Allergies    Health Maintenance:  Health Maintenance   Topic Date Due    TETANUS VACCINE  Never done    Hepatitis C Screening  Completed    Lipid Panel  Completed     Health Maintenance Topics with due status: Not Due       Topic Last Completion Date    Cervical Cancer Screening 06/14/2022       Physical Exam       ]        Assessment/Plan     Madyson Calle is a 38 y.o.female with:    Hypertension, unspecified type  -     valsartan (DIOVAN) 80 MG tablet; Take 1 tablet (80 mg total) by mouth once daily.  Dispense: 90 tablet; Refill: 3    Other orders  -     metoprolol succinate (TOPROL-XL) 50 MG 24 hr tablet; Take 1 tablet (50 mg total) by mouth once daily.  Dispense: 30 tablet; Refill: 5    Amlodipine 5 mg daily  Valsartan 80 mg daily  Metoprolol 50 mg daily (q HS).  I reviewed plan of care with patient and she agrees with this plan of care.  She will keep a blood pressure diary with readings done in the a.m. and the p.m. and send readings to me through her portal.    As above, continue current medications and maintain follow up with specialists.  Return to clinic as needed.    Greater than 50% of this time was spent face to face via virtual visit with patient.  All questions were answered to patient's satisfaction.    The patient location is: home  The chief complaint leading to consultation is: increased BP    Visit type: audiovisual    Face to Face time with  patient:   6 minutes of total time spent on the encounter, which includes face to face time and non-face to face time preparing to see the patient (eg, review of tests), Obtaining and/or reviewing separately obtained history, Documenting clinical information in the electronic or other health record, Independently interpreting results (not separately reported) and communicating results to the patient/family/caregiver, or Care coordination (not separately reported).         Each patient to whom he or she provides medical services by telemedicine is:  (1) informed of the relationship between the physician and patient and the respective role of any other health care provider with respect to management of the patient; and (2) notified that he or she may decline to receive medical services by telemedicine and may withdraw from such care at any time.       Karin Oneill, NP-C  [facility]

## 2023-10-19 ENCOUNTER — OFFICE VISIT (OUTPATIENT)
Dept: PRIMARY CARE CLINIC | Facility: CLINIC | Age: 38
End: 2023-10-19
Payer: COMMERCIAL

## 2023-10-19 ENCOUNTER — PATIENT MESSAGE (OUTPATIENT)
Dept: PRIMARY CARE CLINIC | Facility: CLINIC | Age: 38
End: 2023-10-19

## 2023-10-19 DIAGNOSIS — I10 HYPERTENSION, UNSPECIFIED TYPE: Primary | ICD-10-CM

## 2023-10-19 PROCEDURE — 99214 OFFICE O/P EST MOD 30 MIN: CPT | Mod: 95,,, | Performed by: NURSE PRACTITIONER

## 2023-10-19 PROCEDURE — 4010F ACE/ARB THERAPY RXD/TAKEN: CPT | Mod: CPTII,95,, | Performed by: NURSE PRACTITIONER

## 2023-10-19 PROCEDURE — 4010F PR ACE/ARB THEARPY RXD/TAKEN: ICD-10-PCS | Mod: CPTII,95,, | Performed by: NURSE PRACTITIONER

## 2023-10-19 PROCEDURE — 99214 PR OFFICE/OUTPT VISIT, EST, LEVL IV, 30-39 MIN: ICD-10-PCS | Mod: 95,,, | Performed by: NURSE PRACTITIONER

## 2023-10-19 PROCEDURE — 3044F HG A1C LEVEL LT 7.0%: CPT | Mod: CPTII,95,, | Performed by: NURSE PRACTITIONER

## 2023-10-19 PROCEDURE — 1159F MED LIST DOCD IN RCRD: CPT | Mod: CPTII,95,, | Performed by: NURSE PRACTITIONER

## 2023-10-19 PROCEDURE — 1159F PR MEDICATION LIST DOCUMENTED IN MEDICAL RECORD: ICD-10-PCS | Mod: CPTII,95,, | Performed by: NURSE PRACTITIONER

## 2023-10-19 PROCEDURE — 1160F RVW MEDS BY RX/DR IN RCRD: CPT | Mod: CPTII,95,, | Performed by: NURSE PRACTITIONER

## 2023-10-19 PROCEDURE — 1160F PR REVIEW ALL MEDS BY PRESCRIBER/CLIN PHARMACIST DOCUMENTED: ICD-10-PCS | Mod: CPTII,95,, | Performed by: NURSE PRACTITIONER

## 2023-10-19 PROCEDURE — 3044F PR MOST RECENT HEMOGLOBIN A1C LEVEL <7.0%: ICD-10-PCS | Mod: CPTII,95,, | Performed by: NURSE PRACTITIONER

## 2023-10-19 RX ORDER — METOPROLOL SUCCINATE 50 MG/1
50 TABLET, EXTENDED RELEASE ORAL DAILY
Qty: 30 TABLET | Refills: 5 | Status: SHIPPED | OUTPATIENT
Start: 2023-10-19 | End: 2024-10-18

## 2023-10-19 RX ORDER — VALSARTAN 80 MG/1
80 TABLET ORAL DAILY
Qty: 90 TABLET | Refills: 3 | Status: ON HOLD | OUTPATIENT
Start: 2023-10-19 | End: 2024-03-12 | Stop reason: HOSPADM

## 2023-10-26 ENCOUNTER — PATIENT MESSAGE (OUTPATIENT)
Dept: PRIMARY CARE CLINIC | Facility: CLINIC | Age: 38
End: 2023-10-26
Payer: COMMERCIAL

## 2023-10-26 NOTE — TELEPHONE ENCOUNTER
LOV: 10/19/23    Please see patient below about her bp.    This week medicine was taken as prescribed. In the morning blood pressure would be high until medication is taken and then it would go down to normal.  Yesterday my numbers were low 123/64, so I only took the amlodipine and it brought my blood pressure down to 114/61. This morning it is 114/92 on right arm and 121/86 on left arm. My pulse was 53 but it has risen to 73. Should I continue taking as prescribed even though my BP was on the low side?

## 2023-12-06 ENCOUNTER — OFFICE VISIT (OUTPATIENT)
Dept: PRIMARY CARE CLINIC | Facility: CLINIC | Age: 38
End: 2023-12-06
Payer: COMMERCIAL

## 2023-12-06 VITALS
HEIGHT: 64 IN | RESPIRATION RATE: 16 BRPM | SYSTOLIC BLOOD PRESSURE: 132 MMHG | BODY MASS INDEX: 22.99 KG/M2 | TEMPERATURE: 98 F | DIASTOLIC BLOOD PRESSURE: 88 MMHG | OXYGEN SATURATION: 98 % | WEIGHT: 134.69 LBS | HEART RATE: 65 BPM

## 2023-12-06 DIAGNOSIS — I10 HYPERTENSION, UNSPECIFIED TYPE: ICD-10-CM

## 2023-12-06 DIAGNOSIS — R06.02 SHORTNESS OF BREATH: ICD-10-CM

## 2023-12-06 DIAGNOSIS — R00.2 HEART PALPITATIONS: Primary | ICD-10-CM

## 2023-12-06 PROCEDURE — 1160F PR REVIEW ALL MEDS BY PRESCRIBER/CLIN PHARMACIST DOCUMENTED: ICD-10-PCS | Mod: CPTII,S$GLB,, | Performed by: NURSE PRACTITIONER

## 2023-12-06 PROCEDURE — 3044F HG A1C LEVEL LT 7.0%: CPT | Mod: CPTII,S$GLB,, | Performed by: NURSE PRACTITIONER

## 2023-12-06 PROCEDURE — 1159F PR MEDICATION LIST DOCUMENTED IN MEDICAL RECORD: ICD-10-PCS | Mod: CPTII,S$GLB,, | Performed by: NURSE PRACTITIONER

## 2023-12-06 PROCEDURE — 3075F PR MOST RECENT SYSTOLIC BLOOD PRESS GE 130-139MM HG: ICD-10-PCS | Mod: CPTII,S$GLB,, | Performed by: NURSE PRACTITIONER

## 2023-12-06 PROCEDURE — 3079F PR MOST RECENT DIASTOLIC BLOOD PRESSURE 80-89 MM HG: ICD-10-PCS | Mod: CPTII,S$GLB,, | Performed by: NURSE PRACTITIONER

## 2023-12-06 PROCEDURE — 99999 PR PBB SHADOW E&M-EST. PATIENT-LVL V: ICD-10-PCS | Mod: PBBFAC,,, | Performed by: NURSE PRACTITIONER

## 2023-12-06 PROCEDURE — 4010F PR ACE/ARB THEARPY RXD/TAKEN: ICD-10-PCS | Mod: CPTII,S$GLB,, | Performed by: NURSE PRACTITIONER

## 2023-12-06 PROCEDURE — 3044F PR MOST RECENT HEMOGLOBIN A1C LEVEL <7.0%: ICD-10-PCS | Mod: CPTII,S$GLB,, | Performed by: NURSE PRACTITIONER

## 2023-12-06 PROCEDURE — 1159F MED LIST DOCD IN RCRD: CPT | Mod: CPTII,S$GLB,, | Performed by: NURSE PRACTITIONER

## 2023-12-06 PROCEDURE — 99214 PR OFFICE/OUTPT VISIT, EST, LEVL IV, 30-39 MIN: ICD-10-PCS | Mod: S$GLB,,, | Performed by: NURSE PRACTITIONER

## 2023-12-06 PROCEDURE — 3008F BODY MASS INDEX DOCD: CPT | Mod: CPTII,S$GLB,, | Performed by: NURSE PRACTITIONER

## 2023-12-06 PROCEDURE — 1160F RVW MEDS BY RX/DR IN RCRD: CPT | Mod: CPTII,S$GLB,, | Performed by: NURSE PRACTITIONER

## 2023-12-06 PROCEDURE — 3079F DIAST BP 80-89 MM HG: CPT | Mod: CPTII,S$GLB,, | Performed by: NURSE PRACTITIONER

## 2023-12-06 PROCEDURE — 4010F ACE/ARB THERAPY RXD/TAKEN: CPT | Mod: CPTII,S$GLB,, | Performed by: NURSE PRACTITIONER

## 2023-12-06 PROCEDURE — 3075F SYST BP GE 130 - 139MM HG: CPT | Mod: CPTII,S$GLB,, | Performed by: NURSE PRACTITIONER

## 2023-12-06 PROCEDURE — 99999 PR PBB SHADOW E&M-EST. PATIENT-LVL V: CPT | Mod: PBBFAC,,, | Performed by: NURSE PRACTITIONER

## 2023-12-06 PROCEDURE — 99214 OFFICE O/P EST MOD 30 MIN: CPT | Mod: S$GLB,,, | Performed by: NURSE PRACTITIONER

## 2023-12-06 PROCEDURE — 3008F PR BODY MASS INDEX (BMI) DOCUMENTED: ICD-10-PCS | Mod: CPTII,S$GLB,, | Performed by: NURSE PRACTITIONER

## 2023-12-06 RX ORDER — VALSARTAN 160 MG/1
160 TABLET ORAL DAILY
Qty: 90 TABLET | Refills: 3 | Status: ON HOLD | OUTPATIENT
Start: 2023-12-06 | End: 2024-03-12 | Stop reason: HOSPADM

## 2023-12-06 NOTE — PROGRESS NOTES
[facility] Note    Chief Complaint      Chief Complaint   Patient presents with    Hypertension    Headache     Patient states she is having a headache with a lilttle dizziness.     Palpitations    Shortness of Breath       History of Present Illness      Madyson Calle is a 38 y.o. female who presents today for   Chief Complaint   Patient presents with    Hypertension    Headache     Patient states she is having a headache with a lilttle dizziness.     Palpitations    Shortness of Breath         Patient presents to clinic today for follow-up visit regarding report of headaches, heart palpitations, shortness a breath. BP today is 132/88.  She is currently taking amlodipine 5 mg daily, metoprolol 25 mg daily, and valsartan 80 mg daily.  She denies any chest pain or shortness a breath during this visit.    I have discussed increasing valsartan to 160 mg daily, continue metoprolol at 25 mg daily, amlodipine 5 mg daily.  He will refer patient to Cardiology for shortness a breath and chest palpitations.  I am ordering an echocardiogram.  Patient will send me daily blood pressure readings in 2 weeks in order to re-evaluate treatment plan.  Patient verbalizes understanding of treatment plan.    Hypertension  This is a chronic problem. The current episode started more than 1 year ago. The problem has been waxing and waning since onset. The problem is resistant. Associated symptoms include headaches, palpitations and shortness of breath. There are no associated agents to hypertension. Risk factors for coronary artery disease include stress. Past treatments include beta blockers. The current treatment provides no improvement. There are no compliance problems.         Review of Systems   Respiratory:  Positive for shortness of breath.    Cardiovascular:  Positive for palpitations.   Neurological:  Positive for headaches.   All 12 systems otherwise negative.       Family History:  family history includes Bipolar  disorder in her sister; Colon cancer in her paternal aunt; Diabetes in her father and maternal grandfather; Heart attack in her maternal grandfather; Heart murmur in her father; Hyperlipidemia in her maternal grandfather; Hypertension in her father and maternal grandmother; No Known Problems in her daughter, mother, paternal grandfather, and paternal grandmother; Prostate cancer in her father; Stroke in her maternal grandfather.   Family history was reviewed with patient.     Medications:  Outpatient Encounter Medications as of 12/6/2023   Medication Sig Dispense Refill    amLODIPine (NORVASC) 5 MG tablet Take 1 tablet (5 mg total) by mouth once daily. 90 tablet 3    ARAZLO 0.045 % Lotn       boric acid (bulk) Powd Insert one Gelatin Capsule filled with 600 mg of Boric Acid Powder H.S. 100 capsule 4    dapsone (ACZONE) 7.5 % GlwP       hydroquinone 4 % Crea Apply to dark spots once daily. Use with sunscreen if outdoors 28 g 1    ibuprofen (ADVIL,MOTRIN) 800 MG tablet Take 1 tablet (800 mg total) by mouth 3 (three) times daily as needed for Pain. Take with food 30 tablet 0    loratadine (CLARITIN) 10 mg tablet Take 1 tablet (10 mg total) by mouth once daily. 30 tablet 11    metoprolol succinate (TOPROL-XL) 50 MG 24 hr tablet Take 1 tablet (50 mg total) by mouth once daily. 30 tablet 5    omeprazole (PRILOSEC) 40 MG capsule Take 40 mg by mouth as needed.      valsartan (DIOVAN) 80 MG tablet Take 1 tablet (80 mg total) by mouth once daily. 90 tablet 3    diclofenac sodium (VOLTAREN) 1 % Gel Apply 2 g topically 3 (three) times daily. (Patient not taking: Reported on 12/6/2023) 100 g 0    ergocalciferol (ERGOCALCIFEROL) 50,000 unit Cap Take 1 capsule (50,000 Units total) by mouth every 7 days. (Patient not taking: Reported on 12/6/2023) 4 capsule 11    metoclopramide HCl (REGLAN) 10 MG tablet Take 1 tablet (10 mg total) by mouth every 6 (six) hours. (Patient not taking: Reported on 12/6/2023) 30 tablet 0    ondansetron  "(ZOFRAN-ODT) 4 MG TbDL Take 1 tablet (4 mg total) by mouth every 8 (eight) hours as needed (nausea). (Patient not taking: Reported on 12/6/2023) 20 tablet 6    valsartan (DIOVAN) 160 MG tablet Take 1 tablet (160 mg total) by mouth once daily. 90 tablet 3     Facility-Administered Encounter Medications as of 12/6/2023   Medication Dose Route Frequency Provider Last Rate Last Admin    levonorgestreL (MIRENA) 20 mcg/24 hours (8 yrs) 52 mg IUD 1 Intra Uterine Device  1 Intra Uterine Device Intrauterine  Juan Curiel MD   1 Intra Uterine Device at 01/03/23 1545       Allergies:  Review of patient's allergies indicates:  No Known Allergies    Health Maintenance:  Health Maintenance   Topic Date Due    TETANUS VACCINE  Never done    Hepatitis C Screening  Completed    Lipid Panel  Completed     Health Maintenance Topics with due status: Not Due       Topic Last Completion Date    Cervical Cancer Screening 06/14/2022       Physical Exam      Vital Signs  Temp: 98.1 °F (36.7 °C)  Temp Source: Oral  Pulse: 65  Resp: 16  SpO2: 98 %  BP: 132/88  BP Location: Right arm  Patient Position: Sitting  Pain Score:   2  Pain Loc: Head  Height and Weight  Height: 5' 4" (162.6 cm)  Weight: 61.1 kg (134 lb 11.2 oz)  BSA (Calculated - sq m): 1.66 sq meters  BMI (Calculated): 23.1  Weight in (lb) to have BMI = 25: 145.3]    Physical Exam  Vitals reviewed.   Constitutional:       Appearance: Normal appearance. She is normal weight.   HENT:      Head: Normocephalic and atraumatic.      Nose: Nose normal.      Mouth/Throat:      Mouth: Mucous membranes are moist.      Pharynx: Oropharynx is clear.   Eyes:      Extraocular Movements: Extraocular movements intact.      Conjunctiva/sclera: Conjunctivae normal.      Pupils: Pupils are equal, round, and reactive to light.   Cardiovascular:      Rate and Rhythm: Normal rate and regular rhythm.      Pulses: Normal pulses.      Heart sounds: Normal heart sounds.   Pulmonary:      Effort: " Pulmonary effort is normal.      Breath sounds: Normal breath sounds.   Abdominal:      General: Abdomen is flat.   Musculoskeletal:         General: Normal range of motion.      Cervical back: Normal range of motion and neck supple.   Skin:     General: Skin is warm and dry.      Capillary Refill: Capillary refill takes less than 2 seconds.   Neurological:      General: No focal deficit present.      Mental Status: She is alert and oriented to person, place, and time. Mental status is at baseline.   Psychiatric:         Mood and Affect: Mood normal.         Behavior: Behavior normal.         Thought Content: Thought content normal.         Judgment: Judgment normal.            Assessment/Plan     Madyson Calle is a 38 y.o.female with:    Heart palpitations  -     Ambulatory referral/consult to Cardiology; Future; Expected date: 12/13/2023  -     Echo Saline Bubble? No; Future    Shortness of breath  -     Ambulatory referral/consult to Cardiology; Future; Expected date: 12/13/2023  -     Echo Saline Bubble? No; Future    Hypertension, unspecified type  -     valsartan (DIOVAN) 160 MG tablet; Take 1 tablet (160 mg total) by mouth once daily.  Dispense: 90 tablet; Refill: 3        As above, continue current medications and maintain follow up with specialists.  Return to clinic as needed.    Greater than 50% of visit was spent face to face with patient.  All questions were answered to patient's satisfaction.          Karen L Spencer, NP-C [facility]

## 2023-12-12 ENCOUNTER — PATIENT MESSAGE (OUTPATIENT)
Dept: PRIMARY CARE CLINIC | Facility: CLINIC | Age: 38
End: 2023-12-12
Payer: COMMERCIAL

## 2023-12-12 ENCOUNTER — HOSPITAL ENCOUNTER (OUTPATIENT)
Dept: CARDIOLOGY | Facility: HOSPITAL | Age: 38
Discharge: HOME | End: 2023-12-12
Attending: NURSE PRACTITIONER
Payer: COMMERCIAL

## 2023-12-12 VITALS
DIASTOLIC BLOOD PRESSURE: 80 MMHG | SYSTOLIC BLOOD PRESSURE: 125 MMHG | WEIGHT: 134 LBS | HEIGHT: 64 IN | BODY MASS INDEX: 22.88 KG/M2 | HEART RATE: 62 BPM

## 2023-12-12 DIAGNOSIS — R00.2 HEART PALPITATIONS: ICD-10-CM

## 2023-12-12 DIAGNOSIS — R06.02 SHORTNESS OF BREATH: ICD-10-CM

## 2023-12-12 LAB
ASCENDING AORTA: 3.02 CM
AV INDEX (PROSTH): 0.75
AV MEAN GRADIENT: 3 MMHG
AV PEAK GRADIENT: 5 MMHG
AV VALVE AREA BY VELOCITY RATIO: 2.2 CM²
AV VALVE AREA: 2.12 CM²
AV VELOCITY RATIO: 0.78
BSA FOR ECHO PROCEDURE: 1.66 M2
CV ECHO LV RWT: 0.36 CM
DOP CALC AO PEAK VEL: 1.07 M/S
DOP CALC AO VTI: 23.68 CM
DOP CALC LVOT AREA: 2.8 CM2
DOP CALC LVOT DIAMETER: 1.9 CM
DOP CALC LVOT PEAK VEL: 0.83 M/S
DOP CALC LVOT STROKE VOLUME: 50.3 CM3
DOP CALCLVOT PEAK VEL VTI: 17.75 CM
E WAVE DECELERATION TIME: 220.32 MSEC
E/A RATIO: 1.31
E/E' RATIO: 7 M/S
ECHO LV POSTERIOR WALL: 0.77 CM (ref 0.6–1.1)
EJECTION FRACTION: 55 %
FRACTIONAL SHORTENING: 29 % (ref 28–44)
INTERVENTRICULAR SEPTUM: 0.7 CM (ref 0.6–1.1)
LA MAJOR: 4.24 CM
LA MINOR: 4.13 CM
LA WIDTH: 3.74 CM
LEFT ATRIUM SIZE: 3.18 CM
LEFT ATRIUM VOLUME INDEX MOD: 19.6 ML/M2
LEFT ATRIUM VOLUME INDEX: 25.6 ML/M2
LEFT ATRIUM VOLUME MOD: 32.42 CM3
LEFT ATRIUM VOLUME: 42.3 CM3
LEFT INTERNAL DIMENSION IN SYSTOLE: 2.99 CM (ref 2.1–4)
LEFT VENTRICLE DIASTOLIC VOLUME INDEX: 48.19 ML/M2
LEFT VENTRICLE DIASTOLIC VOLUME: 79.51 ML
LEFT VENTRICLE MASS INDEX: 55 G/M2
LEFT VENTRICLE SYSTOLIC VOLUME INDEX: 21 ML/M2
LEFT VENTRICLE SYSTOLIC VOLUME: 34.6 ML
LEFT VENTRICULAR INTERNAL DIMENSION IN DIASTOLE: 4.22 CM (ref 3.5–6)
LEFT VENTRICULAR MASS: 91.35 G
LV LATERAL E/E' RATIO: 5.5 M/S
LV SEPTAL E/E' RATIO: 9.63 M/S
MV PEAK A VEL: 0.59 M/S
MV PEAK E VEL: 0.77 M/S
MV STENOSIS PRESSURE HALF TIME: 63.89 MS
MV VALVE AREA P 1/2 METHOD: 3.44 CM2
PISA TR MAX VEL: 2.26 M/S
RA MAJOR: 3.83 CM
RA PRESSURE ESTIMATED: 3 MMHG
RA WIDTH: 3.31 CM
RIGHT VENTRICULAR END-DIASTOLIC DIMENSION: 3.37 CM
RV TB RVSP: 5 MMHG
SINUS: 2.69 CM
STJ: 2.53 CM
TDI LATERAL: 0.14 M/S
TDI SEPTAL: 0.08 M/S
TDI: 0.11 M/S
TR MAX PG: 20 MMHG
TRICUSPID ANNULAR PLANE SYSTOLIC EXCURSION: 2 CM
TV REST PULMONARY ARTERY PRESSURE: 23 MMHG
Z-SCORE OF LEFT VENTRICULAR DIMENSION IN END DIASTOLE: -0.93
Z-SCORE OF LEFT VENTRICULAR DIMENSION IN END SYSTOLE: 0.32

## 2023-12-12 PROCEDURE — 93306 TTE W/DOPPLER COMPLETE: CPT

## 2023-12-12 PROCEDURE — 93306 ECHO (CUPID ONLY): ICD-10-PCS | Mod: 26,,, | Performed by: INTERNAL MEDICINE

## 2023-12-12 PROCEDURE — 93306 TTE W/DOPPLER COMPLETE: CPT | Mod: 26,,, | Performed by: INTERNAL MEDICINE

## 2024-01-11 ENCOUNTER — OFFICE VISIT (OUTPATIENT)
Dept: CARDIOLOGY | Facility: CLINIC | Age: 39
End: 2024-01-11
Payer: COMMERCIAL

## 2024-01-11 ENCOUNTER — OFFICE VISIT (OUTPATIENT)
Dept: PODIATRY | Facility: CLINIC | Age: 39
End: 2024-01-11
Payer: COMMERCIAL

## 2024-01-11 VITALS
HEART RATE: 78 BPM | OXYGEN SATURATION: 100 % | DIASTOLIC BLOOD PRESSURE: 103 MMHG | HEIGHT: 64 IN | BODY MASS INDEX: 23.18 KG/M2 | SYSTOLIC BLOOD PRESSURE: 164 MMHG | WEIGHT: 135.81 LBS

## 2024-01-11 VITALS
BODY MASS INDEX: 22.88 KG/M2 | HEIGHT: 64 IN | WEIGHT: 134 LBS | DIASTOLIC BLOOD PRESSURE: 109 MMHG | HEART RATE: 81 BPM | SYSTOLIC BLOOD PRESSURE: 162 MMHG

## 2024-01-11 DIAGNOSIS — Q72.891 BRACHYMETATARSIA OF RIGHT FOOT: Primary | ICD-10-CM

## 2024-01-11 DIAGNOSIS — R00.2 HEART PALPITATIONS: ICD-10-CM

## 2024-01-11 DIAGNOSIS — I10 PRIMARY HYPERTENSION: Primary | ICD-10-CM

## 2024-01-11 DIAGNOSIS — Z01.818 PREOP TESTING: ICD-10-CM

## 2024-01-11 DIAGNOSIS — R06.02 SHORTNESS OF BREATH: ICD-10-CM

## 2024-01-11 DIAGNOSIS — Z00.00 WELLNESS EXAMINATION: ICD-10-CM

## 2024-01-11 DIAGNOSIS — M20.5X1 CONTRACTURE OF TOE OF RIGHT FOOT: ICD-10-CM

## 2024-01-11 PROCEDURE — 3008F BODY MASS INDEX DOCD: CPT | Mod: CPTII,S$GLB,, | Performed by: PODIATRIST

## 2024-01-11 PROCEDURE — 1160F RVW MEDS BY RX/DR IN RCRD: CPT | Mod: CPTII,S$GLB,, | Performed by: PODIATRIST

## 2024-01-11 PROCEDURE — 3080F DIAST BP >= 90 MM HG: CPT | Mod: CPTII,S$GLB,, | Performed by: INTERNAL MEDICINE

## 2024-01-11 PROCEDURE — 99214 OFFICE O/P EST MOD 30 MIN: CPT | Mod: S$GLB,,, | Performed by: INTERNAL MEDICINE

## 2024-01-11 PROCEDURE — 3008F BODY MASS INDEX DOCD: CPT | Mod: CPTII,S$GLB,, | Performed by: INTERNAL MEDICINE

## 2024-01-11 PROCEDURE — 1159F MED LIST DOCD IN RCRD: CPT | Mod: CPTII,S$GLB,, | Performed by: PODIATRIST

## 2024-01-11 PROCEDURE — 3077F SYST BP >= 140 MM HG: CPT | Mod: CPTII,S$GLB,, | Performed by: PODIATRIST

## 2024-01-11 PROCEDURE — 3077F SYST BP >= 140 MM HG: CPT | Mod: CPTII,S$GLB,, | Performed by: INTERNAL MEDICINE

## 2024-01-11 PROCEDURE — 99214 OFFICE O/P EST MOD 30 MIN: CPT | Mod: S$GLB,,, | Performed by: PODIATRIST

## 2024-01-11 PROCEDURE — 99999 PR PBB SHADOW E&M-EST. PATIENT-LVL V: CPT | Mod: PBBFAC,,, | Performed by: INTERNAL MEDICINE

## 2024-01-11 PROCEDURE — 99999 PR PBB SHADOW E&M-EST. PATIENT-LVL V: CPT | Mod: PBBFAC,,, | Performed by: PODIATRIST

## 2024-01-11 PROCEDURE — 3080F DIAST BP >= 90 MM HG: CPT | Mod: CPTII,S$GLB,, | Performed by: PODIATRIST

## 2024-01-11 RX ORDER — SODIUM CHLORIDE 0.9 % (FLUSH) 0.9 %
10 SYRINGE (ML) INJECTION
Status: DISCONTINUED | OUTPATIENT
Start: 2024-01-11 | End: 2024-01-31

## 2024-01-11 RX ORDER — CEFAZOLIN SODIUM 2 G/50ML
2 SOLUTION INTRAVENOUS
OUTPATIENT
Start: 2024-01-11

## 2024-01-11 NOTE — PROGRESS NOTES
"Subjective:     Patient ID: Madyson Calle is a 38 y.o. female.    Chief Complaint: Follow-up (Foot surgery)    Referral from Dr. 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  Patient relates pain in enclosed shoes secondary to rubbing and irritation against the 4th toe.  Relates that she stubbed her toe when she was younger and this may have contributed to it.  She has an uncle that has a similar condition in both feet.  For more relates that she would a slip and fall in the middle of the night and suffered a concussion several weeks ago.  She was receiving physical therapy twice weekly which has helped in the symptoms are gradually improving.    08/17/2023: Returns to review bilateral foot x-ray and discuss possible surgical intervention for breaking metatarsalgia 4th metatarsal right foot.  No pain experienced.    01/11/2024:  Returns to discuss surgical intervention for the right foot.  No new concerns.    Vitals:    01/11/24 1146   BP: (!) 162/109   Pulse: 81   Weight: 60.8 kg (134 lb)   Height: 5' 4" (1.626 m)   PainSc: 0-No pain      Past Medical History:   Diagnosis Date    Abnormal cervical Papanicolaou smear 2010, 2016, 2017    Colposcopy    Anxiety     GERD (gastroesophageal reflux disease)     Hypertension     Murmur     Scoliosis        No past surgical history on file.    Family History   Problem Relation Age of Onset    No Known Problems Mother     Hypertension Father     Diabetes Father     Heart murmur Father     Prostate cancer Father     Bipolar disorder Sister     Hypertension Maternal Grandmother     Stroke Maternal Grandfather     Heart attack Maternal Grandfather     Diabetes Maternal Grandfather     Hyperlipidemia Maternal Grandfather     No Known Problems Paternal Grandmother     No Known Problems Paternal Grandfather     Colon cancer Paternal Aunt     No Known Problems Daughter     Breast cancer Neg Hx        Social History     Socioeconomic History    Marital status: Single "   Tobacco Use    Smoking status: Never     Passive exposure: Never    Smokeless tobacco: Never   Substance and Sexual Activity    Alcohol use: Yes     Alcohol/week: 7.0 standard drinks of alcohol     Types: 7 Glasses of wine per week     Comment: SIOCA    Drug use: Not Currently     Types: Marijuana    Sexual activity: Yes     Partners: Male     Birth control/protection: I.U.D.       Current Outpatient Medications   Medication Sig Dispense Refill    ARAZLO 0.045 % Lotn       boric acid (bulk) Powd Insert one Gelatin Capsule filled with 600 mg of Boric Acid Powder H.S. 100 capsule 4    dapsone (ACZONE) 7.5 % GlwP       diclofenac sodium (VOLTAREN) 1 % Gel Apply 2 g topically 3 (three) times daily. (Patient not taking: Reported on 1/11/2024) 100 g 0    ergocalciferol (ERGOCALCIFEROL) 50,000 unit Cap Take 1 capsule (50,000 Units total) by mouth every 7 days. (Patient not taking: Reported on 1/11/2024) 4 capsule 11    hydroquinone 4 % Crea Apply to dark spots once daily. Use with sunscreen if outdoors 28 g 1    ibuprofen (ADVIL,MOTRIN) 800 MG tablet Take 1 tablet (800 mg total) by mouth 3 (three) times daily as needed for Pain. Take with food 30 tablet 0    loratadine (CLARITIN) 10 mg tablet Take 1 tablet (10 mg total) by mouth once daily. 30 tablet 11    metoclopramide HCl (REGLAN) 10 MG tablet Take 1 tablet (10 mg total) by mouth every 6 (six) hours. 30 tablet 0    metoprolol succinate (TOPROL-XL) 50 MG 24 hr tablet Take 1 tablet (50 mg total) by mouth once daily. 30 tablet 5    omeprazole (PRILOSEC) 40 MG capsule Take 40 mg by mouth as needed.      ondansetron (ZOFRAN-ODT) 4 MG TbDL Take 1 tablet (4 mg total) by mouth every 8 (eight) hours as needed (nausea). (Patient not taking: Reported on 1/11/2024) 20 tablet 6    valsartan (DIOVAN) 160 MG tablet Take 1 tablet (160 mg total) by mouth once daily. 90 tablet 3    valsartan (DIOVAN) 80 MG tablet Take 1 tablet (80 mg total) by mouth once daily. (Patient not taking:  Reported on 1/11/2024) 90 tablet 3    amLODIPine (NORVASC) 5 MG tablet Take 1 tablet (5 mg total) by mouth once daily. 90 tablet 3     Current Facility-Administered Medications   Medication Dose Route Frequency Provider Last Rate Last Admin    levonorgestreL (MIRENA) 20 mcg/24 hours (8 yrs) 52 mg IUD 1 Intra Uterine Device  1 Intra Uterine Device Intrauterine  Juan Curiel MD   1 Intra Uterine Device at 01/03/23 1545    sodium chloride 0.9% flush 10 mL  10 mL Intravenous PRN Saul Moore DPM           Review of patient's allergies indicates:  No Known Allergies      Review of Systems   Constitutional: Negative for chills, fever and malaise/fatigue.   Cardiovascular:  Negative for chest pain, claudication and leg swelling.   Respiratory:  Negative for cough and shortness of breath.    Musculoskeletal:  Negative for back pain, joint pain, muscle cramps and muscle weakness.   Gastrointestinal:  Negative for nausea and vomiting.   Neurological:  Negative for numbness, paresthesias and weakness.   Psychiatric/Behavioral:  Negative for altered mental status.         Objective:     Physical Exam  Constitutional:       General: She is not in acute distress.     Appearance: Normal appearance. She is not ill-appearing.   Cardiovascular:      Pulses:           Dorsalis pedis pulses are 2+ on the right side and 2+ on the left side.        Posterior tibial pulses are 2+ on the right side and 2+ on the left side.      Comments: No lower extremity edema bilateral.  Skin temp is warm to foot bilateral.  No rubor on dependency bilateral foot.  Musculoskeletal:      Comments: Shortened appearance to the right 4th toe with dorsal contracture at the MTP.  Upon palpation of 4th metatarsal head along the plantar aspect is noted to be more proximal than the 3rd and 5th metatarsals right foot.  No localized pain on palpation or with range of motion right foot.  Overall rectus appearing foot type bilateral.   Skin:      General: Skin is warm.      Capillary Refill: Capillary refill takes less than 2 seconds.      Findings: No ecchymosis or erythema.      Nails: There is no clubbing.   Neurological:      Mental Status: She is alert and oriented to person, place, and time.      Sensory: Sensation is intact.      Motor: Motor function is intact.             Assessment:      Encounter Diagnoses   Name Primary?    Brachymetatarsia of right foot Yes    Contracture of toe of right foot     Preop testing      Plan:     Madyson was seen today for follow-up.    Diagnoses and all orders for this visit:    Brachymetatarsia of right foot  -     Ambulatory referral/consult to Internal Medicine; Future  -     Case Request Operating Room: OSTEOTOMY, METATARSAL BONE  -     Full code; Standing  -     Place in Outpatient; Standing  -     Insert peripheral IV; Standing  -     Verify surgical site; Standing  -     Verify informed consent; Standing  -     Chlorhexidine (CHG) 2% Wipes; Standing  -     Insert peripheral IV  -     WALKER FOR HOME USE    Contracture of toe of right foot  -     Ambulatory referral/consult to Internal Medicine; Future  -     Case Request Operating Room: OSTEOTOMY, METATARSAL BONE  -     Full code; Standing  -     Place in Outpatient; Standing  -     Insert peripheral IV; Standing  -     Verify surgical site; Standing  -     Verify informed consent; Standing  -     Chlorhexidine (CHG) 2% Wipes; Standing  -     Insert peripheral IV  -     WALKER FOR HOME USE    Preop testing  -     Ambulatory referral/consult to Internal Medicine; Future  -     WALKER FOR HOME USE    Other orders  -     sodium chloride 0.9% flush 10 mL  -     cefazolin (ANCEF) 2 gram in dextrose 5% 50 mL IVPB (premix)      I counseled the patient on her conditions, their implications and medical management.    Reviewed bilateral foot x-ray in detail.  Note approximate 1.3 cm of shortening to the right 4th metatarsal comparison to left.  There is some  malformation to the 4th metatarsal head.  The 4th toe is also over riding the metatarsal.    We discussed continued conservative care versus surgical intervention consisting of 2 separate approaches.  We discussed open acute lengthening of the 4th metatarsal with autologous bone graft from the ipsilateral calcaneus with extensor tendon lengthening and pinning of the 4th toe versus gradual callus distraction of the 4th metatarsal and pitting of the 4th toe.  Risks, benefits and anticipated postoperative course were discussed in detail.  No guarantees were given or implied.  With gradual correction with callus distraction utilizing mini external fixation.    This procedure has been fully reviewed with the patient and written informed consent has been obtained.    Referred to PCP for medical optimization and risk stratification    Patient is scheduled for surgical intervention on 05/01/2024 as mac with local anesthetic.  Patient will be nonweightbearing initially postop and was recommended to get a rolling knee scooter.    RTC 1 week postop or p.r.n. as discussed.  She will require weekly follow-up.    A portion of this note was generated by voice recognition software and may contain spelling and grammar errors.      .
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
## 2024-01-16 ENCOUNTER — TELEPHONE (OUTPATIENT)
Dept: PODIATRY | Facility: CLINIC | Age: 39
End: 2024-01-16
Payer: COMMERCIAL

## 2024-01-19 ENCOUNTER — LAB VISIT (OUTPATIENT)
Dept: LAB | Facility: HOSPITAL | Age: 39
End: 2024-01-19
Attending: INTERNAL MEDICINE
Payer: COMMERCIAL

## 2024-01-19 DIAGNOSIS — I10 PRIMARY HYPERTENSION: ICD-10-CM

## 2024-01-19 LAB
CHOLEST SERPL-MCNC: 137 MG/DL (ref 120–199)
CHOLEST/HDLC SERPL: 2.7 {RATIO} (ref 2–5)
HDLC SERPL-MCNC: 50 MG/DL (ref 40–75)
HDLC SERPL: 36.5 % (ref 20–50)
LDLC SERPL CALC-MCNC: 65.8 MG/DL (ref 63–159)
NONHDLC SERPL-MCNC: 87 MG/DL
TRIGL SERPL-MCNC: 106 MG/DL (ref 30–150)

## 2024-01-19 PROCEDURE — 80061 LIPID PANEL: CPT | Performed by: INTERNAL MEDICINE

## 2024-01-19 PROCEDURE — 36415 COLL VENOUS BLD VENIPUNCTURE: CPT | Mod: PO | Performed by: INTERNAL MEDICINE

## 2024-01-31 ENCOUNTER — LAB VISIT (OUTPATIENT)
Dept: LAB | Facility: HOSPITAL | Age: 39
End: 2024-01-31
Attending: NURSE PRACTITIONER
Payer: COMMERCIAL

## 2024-01-31 ENCOUNTER — OFFICE VISIT (OUTPATIENT)
Dept: OBSTETRICS AND GYNECOLOGY | Facility: CLINIC | Age: 39
End: 2024-01-31
Payer: COMMERCIAL

## 2024-01-31 VITALS
DIASTOLIC BLOOD PRESSURE: 102 MMHG | HEIGHT: 64 IN | WEIGHT: 136 LBS | BODY MASS INDEX: 23.22 KG/M2 | SYSTOLIC BLOOD PRESSURE: 140 MMHG

## 2024-01-31 DIAGNOSIS — N76.0 ACUTE VAGINITIS: ICD-10-CM

## 2024-01-31 DIAGNOSIS — Z11.3 ENCOUNTER FOR SCREENING FOR INFECTIONS WITH PREDOMINANTLY SEXUAL MODE OF TRANSMISSION: ICD-10-CM

## 2024-01-31 DIAGNOSIS — Z72.89 OTHER PROBLEMS RELATED TO LIFESTYLE: ICD-10-CM

## 2024-01-31 DIAGNOSIS — B96.89 BV (BACTERIAL VAGINOSIS): ICD-10-CM

## 2024-01-31 DIAGNOSIS — N76.0 BV (BACTERIAL VAGINOSIS): ICD-10-CM

## 2024-01-31 DIAGNOSIS — N94.10 DYSPAREUNIA IN FEMALE: Primary | ICD-10-CM

## 2024-01-31 LAB
GARDNERELLA VAGINALIS: ABNORMAL
OTHER MICROSC. OBSERVATIONS: ABNORMAL
POC BACTERIAL VAGINOSIS: ABNORMAL
POC CLUE CELLS: POSITIVE
TRICHOMONAS, POC: NEGATIVE
YEAST WET PREP: NEGATIVE

## 2024-01-31 PROCEDURE — 99999 PR PBB SHADOW E&M-EST. PATIENT-LVL IV: CPT | Mod: PBBFAC,,, | Performed by: NURSE PRACTITIONER

## 2024-01-31 PROCEDURE — 1159F MED LIST DOCD IN RCRD: CPT | Mod: CPTII,S$GLB,, | Performed by: NURSE PRACTITIONER

## 2024-01-31 PROCEDURE — 87210 SMEAR WET MOUNT SALINE/INK: CPT | Mod: QW,S$GLB,, | Performed by: NURSE PRACTITIONER

## 2024-01-31 PROCEDURE — 3008F BODY MASS INDEX DOCD: CPT | Mod: CPTII,S$GLB,, | Performed by: NURSE PRACTITIONER

## 2024-01-31 PROCEDURE — 80074 ACUTE HEPATITIS PANEL: CPT | Performed by: NURSE PRACTITIONER

## 2024-01-31 PROCEDURE — 87389 HIV-1 AG W/HIV-1&-2 AB AG IA: CPT | Performed by: NURSE PRACTITIONER

## 2024-01-31 PROCEDURE — 86592 SYPHILIS TEST NON-TREP QUAL: CPT | Performed by: NURSE PRACTITIONER

## 2024-01-31 PROCEDURE — 3080F DIAST BP >= 90 MM HG: CPT | Mod: CPTII,S$GLB,, | Performed by: NURSE PRACTITIONER

## 2024-01-31 PROCEDURE — 3077F SYST BP >= 140 MM HG: CPT | Mod: CPTII,S$GLB,, | Performed by: NURSE PRACTITIONER

## 2024-01-31 PROCEDURE — 1160F RVW MEDS BY RX/DR IN RCRD: CPT | Mod: CPTII,S$GLB,, | Performed by: NURSE PRACTITIONER

## 2024-01-31 PROCEDURE — 87491 CHLMYD TRACH DNA AMP PROBE: CPT | Performed by: NURSE PRACTITIONER

## 2024-01-31 PROCEDURE — 36415 COLL VENOUS BLD VENIPUNCTURE: CPT | Mod: PN | Performed by: NURSE PRACTITIONER

## 2024-01-31 PROCEDURE — 99214 OFFICE O/P EST MOD 30 MIN: CPT | Mod: S$GLB,,, | Performed by: NURSE PRACTITIONER

## 2024-01-31 RX ORDER — METRONIDAZOLE 500 MG/1
500 TABLET ORAL 2 TIMES DAILY
Qty: 14 TABLET | Refills: 0 | Status: SHIPPED | OUTPATIENT
Start: 2024-01-31 | End: 2024-02-07

## 2024-01-31 NOTE — PROGRESS NOTES
Subjective:       Patient ID: Madyson Calle is a 38 y.o. female.    Chief Complaint:  Fibroids (-pain during sex)      History of Present Illness  HPI   present to discuss fibroids and dyspareunia  Mirena in place since 2023; this is her third device -- amenorrheic  Dyspareunia to LLQ for years  U/s showed cysts and fibroids- small   Position changes do not really help  Needing to use boric acid more frequently r/t BV every couple weeks  Showers with dove sensitive-bar  No feminine products  All free and clear or tide  Vaginal odor-old blood scent  Constipation        GYN & OB History  No LMP recorded. Patient has had an implant.   Date of Last Pap: 2022    OB History    Para Term  AB Living   1 1 1     1   SAB IAB Ectopic Multiple Live Births           1      # Outcome Date GA Lbr Pedro/2nd Weight Sex Delivery Anes PTL Lv   1 Term 11    F Vag-Spont   SIDRA       Review of Systems  Review of Systems   Constitutional:  Negative for chills, fatigue and fever.   Gastrointestinal:  Positive for constipation.   Genitourinary:  Positive for dyspareunia, vaginal discharge and vaginal odor. Negative for dysmenorrhea, frequency, pelvic pain, urgency, vaginal pain and urinary incontinence.   All other systems reviewed and are negative.          Objective:      Physical Exam:   Constitutional: She is oriented to person, place, and time. She appears well-developed and well-nourished. No distress.    HENT:   Head: Normocephalic and atraumatic.    Eyes: Pupils are equal, round, and reactive to light. Conjunctivae and EOM are normal.     Cardiovascular:  Normal rate.             Pulmonary/Chest: Effort normal.        Abdominal: Soft. She exhibits distension. There is no abdominal tenderness. There is no rebound and no guarding. Hernia confirmed negative in the right inguinal area and confirmed negative in the left inguinal area.     Genitourinary:    Inguinal canal, uterus, right adnexa and left  adnexa normal.   Rectum:      No external hemorrhoid.      Pelvic exam was performed with patient in the lithotomy position.   The external female genitalia was normal.   No external genitalia lesions identified,Genitalia hair distrobution normal .     Labial bartholins normal.There is no rash, tenderness, lesion or injury on the right labia. There is no rash, tenderness, lesion or injury on the left labia. Cervix is normal. Right adnexum displays no mass, no tenderness and no fullness. Left adnexum displays no mass, no tenderness and no fullness. There is vaginal discharge (scant amt with odor; milky) in the vagina. No erythema, tenderness or bleeding in the vagina.    No foreign body in the vagina.      No signs of injury in the vagina.   Cervix exhibits no motion tenderness, no lesion, no friability, no tenderness and no polyp. Uerus contour normal  Uterus is not enlarged and not tender. Uterus size: 8 cm.Normal urethral meatus.Urethral Meatus exhibits: urethral lesionUrethra findings: no urethral mass, no tenderness, no urethral scarring and prolapsedBladder findings: no bladder distention and no bladder tenderness   Genitourinary Comments: <1cm strings visualized    Wet prep -- many clue cells and RBC; no yeast or trich noted   IUD strings visualized.          Musculoskeletal: Normal range of motion and moves all extremeties.      Lymphadenopathy: No inguinal adenopathy noted on the right or left side.    Neurological: She is alert and oriented to person, place, and time.    Skin: Skin is warm and dry. No rash noted. She is not diaphoretic. No erythema. No pallor.    Psychiatric: She has a normal mood and affect. Her behavior is normal. Judgment and thought content normal.             Assessment:        1. Dyspareunia in female    2. Encounter for screening for infections with predominantly sexual mode of transmission    3. Other problems related to lifestyle    4. Acute vaginitis    5. BV (bacterial vaginosis)                Plan:   U/s ordered    Labs and cx    Rx sent for flagyl. Avoid alcohol and intercourse while taking.    Patient was counseled today on vaginitis prevention including :  a. avoiding feminine products such as deodorant soaps, body wash, bubble bath, douches, scented toilet paper, deodorant tampons or pads, feminine wipes, chronic pad use, etc.  b. avoiding other vulvovaginal irritants such as long hot baths, humidity, tight, synthetic clothing, chlorine and sitting around in wet bathing suits  c. wearing cotton underwear, avoiding thong underwear and no underwear to bed  d. taking showers instead of baths and use a hair dryer on cool setting afterwards to dry  e. wearing cotton to exercise and shower immediately after exercise and change clothes    Constipation comfort measures discussed -- increasing fiber, water intake, exercise, OTC stool softeners/fiber supplements    Continue annual well woman exam.    Dyspareunia in female  -     US Pelvis Comp with Transvag NON-OB (xpd; Future; Expected date: 01/31/2024    Encounter for screening for infections with predominantly sexual mode of transmission  -     HIV 1/2 Ag/Ab (4th Gen); Future; Expected date: 01/31/2024  -     RPR; Future; Expected date: 01/31/2024  -     Hepatitis Panel, Acute; Future; Expected date: 01/31/2024  -     C. trachomatis/N. gonorrhoeae by AMP DNA    Other problems related to lifestyle  -     HIV 1/2 Ag/Ab (4th Gen); Future; Expected date: 01/31/2024  -     RPR; Future; Expected date: 01/31/2024  -     Hepatitis Panel, Acute; Future; Expected date: 01/31/2024  -     C. trachomatis/N. gonorrhoeae by AMP DNA    Acute vaginitis  -     POCT Wet Prep    BV (bacterial vaginosis)  -     metroNIDAZOLE (FLAGYL) 500 MG tablet; Take 1 tablet (500 mg total) by mouth 2 (two) times daily. for 7 days  Dispense: 14 tablet; Refill: 0

## 2024-02-01 LAB
C TRACH DNA SPEC QL NAA+PROBE: NOT DETECTED
HAV IGM SERPL QL IA: NORMAL
HBV CORE IGM SERPL QL IA: NORMAL
HBV SURFACE AG SERPL QL IA: NORMAL
HCV AB SERPL QL IA: NORMAL
HIV 1+2 AB+HIV1 P24 AG SERPL QL IA: NORMAL
N GONORRHOEA DNA SPEC QL NAA+PROBE: NOT DETECTED
RPR SER QL: NORMAL

## 2024-02-07 ENCOUNTER — PATIENT MESSAGE (OUTPATIENT)
Dept: OBSTETRICS AND GYNECOLOGY | Facility: CLINIC | Age: 39
End: 2024-02-07
Payer: COMMERCIAL

## 2024-02-07 ENCOUNTER — ANCILLARY ORDERS (OUTPATIENT)
Dept: OBSTETRICS AND GYNECOLOGY | Facility: CLINIC | Age: 39
End: 2024-02-07
Payer: COMMERCIAL

## 2024-02-07 ENCOUNTER — HOSPITAL ENCOUNTER (OUTPATIENT)
Dept: RADIOLOGY | Facility: HOSPITAL | Age: 39
Discharge: HOME | End: 2024-02-07
Attending: NURSE PRACTITIONER
Payer: COMMERCIAL

## 2024-02-07 DIAGNOSIS — N83.291 OTHER OVARIAN CYST, RIGHT SIDE: ICD-10-CM

## 2024-02-07 DIAGNOSIS — N76.0 ACUTE VAGINITIS: Primary | ICD-10-CM

## 2024-02-07 DIAGNOSIS — B96.89 BV (BACTERIAL VAGINOSIS): ICD-10-CM

## 2024-02-07 DIAGNOSIS — N94.10 DYSPAREUNIA IN FEMALE: ICD-10-CM

## 2024-02-07 DIAGNOSIS — Z11.3 ENCOUNTER FOR SCREENING FOR INFECTIONS WITH PREDOMINANTLY SEXUAL MODE OF TRANSMISSION: ICD-10-CM

## 2024-02-07 DIAGNOSIS — Z72.89 OTHER PROBLEMS RELATED TO LIFESTYLE: ICD-10-CM

## 2024-02-07 DIAGNOSIS — N94.10 DYSPAREUNIA IN FEMALE: Primary | ICD-10-CM

## 2024-02-07 DIAGNOSIS — N76.0 ACUTE VAGINITIS: ICD-10-CM

## 2024-02-07 DIAGNOSIS — N76.0 BV (BACTERIAL VAGINOSIS): ICD-10-CM

## 2024-02-07 PROCEDURE — 76830 TRANSVAGINAL US NON-OB: CPT | Mod: 26,,, | Performed by: RADIOLOGY

## 2024-02-07 PROCEDURE — 76830 TRANSVAGINAL US NON-OB: CPT | Mod: TC

## 2024-02-07 PROCEDURE — 76856 US EXAM PELVIC COMPLETE: CPT | Mod: 26,,, | Performed by: RADIOLOGY

## 2024-02-08 ENCOUNTER — OFFICE VISIT (OUTPATIENT)
Dept: PRIMARY CARE CLINIC | Facility: CLINIC | Age: 39
End: 2024-02-08
Payer: COMMERCIAL

## 2024-02-08 DIAGNOSIS — Z01.818 PREOP TESTING: ICD-10-CM

## 2024-02-08 DIAGNOSIS — Q72.891 BRACHYMETATARSIA OF RIGHT FOOT: ICD-10-CM

## 2024-02-08 DIAGNOSIS — D21.9 FIBROIDS: ICD-10-CM

## 2024-02-08 DIAGNOSIS — K59.00 CONSTIPATION, UNSPECIFIED CONSTIPATION TYPE: Primary | ICD-10-CM

## 2024-02-08 DIAGNOSIS — M20.5X1 CONTRACTURE OF TOE OF RIGHT FOOT: ICD-10-CM

## 2024-02-08 PROCEDURE — 99214 OFFICE O/P EST MOD 30 MIN: CPT | Mod: 95,,, | Performed by: STUDENT IN AN ORGANIZED HEALTH CARE EDUCATION/TRAINING PROGRAM

## 2024-02-08 RX ORDER — SENNOSIDES 8.6 MG/1
1 CAPSULE, GELATIN COATED ORAL 2 TIMES DAILY PRN
Qty: 60 EACH | Refills: 1 | Status: SHIPPED | OUTPATIENT
Start: 2024-02-08 | End: 2024-03-09

## 2024-02-08 NOTE — PROGRESS NOTES
Telehealth Visit    The patient location is: [place]   The chief complaint leading to consultation is: Constipation     Visit type: audiovisual    Face to Face time with patient: 10 minutes  15 minutes of total time spent on the encounter, which includes face to face time and non-face to face time preparing to see the patient (eg, review of tests), Obtaining and/or reviewing separately obtained history, Documenting clinical information in the electronic or other health record, Independently interpreting results (not separately reported) and communicating results to the patient/family/caregiver, or Care coordination (not separately reported).       HPI    Patient is a 38 y.o.   Madyson Creek  has a past medical history of Abnormal cervical Papanicolaou smear (2010, 2016, 2017), Anxiety, GERD (gastroesophageal reflux disease), Hypertension, Murmur, and Scoliosis.    Patient presenting with longstanding hx of worsening constipation   She reports adequate water intake   She is not taking any fiber supplements, stool softeners, or motility agents at this time   Recently, she was found to have 3 uterine fibroids and suspects this could be contributing to symptoms       Active Medications:  Current Outpatient Medications   Medication Instructions    amLODIPine (NORVASC) 5 mg, Oral, Daily    ARAZLO 0.045 % Lotn No dose, route, or frequency recorded.    boric acid (bulk) Powd Insert one Gelatin Capsule filled with 600 mg of Boric Acid Powder H.S.    dapsone (ACZONE) 7.5 % GlwP No dose, route, or frequency recorded.    hydroquinone 4 % Crea Apply to dark spots once daily. Use with sunscreen if outdoors    ibuprofen (ADVIL,MOTRIN) 800 mg, Oral, 3 times daily PRN, Take with food    loratadine (CLARITIN) 10 mg, Oral, Daily    metoprolol succinate (TOPROL-XL) 50 mg, Oral, Daily    omeprazole (PRILOSEC) 40 mg, Oral, As needed (PRN)    ondansetron (ZOFRAN-ODT) 4 mg, Oral, Every 8 hours PRN    sennosides (SENNA) 8.6 mg Cap 1  tablet, Oral, 2 times daily PRN    valsartan (DIOVAN) 80 mg, Oral, Daily    valsartan (DIOVAN) 160 mg, Oral, Daily       Physical Exam    General: Does not appear to be in acute distress      Pelvic US 1/31/24   1. 2.8 x 2.4 x 2.6-cm intramural posterior fundal, 1.2 x 1.5 x 2.1-cm (previously 1.2 x 1.2 x 1.4-cm) subserosal posterior fundal and 1.6 x 1.8 x 1.3-cm subserosal left lateral uterine leiomyomas.     Assessment and Plan     Suspect fibroids may be contributing to constipation. Recommended f/u with GYN for treatment options  In the meantime, optimize bowel regimen   Patient unable to tolerate miralax     1. Constipation, unspecified constipation type  2. Fibroids  -     sennosides (SENNA) 8.6 mg Cap; Take 1 tablet by mouth 2 (two) times daily as needed (constipation).  Dispense: 60 each; Refill: 1                 Upcoming Scheduled Appointments and Follow Up:    Future Appointments   Date Time Provider Department Center   2/9/2024  1:30 PM Nakul Beltran MD [facility] GASTRO [place]   4/17/2024  2:00 PM Paul Oden, [facility] PREAD[facility]   5/6/2024 11:00 AM Saul Moore, [facility] PODIAT [facility]   5/20/2024 11:00 AM Saul Moore, [facility] POD[facility]   6/17/2024 11:00 AM Saul Moore, [facility] PODI[facility]   7/1/2024  8:45 AM [facility] USOUNDO [facility]       Follow Up [facility] (with who? when?): No follow-ups on file.        Extended Emergency Contact Information  Primary Emergency Contact: Tristin Calle  Address: [address] [place] [address] [facility]  Home Phone: [phone]  Mobile Phone: [phone]  Relation: Father      Lula Rich MD   Internal Medicine  2/8/2024 - 2:11 PM        Each patient to whom he or she provides medical services by telemedicine is:  (1) informed of the relationship between the physician and patient and the respective role of any other health care provider with respect  to management of the patient; and (2) notified that he or she may decline to receive medical services by telemedicine and may withdraw from such care at any time.    While patients have the right to access their medical record, it is essential to recognize that progress notes primarily serve as a means of communication among healthcare professionals.

## 2024-02-09 ENCOUNTER — OFFICE VISIT (OUTPATIENT)
Dept: GASTROENTEROLOGY | Facility: CLINIC | Age: 39
End: 2024-02-09
Payer: COMMERCIAL

## 2024-02-09 VITALS
DIASTOLIC BLOOD PRESSURE: 98 MMHG | HEART RATE: 92 BPM | WEIGHT: 129.88 LBS | SYSTOLIC BLOOD PRESSURE: 139 MMHG | HEIGHT: 64 IN | BODY MASS INDEX: 22.17 KG/M2

## 2024-02-09 DIAGNOSIS — K59.04 CHRONIC IDIOPATHIC CONSTIPATION: ICD-10-CM

## 2024-02-09 DIAGNOSIS — K21.9 GASTROESOPHAGEAL REFLUX DISEASE, UNSPECIFIED WHETHER ESOPHAGITIS PRESENT: Primary | ICD-10-CM

## 2024-02-09 PROCEDURE — 3008F BODY MASS INDEX DOCD: CPT | Mod: CPTII,S$GLB,, | Performed by: INTERNAL MEDICINE

## 2024-02-09 PROCEDURE — 1159F MED LIST DOCD IN RCRD: CPT | Mod: CPTII,S$GLB,, | Performed by: INTERNAL MEDICINE

## 2024-02-09 PROCEDURE — 99204 OFFICE O/P NEW MOD 45 MIN: CPT | Mod: S$GLB,,, | Performed by: INTERNAL MEDICINE

## 2024-02-09 PROCEDURE — 3080F DIAST BP >= 90 MM HG: CPT | Mod: CPTII,S$GLB,, | Performed by: INTERNAL MEDICINE

## 2024-02-09 PROCEDURE — 3075F SYST BP GE 130 - 139MM HG: CPT | Mod: CPTII,S$GLB,, | Performed by: INTERNAL MEDICINE

## 2024-02-09 PROCEDURE — 99999 PR PBB SHADOW E&M-EST. PATIENT-LVL III: CPT | Mod: PBBFAC,,, | Performed by: INTERNAL MEDICINE
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
## 2024-02-12 RX ORDER — FLUCONAZOLE 150 MG/1
150 TABLET ORAL
Qty: 2 TABLET | Refills: 0 | Status: SHIPPED | OUTPATIENT
Start: 2024-02-12 | End: 2024-02-16

## 2024-02-14 ENCOUNTER — TELEPHONE (OUTPATIENT)
Dept: ENDOSCOPY | Facility: HOSPITAL | Age: 39
End: 2024-02-14
Payer: COMMERCIAL

## 2024-02-14 NOTE — TELEPHONE ENCOUNTER
"    Endo Case Request  Received: 5 days ago  Nakul Beltran MD Piglia, Ashley, SHERICE  Procedure: EGD    Diagnosis: GERD    Procedure Timin-4 weeks    *If within 4 weeks selected, please delmar as high priority*    *If greater than 12 weeks, please select "4-12 weeks" and delay sending until 2 months prior to requested date*    Provider: Myself    Location: [facility]    Additional Scheduling Information: No scheduling concerns    Prep Specifications:N/A    Have you attached a patient to this message: Yes  "

## 2024-02-20 ENCOUNTER — PATIENT MESSAGE (OUTPATIENT)
Dept: ENDOSCOPY | Facility: HOSPITAL | Age: 39
End: 2024-02-20
Payer: COMMERCIAL

## 2024-02-20 DIAGNOSIS — K21.9 GASTROESOPHAGEAL REFLUX DISEASE, UNSPECIFIED WHETHER ESOPHAGITIS PRESENT: Primary | ICD-10-CM

## 2024-02-22 ENCOUNTER — TELEPHONE (OUTPATIENT)
Dept: PHARMACY | Facility: CLINIC | Age: 39
End: 2024-02-22
Payer: COMMERCIAL

## 2024-02-23 NOTE — TELEPHONE ENCOUNTER
Contacted the patient to perform Medication Therapy Management review, specifically in reference to refilling losartan to improve PDC for HEDIS measurements.   Medication was discontinued:alternate therapy

## 2024-02-29 ENCOUNTER — ANESTHESIA EVENT (OUTPATIENT)
Dept: ENDOSCOPY | Facility: HOSPITAL | Age: 39
End: 2024-02-29
Payer: COMMERCIAL

## 2024-03-12 ENCOUNTER — HOSPITAL ENCOUNTER (OUTPATIENT)
Facility: HOSPITAL | Age: 39
Discharge: HOME | End: 2024-03-12
Attending: INTERNAL MEDICINE | Admitting: INTERNAL MEDICINE
Payer: COMMERCIAL

## 2024-03-12 ENCOUNTER — ANESTHESIA (OUTPATIENT)
Dept: ENDOSCOPY | Facility: HOSPITAL | Age: 39
End: 2024-03-12
Payer: COMMERCIAL

## 2024-03-12 VITALS
HEART RATE: 68 BPM | HEIGHT: 63 IN | RESPIRATION RATE: 17 BRPM | DIASTOLIC BLOOD PRESSURE: 83 MMHG | OXYGEN SATURATION: 99 % | BODY MASS INDEX: 23.04 KG/M2 | SYSTOLIC BLOOD PRESSURE: 120 MMHG | WEIGHT: 130 LBS | TEMPERATURE: 98 F

## 2024-03-12 DIAGNOSIS — K21.9 GERD (GASTROESOPHAGEAL REFLUX DISEASE): ICD-10-CM

## 2024-03-12 DIAGNOSIS — K21.9 GASTROESOPHAGEAL REFLUX DISEASE, UNSPECIFIED WHETHER ESOPHAGITIS PRESENT: Primary | ICD-10-CM

## 2024-03-12 LAB
B-HCG UR QL: NEGATIVE
CTP QC/QA: YES

## 2024-03-12 PROCEDURE — 43239 EGD BIOPSY SINGLE/MULTIPLE: CPT | Mod: ,,, | Performed by: INTERNAL MEDICINE

## 2024-03-12 PROCEDURE — 81025 URINE PREGNANCY TEST: CPT | Performed by: INTERNAL MEDICINE

## 2024-03-12 PROCEDURE — D9220A PRA ANESTHESIA: Mod: ,,, | Performed by: NURSE ANESTHETIST, CERTIFIED REGISTERED

## 2024-03-12 PROCEDURE — 25000003 PHARM REV CODE 250: Performed by: ANESTHESIOLOGY

## 2024-03-12 PROCEDURE — 88342 IMHCHEM/IMCYTCHM 1ST ANTB: CPT | Mod: 59 | Performed by: STUDENT IN AN ORGANIZED HEALTH CARE EDUCATION/TRAINING PROGRAM

## 2024-03-12 PROCEDURE — 99900035 HC TECH TIME PER 15 MIN (STAT)

## 2024-03-12 PROCEDURE — 88305 TISSUE EXAM BY PATHOLOGIST: CPT | Mod: 59 | Performed by: STUDENT IN AN ORGANIZED HEALTH CARE EDUCATION/TRAINING PROGRAM

## 2024-03-12 PROCEDURE — 94761 N-INVAS EAR/PLS OXIMETRY MLT: CPT

## 2024-03-12 PROCEDURE — 37000009 HC ANESTHESIA EA ADD 15 MINS: Performed by: INTERNAL MEDICINE

## 2024-03-12 PROCEDURE — 63600175 PHARM REV CODE 636 W HCPCS: Performed by: ANESTHESIOLOGY

## 2024-03-12 PROCEDURE — 88305 TISSUE EXAM BY PATHOLOGIST: CPT | Mod: 26,,, | Performed by: STUDENT IN AN ORGANIZED HEALTH CARE EDUCATION/TRAINING PROGRAM

## 2024-03-12 PROCEDURE — 82657 ENZYME CELL ACTIVITY: CPT | Performed by: PATHOLOGY

## 2024-03-12 PROCEDURE — 43239 EGD BIOPSY SINGLE/MULTIPLE: CPT | Performed by: INTERNAL MEDICINE

## 2024-03-12 PROCEDURE — 88342 IMHCHEM/IMCYTCHM 1ST ANTB: CPT | Mod: 26,,, | Performed by: STUDENT IN AN ORGANIZED HEALTH CARE EDUCATION/TRAINING PROGRAM

## 2024-03-12 PROCEDURE — 27201012 HC FORCEPS, HOT/COLD, DISP: Performed by: INTERNAL MEDICINE

## 2024-03-12 PROCEDURE — 25000003 PHARM REV CODE 250: Performed by: NURSE ANESTHETIST, CERTIFIED REGISTERED

## 2024-03-12 PROCEDURE — 37000008 HC ANESTHESIA 1ST 15 MINUTES: Performed by: INTERNAL MEDICINE

## 2024-03-12 RX ORDER — PROPOFOL 10 MG/ML
VIAL (ML) INTRAVENOUS CONTINUOUS PRN
Status: DISCONTINUED | OUTPATIENT
Start: 2024-03-12 | End: 2024-03-12

## 2024-03-12 RX ORDER — LIDOCAINE HYDROCHLORIDE 20 MG/ML
INJECTION INTRAVENOUS
Status: DISCONTINUED | OUTPATIENT
Start: 2024-03-12 | End: 2024-03-12

## 2024-03-12 RX ORDER — SODIUM CHLORIDE 9 MG/ML
INJECTION, SOLUTION INTRAVENOUS CONTINUOUS
Status: DISCONTINUED | OUTPATIENT
Start: 2024-03-12 | End: 2024-03-12 | Stop reason: HOSPADM

## 2024-03-12 RX ORDER — OMEPRAZOLE 40 MG/1
40 CAPSULE, DELAYED RELEASE ORAL EVERY MORNING
Qty: 30 CAPSULE | Refills: 2 | Status: SHIPPED | OUTPATIENT
Start: 2024-03-12

## 2024-03-12 RX ORDER — PROPOFOL 10 MG/ML
VIAL (ML) INTRAVENOUS
Status: DISCONTINUED | OUTPATIENT
Start: 2024-03-12 | End: 2024-03-12

## 2024-03-12 RX ADMIN — PROPOFOL 150 MCG/KG/MIN: 10 INJECTION, EMULSION INTRAVENOUS at 12:03

## 2024-03-12 RX ADMIN — LIDOCAINE HYDROCHLORIDE 100 MG: 20 INJECTION INTRAVENOUS at 12:03

## 2024-03-12 RX ADMIN — SODIUM CHLORIDE: 9 INJECTION, SOLUTION INTRAVENOUS at 12:03

## 2024-03-12 RX ADMIN — PROPOFOL 100 MG: 10 INJECTION, EMULSION INTRAVENOUS at 12:03

## 2024-03-12 NOTE — H&P
Short Stay Endoscopy History and Physical    PCP - Karin Oneill NP    Procedure - EGD  Sedation: GA  ASA - per anesthesia  Mallampati - per anesthesia  History of Anesthesia problems - no  Family history Anesthesia problems -  no     HPI:  This is a 38 y.o. female here for evaluation of : GERD    Reflux - yes  Dysphagia - no  Abdominal pain - no  Diarrhea - no    ROS:  Constitutional: No fevers, chills, No weight loss  ENT: No allergies  CV: No chest pain  Pulm: No cough, No shortness of breath  Ophtho: No vision changes  GI: see HPI  Medical History:  has a past medical history of Abnormal cervical Papanicolaou smear (2010, 2016, 2017), Anxiety, GERD (gastroesophageal reflux disease), Hypertension, Murmur, and Scoliosis.    Surgical History:  has no past surgical history on file.    Family History: family history includes Bipolar disorder in her sister; Colon cancer in her paternal aunt; Diabetes in her father and maternal grandfather; Heart attack in her maternal grandfather and paternal grandmother; Heart murmur in her father; Hyperlipidemia in her maternal grandfather; Hypertension in her father and maternal grandmother; No Known Problems in her daughter, mother, and paternal grandfather; Prostate cancer in her father; Stroke in her maternal grandfather.. Otherwise no colon cancer, inflammatory bowel disease, or GI malignancies.    Social History:  reports that she has never smoked. She has never been exposed to tobacco smoke. She has never used smokeless tobacco. She reports current alcohol use of about 7.0 standard drinks of alcohol per week. She reports that she does not currently use drugs after having used the following drugs: Marijuana.    Review of patient's allergies indicates:  No Known Allergies    Medications:   Facility-Administered Medications Prior to Admission   Medication Dose Route Frequency Provider Last Rate Last Admin    levonorgestreL (MIRENA) 20 mcg/24 hours (8 yrs) 52 mg IUD 1 Intra  Uterine Device  1 Intra Uterine Device Intrauterine  Juan Curiel MD   1 Intra Uterine Device at 01/03/23 1815     Medications Prior to Admission   Medication Sig Dispense Refill Last Dose    amLODIPine (NORVASC) 5 MG tablet Take 1 tablet (5 mg total) by mouth once daily. 90 tablet 3     ARAZLO 0.045 % Lotn        boric acid (bulk) Powd Insert one Gelatin Capsule filled with 600 mg of Boric Acid Powder H.S. 100 capsule 4     dapsone (ACZONE) 7.5 % GlwP        hydroquinone 4 % Crea Apply to dark spots once daily. Use with sunscreen if outdoors 28 g 1     ibuprofen (ADVIL,MOTRIN) 800 MG tablet Take 1 tablet (800 mg total) by mouth 3 (three) times daily as needed for Pain. Take with food (Patient not taking: Reported on 2/9/2024) 30 tablet 0     linaCLOtide (LINZESS) 145 mcg Cap capsule Take 1 capsule (145 mcg total) by mouth before breakfast. 90 capsule 0     loratadine (CLARITIN) 10 mg tablet Take 1 tablet (10 mg total) by mouth once daily. 30 tablet 11     metoprolol succinate (TOPROL-XL) 50 MG 24 hr tablet Take 1 tablet (50 mg total) by mouth once daily. 30 tablet 5     omeprazole (PRILOSEC) 40 MG capsule Take 40 mg by mouth as needed.       ondansetron (ZOFRAN-ODT) 4 MG TbDL Take 1 tablet (4 mg total) by mouth every 8 (eight) hours as needed (nausea). 20 tablet 6     valsartan (DIOVAN) 160 MG tablet Take 1 tablet (160 mg total) by mouth once daily. 90 tablet 3     valsartan (DIOVAN) 80 MG tablet Take 1 tablet (80 mg total) by mouth once daily. 90 tablet 3        Objective Findings:    Vital Signs: Per nursing notes.    Physical Exam:  General Appearance: Well appearing in no acute distress  Head:   Normocephalic, without obvious abnormality  Eyes:    No scleral icterus  Airway: Open  Neck: No restriction in mobility  Lungs: CTA bilaterally in anterior and posterior fields, no wheezes, no crackles.  Heart:  Regular rate and rhythm, S1, S2 normal, no murmurs heard  Abdomen: Soft, non tender, non  distended      Labs:  Lab Results   Component Value Date    WBC 5.59 09/26/2023    HGB 13.3 09/26/2023    HCT 39.7 09/26/2023     09/26/2023    CHOL 137 01/19/2024    TRIG 106 01/19/2024    HDL 50 01/19/2024    ALT 21 09/26/2023    AST 29 09/26/2023     09/26/2023    K 4.0 09/26/2023     09/26/2023    CREATININE 0.8 09/26/2023    BUN 6 09/26/2023    CO2 25 09/26/2023    TSH 0.449 04/11/2023    INR 0.9 08/31/2011    HGBA1C 5.2 04/11/2023         I have explained the risks and benefits of endoscopy procedures to the patient including but not limited to bleeding, perforation, infection, and death.    Thank you so much for allowing me to participate in the care of Madyson Alva Beltran MD

## 2024-03-12 NOTE — PROVATION PATIENT INSTRUCTIONS
Discharge Summary/Instructions after an Endoscopic Procedure  Patient Name: Madyson Calle  Patient MRN: [id number]  Patient YOB: 1985 Tuesday, March 12, 2024  Nakul Beltran MD  Dear patient,  As a result of recent federal legislation (The Federal Cures Act), you may   receive lab or pathology results from your procedure in your [facility]   account before your physician is able to contact you. Your physician or   their representative will relay the results to you with their   recommendations at their soonest availability.  Thank you,  RESTRICTIONS:  During your procedure today, you received medications for sedation.  These   medications may affect your judgment, balance and coordination.  Therefore,   for 24 hours, you have the following restrictions:   - DO NOT drive a car, operate machinery, make legal/financial decisions,   sign important papers or drink alcohol.    ACTIVITY:  Today: no heavy lifting, straining or running due to procedural   sedation/anesthesia.  The following day: return to full activity including work.  DIET:  Eat and drink normally unless instructed otherwise.     TREATMENT FOR COMMON SIDE EFFECTS:  - Mild abdominal pain, nausea, belching, bloating or excessive gas:  rest,   eat lightly and use a heating pad.  - Sore Throat: treat with throat lozenges and/or gargle with warm salt   water.  - Because air was used during the procedure, expelling large amounts of air   from your rectum or belching is normal.  - If a bowel prep was taken, you may not have a bowel movement for 1-3 days.    This is normal.  SYMPTOMS TO WATCH FOR AND REPORT TO YOUR PHYSICIAN:  1. Abdominal pain or bloating, other than gas cramps.  2. Chest pain.  3. Back pain.  4. Signs of infection such as: chills or fever occurring within 24 hours   after the procedure.  5. Rectal bleeding, which would show as bright red, maroon, or black stools.   (A tablespoon of blood from the rectum is not serious, especially if    hemorrhoids are present.)  6. Vomiting.  7. Weakness or dizziness.  GO DIRECTLY TO THE NEAREST EMERGENCY ROOM IF YOU HAVE ANY OF THE FOLLOWING:      Difficulty breathing              Chills and/or fever over 101 F   Persistent vomiting and/or vomiting blood   Severe abdominal pain   Severe chest pain   Black, tarry stools   Bleeding- more than one tablespoon   Any other symptom or condition that you feel may need urgent attention  Your doctor recommends these additional instructions:  If any biopsies were taken, your doctors clinic will contact you in 1 to 2   weeks with any results.  - Patient has a contact number available for emergencies.  The signs and   symptoms of potential delayed complications were discussed with the   patient.  Return to normal activities tomorrow.  Written discharge   instructions were provided to the patient.   - Discharge patient to home.   - Resume previous diet.   - Continue present medications.   - Await pathology results.   For questions, problems or results please call your physician - Nakul Beltran MD at Work:  [phone].  [facility], EMERGENCY ROOM PHONE NUMBER: [phone]  IF A COMPLICATION OR EMERGENCY SITUATION ARISES AND YOU ARE UNABLE TO REACH   YOUR PHYSICIAN - GO DIRECTLY TO THE EMERGENCY ROOM.  Nakul Beltran MD  3/12/2024 12:51:17 PM  This report has been verified and signed electronically.  Dear patient,  As a result of recent federal legislation (The Federal Cures Act), you may   receive lab or pathology results from your procedure in your [facility]   account before your physician is able to contact you. Your physician or   their representative will relay the results to you with their   recommendations at their soonest availability.  Thank you,  PROVATION

## 2024-03-12 NOTE — ANESTHESIA POSTPROCEDURE EVALUATION
Anesthesia Post Evaluation    Patient: Madyson Calle    Procedure(s) Performed: Procedure(s) (LRB):  EGD (ESOPHAGOGASTRODUODENOSCOPY) (N/A)    Final Anesthesia Type: general      Patient location during evaluation: PACU  Patient participation: Yes- Able to Participate  Level of consciousness: awake and alert  Post-procedure vital signs: reviewed and stable  Pain management: adequate  Airway patency: patent    PONV status at discharge: No PONV  Anesthetic complications: no      Cardiovascular status: stable  Respiratory status: spontaneous ventilation  Hydration status: euvolemic  Follow-up not needed.              Vitals Value Taken Time   /83 03/12/24 1310   Temp 36.8 °C (98.2 °F) 03/12/24 1248   Pulse 67 03/12/24 1310   Resp 17 03/12/24 1310   SpO2 96 % 03/12/24 1310   Vitals shown include unvalidated device data.      No case tracking events are documented in the log.      Pain/Josr Score: Josr Score: 10 (3/12/2024  1:03 PM)

## 2024-03-12 NOTE — TRANSFER OF CARE
"Anesthesia Transfer of Care Note    Patient: Madyson Calle    Procedure(s) Performed: Procedure(s) (LRB):  EGD (ESOPHAGOGASTRODUODENOSCOPY) (N/A)    Patient location: PACU    Anesthesia Type: general    Transport from OR: Transported from OR on room air with adequate spontaneous ventilation    Post pain: adequate analgesia    Post assessment: no apparent anesthetic complications    Post vital signs: stable    Level of consciousness: awake    Nausea/Vomiting: no nausea/vomiting    Complications: none    Transfer of care protocol was followed      Last vitals: Visit Vitals  BP (!) 141/92 (BP Location: Left arm, Patient Position: Lying)   Pulse 74   Temp 36.8 °C (98.2 °F) (Temporal)   Resp 17   Ht 5' 3" (1.6 m)   Wt 59 kg (130 lb)   LMP  (Approximate)   SpO2 98%   Breastfeeding No   BMI 23.03 kg/m²     "

## 2024-03-18 ENCOUNTER — TELEPHONE (OUTPATIENT)
Dept: GASTROENTEROLOGY | Facility: CLINIC | Age: 39
End: 2024-03-18
Payer: COMMERCIAL

## 2024-03-18 LAB
FINAL PATHOLOGIC DIAGNOSIS: NORMAL
Lab: NORMAL

## 2024-03-18 NOTE — TELEPHONE ENCOUNTER
----- Message from Nakul Beltran MD sent at 3/18/2024  3:36 PM CDT -----  Please notify patient the biopsies reveal lactose intolerance. Recommend 4 chewable Lactaid tablets with dairy products (milk, ice cream, butter, cheese, cream, yogurt etc).

## 2024-03-21 LAB
FINAL PATHOLOGIC DIAGNOSIS: NORMAL
GROSS: NORMAL
Lab: NORMAL

## 2024-04-15 ENCOUNTER — PATIENT MESSAGE (OUTPATIENT)
Dept: PODIATRY | Facility: CLINIC | Age: 39
End: 2024-04-15
Payer: COMMERCIAL

## 2024-04-15 ENCOUNTER — TELEPHONE (OUTPATIENT)
Dept: PODIATRY | Facility: CLINIC | Age: 39
End: 2024-04-15
Payer: COMMERCIAL

## 2024-04-15 NOTE — TELEPHONE ENCOUNTER
I sent this message to Dr. Moore and the surgical team below:    Hello,       Please see the message below.  The patient would like to reschedule her surgery sometime in July.     Thank you,     Swati  ===View-only below this line===  ----- Message -----  From: Madyson Calle  Sent: 4/15/2024  10:21 AM CDT  To: [facility] Staff  Subject: May 1 surgery                                    Good morning,    I need to reschedule the surgery that I have on May 1. Mid July would be more ideal for me

## 2024-04-15 NOTE — TELEPHONE ENCOUNTER
Sent this message though patient portal for a response:    Either 07/17 or 07/19 or afterwards works.    Please check with patient and then have case moved.    Thanks,  Isaura Longoria

## 2024-04-16 RX ORDER — AMLODIPINE BESYLATE 5 MG/1
5 TABLET ORAL DAILY
Qty: 90 TABLET | Refills: 3 | Status: SHIPPED | OUTPATIENT
Start: 2024-04-16 | End: 2025-04-16

## 2024-04-17 ENCOUNTER — HOSPITAL ENCOUNTER (OUTPATIENT)
Dept: PREADMISSION TESTING | Facility: HOSPITAL | Age: 39
Discharge: HOME | End: 2024-04-17
Attending: NURSE PRACTITIONER
Payer: COMMERCIAL

## 2024-04-17 NOTE — DISCHARGE INSTRUCTIONS
Your surgery is scheduled for 5/1/24.    Please report to [facility] on the 1st Floor at 530 a.m.    THIS TIME IS SUBJECT TO CHANGE.  YOU WILL RECEIVE A PHONE CALL THE DAY BEFORE SURGERY BY 3:30 PM TO CONFIRM YOUR TIME OF ARRIVAL.  IF YOU HAVE NOT RECEIVED A PHONE CALL BY 3:30 PM THE DAY BEFORE YOUR SURGERY PLEASE CALL [phone]     INSTRUCTIONS IMPORTANT!!!  ¨ Do not eat or drink after 12 midnight-including water, candy, gum, & mints. OK to brush teeth.      ____  Proceed to [facility] on *** for additional testing.        ____  Do not wear makeup, including mascara.  ____  No powder, lotions or creams to surgical area.  ____  Please remove all jewelry, including piercings and leave at home.  ____  No money or valuables needed. Please leave at home.  ____  Please bring any documents given by your doctor.  ____  If going home the same day, arrange for a ride home. You will not be able to             drive if Anesthesia was used.  ____  Children under 18 years require a parent / guardian present the entire time             they are in surgery / recovery.  ____  Wear loose fitting clothing. Allow for dressings, bandages.  ____  Stop Aspirin, Ibuprofen, Motrin, Aleve, Goody's/BC powders, Excedrine and Naproxen (NSAIDS) at least 3-5 days before surgery, unless otherwise instructed by your doctor, or the nurse.   You MAY use Tylenol/acetaminophen until day of surgery.  ____  Wash the surgical area with Hibiclens or Dial Antibacterial bar soap the night before surgery, and again the             morning of surgery.  Be sure to rinse hibiclens or Dial Antibacterial bar soap off completely (if instructed by   nurse).  ____  If you take diabetic medication including Metformin, Glimepiride, Glipizide, Glyburide, Byetta, Januvia, Actos, do not take am of surgery unless instructed by Doctor.  ____ Hold Invokana, Farxiga, and Jardiance for 3 days prior to surgery.   ____  Call MD for temperature  above 101 degrees or any other signs of infection such as Urinary (bladder) infection, Upper respiratory infection, skin boils, etc.  ____ Stop taking any Fish Oil supplement or any Vitamins that contain Vitamin E at least 5 days prior to surgery.  ____ Do Not wear your contact lenses the day of your procedure.  You may wear your glasses.      ____Do not shave surgical site for 3 days prior to surgery.  ____ Practice Good hand washing before, during, and after procedure.      I have read or had read and explained to me, and understand the above information.  Additional comments or instructions:  For additional questions call [phone]      ANESTHESIA SIDE EFFECTS  -For the first 24 hours after surgery:  Do not drive, use heavy equipment, make important decisions, or drink alcohol  -It is normal to feel sleepy for several hours.  Rest until you are more awake.  -Have someone stay with you, if needed.  They can watch for problems and help keep you safe.  -Some possible post anesthesia side effects include: nausea and vomiting, sore throat and hoarseness, sleepiness, and dizziness.        Pre-Op Bathing Instructions    Before surgery, you can play an important role in your own health.    Because skin is not sterile, we need to be sure that your skin is as free of germs as possible. By following the instructions below, you can reduce the number of germs on your skin before surgery.    IMPORTANT: You will need to shower with a special soap called Hibiclens*, available at any pharmacy.  If you are allergic to Chlorhexidine (the antiseptic in Hibiclens), use an antibacterial soap such as Dial Soap for your preoperative shower.  You will shower with Hibiclens both the night before your surgery and the morning of your surgery.  Do not use Hibiclens on the head, face or genitals to avoid injury to those areas.    STEP #1: THE NIGHT BEFORE YOUR SURGERY     Do not shave the area of your body where your surgery will be  performed.  Shower and wash your hair and body as usual with your normal soap and shampoo.  Rinse your hair and body thoroughly after you shower to remove all soap residue.  With your hand, apply one packet of Hibiclens soap to the surgical site.   Wash the site gently for five (5) minutes. Do not scrub your skin too hard.   Do not wash with your regular soap after Hibiclens is used.  Rinse your body thoroughly.  Pat yourself dry with a clean, soft towel.  Do not use lotion, cream, or powder.  Wear clean clothes.    STEP #2: THE MORNING OF YOUR SURGERY     Repeat Step #1.    * Not to be used by people allergic to Chlorhexidine.

## 2024-04-26 ENCOUNTER — HOSPITAL ENCOUNTER (EMERGENCY)
Facility: HOSPITAL | Age: 39
Discharge: HOME | End: 2024-04-26
Attending: EMERGENCY MEDICINE
Payer: COMMERCIAL

## 2024-04-26 VITALS
SYSTOLIC BLOOD PRESSURE: 122 MMHG | DIASTOLIC BLOOD PRESSURE: 68 MMHG | TEMPERATURE: 98 F | HEIGHT: 63 IN | RESPIRATION RATE: 16 BRPM | BODY MASS INDEX: 23.04 KG/M2 | OXYGEN SATURATION: 99 % | HEART RATE: 62 BPM | WEIGHT: 130 LBS

## 2024-04-26 DIAGNOSIS — R07.9 CHEST PAIN: Primary | ICD-10-CM

## 2024-04-26 LAB
ALBUMIN SERPL BCP-MCNC: 4.1 G/DL (ref 3.5–5.2)
ALP SERPL-CCNC: 36 U/L (ref 55–135)
ALT SERPL W/O P-5'-P-CCNC: 15 U/L (ref 10–44)
ANION GAP SERPL CALC-SCNC: 6 MMOL/L (ref 8–16)
AST SERPL-CCNC: 17 U/L (ref 10–40)
BASOPHILS # BLD AUTO: 0.04 K/UL (ref 0–0.2)
BASOPHILS NFR BLD: 0.9 % (ref 0–1.9)
BILIRUB SERPL-MCNC: 0.5 MG/DL (ref 0.1–1)
BNP SERPL-MCNC: <10 PG/ML (ref 0–99)
BUN SERPL-MCNC: 9 MG/DL (ref 6–20)
CALCIUM SERPL-MCNC: 9.4 MG/DL (ref 8.7–10.5)
CHLORIDE SERPL-SCNC: 107 MMOL/L (ref 95–110)
CO2 SERPL-SCNC: 25 MMOL/L (ref 23–29)
CREAT SERPL-MCNC: 0.8 MG/DL (ref 0.5–1.4)
D DIMER PPP IA.FEU-MCNC: 0.81 MG/L FEU
DIFFERENTIAL METHOD BLD: ABNORMAL
EOSINOPHIL # BLD AUTO: 0.1 K/UL (ref 0–0.5)
EOSINOPHIL NFR BLD: 1.1 % (ref 0–8)
ERYTHROCYTE [DISTWIDTH] IN BLOOD BY AUTOMATED COUNT: 12.2 % (ref 11.5–14.5)
EST. GFR  (NO RACE VARIABLE): >60 ML/MIN/1.73 M^2
GLUCOSE SERPL-MCNC: 90 MG/DL (ref 70–110)
HCT VFR BLD AUTO: 36.4 % (ref 37–48.5)
HGB BLD-MCNC: 12.3 G/DL (ref 12–16)
IMM GRANULOCYTES # BLD AUTO: 0 K/UL (ref 0–0.04)
IMM GRANULOCYTES NFR BLD AUTO: 0 % (ref 0–0.5)
LYMPHOCYTES # BLD AUTO: 2 K/UL (ref 1–4.8)
LYMPHOCYTES NFR BLD: 44.7 % (ref 18–48)
MCH RBC QN AUTO: 31.5 PG (ref 27–31)
MCHC RBC AUTO-ENTMCNC: 33.8 G/DL (ref 32–36)
MCV RBC AUTO: 93 FL (ref 82–98)
MONOCYTES # BLD AUTO: 0.5 K/UL (ref 0.3–1)
MONOCYTES NFR BLD: 10.7 % (ref 4–15)
NEUTROPHILS # BLD AUTO: 1.9 K/UL (ref 1.8–7.7)
NEUTROPHILS NFR BLD: 42.6 % (ref 38–73)
NRBC BLD-RTO: 0 /100 WBC
OHS QRS DURATION: 76 MS
OHS QTC CALCULATION: 420 MS
PLATELET # BLD AUTO: 274 K/UL (ref 150–450)
PMV BLD AUTO: 11 FL (ref 9.2–12.9)
POC CARDIAC TROPONIN I: 0 NG/ML (ref 0–0.08)
POTASSIUM SERPL-SCNC: 4.1 MMOL/L (ref 3.5–5.1)
PROT SERPL-MCNC: 8 G/DL (ref 6–8.4)
RBC # BLD AUTO: 3.9 M/UL (ref 4–5.4)
SAMPLE: NORMAL
SODIUM SERPL-SCNC: 138 MMOL/L (ref 136–145)
TROPONIN I SERPL DL<=0.01 NG/ML-MCNC: <0.006 NG/ML (ref 0–0.03)
TROPONIN I SERPL DL<=0.01 NG/ML-MCNC: <0.006 NG/ML (ref 0–0.03)
WBC # BLD AUTO: 4.47 K/UL (ref 3.9–12.7)

## 2024-04-26 PROCEDURE — 25000003 PHARM REV CODE 250: Performed by: PHYSICIAN ASSISTANT

## 2024-04-26 PROCEDURE — 63600175 PHARM REV CODE 636 W HCPCS: Performed by: PHYSICIAN ASSISTANT

## 2024-04-26 PROCEDURE — 99285 EMERGENCY DEPT VISIT HI MDM: CPT | Mod: 25

## 2024-04-26 PROCEDURE — 85379 FIBRIN DEGRADATION QUANT: CPT | Performed by: PHYSICIAN ASSISTANT

## 2024-04-26 PROCEDURE — 25500020 PHARM REV CODE 255: Performed by: EMERGENCY MEDICINE

## 2024-04-26 PROCEDURE — 80053 COMPREHEN METABOLIC PANEL: CPT | Performed by: PHYSICIAN ASSISTANT

## 2024-04-26 PROCEDURE — 93005 ELECTROCARDIOGRAM TRACING: CPT

## 2024-04-26 PROCEDURE — 84484 ASSAY OF TROPONIN QUANT: CPT

## 2024-04-26 PROCEDURE — 83880 ASSAY OF NATRIURETIC PEPTIDE: CPT | Performed by: PHYSICIAN ASSISTANT

## 2024-04-26 PROCEDURE — 85025 COMPLETE CBC W/AUTO DIFF WBC: CPT | Performed by: PHYSICIAN ASSISTANT

## 2024-04-26 PROCEDURE — 96374 THER/PROPH/DIAG INJ IV PUSH: CPT

## 2024-04-26 PROCEDURE — 84484 ASSAY OF TROPONIN QUANT: CPT | Performed by: PHYSICIAN ASSISTANT

## 2024-04-26 PROCEDURE — 93010 ELECTROCARDIOGRAM REPORT: CPT | Mod: ,,, | Performed by: INTERNAL MEDICINE

## 2024-04-26 RX ORDER — ACETAMINOPHEN 325 MG/1
650 TABLET ORAL
Status: COMPLETED | OUTPATIENT
Start: 2024-04-26 | End: 2024-04-26

## 2024-04-26 RX ORDER — KETOROLAC TROMETHAMINE 30 MG/ML
10 INJECTION, SOLUTION INTRAMUSCULAR; INTRAVENOUS
Status: COMPLETED | OUTPATIENT
Start: 2024-04-26 | End: 2024-04-26

## 2024-04-26 RX ORDER — KETOROLAC TROMETHAMINE 10 MG/1
10 TABLET, FILM COATED ORAL EVERY 6 HOURS PRN
Qty: 20 TABLET | Refills: 0 | Status: SHIPPED | OUTPATIENT
Start: 2024-04-26 | End: 2024-05-01

## 2024-04-26 RX ADMIN — IOHEXOL 75 ML: 350 INJECTION, SOLUTION INTRAVENOUS at 04:04

## 2024-04-26 RX ADMIN — KETOROLAC TROMETHAMINE 10 MG: 30 INJECTION, SOLUTION INTRAMUSCULAR; INTRAVENOUS at 03:04

## 2024-04-26 RX ADMIN — ACETAMINOPHEN 650 MG: 325 TABLET ORAL at 03:04

## 2024-04-26 NOTE — DISCHARGE INSTRUCTIONS
Your workup today is reassuring and does not show any significant abnormality.  Take Tylenol and Toradol as needed for pain.  Do not take Toradol with any other anti-inflammatories such as Advil, Aleve, ibuprofen.  Follow up with your primary doctor or return to the emergency department sooner for any new or worsening symptoms.

## 2024-04-26 NOTE — ED PROVIDER NOTES
Encounter Date: 2024       History     Chief Complaint   Patient presents with    Chest Pain     Patient presents for chest pain and tightness since yesterday but worsened this morning.     38-year-old female with history GERD, hypertension, anxiety presents to the emergency department with chief complaint of left-sided chest pain, shortness of breath, fatigue that began this morning around 8:00 a.m..  She reports sharp pain and left side of her chest.  It was initially intermittent, but has now become more constant.  She endorses a sharp as well as squeezing pain.  Pain does not radiate.  She feels short of breath.  She denies swelling in her legs.  Did recently returned home from [place] about 5 days ago.  She drinks alcohol occasionally.  Denies cigarette or drug use.  She reports that her grandfather  from a heart attack in his 70s and she had a few cousins  from a heart attack in their 40s.  She does report symptoms this nature in the past when her blood pressure is really high.  She denies other worsening or alleviating factors.      Review of patient's allergies indicates:  No Known Allergies  Past Medical History:   Diagnosis Date    Abnormal cervical Papanicolaou smear , , 2017    Colposcopy    Anxiety     GERD (gastroesophageal reflux disease)     Hypertension     Murmur     Scoliosis      Past Surgical History:   Procedure Laterality Date    ESOPHAGOGASTRODUODENOSCOPY N/A 3/12/2024    Procedure: EGD (ESOPHAGOGASTRODUODENOSCOPY);  Surgeon: Nakul Beltran MD;  Location: [facility];  Service: Endoscopy;  Laterality: N/A;  2-20 [facility] pt; inst sent via portal [facility]  3/6- pc complete. DBM  3/11     Family History   Problem Relation Name Age of Onset    No Known Problems Paternal Grandfather      Heart attack Paternal Grandmother      Hypertension Maternal Grandmother      Stroke Maternal Grandfather      Heart attack Maternal Grandfather      Diabetes Maternal Grandfather       Hyperlipidemia Maternal Grandfather      Hypertension Father      Diabetes Father      Heart murmur Father      Prostate cancer Father      No Known Problems Mother      Bipolar disorder Sister Prema     No Known Problems Daughter Natacha     Colon cancer Paternal Aunt      Breast cancer Neg Hx       Social History     Tobacco Use    Smoking status: Never     Passive exposure: Never    Smokeless tobacco: Never   Substance Use Topics    Alcohol use: Yes     Alcohol/week: 7.0 standard drinks of alcohol     Types: 7 Glasses of wine per week     Comment: SIOCA    Drug use: Not Currently     Types: Marijuana     Review of Systems   Respiratory:  Positive for shortness of breath.    Cardiovascular:  Positive for chest pain.       Physical Exam     Initial Vitals [04/26/24 1256]   BP Pulse Resp Temp SpO2   134/87 70 17 97.6 °F (36.4 °C) 100 %      MAP       --         Physical Exam    Vitals reviewed.  Constitutional: She appears well-developed and well-nourished. She is not diaphoretic. No distress.   HENT:   Head: Normocephalic and atraumatic.   Mouth/Throat: Oropharynx is clear and moist.   Eyes: Conjunctivae and EOM are normal. Pupils are equal, round, and reactive to light.   Neck: Neck supple.   Normal range of motion.  Cardiovascular:  Normal rate, regular rhythm, normal heart sounds and intact distal pulses.     Exam reveals no gallop and no friction rub.       No murmur heard.  Pulmonary/Chest: Breath sounds normal. She has no wheezes. She has no rhonchi. She has no rales. She exhibits tenderness.   Abdominal: Abdomen is soft. Bowel sounds are normal. There is no abdominal tenderness.   Musculoskeletal:         General: Normal range of motion.      Cervical back: Normal range of motion and neck supple.     Neurological: She is alert and oriented to person, place, and time. She has normal strength. No cranial nerve deficit or sensory deficit. GCS score is 15. GCS eye subscore is 4. GCS verbal subscore is 5. GCS  motor subscore is 6.   Skin: Skin is warm and dry. Capillary refill takes less than 2 seconds.   Psychiatric: She has a normal mood and affect. Her behavior is normal. Judgment and thought content normal.         ED Course   Procedures  Labs Reviewed   CBC W/ AUTO DIFFERENTIAL - Abnormal; Notable for the following components:       Result Value    RBC 3.90 (*)     Hematocrit 36.4 (*)     MCH 31.5 (*)     All other components within normal limits   COMPREHENSIVE METABOLIC PANEL - Abnormal; Notable for the following components:    Alkaline Phosphatase 36 (*)     Anion Gap 6 (*)     All other components within normal limits   D DIMER, QUANTITATIVE - Abnormal; Notable for the following components:    D-Dimer 0.81 (*)     All other components within normal limits   TROPONIN I   B-TYPE NATRIURETIC PEPTIDE   TROPONIN ISTAT   TROPONIN I   POCT TROPONIN   POCT TROPONIN     EKG Readings: (Independently Interpreted)   Initial Reading: No STEMI. Rhythm: Normal Sinus Rhythm. Heart Rate: 61.     ECG Results              EKG 12-lead (Final result)        Collection Time Result Time QRS Duration OHS QTC Calculation    04/26/24 13:14:48 04/26/24 16:32:17 76 420                     Final result by Interface, Lab In [facility] (04/26/24 16:32:24)                   Narrative:    Test Reason : R07.9,    Vent. Rate : 061 BPM     Atrial Rate : 061 BPM     P-R Int : 146 ms          QRS Dur : 076 ms      QT Int : 418 ms       P-R-T Axes : 032 044 054 degrees     QTc Int : 420 ms    Normal sinus rhythm  Normal ECG  When compared with ECG of 26-SEP-2023 10:44,  No significant change was found  Confirmed by Steven Hogan MD ([id number]) on 4/26/2024 4:32:15 PM    Referred By:             Confirmed By:Steven Hogan MD                                  Imaging Results              CTA Chest Non-Coronary (PE Studies) (Final result)  Result time 04/26/24 17:28:15      Final result by Mike Gutierrez MD (04/26/24 17:28:15)                    Impression:      1. Allowing for motion artifact and bolus timing, no convincing pulmonary thromboembolism.  Correlation of these findings with D-dimer and/or lower extremity ultrasound as warranted.  2. No acute pulmonary process.  3. Please see above for several additional findings.      Electronically signed by: Mike Gutierrez MD  Date:    04/26/2024  Time:    17:28               Narrative:    EXAMINATION:  CTA CHEST NON CORONARY (PE STUDIES)    CLINICAL HISTORY:  Pulmonary embolism (PE) suspected, positive D-dimer;    TECHNIQUE:  Low dose axial images, sagittal and coronal reformations were obtained from the thoracic inlet to the lung bases following the IV administration of 75 mL of Omnipaque 350.  Contrast timing was optimized to evaluate the pulmonary arteries.  MIP images were performed.    COMPARISON:  04/26/2024    FINDINGS:  The structures at the base of the neck are unremarkable.  No significant mediastinal lymphadenopathy.  The heart is not enlarged.  The thoracic aorta tapers normally.  Allowing for bolus timing, the visualized portions of the kidneys, adrenal glands, spleen, pancreas and liver are grossly unremarkable.    Allowing for motion artifact, the airways are patent.  There is bilateral basilar dependent atelectasis.  No pneumothorax.  No pleural effusion.    Bolus timing is not optimal for evaluation of pulmonary thromboembolism.  Additionally, motion artifact limits evaluation.  Allowing for the above, no convincing pulmonary arterial filling defect to the level of the segmental branches bilaterally to suggest pulmonary thromboembolism.    No acute osseous abnormality.  No significant axillary lymphadenopathy.                                       X-Ray Chest PA And Lateral (Final result)  Result time 04/26/24 15:18:05      Final result by Francisco Robles MD (04/26/24 15:18:05)                   Impression:      See above      Electronically signed by: Francisco Robles  MD  Date:    04/26/2024  Time:    15:18               Narrative:    EXAMINATION:  XR CHEST PA AND LATERAL    CLINICAL HISTORY:  Chest Pain;    TECHNIQUE:  PA and lateral views of the chest were performed.    COMPARISON:  No 01/10/2023 ne    FINDINGS:  Heart size normal.  The lungs are clear.  No pleural effusion                                    X-Rays:   Independently Interpreted Readings:   Chest X-Ray: Normal heart size.  No infiltrates.  No acute abnormalities.     Medications   ketorolac injection 9.999 mg (9.999 mg Intravenous Given 4/26/24 1521)   acetaminophen tablet 650 mg (650 mg Oral Given 4/26/24 1521)   iohexoL (OMNIPAQUE 350) injection 75 mL (75 mLs Intravenous Given 4/26/24 1628)     Medical Decision Making  Emergent evaluation of a 38 y.o. female presenting to the emergency department complaining of left-sided chest pain, shortness of breath, fatigue that began this morning. Patient is afebrile, hemodynamically stable, and non toxic appearing.   Will initiate cardiac workup.    Differential diagnosis includes but isn't limited to ACS, PE, pneumothorax, muscular strain.    No severe metabolic or hematologic derangements.  Troponin negative x2.  BNP negative.  D-dimer elevated at 0.81.  Will proceed with CTA PE study.    CTA without definitive evidence of PE.  On reassessment, patient reports improvement of her symptoms.  She is now pain-free after Tylenol and Toradol administration.  Her pain was very reproducible on initial exam.  I suspect this is musculoskeletal in nature.  Recommend outpatient follow-up with PCP.  Recommend Tylenol and Toradol at home as needed for pain.  Return precautions given.  All questions answered.  The patient was instructed to follow up with a primary care provider or to return to the emergency department for worsening symptoms. The treatment plan was discussed with the patient who demonstrated understanding and comfort with plan. The patient's history, physical exam,  and plan of care was discussed with and agreed upon with my supervising physician.     Amount and/or Complexity of Data Reviewed  Labs: ordered. Decision-making details documented in ED Course.  Radiology: ordered.    Risk  OTC drugs.  Prescription drug management.               ED Course as of 04/26/24 1930 Fri Apr 26, 2024   1453 WBC: 4.47 [JM]   1453 Hemoglobin: 12.3 [JM]   1453 Hematocrit(!): 36.4 [JM]   1453 Platelet Count: 274 [JM]   1453 D-Dimer(!): 0.81 [JM]   1727 BUN: 9 [JM]   1727 Creatinine: 0.8 [JM]   1727 BNP: <10 [JM]   1727 Troponin I: <0.006 [JM]      ED Course User Index  [JM] Tatum Doherty PA-C                           Clinical Impression:  Final diagnoses:  [R07.9] Chest pain (Primary)          ED Disposition Condition    Discharge Stable          ED Prescriptions       Medication Sig Dispense Start Date End Date Auth. Provider    ketorolac (TORADOL) 10 mg tablet Take 1 tablet (10 mg total) by mouth every 6 (six) hours as needed for Pain. 20 tablet 4/26/2024 5/1/2024 Tatum Doherty PA-C          Follow-up Information       Follow up With Specialties Details Why Contact Info    [facility] - Emergency Dept Emergency Medicine Go to  If symptoms worsen [address]  [facility] [id number] [phone]    Karin Oneill NP Internal Medicine Schedule an appointment as soon as possible for a visit   [address] [facility] [id number]  [phone]               Tatum Doherty PA-C  04/26/24 1930

## 2024-04-26 NOTE — ED TRIAGE NOTES
39 y/o F presents to ER with c/c nonradiating, 8/10, midsternal, stabbing quality c/p since this morning with SOB. H/x panic attacks. Denies N/V/D, fevers and chills.

## 2024-04-26 NOTE — ED NOTES
Patient identifiers for Madyson Calle 38 y.o. female checked and correct.  Chief Complaint   Patient presents with    Chest Pain     Patient presents for chest pain and tightness since yesterday but worsened this morning.     Past Medical History:   Diagnosis Date    Abnormal cervical Papanicolaou smear 2010, 2016, 2017    Colposcopy    Anxiety     GERD (gastroesophageal reflux disease)     Hypertension     Murmur     Scoliosis      Allergies reported: Review of patient's allergies indicates:  No Known Allergies      LOC: Patient is awake, alert, and aware of environment with an appropriate affect. Patient is oriented x 4 and speaking appropriately.  APPEARANCE: Patient resting comfortably and in no acute distress. Patient is clean and well groomed, patient's clothing is properly fastened.  HEENT: - JVD, + midline trach  SKIN: The skin is warm and dry. Patient has normal skin turgor and moist mucus membranes.   MUSKULOSKELETAL: Patient is moving all extremities well, no obvious deformities noted. Pulses intact. PMS x 4  RESPIRATORY: Airway is open and patent. Respirations are spontaneous and non-labored with normal effort and rate. = CBBS. Endorses SOB.  CARDIAC:  No peripheral edema noted. + 2 bilateral pedal and radial pulses, < 3 s cap refill. Endorses nonradiating, 8/10, stabbing quality c/p.  ABDOMEN: No distention noted. Soft and non-tender upon palpation.  NEUROLOGICAL: pupils 4 mm, PERRL. Facial expression is symmetrical. Hand grasps are equal bilaterally. Normal sensation in all extremities when touched with finger.

## 2024-04-30 ENCOUNTER — OFFICE VISIT (OUTPATIENT)
Dept: PRIMARY CARE CLINIC | Facility: CLINIC | Age: 39
End: 2024-04-30
Payer: COMMERCIAL

## 2024-04-30 ENCOUNTER — PATIENT MESSAGE (OUTPATIENT)
Dept: PRIMARY CARE CLINIC | Facility: CLINIC | Age: 39
End: 2024-04-30

## 2024-04-30 DIAGNOSIS — R09.81 SINUS CONGESTION: ICD-10-CM

## 2024-04-30 DIAGNOSIS — D21.9 FIBROIDS: ICD-10-CM

## 2024-04-30 DIAGNOSIS — J30.2 SEASONAL ALLERGIES: Primary | ICD-10-CM

## 2024-04-30 PROCEDURE — 4010F ACE/ARB THERAPY RXD/TAKEN: CPT | Mod: CPTII,95,, | Performed by: NURSE PRACTITIONER

## 2024-04-30 PROCEDURE — 1159F MED LIST DOCD IN RCRD: CPT | Mod: CPTII,95,, | Performed by: NURSE PRACTITIONER

## 2024-04-30 PROCEDURE — 99214 OFFICE O/P EST MOD 30 MIN: CPT | Mod: 95,,, | Performed by: NURSE PRACTITIONER

## 2024-04-30 PROCEDURE — 1160F RVW MEDS BY RX/DR IN RCRD: CPT | Mod: CPTII,95,, | Performed by: NURSE PRACTITIONER

## 2024-04-30 RX ORDER — LORATADINE 10 MG/1
10 TABLET ORAL DAILY
Qty: 30 TABLET | Refills: 11 | Status: SHIPPED | OUTPATIENT
Start: 2024-04-30 | End: 2025-04-30

## 2024-04-30 RX ORDER — PREDNISONE 20 MG/1
20 TABLET ORAL DAILY
Qty: 5 TABLET | Refills: 0 | Status: SHIPPED | OUTPATIENT
Start: 2024-04-30

## 2024-04-30 NOTE — PROGRESS NOTES
[facility] Note    Chief Complaint      Chief Complaint   Patient presents with    Follow-up       History of Present Illness      Madyson Calle is a 38 y.o. female who presents today via virtual visit for   Chief Complaint   Patient presents with    Follow-up         Patient is known to me.  She presents to clinic for follow-up visit after visiting the emergency room on 04/26/2024 for left-sided chest pain.  Cardiac enzymes were negative.  Chest x-ray was normal.  Pulmonary embolism ruled out.  Patient states she felt like she had a lot of phlegm to cough forward.  She is feeling better today.  She continues to have left flank pain.  She explains that she was diagnosed with fibroids.  She would like to be referred to a new gynecologist for more aggressive treatment and plan of care for these fibroids.    Shortness of Breath  This is a new problem. The current episode started in the past 7 days. The problem occurs intermittently. The problem has been waxing and waning. The average episode lasts 5 minutes. Associated symptoms include abdominal pain, chest pain, neck pain, sputum production and swollen glands. Pertinent negatives include no claudication, coryza, ear pain, fever, headaches, hemoptysis, leg pain, leg swelling, orthopnea, PND, rash, rhinorrhea, sore throat, syncope, vomiting or wheezing. Nothing aggravates the symptoms. The patient has no known risk factors for DVT/PE. She has tried rest for the symptoms. The treatment provided mild relief. Her past medical history is significant for allergies and pneumonia. There is no history of aspirin allergies, asthma, bronchiolitis, CAD, chronic lung disease, COPD, DVT, a heart failure, PE or a recent surgery.        Review of Systems   Constitutional:  Negative for fever.   HENT:  Negative for ear pain, rhinorrhea and sore throat.    Respiratory:  Positive for sputum production and shortness of breath. Negative for hemoptysis and wheezing.     Cardiovascular:  Positive for chest pain. Negative for orthopnea, claudication, leg swelling, syncope and PND.   Gastrointestinal:  Positive for abdominal pain. Negative for vomiting.   Musculoskeletal:  Positive for neck pain.   Skin:  Negative for rash.   Neurological:  Negative for headaches.        Family History:  family history includes Bipolar disorder in her sister; Colon cancer in her paternal aunt; Diabetes in her father and maternal grandfather; Heart attack in her maternal grandfather and paternal grandmother; Heart murmur in her father; Hyperlipidemia in her maternal grandfather; Hypertension in her father and maternal grandmother; No Known Problems in her daughter, mother, and paternal grandfather; Prostate cancer in her father; Stroke in her maternal grandfather.   Family history was reviewed with patient.     Medications:  Outpatient Encounter Medications as of 4/30/2024   Medication Sig Dispense Refill    amLODIPine (NORVASC) 5 MG tablet Take 1 tablet (5 mg total) by mouth once daily. 90 tablet 3    ARAZLO 0.045 % Lotn       boric acid (bulk) Powd Insert one Gelatin Capsule filled with 600 mg of Boric Acid Powder H.S. 100 capsule 4    dapsone (ACZONE) 7.5 % GlwP       hydroquinone 4 % Crea Apply to dark spots once daily. Use with sunscreen if outdoors 28 g 1    ibuprofen (ADVIL,MOTRIN) 800 MG tablet Take 1 tablet (800 mg total) by mouth 3 (three) times daily as needed for Pain. Take with food 30 tablet 0    ketorolac (TORADOL) 10 mg tablet Take 1 tablet (10 mg total) by mouth every 6 (six) hours as needed for Pain. 20 tablet 0    linaCLOtide (LINZESS) 145 mcg Cap capsule Take 1 capsule (145 mcg total) by mouth before breakfast. 90 capsule 0    loratadine (CLARITIN) 10 mg tablet Take 1 tablet (10 mg total) by mouth once daily. 30 tablet 11    loratadine (CLARITIN) 10 mg tablet Take 1 tablet (10 mg total) by mouth once daily. 30 tablet 11    metoprolol succinate (TOPROL-XL) 50 MG 24 hr tablet Take 1  tablet (50 mg total) by mouth once daily. 30 tablet 5    omeprazole (PRILOSEC) 40 MG capsule Take 1 capsule (40 mg total) by mouth every morning. 30 capsule 2    ondansetron (ZOFRAN-ODT) 4 MG TbDL Take 1 tablet (4 mg total) by mouth every 8 (eight) hours as needed (nausea). 20 tablet 6    predniSONE (DELTASONE) 20 MG tablet Take 1 tablet (20 mg total) by mouth once daily. 5 tablet 0     Facility-Administered Encounter Medications as of 4/30/2024   Medication Dose Route Frequency Provider Last Rate Last Admin    levonorgestreL (MIRENA) 20 mcg/24 hours (8 yrs) 52 mg IUD 1 Intra Uterine Device  1 Intra Uterine Device Intrauterine  Juan Curiel MD   1 Intra Uterine Device at 01/03/23 1545       Allergies:  Review of patient's allergies indicates:  No Known Allergies    Health Maintenance:  Health Maintenance   Topic Date Due    TETANUS VACCINE  Never done    Hepatitis C Screening  Completed    Lipid Panel  Completed     Health Maintenance Topics with due status: Not Due       Topic Last Completion Date    Cervical Cancer Screening 06/14/2022       Physical Exam       ]        Assessment/Plan     Madyson Calle is a 38 y.o.female with:    Seasonal allergies  -     loratadine (CLARITIN) 10 mg tablet; Take 1 tablet (10 mg total) by mouth once daily.  Dispense: 30 tablet; Refill: 11    Sinus congestion  -     predniSONE (DELTASONE) 20 MG tablet; Take 1 tablet (20 mg total) by mouth once daily.  Dispense: 5 tablet; Refill: 0    Fibroids  -     Ambulatory referral/consult to Gynecology; Future; Expected date: 05/07/2024        As above, continue current medications and maintain follow up with specialists.  Return to clinic as needed.    Greater than 50% of this time was spent face to face via virtual visit with patient.  All questions were answered to patient's satisfaction.    The patient location is: work  The chief complaint leading to consultation is: follow up visit, left flank pain    Visit type:  audiovisual    Face to Face time with patient:   12 minutes of total time spent on the encounter, which includes face to face time and non-face to face time preparing to see the patient (eg, review of tests), Obtaining and/or reviewing separately obtained history, Documenting clinical information in the electronic or other health record, Independently interpreting results (not separately reported) and communicating results to the patient/family/caregiver, or Care coordination (not separately reported).         Each patient to whom he or she provides medical services by telemedicine is:  (1) informed of the relationship between the physician and patient and the respective role of any other health care provider with respect to management of the patient; and (2) notified that he or she may decline to receive medical services by telemedicine and may withdraw from such care at any time.             ADEIL GibsonC  [facility]

## 2024-05-29 ENCOUNTER — E-VISIT (OUTPATIENT)
Dept: PRIMARY CARE CLINIC | Facility: CLINIC | Age: 39
End: 2024-05-29
Payer: COMMERCIAL

## 2024-05-29 DIAGNOSIS — N76.0 ACUTE VAGINITIS: Primary | ICD-10-CM

## 2024-05-29 PROCEDURE — 99421 OL DIG E/M SVC 5-10 MIN: CPT | Mod: ,,, | Performed by: NURSE PRACTITIONER

## 2024-05-29 RX ORDER — FLUCONAZOLE 150 MG/1
150 TABLET ORAL DAILY
Qty: 1 TABLET | Refills: 0 | Status: SHIPPED | OUTPATIENT
Start: 2024-05-29 | End: 2024-05-30

## 2024-05-29 NOTE — PROGRESS NOTES
Patient ID: Madyson Calle is a 38 y.o. female.    Chief Complaint: Vaginal Discharge (Entered automatically based on patient selection in Patient Portal.)    The patient initiated a request through [employer] on 5/29/2024 for evaluation and management with a chief complaint of Vaginal Discharge (Entered automatically based on patient selection in Patient Portal.)     I evaluated the questionnaire submission on [place].    [facility] Peq Evisit Vaginal Discharge    5/29/2024  3:52 PM CDT - Filed by Patient   Do you agree to participate in an E-Visit? Yes   If you have any of the following symptoms,  please do not complete an E-Visit,  schedule an appointment with your provider: I acknowledge   Are you pregnant, could you be pregnant, or are you breast feeding? None of the above   What is the main issue you would like addressed today? I am experiencing yeast infection, need prescription for metronidazole cream. The over the counter burns me. Don't have an appointment with OB until July   Which of the following are you experiencing? Vaginal Itching;  Vaginal discharge    Are you having pain while passing urine? No, I have no pain while urinating.   Which of the following applies to your vaginal discharge? I have a white/milky discharge.    Which of the following are you experiencing? None of the above   Do you have any sores on your genitals? No    Have you taken antibiotics recently? I have not been on any antibiotics    Do you use any of the following? None of the above   Which of the following applies to your menstrual period? I do not have menstrual periods   Have you had similar symptoms in the past? Yes, I have had had similar symptoms more than once before.   When you had similar symptoms in the past, did any of the following work? Cream for yeast infection   Have you had a fever? No   During the last 2 months, have you had sexual contact with a specific person for the first time? No   Provide any additional information you  feel is important. N/A   Please attach any relevant images or files    Are you able to take your vital signs? No         Encounter Diagnosis   Name Primary?    Acute vaginitis Yes        No orders of the defined types were placed in this encounter.     Medications Ordered This Encounter   Medications    fluconazole (DIFLUCAN) 150 MG Tab     Sig: Take 1 tablet (150 mg total) by mouth once daily. for 1 day     Dispense:  1 tablet     Refill:  0        No follow-ups on file.      E-Visit Time Trackin minutes spent with this visit.

## 2024-06-06 ENCOUNTER — TELEPHONE (OUTPATIENT)
Dept: GASTROENTEROLOGY | Facility: CLINIC | Age: 39
End: 2024-06-06
Payer: COMMERCIAL

## 2024-06-06 ENCOUNTER — HOSPITAL ENCOUNTER (EMERGENCY)
Facility: HOSPITAL | Age: 39
Discharge: HOME | End: 2024-06-06
Attending: EMERGENCY MEDICINE
Payer: COMMERCIAL

## 2024-06-06 VITALS
WEIGHT: 130 LBS | DIASTOLIC BLOOD PRESSURE: 72 MMHG | OXYGEN SATURATION: 98 % | RESPIRATION RATE: 16 BRPM | HEART RATE: 70 BPM | TEMPERATURE: 98 F | BODY MASS INDEX: 21.66 KG/M2 | HEIGHT: 65 IN | SYSTOLIC BLOOD PRESSURE: 134 MMHG

## 2024-06-06 DIAGNOSIS — R10.33 PERIUMBILICAL ABDOMINAL PAIN: ICD-10-CM

## 2024-06-06 DIAGNOSIS — Z86.018 HISTORY OF UTERINE LEIOMYOMA: ICD-10-CM

## 2024-06-06 DIAGNOSIS — N83.201 HEMORRHAGIC CYST OF RIGHT OVARY: ICD-10-CM

## 2024-06-06 DIAGNOSIS — K52.9 COLITIS: Primary | ICD-10-CM

## 2024-06-06 LAB
ALBUMIN SERPL BCP-MCNC: 4.1 G/DL (ref 3.5–5.2)
ALP SERPL-CCNC: 36 U/L (ref 55–135)
ALT SERPL W/O P-5'-P-CCNC: 15 U/L (ref 10–44)
ANION GAP SERPL CALC-SCNC: 6 MMOL/L (ref 8–16)
AST SERPL-CCNC: 17 U/L (ref 10–40)
B-HCG UR QL: NEGATIVE
BASOPHILS # BLD AUTO: 0.03 K/UL (ref 0–0.2)
BASOPHILS NFR BLD: 0.5 % (ref 0–1.9)
BILIRUB SERPL-MCNC: 0.7 MG/DL (ref 0.1–1)
BILIRUB UR QL STRIP: NEGATIVE
BUN SERPL-MCNC: 10 MG/DL (ref 6–20)
BUN SERPL-MCNC: 10 MG/DL (ref 6–30)
CALCIUM SERPL-MCNC: 9.7 MG/DL (ref 8.7–10.5)
CHLORIDE SERPL-SCNC: 104 MMOL/L (ref 95–110)
CHLORIDE SERPL-SCNC: 105 MMOL/L (ref 95–110)
CLARITY UR REFRACT.AUTO: CLEAR
CO2 SERPL-SCNC: 24 MMOL/L (ref 23–29)
COLOR UR AUTO: NORMAL
CREAT SERPL-MCNC: 0.8 MG/DL (ref 0.5–1.4)
CREAT SERPL-MCNC: 0.8 MG/DL (ref 0.5–1.4)
CTP QC/QA: YES
DIFFERENTIAL METHOD BLD: NORMAL
EOSINOPHIL # BLD AUTO: 0.1 K/UL (ref 0–0.5)
EOSINOPHIL NFR BLD: 0.8 % (ref 0–8)
ERYTHROCYTE [DISTWIDTH] IN BLOOD BY AUTOMATED COUNT: 12.1 % (ref 11.5–14.5)
EST. GFR  (NO RACE VARIABLE): >60 ML/MIN/1.73 M^2
GLUCOSE SERPL-MCNC: 90 MG/DL (ref 70–110)
GLUCOSE SERPL-MCNC: 92 MG/DL (ref 70–110)
GLUCOSE UR QL STRIP: NEGATIVE
HCT VFR BLD AUTO: 37.6 % (ref 37–48.5)
HCT VFR BLD CALC: 39 %PCV (ref 36–54)
HGB BLD-MCNC: 12.9 G/DL (ref 12–16)
HGB UR QL STRIP: NEGATIVE
IMM GRANULOCYTES # BLD AUTO: 0.02 K/UL (ref 0–0.04)
IMM GRANULOCYTES NFR BLD AUTO: 0.3 % (ref 0–0.5)
KETONES UR QL STRIP: NEGATIVE
LEUKOCYTE ESTERASE UR QL STRIP: NEGATIVE
LIPASE SERPL-CCNC: 22 U/L (ref 4–60)
LYMPHOCYTES # BLD AUTO: 2 K/UL (ref 1–4.8)
LYMPHOCYTES NFR BLD: 31.8 % (ref 18–48)
MCH RBC QN AUTO: 30.9 PG (ref 27–31)
MCHC RBC AUTO-ENTMCNC: 34.3 G/DL (ref 32–36)
MCV RBC AUTO: 90 FL (ref 82–98)
MONOCYTES # BLD AUTO: 0.5 K/UL (ref 0.3–1)
MONOCYTES NFR BLD: 8.4 % (ref 4–15)
NEUTROPHILS # BLD AUTO: 3.6 K/UL (ref 1.8–7.7)
NEUTROPHILS NFR BLD: 58.2 % (ref 38–73)
NITRITE UR QL STRIP: NEGATIVE
NRBC BLD-RTO: 0 /100 WBC
PH UR STRIP: 7 [PH] (ref 5–8)
PLATELET # BLD AUTO: 260 K/UL (ref 150–450)
PMV BLD AUTO: 10.5 FL (ref 9.2–12.9)
POC IONIZED CALCIUM: 1.2 MMOL/L (ref 1.06–1.42)
POC TCO2 (MEASURED): 24 MMOL/L (ref 23–29)
POTASSIUM BLD-SCNC: 3.7 MMOL/L (ref 3.5–5.1)
POTASSIUM SERPL-SCNC: 3.6 MMOL/L (ref 3.5–5.1)
PROT SERPL-MCNC: 8.2 G/DL (ref 6–8.4)
PROT UR QL STRIP: NEGATIVE
RBC # BLD AUTO: 4.17 M/UL (ref 4–5.4)
SAMPLE: NORMAL
SODIUM BLD-SCNC: 138 MMOL/L (ref 136–145)
SODIUM SERPL-SCNC: 135 MMOL/L (ref 136–145)
SP GR UR STRIP: 1.01 (ref 1–1.03)
URN SPEC COLLECT METH UR: NORMAL
WBC # BLD AUTO: 6.2 K/UL (ref 3.9–12.7)

## 2024-06-06 PROCEDURE — 80053 COMPREHEN METABOLIC PANEL: CPT | Performed by: EMERGENCY MEDICINE

## 2024-06-06 PROCEDURE — 85025 COMPLETE CBC W/AUTO DIFF WBC: CPT | Performed by: EMERGENCY MEDICINE

## 2024-06-06 PROCEDURE — 83690 ASSAY OF LIPASE: CPT | Performed by: EMERGENCY MEDICINE

## 2024-06-06 PROCEDURE — 25500020 PHARM REV CODE 255: Performed by: EMERGENCY MEDICINE

## 2024-06-06 PROCEDURE — 96376 TX/PRO/DX INJ SAME DRUG ADON: CPT

## 2024-06-06 PROCEDURE — 99285 EMERGENCY DEPT VISIT HI MDM: CPT | Mod: 25

## 2024-06-06 PROCEDURE — 63600175 PHARM REV CODE 636 W HCPCS: Performed by: EMERGENCY MEDICINE

## 2024-06-06 PROCEDURE — 80047 BASIC METABLC PNL IONIZED CA: CPT | Mod: 59

## 2024-06-06 PROCEDURE — 96375 TX/PRO/DX INJ NEW DRUG ADDON: CPT

## 2024-06-06 PROCEDURE — 81003 URINALYSIS AUTO W/O SCOPE: CPT | Performed by: EMERGENCY MEDICINE

## 2024-06-06 PROCEDURE — 81025 URINE PREGNANCY TEST: CPT | Performed by: EMERGENCY MEDICINE

## 2024-06-06 PROCEDURE — 96374 THER/PROPH/DIAG INJ IV PUSH: CPT | Mod: 59

## 2024-06-06 RX ORDER — KETOROLAC TROMETHAMINE 30 MG/ML
10 INJECTION, SOLUTION INTRAMUSCULAR; INTRAVENOUS
Status: COMPLETED | OUTPATIENT
Start: 2024-06-06 | End: 2024-06-06

## 2024-06-06 RX ORDER — NAPROXEN 375 MG/1
375 TABLET ORAL 2 TIMES DAILY PRN
Qty: 10 TABLET | Refills: 0 | Status: SHIPPED | OUTPATIENT
Start: 2024-06-06 | End: 2024-06-11

## 2024-06-06 RX ORDER — ONDANSETRON HYDROCHLORIDE 2 MG/ML
4 INJECTION, SOLUTION INTRAVENOUS ONCE AS NEEDED
Status: COMPLETED | OUTPATIENT
Start: 2024-06-06 | End: 2024-06-06

## 2024-06-06 RX ORDER — MORPHINE SULFATE 4 MG/ML
4 INJECTION, SOLUTION INTRAMUSCULAR; INTRAVENOUS
Status: COMPLETED | OUTPATIENT
Start: 2024-06-06 | End: 2024-06-06

## 2024-06-06 RX ORDER — ONDANSETRON HYDROCHLORIDE 2 MG/ML
4 INJECTION, SOLUTION INTRAVENOUS
Status: COMPLETED | OUTPATIENT
Start: 2024-06-06 | End: 2024-06-06

## 2024-06-06 RX ADMIN — MORPHINE SULFATE 4 MG: 4 INJECTION INTRAVENOUS at 03:06

## 2024-06-06 RX ADMIN — IOHEXOL 75 ML: 350 INJECTION, SOLUTION INTRAVENOUS at 02:06

## 2024-06-06 RX ADMIN — KETOROLAC TROMETHAMINE 10 MG: 30 INJECTION, SOLUTION INTRAMUSCULAR; INTRAVENOUS at 01:06

## 2024-06-06 RX ADMIN — ONDANSETRON 4 MG: 2 INJECTION INTRAMUSCULAR; INTRAVENOUS at 01:06

## 2024-06-06 RX ADMIN — ONDANSETRON 4 MG: 2 INJECTION INTRAMUSCULAR; INTRAVENOUS at 03:06

## 2024-06-06 RX ADMIN — SODIUM CHLORIDE, POTASSIUM CHLORIDE, SODIUM LACTATE AND CALCIUM CHLORIDE 1000 ML: 600; 310; 30; 20 INJECTION, SOLUTION INTRAVENOUS at 01:06

## 2024-06-06 NOTE — ED TRIAGE NOTES
"Madyson Calle, an 38 y.o. female presents to the ED via POV for complaints of suprapubic/lower abdominal pain that has been ongoing x1.5 weeks. States she has been having issues with constipation x6m and explains she has a hx of a fibroid on her bowel. However, the last week she has been having diarrhea, today's Bm being orange with scant blood. Endorses nausea and dry heaving, no vomiting. Endorses the sensation as a pressure as if she needs to have a BM as well as cramping. Hx of L flank pain that "has been going on for years with no answer".       LOC: The patient is awake, alert and aware of environment with an appropriate affect, the patient is oriented x 3 and speaking appropriately.   APPEARANCE: Patient appears comfortable and in no acute distress, patient is clean and well groomed.  SKIN: The skin is warm and dry, color consistent with ethnicity.   MUSCULOSKELETAL: Patient moving all extremities spontaneously, no swelling noted.  RESPIRATORY: Airway is open and patent, respirations are spontaneous, patient has a normal effort and rate, no accessory muscle use noted.  CARDIAC: Patient has a normal rate and regular rhythm, no edema noted, capillary refill < 3 seconds.   GASTRO: +lower abdominal pain +diarrhea  : Pt denies any pain or frequency with urination.  NEURO: Pt opens eyes spontaneously, behavior appropriate to situation, follows commands.        Chief Complaint   Patient presents with    Abdominal Pain     Lower abd pain, little diarrhea, L LLQ pain for yrs     Review of patient's allergies indicates:  No Known Allergies  Past Medical History:   Diagnosis Date    Abnormal cervical Papanicolaou smear 2010, 2016, 2017    Colposcopy    Anxiety     GERD (gastroesophageal reflux disease)     Hypertension     Murmur     Scoliosis        "

## 2024-06-06 NOTE — TELEPHONE ENCOUNTER
MA Spoke with patient, tried scheduling for sooner appt, no availability, MA advised that if pain gets severe and symptoms worsen pt should seek medical attention at ER or Urgent care. MA also directed patient to request medical advice via portal.

## 2024-06-06 NOTE — ED PROVIDER NOTES
Encounter Date: 6/6/2024       History     Chief Complaint   Patient presents with    Abdominal Pain     Lower abd pain, little diarrhea, L LLQ pain for yrs     HPI  37 Y/O F with history of constipation and uterine fibroids C/O gradual onset periumbilical gradually migrating to suprapubic region nonradiating abdominal pain that is described as crampy and dull.  She reports multiple bouts of nonbloody nonbilious filled loose stools and reports increased urinary frequency, but no hesitancy, urgency or dysuria.  She denies any use of recent antibiotics, recent travel, ingestion of undercooked poultry or seafood and no drinking of river/strain murky river water.  She does report history years ago of kidney stones. No reported vaginal discharge or vaginal bleeding.  She reports slight nauseousness but no emesis.  She denies any recent illness, shortness of breath, cough, chest/back pain or recent illness.    Review of patient's allergies indicates:  No Known Allergies  Past Medical History:   Diagnosis Date    Abnormal cervical Papanicolaou smear 2010, 2016, 2017    Colposcopy    Anxiety     GERD (gastroesophageal reflux disease)     Hypertension     Murmur     Scoliosis      Past Surgical History:   Procedure Laterality Date    ESOPHAGOGASTRODUODENOSCOPY N/A 3/12/2024    Procedure: EGD (ESOPHAGOGASTRODUODENOSCOPY);  Surgeon: Nakul Beltran MD;  Location: [facility] ENDOSCOPY;  Service: Endoscopy;  Laterality: N/A;  2-20 [facility] pt; inst sent via portal [facility]  3/6- pc complete. DBM  3/11     Family History   Problem Relation Name Age of Onset    No Known Problems Paternal Grandfather      Heart attack Paternal Grandmother      Hypertension Maternal Grandmother      Stroke Maternal Grandfather      Heart attack Maternal Grandfather      Diabetes Maternal Grandfather      Hyperlipidemia Maternal Grandfather      Hypertension Father      Diabetes Father      Heart murmur Father      Prostate cancer Father      No Known  Problems Mother      Bipolar disorder Sister Prema     No Known Problems Daughter Natacha     Colon cancer Paternal Aunt      Breast cancer Neg Hx       Social History     Tobacco Use    Smoking status: Never     Passive exposure: Never    Smokeless tobacco: Never   Substance Use Topics    Alcohol use: Yes     Alcohol/week: 7.0 standard drinks of alcohol     Types: 7 Glasses of wine per week     Comment: socially    Drug use: Not Currently     Types: Marijuana     Review of Systems    Physical Exam     Initial Vitals [06/06/24 1230]   BP Pulse Resp Temp SpO2   (!) 143/103 82 18 98.5 °F (36.9 °C) 100 %      MAP       --         Vitals:    06/06/24 1349 06/06/24 1524 06/06/24 1539 06/06/24 1853   BP: (!) 140/84  138/78 134/72   Pulse: 76  72 70   Resp: 16 16 16 16   Temp: 98 °F (36.7 °C)  98 °F (36.7 °C) 98 °F (36.7 °C)   TempSrc:       SpO2: 98%  98% 98%   Weight:       Height:           Physical Exam  GENERAL: Well-appearing and Non-Toxic; Well-Nourished; NAD despite reported pain to mid abdomen.  HEENT: AT/NC; anicteric; speaking full sentences with no drooling.  NECK: Supple, FROM, no accessory muscle use.  HEART: Regular rate and rhythm, no M/G/T.  LUNGS: No Tachypnea for appreciated work of breathing, No Retractions, and CTA B/L with no W/R/R.  ABDOMEN: Soft, ND, NTTP.  No rigidity or involuntary guarding.  NEG Torres's, Rovsing's, Psoas', Obturator's and McBurney's Signs.  BACK: Atraumatic, No CVA tenderness B/L.  EXTREMITIES: FROM.  SKIN: Warm, Dry, No Skin Tears or Rashes. No Jaundice.  VASCULAR: 2+ pulses Prox+Dist & Symm with no delay.  NEUROLOGIC: AAOx3, answered questions appropriately with no focal deficit.      ED Course   Procedures  Labs Reviewed   COMPREHENSIVE METABOLIC PANEL - Abnormal; Notable for the following components:       Result Value    Sodium 135 (*)     Alkaline Phosphatase 36 (*)     Anion Gap 6 (*)     All other components within normal limits   CBC W/ AUTO DIFFERENTIAL   LIPASE    URINALYSIS, REFLEX TO URINE CULTURE    Narrative:     Specimen Source->Urine   POCT URINE PREGNANCY   ISTAT PROCEDURE          Imaging Results              US Pelvis Comp with Transvag NON-OB (xpd (Final result)  Result time 06/06/24 18:36:45      Final result by Mike Gutierrez MD (06/06/24 18:36:45)                   Impression:      Uterine leiomyoma, essentially stable as compared to the previous exam.  No discrete endometrial abnormalities.    Right ovarian cyst/follicle.  If pain persists in the region, follow-up could be performed in 6-8 weeks as warranted.    IUD appears in apparent appropriate position.      Electronically signed by: Mike Gutierrez MD  Date:    06/06/2024  Time:    18:36               Narrative:    EXAMINATION:  US PELVIS COMP WITH TRANSVAG NON-OB (XPD)    CLINICAL HISTORY:  RIGHT Adnexal Eval;    TECHNIQUE:  Transabdominal sonography of the pelvis was performed, followed by transvaginal sonography to better evaluate the uterus and ovaries.    COMPARISON:  Ultrasound 02/07/2024, CT abdomen and pelvis 06/06/2020    FINDINGS:  The uterus measures approximately 10.4 x 5.0 x 7.0 cm.  Measured uterine leiomyoma measure approximately 2.6 x 2.6 x 3.2 cm, 1.8 x 1.5 x 1.5 cm, and 2.0 x 1.4 x 2.1 cm.  IUD appears in apparent appropriate position.  There are cervical nabothian cysts.  The endometrial stripe is not thickened measuring 3-4 mm.    The right ovary measures approximately 3.0 x 2.3 x 4.4 cm and contains a dominant follicle or adjacent follicles, in conglomerate measures approximately 1.2 x 2.1 x 1.5 cm.  The left ovary measures approximately 1.0 x 2.9 x 3.2 cm.  Arterial and venous flow is documented to the ovaries bilaterally.  There is a small amount of fluid in the pelvis.                                        CT Abdomen Pelvis With IV Contrast NO Oral Contrast (Final result)  Result time 06/06/24 15:27:18      Final result by Mike Gutierrez MD (06/06/24 15:27:18)                    Impression:      This report was flagged in Epic as abnormal.    1. There is mild wall thickening involving the cecum and proximal ascending colon noting motion artifact in the region.  Correlation with any symptomatology attributable to infectious or inflammatory colitis recommended.  2. The urinary bladder is distended without wall thickening, correlation with any history of urinary retention or outlet obstruction.  3. Suspected involuting hemorrhagic cyst or follicle within the right ovary.  If there is discrete pain in the region, ultrasound could be performed as warranted.  4. Findings suggest possible hepatic steatosis, correlation with LFTs recommended noting hepatomegaly.  5. Please see above for additional findings.      Electronically signed by: Mike Gutierrez MD  Date:    06/06/2024  Time:    15:27               Narrative:    EXAMINATION:  CT ABDOMEN PELVIS WITH IV CONTRAST    CLINICAL HISTORY:  RLQ abdominal pain (Age >= 14y);    TECHNIQUE:  Low dose axial images, sagittal and coronal reformations were obtained from the lung bases to the pubic symphysis following the IV administration of 75 mL of Omnipaque 350 .  Oral contrast was not given.    COMPARISON:  CT 07/22/2022    FINDINGS:  Images of the lower thorax are unremarkable.    The liver is mildly hypoattenuating, possibly reflecting steatosis, correlation with LFTs recommended.  The liver is enlarged.  The spleen, pancreas, gallbladder and adrenal glands are grossly unremarkable.  There is no biliary dilation or ascites.  The portal vein, splenic vein, SMV, celiac axis and SMA all are patent.  No significant abdominal lymphadenopathy.    The kidneys enhance symmetrically without hydronephrosis or nephrolithiasis.  The bilateral ureters are unable to be followed in their entirety to the urinary bladder, no definite calculi seen or secondary findings to suggest obstructive uropathy.  The urinary bladder is distended without wall thickening.   "The left adnexa is unremarkable.  There is an IUD.  There are uterine fundal leiomyoma.  There is a suspected involuting hemorrhagic follicle or cyst within the right ovary.  There is a small amount of fluid in the pelvis.    The distal large bowel is decompressed.  There is mild wall thickening involving the proximal ascending colon/cecum.  The terminal ileum is unremarkable.  The appendix is not confidently identified, no secondary findings to suggest acute appendicitis.  The small bowel is grossly unremarkable.  There are a few scattered shotty periaortic, pericaval, and mesenteric lymph nodes.    The osseous structures are intact.  No significant inguinal lymphadenopathy.                                       Medications   ketorolac injection 9.999 mg (9.999 mg Intravenous Given 24 1336)   ondansetron injection 4 mg (4 mg Intravenous Given 24 1336)   lactated ringers bolus 1,000 mL (0 mLs Intravenous Stopped 24 1353)   iohexoL (OMNIPAQUE 350) injection 75 mL (75 mLs Intravenous Given 24 1452)   morphine injection 4 mg (4 mg Intravenous Given 24 1524)   ondansetron injection 4 mg (4 mg Intravenous Given 24 1524)     Medical Decision Making  Afebrile, atraumatic at hemodynamically stable female with history of nephrolithiasis "years ago" complaints of increased frequency and periumbilical abdominal pain with increased flatulence and loose stools.    Amount and/or Complexity of Data Reviewed  Labs: ordered.  Radiology: ordered.    Risk  Prescription drug management.                                      Clinical Impression:  Final diagnoses:  [R10.33] Periumbilical abdominal pain  [Z86.018] History of uterine leiomyoma  [K52.9] Colitis (Primary)  [N83.201] Hemorrhagic cyst of right ovary          ED Disposition Condition    Discharge Stable          ED Prescriptions       Medication Sig Dispense Start Date End Date Auth. Provider    naproxen (NAPROSYN) 375 MG tablet () Take 1 tablet " (375 mg total) by mouth 2 (two) times daily as needed (pain). 10 tablet 6/6/2024 6/11/2024 Juan Pablo Arthur MD          Follow-up Information       Follow up With Specialties Details Why Contact Info    Karin Oneill NP Internal Medicine Schedule an appointment as soon as possible for a visit   [address] [facility] [address]  [phone]               Dwight Daniels MD  06/17/24 0620

## 2024-06-17 ENCOUNTER — HOSPITAL ENCOUNTER (EMERGENCY)
Facility: HOSPITAL | Age: 39
Discharge: HOME | End: 2024-06-17
Attending: STUDENT IN AN ORGANIZED HEALTH CARE EDUCATION/TRAINING PROGRAM
Payer: COMMERCIAL

## 2024-06-17 VITALS
TEMPERATURE: 98 F | BODY MASS INDEX: 21.47 KG/M2 | DIASTOLIC BLOOD PRESSURE: 110 MMHG | WEIGHT: 130 LBS | RESPIRATION RATE: 22 BRPM | OXYGEN SATURATION: 99 % | SYSTOLIC BLOOD PRESSURE: 164 MMHG | HEART RATE: 85 BPM

## 2024-06-17 DIAGNOSIS — R51.9 ACUTE NONINTRACTABLE HEADACHE, UNSPECIFIED HEADACHE TYPE: Primary | ICD-10-CM

## 2024-06-17 LAB
ALBUMIN SERPL BCP-MCNC: 3.9 G/DL (ref 3.5–5.2)
ALP SERPL-CCNC: 44 U/L (ref 55–135)
ALT SERPL W/O P-5'-P-CCNC: 21 U/L (ref 10–44)
ANION GAP SERPL CALC-SCNC: 5 MMOL/L (ref 8–16)
AST SERPL-CCNC: 19 U/L (ref 10–40)
B-HCG UR QL: NEGATIVE
BASOPHILS # BLD AUTO: 0.03 K/UL (ref 0–0.2)
BASOPHILS NFR BLD: 0.6 % (ref 0–1.9)
BILIRUB SERPL-MCNC: 0.3 MG/DL (ref 0.1–1)
BILIRUB UR QL STRIP: NEGATIVE
BUN SERPL-MCNC: 8 MG/DL (ref 6–20)
CALCIUM SERPL-MCNC: 9.4 MG/DL (ref 8.7–10.5)
CHLORIDE SERPL-SCNC: 105 MMOL/L (ref 95–110)
CLARITY UR REFRACT.AUTO: CLEAR
CO2 SERPL-SCNC: 27 MMOL/L (ref 23–29)
COLOR UR AUTO: COLORLESS
CREAT SERPL-MCNC: 0.8 MG/DL (ref 0.5–1.4)
CTP QC/QA: YES
DIFFERENTIAL METHOD BLD: ABNORMAL
EOSINOPHIL # BLD AUTO: 0.1 K/UL (ref 0–0.5)
EOSINOPHIL NFR BLD: 1.7 % (ref 0–8)
ERYTHROCYTE [DISTWIDTH] IN BLOOD BY AUTOMATED COUNT: 12.2 % (ref 11.5–14.5)
EST. GFR  (NO RACE VARIABLE): >60 ML/MIN/1.73 M^2
GLUCOSE SERPL-MCNC: 91 MG/DL (ref 70–110)
GLUCOSE UR QL STRIP: NEGATIVE
HCT VFR BLD AUTO: 34.7 % (ref 37–48.5)
HGB BLD-MCNC: 11.9 G/DL (ref 12–16)
HGB UR QL STRIP: NEGATIVE
IMM GRANULOCYTES # BLD AUTO: 0.01 K/UL (ref 0–0.04)
IMM GRANULOCYTES NFR BLD AUTO: 0.2 % (ref 0–0.5)
KETONES UR QL STRIP: NEGATIVE
LEUKOCYTE ESTERASE UR QL STRIP: NEGATIVE
LYMPHOCYTES # BLD AUTO: 2.7 K/UL (ref 1–4.8)
LYMPHOCYTES NFR BLD: 52.9 % (ref 18–48)
MAGNESIUM SERPL-MCNC: 2.1 MG/DL (ref 1.6–2.6)
MCH RBC QN AUTO: 31.6 PG (ref 27–31)
MCHC RBC AUTO-ENTMCNC: 34.3 G/DL (ref 32–36)
MCV RBC AUTO: 92 FL (ref 82–98)
MONOCYTES # BLD AUTO: 0.5 K/UL (ref 0.3–1)
MONOCYTES NFR BLD: 9.5 % (ref 4–15)
NEUTROPHILS # BLD AUTO: 1.8 K/UL (ref 1.8–7.7)
NEUTROPHILS NFR BLD: 35.1 % (ref 38–73)
NITRITE UR QL STRIP: NEGATIVE
NRBC BLD-RTO: 0 /100 WBC
PH UR STRIP: 7 [PH] (ref 5–8)
PLATELET # BLD AUTO: 275 K/UL (ref 150–450)
PMV BLD AUTO: 10.9 FL (ref 9.2–12.9)
POTASSIUM SERPL-SCNC: 4.1 MMOL/L (ref 3.5–5.1)
PROT SERPL-MCNC: 7.6 G/DL (ref 6–8.4)
PROT UR QL STRIP: NEGATIVE
RBC # BLD AUTO: 3.77 M/UL (ref 4–5.4)
SODIUM SERPL-SCNC: 137 MMOL/L (ref 136–145)
SP GR UR STRIP: 1.01 (ref 1–1.03)
URN SPEC COLLECT METH UR: ABNORMAL
WBC # BLD AUTO: 5.16 K/UL (ref 3.9–12.7)

## 2024-06-17 PROCEDURE — 99284 EMERGENCY DEPT VISIT MOD MDM: CPT | Mod: 25

## 2024-06-17 PROCEDURE — 81025 URINE PREGNANCY TEST: CPT | Performed by: EMERGENCY MEDICINE

## 2024-06-17 PROCEDURE — 96374 THER/PROPH/DIAG INJ IV PUSH: CPT

## 2024-06-17 PROCEDURE — 80053 COMPREHEN METABOLIC PANEL: CPT | Performed by: EMERGENCY MEDICINE

## 2024-06-17 PROCEDURE — 96361 HYDRATE IV INFUSION ADD-ON: CPT

## 2024-06-17 PROCEDURE — 83735 ASSAY OF MAGNESIUM: CPT | Performed by: EMERGENCY MEDICINE

## 2024-06-17 PROCEDURE — 25000003 PHARM REV CODE 250: Performed by: STUDENT IN AN ORGANIZED HEALTH CARE EDUCATION/TRAINING PROGRAM

## 2024-06-17 PROCEDURE — 81003 URINALYSIS AUTO W/O SCOPE: CPT | Performed by: EMERGENCY MEDICINE

## 2024-06-17 PROCEDURE — 63600175 PHARM REV CODE 636 W HCPCS: Performed by: STUDENT IN AN ORGANIZED HEALTH CARE EDUCATION/TRAINING PROGRAM

## 2024-06-17 PROCEDURE — 96375 TX/PRO/DX INJ NEW DRUG ADDON: CPT

## 2024-06-17 PROCEDURE — 85025 COMPLETE CBC W/AUTO DIFF WBC: CPT | Performed by: EMERGENCY MEDICINE

## 2024-06-17 RX ORDER — MAGNESIUM SULFATE HEPTAHYDRATE 40 MG/ML
2 INJECTION, SOLUTION INTRAVENOUS
Status: DISCONTINUED | OUTPATIENT
Start: 2024-06-17 | End: 2024-06-18 | Stop reason: HOSPADM

## 2024-06-17 RX ORDER — DIPHENHYDRAMINE HYDROCHLORIDE 50 MG/ML
25 INJECTION INTRAMUSCULAR; INTRAVENOUS
Status: DISCONTINUED | OUTPATIENT
Start: 2024-06-17 | End: 2024-06-18 | Stop reason: HOSPADM

## 2024-06-17 RX ORDER — ACETAMINOPHEN 500 MG
1000 TABLET ORAL
Status: COMPLETED | OUTPATIENT
Start: 2024-06-17 | End: 2024-06-17

## 2024-06-17 RX ORDER — KETOROLAC TROMETHAMINE 30 MG/ML
10 INJECTION, SOLUTION INTRAMUSCULAR; INTRAVENOUS
Status: COMPLETED | OUTPATIENT
Start: 2024-06-17 | End: 2024-06-17

## 2024-06-17 RX ORDER — METOCLOPRAMIDE HYDROCHLORIDE 5 MG/ML
10 INJECTION INTRAMUSCULAR; INTRAVENOUS
Status: COMPLETED | OUTPATIENT
Start: 2024-06-17 | End: 2024-06-17

## 2024-06-17 RX ADMIN — ACETAMINOPHEN 1000 MG: 500 TABLET ORAL at 09:06

## 2024-06-17 RX ADMIN — METOCLOPRAMIDE 10 MG: 5 INJECTION, SOLUTION INTRAMUSCULAR; INTRAVENOUS at 09:06

## 2024-06-17 RX ADMIN — SODIUM CHLORIDE 500 ML: 9 INJECTION, SOLUTION INTRAVENOUS at 09:06

## 2024-06-17 RX ADMIN — KETOROLAC TROMETHAMINE 10 MG: 30 INJECTION, SOLUTION INTRAMUSCULAR; INTRAVENOUS at 09:06

## 2024-06-17 NOTE — FIRST PROVIDER EVALUATION
Medical screening examination initiated.  I have conducted a focused provider triage encounter, findings are as follows:    Brief history of present illness:  39 y/o F with hx of GERD, HTN, anxiety presenting with HA for 3 days. HA in several areas, described as pressure with right sided banging. Some right eye blurry vision this am but resolved. Mild nausea, no gait instability, no fevers or chills, but states she felt hot today. + photophobia. Different than previous migraine. Gradually worsened. Ibuprofen worked temporarily, but only lasted 2 hours.     Vitals:    06/17/24 1841   BP: (!) 170/116   Pulse: 74   Resp: 20   Temp: 97.3 °F (36.3 °C)   TempSrc: Oral   SpO2: 99%   Weight: 59 kg (130 lb)       Pertinent physical exam:  non-toxic, ambulatory, no deficits    Brief workup plan:  labs, IV, UPT    Preliminary workup initiated; this workup will be continued and followed by the physician or advanced practice provider that is assigned to the patient when roomed.    Montez Heredia DO, FAAEM  Emergency Staff Physician   Dept of Emergency Medicine   [facility]  Spectralink: [id number]        Disclaimer: This note has been generated using voice-recognition software. There may be typographical errors that have been missed during proof-reading.

## 2024-06-18 NOTE — ED NOTES
Pt c/o of feeling anxious  and muscle spasms about five minutes after reglan and NS began infusing. Fluids stopped. Notified Dr. Nelson. Orders entered for Benadryl. Patient declined. Dr. Nelson came to [facility] to assess patient. Pt. Continued to decline tx offered.

## 2024-06-18 NOTE — ED PROVIDER NOTES
Encounter Date: 6/17/2024       History     Chief Complaint   Patient presents with    Headache     Pt reports headache x3 days. Pt states she has nausea, dizziness and light sensitivity. One episode of burred vision to right eye that has resolved.     38-year-old female with PMH of hypertension presents with headache.  Patient reports a 3 day history of progressive headache that is diffuse but worse on the right.  It was not worst at onset and is not the worst headache of her life.  She had nausea but no vomiting the other day as well as dizziness.  She persistently has photophobia.  She also had some blurred vision in the other day that resolved.  She has tried 1 dose of ibuprofen but otherwise has not taken any other medications other than her blood pressure medications, which she reports compliance with.  Denies muscle weakness, numbness/tingling of the extremities.  Reports being diagnosed with migraines in the past but has not had 1 for a few years.  Denies chest pain, shortness of breath, dysuria, hematuria, diarrhea, constipation, black/bloody stools.    The history is provided by the patient.     Review of patient's allergies indicates:  No Known Allergies  Past Medical History:   Diagnosis Date    Abnormal cervical Papanicolaou smear 2010, 2016, 2017    Colposcopy    Anxiety     GERD (gastroesophageal reflux disease)     Hypertension     Murmur     Scoliosis      Past Surgical History:   Procedure Laterality Date    ESOPHAGOGASTRODUODENOSCOPY N/A 3/12/2024    Procedure: EGD (ESOPHAGOGASTRODUODENOSCOPY);  Surgeon: Nakul Beltran MD;  Location: [facility] ENDOSCOPY;  Service: Endoscopy;  Laterality: N/A;  2-20 [facility] pt; inst sent via portal [facility]  3/6- pc complete. DBM  3/11     Family History   Problem Relation Name Age of Onset    No Known Problems Paternal Grandfather      Heart attack Paternal Grandmother      Hypertension Maternal Grandmother      Stroke Maternal Grandfather      Heart attack  Maternal Grandfather      Diabetes Maternal Grandfather      Hyperlipidemia Maternal Grandfather      Hypertension Father      Diabetes Father      Heart murmur Father      Prostate cancer Father      No Known Problems Mother      Bipolar disorder Sister Prema     No Known Problems Daughter Natacha     Colon cancer Paternal Aunt      Breast cancer Neg Hx       Social History     Tobacco Use    Smoking status: Never     Passive exposure: Never    Smokeless tobacco: Never   Substance Use Topics    Alcohol use: Yes     Alcohol/week: 7.0 standard drinks of alcohol     Types: 7 Glasses of wine per week     Comment: socially    Drug use: Not Currently     Types: Marijuana     Review of Systems    Physical Exam     Initial Vitals [06/17/24 1841]   BP Pulse Resp Temp SpO2   (!) 170/116 74 20 97.3 °F (36.3 °C) 99 %      MAP       --         Physical Exam    Nursing note and vitals reviewed.  Constitutional: She appears well-developed and well-nourished. She is not diaphoretic. No distress.   HENT:   Head: Normocephalic and atraumatic.   Eyes: Conjunctivae and EOM are normal. Pupils are equal, round, and reactive to light.   Neck: Neck supple.   Normal range of motion.  Cardiovascular:  Normal rate, regular rhythm, normal heart sounds and intact distal pulses.           No murmur heard.  Pulmonary/Chest: Breath sounds normal. No respiratory distress. She has no wheezes. She has no rhonchi. She has no rales.   Abdominal: Abdomen is soft. She exhibits no distension. There is no abdominal tenderness. There is no rebound and no guarding.   Musculoskeletal:         General: No tenderness or edema.      Cervical back: Normal range of motion and neck supple.     Neurological: She is alert and oriented to person, place, and time. She has normal strength. No sensory deficit. GCS score is 15. GCS eye subscore is 4. GCS verbal subscore is 5. GCS motor subscore is 6.   Skin: Skin is warm and dry. Capillary refill takes less than 2  seconds.         ED Course   Procedures  Labs Reviewed   CBC W/ AUTO DIFFERENTIAL - Abnormal; Notable for the following components:       Result Value    RBC 3.77 (*)     Hemoglobin 11.9 (*)     Hematocrit 34.7 (*)     MCH 31.6 (*)     Gran % 35.1 (*)     Lymph % 52.9 (*)     All other components within normal limits    Narrative:     Add on CMP per  Sessions @ 20:11 pm to order # [id number]   URINALYSIS, REFLEX TO URINE CULTURE - Abnormal; Notable for the following components:    Color, UA Colorless (*)     All other components within normal limits    Narrative:     Specimen Source->Urine   COMPREHENSIVE METABOLIC PANEL - Abnormal; Notable for the following components:    Alkaline Phosphatase 44 (*)     Anion Gap 5 (*)     All other components within normal limits    Narrative:     Add on CMP per  Sessions @ 20:11 pm to order # [id number]   MAGNESIUM    Narrative:     Add on CMP per  Sessions @ 20:11 pm to order # [id number]   COMPREHENSIVE METABOLIC PANEL   POCT URINE PREGNANCY          Imaging Results    None          Medications   sodium chloride 0.9% bolus 500 mL 500 mL (500 mLs Intravenous New Bag 6/17/24 2157)   magnesium sulfate 2g in water 50mL IVPB (premix) (2 g Intravenous New Bag 6/17/24 2216)   sodium chloride 0.9% bolus 250 mL 250 mL (has no administration in time range)   diphenhydrAMINE injection 25 mg (has no administration in time range)   metoclopramide injection 10 mg (10 mg Intravenous Given 6/17/24 2157)   ketorolac injection 9.999 mg (9.999 mg Intravenous Given 6/17/24 2157)   acetaminophen tablet 1,000 mg (1,000 mg Oral Given 6/17/24 2156)     Medical Decision Making  Differential diagnoses considered includes migraine, tension headache, cervicogenic headache, cluster headache, doubt ICH/SAH    Initial symptom management with Reglan, Toradol, Tylenol, fluids, and magnesium. See ED course for additional attending MDM.     Amount and/or Complexity of Data Reviewed  Labs: ordered.  Decision-making details documented in ED Course.    Risk  OTC drugs.  Prescription drug management.               ED Course as of 06/17/24 2243 Mon Jun 17, 2024 2055 CBC auto differential(!)  CBC unremarkable without leukocytosis, significant anemia, or decreased platelets   [BD]   2055 Comprehensive Metabolic Panel(!)  CMP unremarkable without significant electrolyte derangement, impaired renal function, or elevated LFTs   [BD]   2055 hCG Qualitative, Urine: Negative [BD]   2055 Urinalysis, Reflex to Urine Culture Urine, Clean Catch(!)  UA unremarkable without evidence of infection or hematuria   [BD]   2242 Patient's headache is completely resolved but she appears to of had a slight dystonic reaction with anxiety.  She declined any Benadryl or additional medications and would like to be discharged at this time.  She is hemodynamically and clinically stable.  I encouraged her to take some Benadryl when she got home.  Her  is with her and is going to take her home. Patient will be discharged at this time. Patient has been given home care instructions, follow up instructions, and strict return precautions. They agree with and are comfortable with the plan.  [BD]      ED Course User Index  [BD] Shane Nelson MD                             Clinical Impression:  Final diagnoses:  [R51.9] Acute nonintractable headache, unspecified headache type (Primary)          ED Disposition Condition    Discharge Stable          ED Prescriptions    None       Follow-up Information       Follow up With Specialties Details Why Contact Info    Karin Oneill NP Internal Medicine Schedule an appointment as soon as possible for a visit  To discuss your recent ER visit and any additional concerns that you may have [address] [facility] [address]  [phone]      [facility] - Emergency Dept Emergency Medicine Go to  As needed, If symptoms worsen [address]  [facility]  [address]  [phone]             Shane Nelson MD  06/17/24 9350

## 2024-06-18 NOTE — DISCHARGE INSTRUCTIONS
It was a pleasure taking care of you today!     Diagnosis: Headache    Home Care:   - Tylenol 1000mg every 8 hours and Ibuprofen 400mg every 4-6 hours as needed for pain/fever/body aches    Follow-Up Plan:  - Follow-up with primary care doctor within 3 - 5 days to discuss your recent ER visit and any additional concerns that you may have.  - Additional testing and/or evaluation as directed by your primary doctor    Return to the Emergency Department for symptoms including but not limited to: persistence or worsening of symptoms, shortness of breath or chest pain, inability to drink without vomiting, passing out/fainting/ loss of consciousness, or if you have other concerns.

## 2024-06-18 NOTE — ED TRIAGE NOTES
Pt reports headache x3 days. Pt states she has nausea, dizziness and light sensitivity. One episode of burred vision to right eye that has resolved. Pain to right side of head is the worse with tinging on top of head, sensitivity to light, complaint of neck pain and pressure.

## 2024-06-27 ENCOUNTER — OFFICE VISIT (OUTPATIENT)
Dept: GASTROENTEROLOGY | Facility: CLINIC | Age: 39
End: 2024-06-27
Payer: COMMERCIAL

## 2024-06-27 DIAGNOSIS — K59.04 CHRONIC IDIOPATHIC CONSTIPATION: Primary | ICD-10-CM

## 2024-06-27 DIAGNOSIS — K21.9 GASTROESOPHAGEAL REFLUX DISEASE, UNSPECIFIED WHETHER ESOPHAGITIS PRESENT: ICD-10-CM

## 2024-06-27 PROCEDURE — 4010F ACE/ARB THERAPY RXD/TAKEN: CPT | Mod: CPTII,95,, | Performed by: INTERNAL MEDICINE

## 2024-06-27 PROCEDURE — 99214 OFFICE O/P EST MOD 30 MIN: CPT | Mod: 95,,, | Performed by: INTERNAL MEDICINE

## 2024-06-27 RX ORDER — DICYCLOMINE HYDROCHLORIDE 10 MG/1
10 CAPSULE ORAL 2 TIMES DAILY PRN
Qty: 20 CAPSULE | Refills: 0 | Status: SHIPPED | OUTPATIENT
Start: 2024-06-27 | End: 2024-07-27
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
## 2024-07-01 ENCOUNTER — HOSPITAL ENCOUNTER (OUTPATIENT)
Dept: RADIOLOGY | Facility: HOSPITAL | Age: 39
Discharge: HOME | End: 2024-07-01
Attending: NURSE PRACTITIONER
Payer: COMMERCIAL

## 2024-07-01 ENCOUNTER — TELEPHONE (OUTPATIENT)
Dept: ENDOSCOPY | Facility: HOSPITAL | Age: 39
End: 2024-07-01
Payer: COMMERCIAL

## 2024-07-01 DIAGNOSIS — K59.00 CONSTIPATION, UNSPECIFIED CONSTIPATION TYPE: ICD-10-CM

## 2024-07-01 DIAGNOSIS — N83.291 OTHER OVARIAN CYST, RIGHT SIDE: ICD-10-CM

## 2024-07-01 DIAGNOSIS — R10.9 ABDOMINAL PAIN, UNSPECIFIED ABDOMINAL LOCATION: Primary | ICD-10-CM

## 2024-07-01 PROCEDURE — 76856 US EXAM PELVIC COMPLETE: CPT | Mod: 26,,, | Performed by: RADIOLOGY

## 2024-07-01 PROCEDURE — 76830 TRANSVAGINAL US NON-OB: CPT | Mod: TC

## 2024-07-01 PROCEDURE — 76830 TRANSVAGINAL US NON-OB: CPT | Mod: 26,,, | Performed by: RADIOLOGY

## 2024-07-01 NOTE — TELEPHONE ENCOUNTER
"    Endo Case Request  Received: 4 days ago  Nakul Beltran MD Piglia, Ashley, RN  Procedure: Colonoscopy    Diagnosis: Constipation and Abdominal pain    Procedure Timing: Within 4 weeks (Urgent)    *If within 4 weeks selected, please delmar as high priority*    *If greater than 12 weeks, please select "4-12 weeks" and delay sending until 2 months prior to requested date*    Provider: Myself    Location: Any Site    Additional Scheduling Information: No scheduling concerns    Prep Specifications:Standard prep    Have you attached a patient to this message: Yes  "

## 2024-07-01 NOTE — TELEPHONE ENCOUNTER
Spoke to patient to schedule procedure(s) Colonoscopy       Physician to perform procedure(s) Dr. RENY Beltran  Date of Procedure (s) 7/8/2024  Arrival Time 2:00 PM  Time of Procedure(s) 3:00 PM   Location of Procedure(s) [facility]  Type of Rx Prep sent to patient: PEG  Instructions provided to patient via [facility]    Patient was informed on the following information and verbalized understanding. Screening questionnaire reviewed with patient and complete. If procedure requires anesthesia, a responsible adult needs to be present to accompany the patient home, patient cannot drive after receiving anesthesia. Appointment details are tentative, especially check-in time. Patient will receive a prep-op call 7 days prior to confirm check-in time for procedure. If applicable the patient should contact their pharmacy to verify Rx for procedure prep is ready for pick-up. Patient was advised to call the scheduling department at [phone] if pharmacy states no Rx is available. Patient was advised to call the endoscopy scheduling department if any questions or concerns arise.      [facility] Scheduling Department

## 2024-07-02 ENCOUNTER — PATIENT MESSAGE (OUTPATIENT)
Dept: PODIATRY | Facility: CLINIC | Age: 39
End: 2024-07-02
Payer: COMMERCIAL

## 2024-07-02 ENCOUNTER — TELEPHONE (OUTPATIENT)
Dept: PODIATRY | Facility: CLINIC | Age: 39
End: 2024-07-02
Payer: COMMERCIAL

## 2024-07-05 ENCOUNTER — ANESTHESIA EVENT (OUTPATIENT)
Dept: ENDOSCOPY | Facility: HOSPITAL | Age: 39
End: 2024-07-05
Payer: COMMERCIAL

## 2024-07-05 ENCOUNTER — TELEPHONE (OUTPATIENT)
Dept: ENDOSCOPY | Facility: HOSPITAL | Age: 39
End: 2024-07-05
Payer: COMMERCIAL

## 2024-07-05 NOTE — TELEPHONE ENCOUNTER
Received voicemail to [facility] Endoscopy scheduling main line.     9:41 AM Madyson Kimble [phone]

## 2024-07-05 NOTE — ANESTHESIA PREPROCEDURE EVALUATION
07/05/2024  Madyson Calle is a 38 y.o., female here for a colonoscopy.     Procedure: COLONOSCOPY (N/A)         Patient Active Problem List   Diagnosis    Scoliosis (and kyphoscoliosis), idiopathic    Intrauterine device    Hemiplegic migraine without status migrainosus, not intractable    Cervicogenic headache    Vestibulopathy    Saccadic eye movement deficiency    Post concussion syndrome    Other ovarian cyst, right side       Past Medical History:   Diagnosis Date    Abnormal cervical Papanicolaou smear 2010, 2016, 2017    Colposcopy    Anxiety     GERD (gastroesophageal reflux disease)     Hypertension     Murmur     Scoliosis        ECHO: Results for orders placed during the hospital encounter of 12/12/23    Echo Saline Bubble? No    Interpretation Summary    Left Ventricle: The left ventricle is normal in size. Normal wall thickness. Normal wall motion. There is normal systolic function. Ejection fraction by visual approximation is 55%. There is normal diastolic function.    Right Ventricle: Normal right ventricular cavity size. Wall thickness is normal. Right ventricle wall motion  is normal. Systolic function is normal.    Mitral Valve: There is mild regurgitation.    Pulmonary Artery: The estimated pulmonary artery systolic pressure is 23 mmHg.    IVC/SVC: Normal venous pressure at 3 mmHg.      There is no height or weight on file to calculate BMI.    Tobacco Use: Low Risk  (6/17/2024)    Patient History     Smoking Tobacco Use: Never     Smokeless Tobacco Use: Never     Passive Exposure: Never       Social History     Substance and Sexual Activity   Drug Use Not Currently    Types: Marijuana        Alcohol Use: Not on file       Review of patient's allergies indicates:  No Known Allergies      Airway:  No value filed.    Pre-op Assessment    I have reviewed the Patient Summary Reports.    I have  reviewed the NPO Status.   I have reviewed the Medications.     Review of Systems  Anesthesia Hx:  No problems with previous Anesthesia             Denies Family Hx of Anesthesia complications.    Denies Personal Hx of Anesthesia complications.                    Social:  Alcohol Use, Non-Smoker       Hematology/Oncology:  Hematology Normal   Oncology Normal                                   EENT/Dental:  EENT/Dental Normal           Cardiovascular:     Hypertension                                        Pulmonary:  Pulmonary Normal                       Renal/:  Renal/ Normal                 Hepatic/GI:     GERD             Musculoskeletal:  Musculoskeletal Normal                Neurological:      Headaches                                 Endocrine:  Endocrine Normal            Dermatological:  Skin Normal    Psych:  Psychiatric Normal                    Physical Exam  General: Well nourished, Cooperative, Alert and Oriented    Airway:  Mallampati: II   Mouth Opening: Normal  TM Distance: Normal  Tongue: Normal  Neck ROM: Normal ROM    Dental:  Intact        Anesthesia Plan  Type of Anesthesia, risks & benefits discussed:    Anesthesia Type: Gen Natural Airway  Intra-op Monitoring Plan: Standard ASA Monitors  Post Op Pain Control Plan: multimodal analgesia  Induction:  IV  Informed Consent: Informed consent signed with the Patient and all parties understand the risks and agree with anesthesia plan.  All questions answered.   ASA Score: 2  Day of Surgery Review of History & Physical: H&P Update referred to the surgeon/provider.    Ready For Surgery From Anesthesia Perspective.     .

## 2024-07-05 NOTE — TELEPHONE ENCOUNTER
Returned patient's call to confirm Colonoscopy. Pt has prep and instructions and confirmed ride. Verified if any GLP1's and blood thinners. Pre-call complete, Pt does not have any further questions. Arrival time:  2 PM

## 2024-07-08 ENCOUNTER — ANESTHESIA (OUTPATIENT)
Dept: ENDOSCOPY | Facility: HOSPITAL | Age: 39
End: 2024-07-08
Payer: COMMERCIAL

## 2024-07-08 ENCOUNTER — HOSPITAL ENCOUNTER (OUTPATIENT)
Facility: HOSPITAL | Age: 39
Discharge: HOME | End: 2024-07-08
Attending: INTERNAL MEDICINE | Admitting: INTERNAL MEDICINE
Payer: COMMERCIAL

## 2024-07-08 VITALS
HEIGHT: 65 IN | RESPIRATION RATE: 20 BRPM | DIASTOLIC BLOOD PRESSURE: 87 MMHG | TEMPERATURE: 98 F | HEART RATE: 82 BPM | OXYGEN SATURATION: 96 % | SYSTOLIC BLOOD PRESSURE: 120 MMHG | WEIGHT: 130 LBS | BODY MASS INDEX: 21.66 KG/M2

## 2024-07-08 DIAGNOSIS — R93.5 ABNORMAL ABDOMINAL CT SCAN: Primary | ICD-10-CM

## 2024-07-08 DIAGNOSIS — R93.5 ABNORMAL CT OF THE ABDOMEN: ICD-10-CM

## 2024-07-08 PROCEDURE — 88305 TISSUE EXAM BY PATHOLOGIST: CPT | Performed by: PATHOLOGY

## 2024-07-08 PROCEDURE — 63600175 PHARM REV CODE 636 W HCPCS: Performed by: NURSE ANESTHETIST, CERTIFIED REGISTERED

## 2024-07-08 PROCEDURE — 37000008 HC ANESTHESIA 1ST 15 MINUTES: Performed by: INTERNAL MEDICINE

## 2024-07-08 PROCEDURE — 45385 COLONOSCOPY W/LESION REMOVAL: CPT | Performed by: INTERNAL MEDICINE

## 2024-07-08 PROCEDURE — 25000003 PHARM REV CODE 250: Performed by: NURSE ANESTHETIST, CERTIFIED REGISTERED

## 2024-07-08 PROCEDURE — D9220A PRA ANESTHESIA: Mod: ,,, | Performed by: NURSE ANESTHETIST, CERTIFIED REGISTERED

## 2024-07-08 PROCEDURE — 99900035 HC TECH TIME PER 15 MIN (STAT)

## 2024-07-08 PROCEDURE — 27201089 HC SNARE, DISP (ANY): Performed by: INTERNAL MEDICINE

## 2024-07-08 PROCEDURE — 37000009 HC ANESTHESIA EA ADD 15 MINS: Performed by: INTERNAL MEDICINE

## 2024-07-08 PROCEDURE — 94761 N-INVAS EAR/PLS OXIMETRY MLT: CPT

## 2024-07-08 PROCEDURE — 45385 COLONOSCOPY W/LESION REMOVAL: CPT | Mod: ,,, | Performed by: INTERNAL MEDICINE

## 2024-07-08 RX ORDER — SODIUM CHLORIDE 9 MG/ML
INJECTION, SOLUTION INTRAVENOUS CONTINUOUS
Status: DISCONTINUED | OUTPATIENT
Start: 2024-07-08 | End: 2024-07-08 | Stop reason: HOSPADM

## 2024-07-08 RX ORDER — LIDOCAINE HYDROCHLORIDE 20 MG/ML
INJECTION INTRAVENOUS
Status: DISCONTINUED | OUTPATIENT
Start: 2024-07-08 | End: 2024-07-08

## 2024-07-08 RX ORDER — PROPOFOL 10 MG/ML
VIAL (ML) INTRAVENOUS
Status: DISCONTINUED | OUTPATIENT
Start: 2024-07-08 | End: 2024-07-08

## 2024-07-08 RX ORDER — PROPOFOL 10 MG/ML
VIAL (ML) INTRAVENOUS CONTINUOUS PRN
Status: DISCONTINUED | OUTPATIENT
Start: 2024-07-08 | End: 2024-07-08

## 2024-07-08 RX ADMIN — PROPOFOL 20 MG: 10 INJECTION, EMULSION INTRAVENOUS at 03:07

## 2024-07-08 RX ADMIN — SODIUM CHLORIDE: 0.9 INJECTION, SOLUTION INTRAVENOUS at 02:07

## 2024-07-08 RX ADMIN — GLYCOPYRROLATE 0.1 MG: 0.2 INJECTION, SOLUTION INTRAMUSCULAR; INTRAVENOUS at 02:07

## 2024-07-08 RX ADMIN — LIDOCAINE HYDROCHLORIDE 50 MG: 20 INJECTION INTRAVENOUS at 02:07

## 2024-07-08 RX ADMIN — PROPOFOL 200 MCG/KG/MIN: 10 INJECTION, EMULSION INTRAVENOUS at 02:07

## 2024-07-08 RX ADMIN — PROPOFOL 100 MG: 10 INJECTION, EMULSION INTRAVENOUS at 02:07

## 2024-07-08 RX ADMIN — PROPOFOL 30 MG: 10 INJECTION, EMULSION INTRAVENOUS at 03:07

## 2024-07-08 NOTE — TRANSFER OF CARE
"Anesthesia Transfer of Care Note    Patient: Madyson Calle    Procedure(s) Performed: Procedure(s) (LRB):  COLONOSCOPY (N/A)    Patient location: PACU    Anesthesia Type: general    Transport from OR: Transported from OR on room air with adequate spontaneous ventilation    Post pain: adequate analgesia    Post assessment: no apparent anesthetic complications and tolerated procedure well    Post vital signs: stable    Level of consciousness: responds to stimulation and sedated    Nausea/Vomiting: no nausea/vomiting    Complications: none    Transfer of care protocol was followed      Last vitals: Visit Vitals  BP (!) 131/91 (BP Location: Left arm, Patient Position: Lying)   Pulse 78   Temp 36.7 °C (98.1 °F) (Temporal)   Resp 16   Ht 5' 5" (1.651 m)   Wt 59 kg (130 lb)   SpO2 98%   Breastfeeding No   BMI 21.63 kg/m²     "

## 2024-07-08 NOTE — H&P
Short Stay Endoscopy History and Physical    PCP - Karin Oneill NP    Procedure - Colonoscopy  Sedation: GA  ASA - per anesthesia  Mallampati - per anesthesia  History of Anesthesia problems - no  Family history Anesthesia problems -  no     HPI:  This is a 38 y.o. female here for evaluation of : Abnormal CT of the abdomen    Reflux - no  Dysphagia - no  Abdominal pain - no  Diarrhea - no    ROS:  Constitutional: No fevers, chills, No weight loss  ENT: No allergies  CV: No chest pain  Pulm: No cough, No shortness of breath  Ophtho: No vision changes  GI: see HPI  Medical History:  has a past medical history of Abnormal cervical Papanicolaou smear (2010, 2016, 2017), Anxiety, GERD (gastroesophageal reflux disease), Hypertension, Murmur, and Scoliosis.    Surgical History:  has a past surgical history that includes Esophagogastroduodenoscopy (N/A, 3/12/2024).    Family History: family history includes Bipolar disorder in her sister; Colon cancer in her paternal aunt; Diabetes in her father and maternal grandfather; Heart attack in her maternal grandfather and paternal grandmother; Heart murmur in her father; Hyperlipidemia in her maternal grandfather; Hypertension in her father and maternal grandmother; No Known Problems in her daughter, mother, and paternal grandfather; Prostate cancer in her father; Stroke in her maternal grandfather.. Otherwise no colon cancer, inflammatory bowel disease, or GI malignancies.    Social History:  reports that she has never smoked. She has never been exposed to tobacco smoke. She has never used smokeless tobacco. She reports current alcohol use of about 7.0 standard drinks of alcohol per week. She reports that she does not currently use drugs after having used the following drugs: Marijuana.    Review of patient's allergies indicates:  No Known Allergies    Medications:   Facility-Administered Medications Prior to Admission   Medication Dose Route Frequency Provider Last Rate  Last Admin    levonorgestreL (MIRENA) 20 mcg/24 hours (8 yrs) 52 mg IUD 1 Intra Uterine Device  1 Intra Uterine Device Intrauterine  Juan Curiel MD   1 Intra Uterine Device at 01/03/23 4045     Medications Prior to Admission   Medication Sig Dispense Refill Last Dose    amLODIPine (NORVASC) 5 MG tablet Take 1 tablet (5 mg total) by mouth once daily. 90 tablet 3     ARAZLO 0.045 % Lotn        boric acid (bulk) Powd Insert one Gelatin Capsule filled with 600 mg of Boric Acid Powder H.S. 100 capsule 4     dapsone (ACZONE) 7.5 % GlwP        dicyclomine (BENTYL) 10 MG capsule Take 1 capsule (10 mg total) by mouth 2 (two) times daily as needed (abdominal pain). 20 capsule 0     hydroquinone 4 % Crea Apply to dark spots once daily. Use with sunscreen if outdoors 28 g 1     linaCLOtide (LINZESS) 145 mcg Cap capsule Take 1 capsule (145 mcg total) by mouth before breakfast. 90 capsule 0     loratadine (CLARITIN) 10 mg tablet Take 1 tablet (10 mg total) by mouth once daily. 30 tablet 11     metoprolol succinate (TOPROL-XL) 50 MG 24 hr tablet Take 1 tablet (50 mg total) by mouth once daily. 30 tablet 5     omeprazole (PRILOSEC) 40 MG capsule Take 1 capsule (40 mg total) by mouth every morning. 30 capsule 2     ondansetron (ZOFRAN-ODT) 4 MG TbDL Take 1 tablet (4 mg total) by mouth every 8 (eight) hours as needed (nausea). 20 tablet 6     predniSONE (DELTASONE) 20 MG tablet Take 1 tablet (20 mg total) by mouth once daily. 5 tablet 0        Objective Findings:    Vital Signs: Per nursing notes.    Physical Exam:  General Appearance: Well appearing in no acute distress  Head:   Normocephalic, without obvious abnormality  Eyes:    No scleral icterus  Airway: Open  Neck: No restriction in mobility  Lungs: CTA bilaterally in anterior and posterior fields, no wheezes, no crackles.  Heart:  Regular rate and rhythm, S1, S2 normal, no murmurs heard  Abdomen: Soft, non tender, non distended      Labs:  Lab Results   Component  Value Date    WBC 5.16 06/17/2024    HGB 11.9 (L) 06/17/2024    HCT 34.7 (L) 06/17/2024     06/17/2024    CHOL 137 01/19/2024    TRIG 106 01/19/2024    HDL 50 01/19/2024    ALT 21 06/17/2024    AST 19 06/17/2024     06/17/2024    K 4.1 06/17/2024     06/17/2024    CREATININE 0.8 06/17/2024    BUN 8 06/17/2024    CO2 27 06/17/2024    TSH 0.449 04/11/2023    INR 0.9 08/31/2011    HGBA1C 5.2 04/11/2023         I have explained the risks and benefits of endoscopy procedures to the patient including but not limited to bleeding, perforation, infection, and death.    Thank you so much for allowing me to participate in the care of Madyson Calle    Nakul Beltran MD

## 2024-07-08 NOTE — PROVATION PATIENT INSTRUCTIONS
Discharge Summary/Instructions after an Endoscopic Procedure  Patient Name: Madyson Calle  Patient MRN: [id number]  Patient YOB: 1985 Monday, July 8, 2024  Nakul Beltran MD  Dear patient,  As a result of recent federal legislation (The Federal Cures Act), you may   receive lab or pathology results from your procedure in your [facility]   account before your physician is able to contact you. Your physician or   their representative will relay the results to you with their   recommendations at their soonest availability.  Thank you,  RESTRICTIONS:  During your procedure today, you received medications for sedation.  These   medications may affect your judgment, balance and coordination.  Therefore,   for 24 hours, you have the following restrictions:   - DO NOT drive a car, operate machinery, make legal/financial decisions,   sign important papers or drink alcohol.    ACTIVITY:  Today: no heavy lifting, straining or running due to procedural   sedation/anesthesia.  The following day: return to full activity including work.  DIET:  Eat and drink normally unless instructed otherwise.     TREATMENT FOR COMMON SIDE EFFECTS:  - Mild abdominal pain, nausea, belching, bloating or excessive gas:  rest,   eat lightly and use a heating pad.  - Sore Throat: treat with throat lozenges and/or gargle with warm salt   water.  - Because air was used during the procedure, expelling large amounts of air   from your rectum or belching is normal.  - If a bowel prep was taken, you may not have a bowel movement for 1-3 days.    This is normal.  SYMPTOMS TO WATCH FOR AND REPORT TO YOUR PHYSICIAN:  1. Abdominal pain or bloating, other than gas cramps.  2. Chest pain.  3. Back pain.  4. Signs of infection such as: chills or fever occurring within 24 hours   after the procedure.  5. Rectal bleeding, which would show as bright red, maroon, or black stools.   (A tablespoon of blood from the rectum is not serious, especially if    hemorrhoids are present.)  6. Vomiting.  7. Weakness or dizziness.  GO DIRECTLY TO THE NEAREST EMERGENCY ROOM IF YOU HAVE ANY OF THE FOLLOWING:      Difficulty breathing              Chills and/or fever over 101 F   Persistent vomiting and/or vomiting blood   Severe abdominal pain   Severe chest pain   Black, tarry stools   Bleeding- more than one tablespoon   Any other symptom or condition that you feel may need urgent attention  Your doctor recommends these additional instructions:  If any biopsies were taken, your doctors clinic will contact you in 1 to 2   weeks with any results.  - Patient has a contact number available for emergencies.  The signs and   symptoms of potential delayed complications were discussed with the   patient.  Return to normal activities tomorrow.  Written discharge   instructions were provided to the patient.   - Discharge patient to home.   - Resume previous diet.   - Continue present medications.   - Await pathology results.   - Repeat colonoscopy in 7 years for surveillance.   For questions, problems or results please call your physician - Nakul Beltran MD at Work:  [phone].  [facility], EMERGENCY ROOM PHONE NUMBER: [phone]  IF A COMPLICATION OR EMERGENCY SITUATION ARISES AND YOU ARE UNABLE TO REACH   YOUR PHYSICIAN - GO DIRECTLY TO THE EMERGENCY ROOM.  Nakul Beltran MD  7/8/2024 3:27:15 PM  This report has been verified and signed electronically.  Dear patient,  As a result of recent federal legislation (The Federal Cures Act), you may   receive lab or pathology results from your procedure in your [facility]   account before your physician is able to contact you. Your physician or   their representative will relay the results to you with their   recommendations at their soonest availability.  Thank you,  PROVATION

## 2024-07-09 ENCOUNTER — OFFICE VISIT (OUTPATIENT)
Dept: OBSTETRICS AND GYNECOLOGY | Facility: CLINIC | Age: 39
End: 2024-07-09
Payer: COMMERCIAL

## 2024-07-09 VITALS
DIASTOLIC BLOOD PRESSURE: 84 MMHG | HEIGHT: 65 IN | HEART RATE: 69 BPM | BODY MASS INDEX: 22.44 KG/M2 | SYSTOLIC BLOOD PRESSURE: 130 MMHG | WEIGHT: 134.69 LBS

## 2024-07-09 DIAGNOSIS — Z01.411 ENCOUNTER FOR WELL WOMAN EXAM WITH ABNORMAL FINDINGS: Primary | ICD-10-CM

## 2024-07-09 DIAGNOSIS — N83.209 CYST OF OVARY, UNSPECIFIED LATERALITY: ICD-10-CM

## 2024-07-09 DIAGNOSIS — B96.89 BV (BACTERIAL VAGINOSIS): ICD-10-CM

## 2024-07-09 DIAGNOSIS — N76.0 BV (BACTERIAL VAGINOSIS): ICD-10-CM

## 2024-07-09 DIAGNOSIS — B37.31 VAGINAL YEAST INFECTION: ICD-10-CM

## 2024-07-09 DIAGNOSIS — D21.9 FIBROIDS: ICD-10-CM

## 2024-07-09 DIAGNOSIS — R10.2 PELVIC PAIN: ICD-10-CM

## 2024-07-09 PROCEDURE — 3008F BODY MASS INDEX DOCD: CPT | Mod: CPTII,S$GLB,, | Performed by: OBSTETRICS & GYNECOLOGY

## 2024-07-09 PROCEDURE — 99395 PREV VISIT EST AGE 18-39: CPT | Mod: 25,S$GLB,, | Performed by: OBSTETRICS & GYNECOLOGY

## 2024-07-09 PROCEDURE — 99999 PR PBB SHADOW E&M-EST. PATIENT-LVL V: CPT | Mod: PBBFAC,,, | Performed by: OBSTETRICS & GYNECOLOGY

## 2024-07-09 PROCEDURE — 3075F SYST BP GE 130 - 139MM HG: CPT | Mod: CPTII,S$GLB,, | Performed by: OBSTETRICS & GYNECOLOGY

## 2024-07-09 PROCEDURE — 1159F MED LIST DOCD IN RCRD: CPT | Mod: CPTII,S$GLB,, | Performed by: OBSTETRICS & GYNECOLOGY

## 2024-07-09 PROCEDURE — 3079F DIAST BP 80-89 MM HG: CPT | Mod: CPTII,S$GLB,, | Performed by: OBSTETRICS & GYNECOLOGY

## 2024-07-09 PROCEDURE — 4010F ACE/ARB THERAPY RXD/TAKEN: CPT | Mod: CPTII,S$GLB,, | Performed by: OBSTETRICS & GYNECOLOGY

## 2024-07-09 RX ORDER — METRONIDAZOLE VAGINAL GEL, 1.3 % 65 MG/5G
1 GEL VAGINAL ONCE
Qty: 5 G | Refills: 1 | Status: SHIPPED | OUTPATIENT
Start: 2024-07-09 | End: 2024-07-10

## 2024-07-09 RX ORDER — TERCONAZOLE 4 MG/G
1 CREAM VAGINAL NIGHTLY
Qty: 45 G | Refills: 0 | Status: SHIPPED | OUTPATIENT
Start: 2024-07-09

## 2024-07-09 NOTE — PROGRESS NOTES
Subjective     Patient ID: Madyson Calle is a 38 y.o. female.    Chief Complaint:  Well Woman and Fibroids (Pain with sex, left flank pain)      History of Present Illness  Pelvic Pain  The patient's primary symptoms include pelvic pain. The patient's pertinent negatives include no vaginal discharge. This is a chronic problem. The current episode started more than 1 year ago. The problem occurs daily. The problem has been waxing and waning. The pain is moderate. The problem affects both sides. She is not pregnant. Associated symptoms include back pain, constipation, flank pain and painful intercourse. Pertinent negatives include no abdominal pain, anorexia, chills, diarrhea, discolored urine, dysuria, fever, frequency, headaches, hematuria, nausea, rash, urgency or vomiting. The symptoms are aggravated by bowel movements and intercourse. She has tried nothing for the symptoms. The treatment provided no relief. She is sexually active. No, her partner does not have an STD. She uses an IUD for contraception. Her past medical history is significant for ovarian cysts and vaginosis. There is no history of an abdominal surgery, a  section, an ectopic pregnancy, endometriosis, a gynecological surgery, herpes simplex, menorrhagia, metrorrhagia, miscarriage, perineal abscess, PID, an STD or a terminated pregnancy.     38 y.o. female  here for annual.     She describes her periods as absent with IUD in place.  denies break through bleeding.   reports vaginal itching or irritation.  reports vaginal discharge.   Recently changed soaps and noticed mild BV symptoms - discharge, irritation and odor. Typically gets a yeast infection after treatment of BV, only responds to vaginal antifungal creams.     She is sexually active.  She uses Mirena IUD for contraception.    History of abnormal pap: Yes - history of colposcopy in the past.   Last Pap: 2022 - NILM/HPV neg.   Last MMG: N/A  Last Colonoscopy: N/A  Last DXA:  N/A    Family History   Problem Relation Name Age of Onset    No Known Problems Paternal Grandfather      Heart attack Paternal Grandmother      Hypertension Maternal Grandmother      Stroke Maternal Grandfather      Heart attack Maternal Grandfather      Diabetes Maternal Grandfather      Hyperlipidemia Maternal Grandfather      Hypertension Father      Diabetes Father      Heart murmur Father      Prostate cancer Father      No Known Problems Mother      Bipolar disorder Sister Prema     No Known Problems Daughter Natacha     Colon cancer Paternal Aunt      Breast cancer Neg Hx         Patient also presents for a problem visit:  39 y/o  presents for evaluation of pelvic pain and dyspareunia. Reports a long history of LLQ pain and lower rectal pain with intercourse/deep penetration. Different positions seem to improve symptoms somewhat. No vaginal dryness. She was seen in the ED last week for acute onset LLQ pain- ultrasound noted known small fibroids and a 3 cm hemorrhagic ovarian cyst. She has had increased LLQ pain over the past few days. She has had Mirena 2023, this is her third device. She has amenorrhea with IUD. She had a pelvic US in Feb of this year that ntoed a complex (possibly hemorrhagic) left ovarian cyst, resolved on repeat US last week but a 3 cm hemorrhagic right ovarian cyst was noted. Fibroids stable on both US, largest 2.8 cm. She had colonoscopy yesterday - noted a polyp, they had thought she possibly had colitis going in given pain. She has a history of constipation but this has been better lately. She had the bowel prep for the colonoscopy this week and has still continued to have LLQ pain so does not feel constipation is contributing to symptoms.       GYN & OB History  No LMP recorded (lmp unknown). Patient has had an implant.   Date of Last Pap: 2022    OB History    Para Term  AB Living   1 1 1     1   SAB IAB Ectopic Multiple Live Births           1      #  Outcome Date GA Lbr Pedro/2nd Weight Sex Type Anes PTL Lv   1 Term 11/04/11    F Vag-Spont   SIDRA       Review of Systems  Review of Systems   Constitutional:  Negative for chills, diaphoresis, fatigue and fever.   Respiratory:  Negative for cough and shortness of breath.    Cardiovascular:  Negative for chest pain and palpitations.   Gastrointestinal:  Positive for constipation. Negative for abdominal pain, anorexia, diarrhea, nausea and vomiting.   Genitourinary:  Positive for flank pain and pelvic pain. Negative for dysmenorrhea, dysuria, frequency, hematuria, menorrhagia, menstrual problem, urgency, vaginal bleeding, vaginal discharge and vaginal pain.   Musculoskeletal:  Positive for back pain. Negative for myalgias.   Integumentary:  Negative for rash, acne, breast mass, nipple discharge and breast skin changes.   Neurological:  Negative for headaches.   Psychiatric/Behavioral:  Negative for depression. The patient is not nervous/anxious.    Breast: Negative for mass, mastodynia, nipple discharge and skin changes         Objective   Physical Exam:   Constitutional: She is oriented to person, place, and time. She appears well-developed and well-nourished. No distress.    HENT:   Head: Normocephalic and atraumatic.    Eyes: EOM are normal.    Neck: No thyromegaly present.     Pulmonary/Chest: Effort normal. She exhibits no mass and no tenderness. Right breast exhibits no inverted nipple, no mass, no nipple discharge, no skin change, no tenderness and no swelling. Left breast exhibits no inverted nipple, no mass, no nipple discharge, no skin change, no tenderness and no swelling. Breasts are symmetrical.        Abdominal: Soft. She exhibits no distension and no mass. There is no abdominal tenderness. There is no rebound and no guarding. No hernia.     Genitourinary:    Genitourinary Comments: Normal external female genitalia; vagina rugated, normal; cervix normal, no masses; uterus small mobile nontender; no adnexal  masses palpated; rectal deferred               Musculoskeletal: Normal range of motion and moves all extremeties.       Neurological: She is alert and oriented to person, place, and time.    Skin: Skin is warm. No rash noted.    Psychiatric: She has a normal mood and affect. Her behavior is normal. Judgment and thought content normal.            Assessment and Plan     1. Encounter for well woman exam with abnormal findings    2. Fibroids    3. Pelvic pain    4. Cyst of ovary, unspecified laterality    5. BV (bacterial vaginosis)    6. Vaginal yeast infection          Plan:  Madyson was seen today for well woman and fibroids.    Diagnoses and all orders for this visit:    Encounter for well woman exam with abnormal findings  - Pap guidelines discussed. Pap up to date.   - Breast cancer screening not yet indicated.   - Colon cancer screening not yet indicated.   - Bone density screening not yet indicated.  - Contraception - Continue IUD, strings in place.      Problem visit:  Fibroids  Discussed that small fibroids may or may not be contributing to chronic pelvic pain. She has been noted to have a retroflexed uterus, which could be contributing to dyspareunia. She is fairly certain she is done with childbearing. We discussed that surgical management of fibroids including hysterectomy may or may not improve chronic pelvic pain. She is not interested in surgery at this time. Discussed that hormonal contraception to suppress ovulation may help prevent formation of ovarian cysts in the future, will consider in the future. We discussed a trial of pelvic floor PT and she is interested. Will plan to repeat the pelvic US in about 2 months and then follow up with me. Recommended NSAIDs for pain. Pain precautions.   -     Ambulatory referral/consult to Gynecology  -     US Pelvis Comp with Transvag NON-OB (xpd; Future    Pelvic pain  -     Ambulatory referral/consult to Physical/Occupational Therapy; Future    Cyst of ovary,  unspecified laterality  -     US Pelvis Comp with Transvag NON-OB (xpd; Future    BV (bacterial vaginosis)  -     metroNIDAZOLE (NUVESSA) 1.3 % (65 mg/5 gram) Gel; Place 1 applicator vaginally once. for 1 dose    Vaginal yeast infection  -     terconazole (TERAZOL 7) 0.4 % Crea; Place 1 applicator vaginally every evening.    Rx nuvessa for presumed BV, Rx for terazol cream in the event yeast infection symptoms occur    Orders Placed This Encounter   Procedures    US Pelvis Comp with Transvag NON-OB (xpd    Ambulatory referral/consult to Physical/Occupational Therapy       Follow up in about 3 months (around 10/9/2024) for followup.

## 2024-07-10 LAB
FINAL PATHOLOGIC DIAGNOSIS: NORMAL
GROSS: NORMAL
Lab: NORMAL

## 2024-07-10 NOTE — ANESTHESIA POSTPROCEDURE EVALUATION
Anesthesia Post Evaluation    Patient: Madyson Calle    Procedure(s) Performed: Procedure(s) (LRB):  COLONOSCOPY (N/A)    Final Anesthesia Type: general      Patient location during evaluation: GI PACU  Patient participation: Yes- Able to Participate  Level of consciousness: awake and alert, oriented and awake  Post-procedure vital signs: reviewed and stable  Pain management: adequate  Airway patency: patent    PONV status at discharge: No PONV  Anesthetic complications: no      Cardiovascular status: stable  Respiratory status: unassisted and room air  Hydration status: euvolemic  Follow-up not needed.              Vitals Value Taken Time   /87 07/08/24 1543   Temp 36.4 07/10/24 1814   Pulse 82 07/08/24 1543   Resp 20 07/08/24 1544   SpO2 96 % 07/08/24 1543         Event Time   Out of Recovery 15:34:00         Pain/Josr Score: No data recorded

## 2024-07-11 ENCOUNTER — TELEPHONE (OUTPATIENT)
Dept: GASTROENTEROLOGY | Facility: CLINIC | Age: 39
End: 2024-07-11
Payer: COMMERCIAL

## 2024-07-11 NOTE — TELEPHONE ENCOUNTER
----- Message from Nakul Beltran MD sent at 7/10/2024  3:33 PM CDT -----  Please call and notify patient, the colon polyp was benign.

## 2024-07-15 ENCOUNTER — OFFICE VISIT (OUTPATIENT)
Dept: NEUROLOGY | Facility: CLINIC | Age: 39
End: 2024-07-15
Payer: COMMERCIAL

## 2024-07-15 ENCOUNTER — LAB VISIT (OUTPATIENT)
Dept: LAB | Facility: HOSPITAL | Age: 39
End: 2024-07-15
Payer: COMMERCIAL

## 2024-07-15 VITALS
HEIGHT: 63 IN | WEIGHT: 133.81 LBS | HEART RATE: 66 BPM | SYSTOLIC BLOOD PRESSURE: 136 MMHG | DIASTOLIC BLOOD PRESSURE: 94 MMHG | BODY MASS INDEX: 23.71 KG/M2

## 2024-07-15 DIAGNOSIS — E53.8 B12 DEFICIENCY: ICD-10-CM

## 2024-07-15 DIAGNOSIS — G62.9 NEUROPATHY: ICD-10-CM

## 2024-07-15 DIAGNOSIS — G62.9 NEUROPATHY: Primary | ICD-10-CM

## 2024-07-15 LAB
T4 SERPL-MCNC: 6.4 UG/DL (ref 4.5–11.5)
TSH SERPL DL<=0.005 MIU/L-ACNC: 0.42 UIU/ML (ref 0.4–4)

## 2024-07-15 PROCEDURE — 99213 OFFICE O/P EST LOW 20 MIN: CPT | Mod: S$GLB,,, | Performed by: PHYSICIAN ASSISTANT

## 2024-07-15 PROCEDURE — 80321 ALCOHOLS BIOMARKERS 1OR 2: CPT | Performed by: PHYSICIAN ASSISTANT

## 2024-07-15 PROCEDURE — 84436 ASSAY OF TOTAL THYROXINE: CPT | Performed by: PHYSICIAN ASSISTANT

## 2024-07-15 PROCEDURE — 1160F RVW MEDS BY RX/DR IN RCRD: CPT | Mod: CPTII,S$GLB,, | Performed by: PHYSICIAN ASSISTANT

## 2024-07-15 PROCEDURE — 3008F BODY MASS INDEX DOCD: CPT | Mod: CPTII,S$GLB,, | Performed by: PHYSICIAN ASSISTANT

## 2024-07-15 PROCEDURE — 3080F DIAST BP >= 90 MM HG: CPT | Mod: CPTII,S$GLB,, | Performed by: PHYSICIAN ASSISTANT

## 2024-07-15 PROCEDURE — 84425 ASSAY OF VITAMIN B-1: CPT | Performed by: PHYSICIAN ASSISTANT

## 2024-07-15 PROCEDURE — 82607 VITAMIN B-12: CPT | Performed by: PHYSICIAN ASSISTANT

## 2024-07-15 PROCEDURE — 82300 ASSAY OF CADMIUM: CPT | Performed by: PHYSICIAN ASSISTANT

## 2024-07-15 PROCEDURE — 84443 ASSAY THYROID STIM HORMONE: CPT | Performed by: PHYSICIAN ASSISTANT

## 2024-07-15 PROCEDURE — 83921 ORGANIC ACID SINGLE QUANT: CPT | Performed by: PHYSICIAN ASSISTANT

## 2024-07-15 PROCEDURE — 84252 ASSAY OF VITAMIN B-2: CPT | Performed by: PHYSICIAN ASSISTANT

## 2024-07-15 PROCEDURE — 87522 HEPATITIS C REVRS TRNSCRPJ: CPT | Performed by: PHYSICIAN ASSISTANT

## 2024-07-15 PROCEDURE — 99999 PR PBB SHADOW E&M-EST. PATIENT-LVL III: CPT | Mod: PBBFAC,,, | Performed by: PHYSICIAN ASSISTANT

## 2024-07-15 PROCEDURE — 84207 ASSAY OF VITAMIN B-6: CPT | Performed by: PHYSICIAN ASSISTANT

## 2024-07-15 PROCEDURE — 4010F ACE/ARB THERAPY RXD/TAKEN: CPT | Mod: CPTII,S$GLB,, | Performed by: PHYSICIAN ASSISTANT

## 2024-07-15 PROCEDURE — 3075F SYST BP GE 130 - 139MM HG: CPT | Mod: CPTII,S$GLB,, | Performed by: PHYSICIAN ASSISTANT

## 2024-07-15 PROCEDURE — 1159F MED LIST DOCD IN RCRD: CPT | Mod: CPTII,S$GLB,, | Performed by: PHYSICIAN ASSISTANT

## 2024-07-15 RX ORDER — GABAPENTIN 300 MG/1
300 CAPSULE ORAL NIGHTLY
Qty: 30 CAPSULE | Refills: 11 | Status: SHIPPED | OUTPATIENT
Start: 2024-07-15 | End: 2025-07-15
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
## 2024-07-16 LAB
ARSENIC BLD-MCNC: 2 NG/ML
CADMIUM BLD-MCNC: 0.4 NG/ML
CITY: NORMAL
COUNTY: NORMAL
GUARDIAN FIRST NAME: NORMAL
GUARDIAN LAST NAME: NORMAL
HCV RNA SERPL QL NAA+PROBE: NOT DETECTED
HCV RNA SPEC NAA+PROBE-ACNC: NOT DETECTED IU/ML
HOME PHONE: NORMAL
LEAD BLD-MCNC: <1 MCG/DL
MERCURY BLD-MCNC: 2 NG/ML
RACE: NORMAL
STATE: NORMAL
STREET ADDRESS: NORMAL
VENOUS/CAPILLARY: NORMAL
VIT B12 SERPL-MCNC: 190 NG/L (ref 180–914)
ZIP: NORMAL

## 2024-07-17 DIAGNOSIS — E53.8 B12 DEFICIENCY: Primary | ICD-10-CM

## 2024-07-17 LAB
CLINICAL BIOCHEMIST REVIEW: NORMAL
PLPETH BLD-MCNC: 32 NG/ML
POPETH BLD-MCNC: 54 NG/ML
VIT B2 SERPL-MCNC: 9 MCG/L (ref 1–19)

## 2024-07-17 RX ORDER — CYANOCOBALAMIN, ISOPROPYL ALCOHOL 1000MCG/ML
1 KIT INJECTION
Qty: 2 KIT | Refills: 5 | Status: SHIPPED | OUTPATIENT
Start: 2024-07-17

## 2024-07-18 LAB
METHYLMALONATE SERPL-SCNC: 0.1 NMOL/ML
PYRIDOXAL SERPL-MCNC: 10 UG/L (ref 5–50)
VIT B1 BLD-MCNC: 39 UG/L (ref 38–122)

## 2024-07-29 ENCOUNTER — OFFICE VISIT (OUTPATIENT)
Dept: OPTOMETRY | Facility: CLINIC | Age: 39
End: 2024-07-29
Payer: COMMERCIAL

## 2024-07-29 DIAGNOSIS — Z87.820 HISTORY OF CONCUSSION: ICD-10-CM

## 2024-07-29 DIAGNOSIS — G44.86 CERVICOGENIC HEADACHE: ICD-10-CM

## 2024-07-29 DIAGNOSIS — H52.203 HYPEROPIA WITH ASTIGMATISM, BILATERAL: ICD-10-CM

## 2024-07-29 DIAGNOSIS — H52.03 HYPEROPIA WITH ASTIGMATISM, BILATERAL: ICD-10-CM

## 2024-07-29 DIAGNOSIS — H53.143 PHOTOPHOBIA OF BOTH EYES: ICD-10-CM

## 2024-07-29 DIAGNOSIS — H04.123 DRY EYE SYNDROME OF BOTH EYES: Primary | ICD-10-CM

## 2024-07-29 DIAGNOSIS — H10.13 ALLERGIC CONJUNCTIVITIS OF BOTH EYES: ICD-10-CM

## 2024-07-29 PROCEDURE — 99999 PR PBB SHADOW E&M-EST. PATIENT-LVL III: CPT | Mod: PBBFAC,,, | Performed by: OPTOMETRIST

## 2024-07-29 RX ORDER — OSELTAMIVIR PHOSPHATE 75 MG/1
75 CAPSULE ORAL 2 TIMES DAILY
COMMUNITY
Start: 2024-03-04

## 2024-07-29 RX ORDER — AZITHROMYCIN 250 MG/1
250 TABLET, FILM COATED ORAL
COMMUNITY
Start: 2024-03-04

## 2024-07-29 RX ORDER — POLYETHYLENE GLYCOL-3350 AND ELECTROLYTES WITH FLAVOR PACK 240; 5.84; 2.98; 6.72; 22.72 G/278.26G; G/278.26G; G/278.26G; G/278.26G; G/278.26G
4000 POWDER, FOR SOLUTION ORAL ONCE
COMMUNITY
Start: 2024-07-01

## 2024-07-29 NOTE — PROGRESS NOTES
HPI    SAMMY: [birth year]  Chief complaint (CC): Patient is here for annual eye exam today.  Patient   has noticed more trouble with light during the day but night driving has   improved. Patient has also noticed more trouble refocusing when there are   changes in lighting. Patient had a concussion last June that effected her   vision and wonders if it is still having any effect.  Glasses? + 2 yrs. old  Contacts? -  H/o eye surgery, injections or laser: -  H/o eye injury: -  Known eye conditions? See above  Family h/o eye conditions? MGGM with glaucoma  Eye gtts? -      (-) Flashes (-)  Floaters (-) Mucous   (-)  Tearing (+) Itching (-) Burning   (-) Headaches (-) Eye Pain/discomfort (-) Irritation   (-)  Redness (-) Double vision (-) Blurry vision    Diabetic? -  A1c? -      Last edited by Feli Han, OD on 7/29/2024  3:16 PM.            Assessment /Plan     For exam results, see Encounter Report.      Dry eye syndrome of both eyes  Rec PF ATs TID OU.     Allergic conjunctivitis of both eyes  Recommend Zaditor or Alaway bid OU and cool compresses to help soothe itching. Patient advised to RTC if condition gets worse.     Hyperopia with astigmatism, bilateral  SRx released to patient. Patient educated on lens options. Normal ocular health. RTC 1 year for routine exam.     Photophobia of both eyes  History of concussion  Pt shoes no S/sx in office. Pt reports this has been present since her concussion in June 2023 but had gotten worse recently. Pt seen by neurology then too. No head imaging done since 2022. No e/o ocular pathology found in association. Address [facility].     Cerviocogenic headaches  No e/o ocular pathology found in association. Pt reports recent HA that were over 10 on scale. Pt reports that the location of HA changed and didn't stay in the same place. Pt recently went to ER for HA. No head imaging done then. Pt had bad reaction to IV meds given. Pt hasn't spoken to PCP. Rec consulting PCP.

## 2024-07-31 ENCOUNTER — CLINICAL SUPPORT (OUTPATIENT)
Dept: REHABILITATION | Facility: HOSPITAL | Age: 39
End: 2024-07-31
Payer: COMMERCIAL

## 2024-07-31 DIAGNOSIS — R10.2 PELVIC PAIN: ICD-10-CM

## 2024-07-31 DIAGNOSIS — R52 PAIN: Primary | ICD-10-CM

## 2024-07-31 PROCEDURE — 97161 PT EVAL LOW COMPLEX 20 MIN: CPT | Performed by: PHYSICAL THERAPIST

## 2024-07-31 NOTE — PROGRESS NOTES
OUTPATIENT PHYSICAL THERAPY EVALUATION        Name: Madyson Calle  Clinic Number: [id number]    Therapy Diagnosis:   Encounter Diagnoses   Name Primary?    Pelvic pain     Pain Yes     Physician: Kathy Steiner MD    Physician Orders: PT Eval and Treat   Medical Diagnosis:   Diagnosis   R10.2 (ICD-10-CM) - Pelvic pain   Evaluation Date: 7/31/2024  Authorization: 7/31/24  Visit #: 1/20  Time In: 1250pm  Time Out: 132pm  Total Billable Time: 42 minutes    Precautions: universal      HISTORY      Madyson is a 38 y.o. female evaluated on 07/31/2024    Physician:  Kathy Steiner MD   Diagnosis:   Encounter Diagnoses   Name Primary?    Pelvic pain     Pain Yes      Treatment ordered: Physical Therapy  Medical History:   Past Medical History:   Diagnosis Date    Abnormal cervical Papanicolaou smear 2010, 2016, 2017    Colposcopy    Anxiety     GERD (gastroesophageal reflux disease)     Hypertension     Murmur     Scoliosis       Surgical History:   Past Surgical History:   Procedure Laterality Date    COLONOSCOPY N/A 7/8/2024    Procedure: COLONOSCOPY;  Surgeon: Nakul Beltran MD;  Location: [facility] ENDOSCOPY;  Service: Endoscopy;  Laterality: N/A;  clinic pt of dr beltran; instr via portal; peg sent to [facility]; AP  7/5 precall complete -ml    ESOPHAGOGASTRODUODENOSCOPY N/A 3/12/2024    Procedure: EGD (ESOPHAGOGASTRODUODENOSCOPY);  Surgeon: Nakul Beltran MD;  Location: [facility] ENDOSCOPY;  Service: Endoscopy;  Laterality: N/A;  2-20 [facility] pt; inst sent via portal [facility]  3/6- pc complete. DBM  3/11      Medications:   Current Outpatient Medications   Medication Sig    amLODIPine (NORVASC) 5 MG tablet Take 1 tablet (5 mg total) by mouth once daily.    ARAZLO 0.045 % Lotn     azithromycin (Z-SARAH) 250 MG tablet Take 250 mg by mouth.    boric acid (bulk) Powd Insert one Gelatin Capsule filled with 600 mg of Boric Acid Powder H.S.    cyanocobalamin, vitamin B-12, (B-12 COMPLIANCE) 1,000 mcg/mL Kit Inject 1 mL  "as directed every 14 (fourteen) days.    dapsone (ACZONE) 7.5 % GlwP     gabapentin (NEURONTIN) 300 MG capsule Take 1 capsule (300 mg total) by mouth every evening.    GAVILYTE-C 240-22.72-6.72 -5.84 gram SolR Take 4,000 mLs by mouth once.    hydroquinone 4 % Crea Apply to dark spots once daily. Use with sunscreen if outdoors    linaCLOtide (LINZESS) 290 mcg Cap capsule Take 1 capsule (290 mcg total) by mouth before breakfast.    metoprolol succinate (TOPROL-XL) 50 MG 24 hr tablet Take 1 tablet (50 mg total) by mouth once daily.    omeprazole (PRILOSEC) 40 MG capsule Take 1 capsule (40 mg total) by mouth every morning.    ondansetron (ZOFRAN-ODT) 4 MG TbDL Take 1 tablet (4 mg total) by mouth every 8 (eight) hours as needed (nausea).    oseltamivir (TAMIFLU) 75 MG capsule Take 75 mg by mouth 2 (two) times daily.    predniSONE (DELTASONE) 20 MG tablet Take 1 tablet (20 mg total) by mouth once daily.    terconazole (TERAZOL 7) 0.4 % Crea Place 1 applicator vaginally every evening.     Current Facility-Administered Medications   Medication    levonorgestreL (MIRENA) 20 mcg/24 hours (8 yrs) 52 mg IUD 1 Intra Uterine Device       Allergies:   Review of patient's allergies indicates:   Allergen Reactions    Reglan [metoclopramide]      Pt reports muscle twitching when last administered        OB/GYN History:   childbirth vaginal delivery  with small tear    Surgical History:  See above      SUBJECTIVE     Date of onset: Couple years  History of current complaint: Pt notes that she has been dealing with left sided hip and abdomen pain in which she has been told was once kidney stone issue as well as left lower abdomen stabbing pain . Pt notes that she has increased left hip , abdomen and right low back pain. Pt notes that intercourse is "no longer fun". Pt notes that she has "on and off" chronic constipation issues. She notes that she had and ER visit and had emergency colonoscopy which was negative and notes that with " "clearing, she notes that she has ongoing pain in her side. She notes that she has small Fibroids as  well as cysts on her ovary and on both that "go back and forth".   Pt notes that she had urgency a couple times and has had incontinence with unable to make it. She notes only a two week occurrence. Pt denies UTI's in past. Pt notes a history of constipation in which started around time of Fibroid occurances.   Pt has a IUD placement x 3 and current is since 2023.     As last resort, Pt and MD have discussion of fibroid surgery.     Activities that cause symptoms: sexual activity or vaginal exam     Previous treatment included None    Reproductive Symptoms  Sexually active: Yes  Pain: Yes  Dysfunction: Yes    Chronic history: kidney stones and constipation/straining for movement  Bladder/Bowel History:   Frequency of urination:   Daytime: AM hours and lunch, fluids increase to every 30 min to an hour           Nighttime: 0-2  Difficulty initiating urine stream: No  Urine stream: strong  Complete emptying: Yes  Bladder leakage: No  Frequency of incidents: Rare  Amount leaked (urine): full emptying when she had few episodes  Urinary Urgency: No  Able to delay the urge for at least several minute(s).    Frequency of bowel movements: once a day and minimal prior to colonoscopy; 1-2 x after recent colonoscopy  Difficulty initiating BM: Yes  Quality/Shape of BM: White Stool Chart 1-4  Does Patient Feel Empty after BM? No  Colon leakage: No  Bowel Urgency: No  Frequency of incidents: NA   Amount leaked (bowels):  NA  Fiber Supplements or Laxative Use?  No  Comments: Not using restroom at work    Form of protection: none  Number of pads required in 24 hours: NA    Pain:  Location: Left side abdomen, left lower abdomen, Rectal area  Current 7/10, worst 10/10, best 5/10 (with 0 being the lowest and 10 being the highest)  Pelvic Pain Duration never abates  Pain description:Sharp        Diet / Behavior  Types of fluid intake: " soda and ginger Ale, herbal tea, water, apple juice, red wine  Diet:2 meals a day; no snacking  Current exercise: Not currently  Supplements: B12      Social History  Previous treatment included medical management. No PT this calendar year.      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    Home Environment: Pt lives with their family and lives in a correctional facility.     PLOF: Pt was independent with all ADLs without reports of incontinence or pain.    Pts goals: Pt wants to have intercourse without pain lately.       OBJECTIVE     ORTHO SCREEN  Posture: WNL  Pelvic alignment: no sign of deviations noted in supine  Pubic symphysis: WNL      ABDOMINALS  Not assessed today; will complete next session.    VAGINAL PELVIC FLOOR EXAM    EXTERNAL ASSESSMENT and INTERNAL ASSESSMENT  Not assessed today; will complete next session.    Impairment/Limitation/Restriction for Pelvic Survey    Therapist reviewed scores for Madyson Marengo on 7/31/2024.   Documents entered into [employer] - see Media section.    Limitation Score: Intake at eval 7/31/24             TREATMENT    No treatment rendered this pm. Will start next session after completion of symptoms.    Education provided:   - Instructed on general anatomy/physiology  - Role of therapy in multi-disciplinary team  - Instructed in purpose of physical therapy and the benefits/risks of treatment  - Instructed in alternative methods of treatment  - Risks of refusing treatment  - POC and goals for therapy  - Instructed on general anatomy/physiology of urinary/bowel system     Also educated in: bladder irritants, anatomy/physiology of pelvic floor, posture/body mechanices, fluid intake/dietary modifications, and behavior modifications    Patient/guardian agreed to treatment plan.     No spiritual or educational barriers to learning provided    Written Home Exercises Provided:   Pt was not  provided with a written copy of exercises to perform at home. Will give after completion of assessment.  ASSESSMENT      This is a 38 y.o. female referred to outpatient physical therapy and presents with a medical diagnosis of fibroid pain.     Educational/Spiritual/Cultural needs: none  Pt's spiritual, cultural and educational needs considered and pt agreeable to plan of care and goals as stated below:     Abuse/Neglect: no signs  Nutritional Status:  WNL    Fall Risk: No    Anticipated Barriers for therapy: None    Medical Necessity is demonstrated by the following  Not assessed today; will complete next session.    Short Term Goals and Long Term Goals:   Not assessed today; will complete next session.    PLAN     Certification Period: 7/31/2024 to 10/31/24    Outpatient Physical Therapy 1 time(s) every week(s) for 6-12 weeks.     Physical therapy will include: therapeutic exercises, therapeutic activity, neuromuscular re-education, manual therapy, modalities PRN, patient/family education, self care/home management, and biofeedback  to achieve established goals.     At next visit: Continue assessment, start treatment/down training? And give HEP        Therapist: Noemy Fox, PT  7/31/2024

## 2024-08-16 ENCOUNTER — CLINICAL SUPPORT (OUTPATIENT)
Dept: REHABILITATION | Facility: HOSPITAL | Age: 39
End: 2024-08-16
Payer: COMMERCIAL

## 2024-08-16 DIAGNOSIS — R52 PAIN: Primary | ICD-10-CM

## 2024-08-16 PROCEDURE — 97530 THERAPEUTIC ACTIVITIES: CPT | Performed by: PHYSICAL THERAPIST

## 2024-08-16 PROCEDURE — 97140 MANUAL THERAPY 1/> REGIONS: CPT | Performed by: PHYSICAL THERAPIST

## 2024-08-16 PROCEDURE — 97110 THERAPEUTIC EXERCISES: CPT | Performed by: PHYSICAL THERAPIST

## 2024-08-16 NOTE — PATIENT INSTRUCTIONS
Hamstring Stretch - Supine        Lie on back, uninvolved leg raised toward ceiling, towel around knee. Pull thigh toward chest until a stretch is felt on back of thigh. Hold for _20-30__ seconds. If possible, move towel up to calf and repeat. Repeat on involved leg.  Repeat _3__ times. Do __1-2_ times per day.    Copyright © [facility]. All rights reserved.      Piriformis Stretch, Supine        Lie supine, folded towel under sacrum, one ankle crossed onto opposite knee. Holding bottom leg behind knee, gently pull legs toward chest and roll toward top-leg side. Feel stretch in hip or pelvic region. Hold _20-30__ seconds.   Repeat _3__ times per session. Do 1-2___ sessions per day.    Copyright © "[employer] All rights reserved.         Stretches - certain positions help put the pelvic floor in a position where it can relax. You can try adding in the breathing during these stretches to increase their benefit, 5 deep breaths 3 reps  Happy baby:                Abdominal Breathing Lying Down        Place one hand on stomach. Breathe in slowly through nose, letting stomach rise. Breathe out gently through pursed lips, letting stomach fall.  Breathe out for at least twice as long as you breathe in.  Repeat __10__ times, __1-2__ times daily.    Copyright © I. All rights reserved.
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
## 2024-08-22 ENCOUNTER — E-VISIT (OUTPATIENT)
Dept: PRIMARY CARE CLINIC | Facility: CLINIC | Age: 39
End: 2024-08-22
Payer: COMMERCIAL

## 2024-08-22 DIAGNOSIS — T14.8XXA BRUISE: Primary | ICD-10-CM

## 2024-08-23 NOTE — PROGRESS NOTES
Patient ID: Madyson Calle is a 38 y.o. female.    Chief Complaint: General Illness (Entered automatically based on patient selection in [employer].)    The patient initiated a request through [employer] on 2024 for evaluation and management with a chief complaint of General Illness (Entered automatically based on patient selection in [employer].)     I evaluated the questionnaire submission on [place].    [facility] P[facility] Evisit [facility]-Skin Hair Nails    2024  5:09 PM CDT - Filed by Patient   What do you need help with? Skin   What concern do you have about your skin? Other Concern   Do you agree to participate in an E-Visit? Yes   If you have any of the following symptoms, please present to your local emergency room or call 911:  I acknowledge   Are you pregnant, could you be pregnant, or are you breast feeding? None of the above   What is the main issue you would like addressed today? Bruise on leg 2 weeks old   Please describe your symptoms Sore bruise   Where is your problem located? Right leg   How severe are your symptoms? Moderate   Have you had these symptoms before? No   How long have you been having these symptoms? For one to four weeks   Please list any medications or treatments you have used for your condition and indicate if it was effective or not. None   What makes this feel better? Na   What makes this feel worse? Touching it   Are these symptoms related to a condition that you currently have? No   Please describe any probable cause for these symptoms Unknown   Provide any additional information you feel is important.    Please attach any relevant images or files    Are you able to take your vital signs? No         No diagnosis found.     No orders of the defined types were placed in this encounter.           No follow-ups on file.      E-Visit Time Trackin minutes was spent on this visit.

## 2024-08-27 NOTE — PROGRESS NOTES
OUTPATIENT PHYSICAL THERAPY DAILY NOTE       Patient: Madyson Calle   Clinic #:  [id number]    Diagnosis:   Encounter Diagnosis   Name Primary?    Pain Yes      Date of treatment: 08/28/2024         Physician: Kathy Steiner MD     Physician Orders: PT Eval and Treat   Medical Diagnosis:   Diagnosis   R10.2 (ICD-10-CM) - Pelvic pain   Evaluation Date: 8/16/2024  Authorization: 7/31/24  Visit #: 3/20      Time In: 1115am   Time Out: 1200pm  Total Billable Time:  40 of 45 minutes (Neuro, Manual, Estim)    SUBJECTIVE   Pt reports: pt notes that she was doing well until she had intercourse which caused more pain and post coital soreness with lower abdomen and pelvic area. Pt notes some pain in rectal area as well with intercourse.   Pain: 7/10 on VAS scale upon entering; 4/10 with treatment  Pain location: lower abdomen       Impairment/Limitation/Restriction for Pelvic Survey     Therapist reviewed scores for Madyson Calle on 8/16/2024.   Documents entered into [employer] - see Media section.     Limitation Score: 7/31/24 at eval           OBJECTIVE     Neuromuscular Re-education to develop muscle coordination and control for 15 minutes including:  Kegels with assist of SEMG    Manual Therapy to develop flexibility and extensibility for 15 minutes including: manual manipulation of bilateral levator ani mm with emphasis on right side.      Modalities to improve pain tolerance for 10 minutes including:  TENS  applied to the supine vagina with patient positioned in supine for 10 minutes.      Education: Discussed progression of plan of care with patient; educated pt in activity modification; reviewed HEP with pt. Pt demonstrated and verbalized understanding of all instruction and was not provided with a handout of HEP (see Patient Instructions).      ASSESSMENT    Pt with moderate tone at right levator ani mm with associated tenderness. Pt did well with manual manipulation resolving to minimal tension on right post  manipulation. Pt with no tone on left. Pt with 6-8uV with resting tone after manipulation of muscles. Pt resolves to 3-4 uV with reflexive biofeedback. Pt responded well to TENS and notes post treatment relief.   Pt tolerated entire treatment well with no visible adverse effects.   Will the patient continue to benefit from skilled PT intervention? Yes  Medical necessity demonstrated by: weakness/pain/tension  Progress towards goals:  good  New/Revised goals: Same    Short Term Goals: 5 weeks   Pt to demonstrate proper diaphragmatic breathing to help with calming the nervous system in order to decrease pain and to improve abdominal wall and pelvic floor musculature extensibility. In Progress  Pt to report being able to complete a half day at work without significant increase in pain to demonstrate a return towards prior level of function. In Progress  Pt to report a decrease in pain to no more than intercourse/exam at it's worst with 3.   In Progress        Long Term Goals: 10 weeks   Pt to be discharged with home plan for carry over after discharge.  In Progress  Pt will be trained and compliant with postural strategies in sitting and standing to improve alignment and decrease pain and muscle fatigue In Progress  Pt to demonstrate an improved score in the FOTO PFDI Pain survey  to at least 90% function to demonstrate improving ability to participate in activities of daily living with reduced pelvic floor pain.   In Progress  Pt to report a decrease in pain to no more than 0-1 at it's worst with intercourse, exam, bowel movements.  In Progress       PLAN   Certification Period: 8/16/2024 to 10/31/24     Outpatient Physical Therapy 1 time(s) every week(s) for 10-12 weeks.      Physical therapy will include: therapeutic exercises, therapeutic activity, neuromuscular re-education, manual therapy, modalities PRN, patient/family education, self care/home management, and Biofeedback  to achieve established goals.      At  next visit: Downtraining of pelvic floor mm, Modalities as needed  Continue with established Plan of Care, working toward established PT goals.

## 2024-08-28 ENCOUNTER — CLINICAL SUPPORT (OUTPATIENT)
Dept: REHABILITATION | Facility: HOSPITAL | Age: 39
End: 2024-08-28
Payer: COMMERCIAL

## 2024-08-28 DIAGNOSIS — R52 PAIN: Primary | ICD-10-CM

## 2024-08-28 PROCEDURE — 97014 ELECTRIC STIMULATION THERAPY: CPT | Performed by: PHYSICAL THERAPIST

## 2024-08-28 PROCEDURE — 97112 NEUROMUSCULAR REEDUCATION: CPT | Performed by: PHYSICAL THERAPIST

## 2024-08-28 PROCEDURE — 97140 MANUAL THERAPY 1/> REGIONS: CPT | Performed by: PHYSICAL THERAPIST

## 2024-10-11 ENCOUNTER — CLINICAL SUPPORT (OUTPATIENT)
Dept: REHABILITATION | Facility: HOSPITAL | Age: 39
End: 2024-10-11
Payer: COMMERCIAL

## 2024-10-11 DIAGNOSIS — R52 PAIN: Primary | ICD-10-CM

## 2024-10-11 PROCEDURE — 97112 NEUROMUSCULAR REEDUCATION: CPT | Performed by: PHYSICAL THERAPIST

## 2024-10-11 PROCEDURE — 97140 MANUAL THERAPY 1/> REGIONS: CPT | Performed by: PHYSICAL THERAPIST

## 2024-10-11 PROCEDURE — 97014 ELECTRIC STIMULATION THERAPY: CPT | Performed by: PHYSICAL THERAPIST

## 2024-10-11 NOTE — PROGRESS NOTES
OUTPATIENT PHYSICAL THERAPY DAILY NOTE       Patient: Madyson Calle   Clinic #:  [id number]    Diagnosis:   Encounter Diagnosis   Name Primary?    Pain Yes        Date of treatment: 10/11/2024         Physician: Kathy Steiner MD     Physician Orders: PT Eval and Treat   Medical Diagnosis:   Diagnosis   R10.2 (ICD-10-CM) - Pelvic pain   Evaluation Date: 8/16/2024  Authorization: 7/31/24  Visit #: 4/20      Time In: 1105am  Time Out 1145am  Total Billable Time: 40 minutes (Neuro, Manual, Estim)    SUBJECTIVE   Pt reports: Pt notes that she skated last night for adult night and injured her ankle. Pt notes that her pain has lessened some with her pelvic pain allowing her to do more  Pain: 5/10 on VAS scale upon entering; 3/10 with treatment  Pain location: lower abdomen       Impairment/Limitation/Restriction for Pelvic Survey     Therapist reviewed scores for Madyson Calle on 8/16/2024.   Documents entered into [employer] - see Media section.     Limitation Score: 7/31/24 at eval           OBJECTIVE     Neuromuscular Re-education to develop muscle coordination and control for 15 minutes including:  Kegels with assist of SEMG    Manual Therapy to develop flexibility and extensibility for 15 minutes including: manual manipulation of bilateral levator ani mm with emphasis on right side.      Modalities to improve pain tolerance for 10 minutes including:  TENS  applied to the supine vagina with patient positioned in supine for 10 minutes.      Education: Discussed progression of plan of care with patient; educated pt in activity modification; reviewed HEP with pt. Pt demonstrated and verbalized understanding of all instruction and was not provided with a handout of HEP (see Patient Instructions).      ASSESSMENT    Pt with mild to moderate tone at right levator ani mm with associated tenderness. Pt did well with manual manipulation resolving to minimal tension on right post manipulation. Pt with no tone on left. Pt with  8-10uV with resting tone after manipulation of muscles. Pt resolves to 6 uV with reflexive biofeedback. Pt responded well to TENS and notes post treatment relief.   Pt tolerated entire treatment well with no visible adverse effects.   Will the patient continue to benefit from skilled PT intervention? Yes  Medical necessity demonstrated by: weakness/pain/tension  Progress towards goals:  good  New/Revised goals: Same    Short Term Goals: 5 weeks   Pt to demonstrate proper diaphragmatic breathing to help with calming the nervous system in order to decrease pain and to improve abdominal wall and pelvic floor musculature extensibility. In Progress  Pt to report being able to complete a half day at work without significant increase in pain to demonstrate a return towards prior level of function. In Progress  Pt to report a decrease in pain to no more than intercourse/exam at it's worst with 3.   In Progress        Long Term Goals: 10 weeks   Pt to be discharged with home plan for carry over after discharge.  In Progress  Pt will be trained and compliant with postural strategies in sitting and standing to improve alignment and decrease pain and muscle fatigue In Progress  Pt to demonstrate an improved score in the FOTO PFDI Pain survey  to at least 90% function to demonstrate improving ability to participate in activities of daily living with reduced pelvic floor pain.   In Progress  Pt to report a decrease in pain to no more than 0-1 at it's worst with intercourse, exam, bowel movements.  In Progress       PLAN   Certification Period: 8/16/2024 to 10/31/24     Outpatient Physical Therapy 1 time(s) every week(s) for 10-12 weeks.      Physical therapy will include: therapeutic exercises, therapeutic activity, neuromuscular re-education, manual therapy, modalities PRN, patient/family education, self care/home management, and Biofeedback  to achieve established goals.      At next visit: Downtraining of pelvic floor mm,  Modalities as needed  Continue with established Plan of Care, working toward established PT goals.

## 2024-10-16 ENCOUNTER — PATIENT MESSAGE (OUTPATIENT)
Dept: ADMINISTRATIVE | Facility: HOSPITAL | Age: 39
End: 2024-10-16
Payer: COMMERCIAL

## 2024-11-20 ENCOUNTER — PATIENT MESSAGE (OUTPATIENT)
Dept: PRIMARY CARE CLINIC | Facility: CLINIC | Age: 39
End: 2024-11-20

## 2024-11-20 ENCOUNTER — OFFICE VISIT (OUTPATIENT)
Dept: PRIMARY CARE CLINIC | Facility: CLINIC | Age: 39
End: 2024-11-20
Payer: COMMERCIAL

## 2024-11-20 DIAGNOSIS — M25.561 ACUTE PAIN OF RIGHT KNEE: Primary | ICD-10-CM

## 2024-11-20 PROCEDURE — 1160F RVW MEDS BY RX/DR IN RCRD: CPT | Mod: CPTII,95,, | Performed by: NURSE PRACTITIONER

## 2024-11-20 PROCEDURE — 1159F MED LIST DOCD IN RCRD: CPT | Mod: CPTII,95,, | Performed by: NURSE PRACTITIONER

## 2024-11-20 PROCEDURE — 99214 OFFICE O/P EST MOD 30 MIN: CPT | Mod: 95,,, | Performed by: NURSE PRACTITIONER

## 2024-11-20 PROCEDURE — 4010F ACE/ARB THERAPY RXD/TAKEN: CPT | Mod: CPTII,95,, | Performed by: NURSE PRACTITIONER

## 2024-11-20 RX ORDER — IBUPROFEN 800 MG/1
800 TABLET ORAL 3 TIMES DAILY PRN
Qty: 90 TABLET | Refills: 0 | Status: SHIPPED | OUTPATIENT
Start: 2024-11-20

## 2024-11-20 RX ORDER — CYCLOBENZAPRINE HCL 10 MG
10 TABLET ORAL NIGHTLY PRN
Qty: 30 TABLET | Refills: 0 | Status: SHIPPED | OUTPATIENT
Start: 2024-11-20 | End: 2024-12-20
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
## 2024-11-21 ENCOUNTER — HOSPITAL ENCOUNTER (OUTPATIENT)
Dept: RADIOLOGY | Facility: HOSPITAL | Age: 39
Discharge: HOME | End: 2024-11-21
Attending: NURSE PRACTITIONER
Payer: COMMERCIAL

## 2024-11-21 DIAGNOSIS — M25.561 ACUTE PAIN OF RIGHT KNEE: ICD-10-CM

## 2024-11-21 PROCEDURE — 73562 X-RAY EXAM OF KNEE 3: CPT | Mod: 26,RT,, | Performed by: RADIOLOGY

## 2024-11-21 PROCEDURE — 73562 X-RAY EXAM OF KNEE 3: CPT | Mod: TC,PO,RT

## 2024-12-02 ENCOUNTER — OFFICE VISIT (OUTPATIENT)
Dept: ORTHOPEDICS | Facility: CLINIC | Age: 39
End: 2024-12-02
Payer: COMMERCIAL

## 2024-12-02 DIAGNOSIS — M25.561 PATELLOFEMORAL JOINT PAIN, RIGHT: Primary | ICD-10-CM

## 2024-12-02 DIAGNOSIS — M25.561 ACUTE PAIN OF RIGHT KNEE: ICD-10-CM

## 2024-12-02 DIAGNOSIS — M76.51 PATELLAR TENDONITIS OF RIGHT KNEE: ICD-10-CM

## 2024-12-02 PROCEDURE — 1159F MED LIST DOCD IN RCRD: CPT | Mod: CPTII,S$GLB,, | Performed by: PHYSICIAN ASSISTANT

## 2024-12-02 PROCEDURE — 99999 PR PBB SHADOW E&M-EST. PATIENT-LVL III: CPT | Mod: PBBFAC,,, | Performed by: PHYSICIAN ASSISTANT

## 2024-12-02 PROCEDURE — 20610 DRAIN/INJ JOINT/BURSA W/O US: CPT | Mod: RT,S$GLB,, | Performed by: PHYSICIAN ASSISTANT

## 2024-12-02 PROCEDURE — 4010F ACE/ARB THERAPY RXD/TAKEN: CPT | Mod: CPTII,S$GLB,, | Performed by: PHYSICIAN ASSISTANT

## 2024-12-02 PROCEDURE — 99203 OFFICE O/P NEW LOW 30 MIN: CPT | Mod: 25,S$GLB,, | Performed by: PHYSICIAN ASSISTANT

## 2024-12-02 PROCEDURE — 1160F RVW MEDS BY RX/DR IN RCRD: CPT | Mod: CPTII,S$GLB,, | Performed by: PHYSICIAN ASSISTANT

## 2024-12-02 RX ADMIN — TRIAMCINOLONE ACETONIDE 40 MG: 40 INJECTION, SUSPENSION INTRA-ARTICULAR; INTRAMUSCULAR at 05:12

## 2024-12-02 RX ADMIN — LIDOCAINE HYDROCHLORIDE 2 ML: 10 INJECTION, SOLUTION INFILTRATION; PERINEURAL at 05:12

## 2024-12-02 NOTE — PROGRESS NOTES
SUBJECTIVE:     History of Present Illness:  Madyson Calle is a 39 y.o. year old female here with complaints of constant right knee pain which worsened a couple of weeks ago.  There was not a recent injury. The pain is located in the anterior aspect of the knee.  The pain is described as achy.   There is not catching or locking.  Aggravating factors include rising after sitting, squatting, standing, and walking.  Associated symptoms include popping sensation.   Previous treatments include ice, OTC NSAIDs, and rest which have provided minimal relief.  There is not a history of previous injury or surgery to the knee.  The patient does not use an assistive device.    Review of patient's allergies indicates:   Allergen Reactions    Reglan [metoclopramide]      Pt reports muscle twitching when last administered         Current Outpatient Medications   Medication Sig Dispense Refill    amLODIPine (NORVASC) 5 MG tablet Take 1 tablet (5 mg total) by mouth once daily. 90 tablet 3    ARAZLO 0.045 % Lotn       boric acid (bulk) Powd Insert one Gelatin Capsule filled with 600 mg of Boric Acid Powder H.S. 100 capsule 4    cyanocobalamin, vitamin B-12, (B-12 COMPLIANCE) 1,000 mcg/mL Kit Inject 1 mL as directed every 14 (fourteen) days. 2 kit 5    cyclobenzaprine (FLEXERIL) 10 MG tablet Take 1 tablet (10 mg total) by mouth nightly as needed for Muscle spasms. 30 tablet 0    dapsone (ACZONE) 7.5 % GlwP       gabapentin (NEURONTIN) 300 MG capsule Take 1 capsule (300 mg total) by mouth every evening. 30 capsule 11    ibuprofen (ADVIL,MOTRIN) 800 MG tablet Take 1 tablet (800 mg total) by mouth 3 (three) times daily as needed for Pain. 90 tablet 0    linaCLOtide (LINZESS) 290 mcg Cap capsule Take 1 capsule (290 mcg total) by mouth before breakfast. 90 capsule 3    metoprolol succinate (TOPROL-XL) 50 MG 24 hr tablet Take 1 tablet (50 mg total) by mouth once daily. 30 tablet 5    omeprazole (PRILOSEC) 40 MG capsule Take 1 capsule (40  mg total) by mouth every morning. 30 capsule 2    ondansetron (ZOFRAN-ODT) 4 MG TbDL Take 1 tablet (4 mg total) by mouth every 8 (eight) hours as needed (nausea). 20 tablet 6    predniSONE (DELTASONE) 20 MG tablet Take 1 tablet (20 mg total) by mouth once daily. 5 tablet 0    terconazole (TERAZOL 7) 0.4 % Crea Place 1 applicator vaginally every evening. 45 g 0     Current Facility-Administered Medications   Medication Dose Route Frequency Provider Last Rate Last Admin    levonorgestreL (MIRENA) 20 mcg/24 hours (8 yrs) 52 mg IUD 1 Intra Uterine Device  1 Intra Uterine Device Intrauterine  Juan Curiel MD   1 Intra Uterine Device at 01/03/23 1545       Past Medical History:   Diagnosis Date    Abnormal cervical Papanicolaou smear 2010, 2016, 2017    Colposcopy    Anxiety     GERD (gastroesophageal reflux disease)     Hypertension     Murmur     Scoliosis        Past Surgical History:   Procedure Laterality Date    COLONOSCOPY N/A 7/8/2024    Procedure: COLONOSCOPY;  Surgeon: Nakul Beltran MD;  Location: [facility];  Service: Endoscopy;  Laterality: N/A;  clinic pt of dr beltran; instr via portal; peg sent to [facility]; AP  7/5 precall complete -ml    ESOPHAGOGASTRODUODENOSCOPY N/A 3/12/2024    Procedure: EGD (ESOPHAGOGASTRODUODENOSCOPY);  Surgeon: Nakul Beltran MD;  Location: [facility];  Service: Endoscopy;  Laterality: N/A;  2-20 [facility] pt; inst sent via portal [facility]  3/6- pc complete. DB  3/11             OBJECTIVE:     PHYSICAL EXAM:        General: Well developed, well nourished, in no acute distress.  Neurological: Mood & affect are normal.  HEENT: NCAT, sclera nonicteric   Lungs: Respirations are equal and unlabored.   CV: 2+ bilateral upper and lower extremity pulses.   Skin: Intact throughout with no rashes, erythema, or lesions  Extremities: No LE edema, no erythema or warmth of the skin in either lower extremity.    right Knee Exam:  Knee Range of Motion: 0-130   Effusion:  none  Condition of skin: intact   Location of tenderness: diffusely in anterior knee, patella tendon   Strength: 85 of 5 quadriceps strength and 5 of 5 hamstring strength  Stability:  stable to testing  Varus /Valgus stress:  normal  Mahnaz:  negative      Right Hip Examination:  full painless range of motion, without tenderness    IMAGING:    X-rays of the right knee, personally reviewed by me, demonstrate no acute abnormality.  No fracture or dislocation.       ASSESSMENT     1. Patellofemoral joint pain, right    2. Acute pain of right knee    3. Patellar tendonitis of right knee        PLAN:     We discussed with the patient at length all the different treatment options available for the knee including NSAIDS, acetaminophen, rest, ice, knee strengthening exercise, steroid injections for temporary relief, Viscosupplementation, and knee arthroplasty.     - She requests injection today- see procedure note  - Recommend continue rest, ice, activity modification  - HEP  - PT referral   - Follow up if symptoms worsen or fail to improve

## 2024-12-03 RX ORDER — LIDOCAINE HYDROCHLORIDE 10 MG/ML
2 INJECTION, SOLUTION INFILTRATION; PERINEURAL
Status: DISCONTINUED | OUTPATIENT
Start: 2024-12-02 | End: 2024-12-03 | Stop reason: HOSPADM

## 2024-12-03 RX ORDER — TRIAMCINOLONE ACETONIDE 40 MG/ML
40 INJECTION, SUSPENSION INTRA-ARTICULAR; INTRAMUSCULAR
Status: DISCONTINUED | OUTPATIENT
Start: 2024-12-02 | End: 2024-12-03 | Stop reason: HOSPADM

## 2024-12-03 NOTE — PROCEDURES
Large Joint Aspiration/Injection: R knee    Date/Time: 12/2/2024 5:30 PM    Performed by: Allison Oh PA-C  Authorized by: Allison Oh PA-C    Consent Done?:  Yes (Verbal)  Indications:  Pain  Timeout: prior to procedure the correct patient, procedure, and site was verified    Prep: patient was prepped and draped in usual sterile fashion    Local anesthetic:  Topical anesthetic    Details:  Needle Size:  22 G  Approach:  Anterolateral  Location:  Knee  Site:  R knee  Medications:  40 mg triamcinolone acetonide 40 mg/mL; 2 mL LIDOcaine HCL 10 mg/ml (1%) 10 mg/mL (1 %)  Patient tolerance:  Patient tolerated the procedure well with no immediate complications

## 2024-12-19 ENCOUNTER — CLINICAL SUPPORT (OUTPATIENT)
Dept: REHABILITATION | Facility: HOSPITAL | Age: 39
End: 2024-12-19
Payer: COMMERCIAL

## 2024-12-19 DIAGNOSIS — M62.81 MUSCLE WEAKNESS OF EXTREMITY: ICD-10-CM

## 2024-12-19 DIAGNOSIS — F40.298 FEAR OF PAIN: Primary | ICD-10-CM

## 2024-12-19 DIAGNOSIS — M25.561 PATELLOFEMORAL JOINT PAIN, RIGHT: ICD-10-CM

## 2024-12-19 PROCEDURE — 97110 THERAPEUTIC EXERCISES: CPT | Performed by: PHYSICAL THERAPIST

## 2024-12-19 PROCEDURE — 97112 NEUROMUSCULAR REEDUCATION: CPT | Performed by: PHYSICAL THERAPIST

## 2024-12-19 PROCEDURE — 97161 PT EVAL LOW COMPLEX 20 MIN: CPT | Performed by: PHYSICAL THERAPIST

## 2024-12-19 PROCEDURE — 97140 MANUAL THERAPY 1/> REGIONS: CPT | Performed by: PHYSICAL THERAPIST

## 2024-12-19 NOTE — PROGRESS NOTES
[facility]   Physical Therapy Initial Evaluation      Name: Madyson Calle  Clinic Number: [id number]    Therapy Diagnosis:   Encounter Diagnoses   Name Primary?    Patellofemoral joint pain, right     Fear of pain Yes    Muscle weakness of extremity         Physician: Allison Oh PA-C    Physician Orders: PT Eval and Treat   Medical Diagnosis from Referral: M25.561 (ICD-10-CM) - Patellofemoral joint pain, right  Evaluation Date: 12/19/2024  Authorization Period Expiration: 12/31/2024  Plan of Care Expiration: 2/19/2024  Progress Note Due: 1/19/2024  Date of Surgery: -  Visit # / Visits authorized: 1/ 1   FOTO: 1/ 3 (12/19/2024): 36.9%  Precautions: Standard   Time In: 1:05 pm  Time Out: 2:00 pm  Total Billable Time: 55 minutes    Subjective   Date of onset: 2-3 months   History of current condition - Madyson reports: Madyson Calle is a 39 y.o. year old female here with complaints of constant right knee pain which worsened a couple of months ago after standing up from a chair.  There was not a recent injury. The pain is located in the anterior aspect of the knee.  The pain is described as achy.   There is not catching or locking.  Aggravating factors include rising after sitting, squatting, standing, and walking.  Associated symptoms include popping sensation.   Previous treatments include ice, OTC NSAIDs, and rest which have provided minimal relief.  There is not a history of previous injury or surgery to the knee.   Falls: None  Imaging: Radiographs: EXAMINATION:  XR KNEE 3 VIEW RIGHT     TECHNIQUE:  Three views of the right knee were obtained, with AP, lateral, and patellar projections submitted.     COMPARISON:  No relevant comparison examinations are currently available.  Clinical information obtained from the electronic medical record indicates pain, with no history of recent trauma.     FINDINGS:  Allowing for positioning on the AP projection I do not believe that there is a  "significant degree of tibiofemoral joint space narrowing, and no patellofemoral joint space narrowing is seen.  Osseous structures are unremarkable, with no evidence of recent or healing fracture, lytic destructive process, or osteochondral defect appreciated.  No joint effusion is demonstrated.     Impression:     No significant abnormality.    Prior Therapy: Yes, but not for this current issue.  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)  Prior Level of Function: Independent  Current Level of Function: Is unable to exercise, or perform higher level activities including    Pain:  Current 4/10, worst 8/10, best 4/10   Location: Right Anterior Knee (occasionally "shoots" up the thigh); has not happened since the injection)  Description: Aching and Dull, Throbbing (sometimes burning)  Aggravating Factors: Sitting to Standing, Stairs, Squatting  Easing Factors: Corticosteroid (helped for 4 days), Sitting Down    Patients goals: To be able to return to the gym, and to regular activities without pain, as she feels like "I'm much older than I am."     Medical History:   Past Medical History:   Diagnosis Date    Abnormal cervical Papanicolaou smear 2010, 2016, 2017    Colposcopy    Anxiety     GERD (gastroesophageal reflux disease)     Hypertension     Murmur     Scoliosis        Surgical History:   Madyson Calle  has a past surgical history that includes Esophagogastroduodenoscopy (N/A, 3/12/2024) and Colonoscopy (N/A, 7/8/2024).    Medications:   Madyson has a current medication list which includes the following prescription(s): amlodipine, arazlo, boric acid (bulk) Powd, b-12 compliance, dapsone, gabapentin, ibuprofen, linaclotide, metoprolol succinate, omeprazole, ondansetron, prednisone, and terconazole, and the following Facility-Administered Medications: levonorgestrel.    Allergies:   Review of patient's allergies indicates:   Allergen Reactions    Reglan " [metoclopramide]      Pt reports muscle twitching when last administered      Large Joint Aspiration/Injection: R knee     Date/Time: 12/2/2024 5:30 PM     Performed by: Allison Oh PA-C  Authorized by: Allison Oh PA-C    Consent Done?:  Yes (Verbal)  Indications:  Pain  Timeout: prior to procedure the correct patient, procedure, and site was verified    Prep: patient was prepped and draped in usual sterile fashion    Local anesthetic:  Topical anesthetic     Details:  Needle Size:  22 G  Approach:  Anterolateral  Location:  Knee  Site:  R knee  Medications:  40 mg triamcinolone acetonide 40 mg/mL; 2 mL LIDOcaine HCL 10 mg/ml (1%) 10 mg/mL (1 %)  Patient tolerance:  Patient tolerated the procedure well with no immediate complications        Objective    Gait: Madyson maintains ~20 degrees of knee flexion during ambulation, without reaching terminal knee extension with antalgic gait pattern in an effort to offload the right leg.    Sit to Stand: Favoring away from the right leg to sit and to stand, with increased pain noted upon rising from chair. Patient notes decreased pain with therapist facilitating femoral external rotation and abduction of the femur, albeit inconsistently.    Single Leg Stance: ~10 seconds, bilaterally    Circumference:   Right Left   Knee Joint Line 37 cm 36.5 cm     Knee Active Range of Motion   Right Left   Flexion 110 degrees 140 degrees   Extension 0 degrees (posterior tightness) +5 degrees     Knee Passive Range of Motion   Right  End-Feel   Flexion (140 degrees) 130 degrees    Extension (0-5 degrees) 0 degrees    E.R. in Flexion (45) Unable to measure due to guarding    I.R in Flexion (30) Unable to measure due to guarding      Tibiofemoral Joint Mobility   Right Left   Anterior Glide (extension) Hypomobility Normal   Posterior Glide (flexion) Hypomobility Normal   Medial Glide (flexion) Normal Normal   Lateral Glide (extension) Hypomobility Normal     Patellofemoral Joint  "Mobility  Superior Glide (extension) Hypomobility   Inferior Glide (flexion) Hypomobility   Medial Glide (flexion) Hypomobility   Lateral Glide (extension) Hypomobility   Medial/Lateral Tilting    *Increased guarding and pain noted during all mobilizations of the patella.    Manual Muscle Testing   Right Left   Knee Flexion (uniplanar) 4/5 4/5   Knee Extension  Unable to exert greater than 3# of pressure on Microfet 5/5     Special Testing : Unable to perform due to fear and increased sensitivity.    Intake Outcome Measure for FOTO Knee Survey    Therapist reviewed FOTO scores for Madyson Shawnee on 12/19/2024.   FOTO report - see Media section or FOTO account episode details.    Intake Score: 36.9%%         Treatment     Total Treatment time (time-based codes) separate from Evaluation: 28 minutes     Madyson received the treatments listed below:      therapeutic exercises to develop strength, endurance, ROM, flexibility, posture, and core stabilization for 12 minutes including:  Heel Slides with Strap, 10x5" hold  Heel Prop for Knee Extension, 3 minutes  Self-Patellofemoral Joint Mobilizations  *Education regarding safe pain, and pain not always equalling harm.    manual therapy techniques: Joint mobilizations and Manual traction were applied to the: - for 10 minutes, including:  Fat Pad Mobilizations  Patellofemoral Joint Mobilizations, all planes    neuromuscular re-education activities to improve: Balance, Kinesthetic, Proprioception, and Posture for 8 minutes. The following activities were included:  Quadriceps Sets, tactile stimulation of quadriceps    Patient Education and Home Exercises     Education provided:   - -Findings of evaluation and examination, and affect of these on plan for treatment  -Prognosis and expectations  -Role of PT and team-centered care for patient  -Home exercise program and expectations of therapy    Written Home Exercises Provided: Yes. Exercises were reviewed and Madyson was able to " "demonstrate them prior to the end of the session.  Madyson demonstrated good  understanding of the education provided. See EMR under Patient Instructions for exercises provided during therapy sessions.    Assessment     Madyson is a 39 y.o. female referred to outpatient Physical Therapy with a medical diagnosis of  M25.561 (ICD-10-CM) - Patellofemoral joint pain, right. Patient presents with limitation in sitting to standing, bending, navigating step, stair or curb, normalized gait pattern and single leg standing due to significantly impaired quadriceps activation, decreased active and passive range of motion, decreased patellofemoral and tibiofemoral joint range of motion, fear and guarding of pain. Functional limitation is significant at this time given symptom onset and presentation, with education provided to the patient that "stretching in the back of the knee is normal" and not all pain means danger. Many tests and assessments unable to be completed today due to fear, guarding and irritability. Madyson will benefit from a customized physical therapy plan of care  to address the aforementioned impairments in an effort to improve human function and quality of life.      Patient prognosis is Good.   Patient will benefit from skilled outpatient Physical Therapy to address the deficits stated above and in the chart below, provide patient /family education, and to maximize patientt's level of independence.     Plan of care discussed with patient: Yes  Patient's spiritual, cultural and educational needs considered and patient is agreeable to the plan of care and goals as stated below:     Anticipated Barriers for therapy: Fear, Guarding, Understanding of Pain    Medical Necessity is demonstrated by the following  History  Co-morbidities and personal factors that may impact the plan of care [x] LOW: no personal factors / co-morbidities  [] MODERATE: 1-2 personal factors / co-morbidities  [] HIGH: 3+ personal factors / " co-morbidities    Moderate / High Support Documentation:   Co-morbidities affecting plan of care: See Medical History    Personal Factors:   age  education level  coping style  lifestyle     Examination  Body Structures and Functions, activity limitations and participation restrictions that may impact the plan of care [x] LOW: addressing 1-2 elements  [] MODERATE: 3+ elements  [] HIGH: 4+ elements (please support below)    Moderate / High Support Documentation: None     Clinical Presentation [x] LOW: stable  [] MODERATE: Evolving  [] HIGH: Unstable     Decision Making/ Complexity Score: low       Short Term Goals: 2 weeks   1.) Patient will demonstrate independence in compliance and technique of home exercise program provided as per teach-back method of assessment.  2.) Patient will achieve 0-120 degrees of knee flexion to demonstrate progressing range of motion for improved functional mobility.  3.) Patient will ambulate for 200 feet with LRAD with supervision-level assistance and normalized gait pattern.  4.) Patient will demonstrate good quality quadriceps set with the ability to complete 10x straight-leg raises without quadriceps lag.     Long Term Goals: 6 weeks   1.) Patient will demonstrate independence in compliance and technique of home exercise program provided as per teach-back method of assessment.  2.) Patient will achieve 0-130 degrees of knee flexion to demonstrate progressing range of motion for improved functional mobility.  3.) Patient will ambulate for 200 feet with normalized gait pattern.  4.) Patient will demonstrate good quality quadriceps set with the ability to exert >20# of pressure as per Tindeq or Microfet.  5.) Patient will demonstrate <14 seconds as per TUG Test to demonstrate improved functional mobility and decreased fall risk.  6.) Patient will demonstrate 5xSTS Test in <16 seconds  to demonstrate improved functional mobility and decreased fall risk.  7.) Patient will note <10%  disability as per FOTO score.    Plan     Plan of care Certification: 12/19/2024 to 2/19/2025.    Outpatient Physical Therapy 2 times weekly for 6 weeks to include the following interventions: Gait Training, Manual Therapy, Moist Heat/ Ice, Neuromuscular Re-ed, Patient Education, Self Care, Therapeutic Activities, and Therapeutic Exercise.     Renee Villa PT DPT  Board Certified in Orthopedic Physical Therapy          Physician's Signature: _________________________________________ Date: ________________

## 2024-12-23 NOTE — PLAN OF CARE
[facility]   Physical Therapy Initial Evaluation      Name: Madyson Calle  Clinic Number: [id number]    Therapy Diagnosis:   Encounter Diagnoses   Name Primary?    Patellofemoral joint pain, right     Fear of pain Yes    Muscle weakness of extremity         Physician: Allison Oh PA-C    Physician Orders: PT Eval and Treat   Medical Diagnosis from Referral: M25.561 (ICD-10-CM) - Patellofemoral joint pain, right  Evaluation Date: 12/19/2024  Authorization Period Expiration: 12/31/2024  Plan of Care Expiration: 2/19/2024  Progress Note Due: 1/19/2024  Date of Surgery: -  Visit # / Visits authorized: 1/ 1   FOTO: 1/ 3 (12/19/2024): 36.9%  Precautions: Standard   Time In: 1:05 pm  Time Out: 2:00 pm  Total Billable Time: 55 minutes    Subjective   Date of onset: 2-3 months   History of current condition - Madyson reports: Madyson Calle is a 39 y.o. year old female here with complaints of constant right knee pain which worsened a couple of months ago after standing up from a chair.  There was not a recent injury. The pain is located in the anterior aspect of the knee.  The pain is described as achy.   There is not catching or locking.  Aggravating factors include rising after sitting, squatting, standing, and walking.  Associated symptoms include popping sensation.   Previous treatments include ice, OTC NSAIDs, and rest which have provided minimal relief.  There is not a history of previous injury or surgery to the knee.   Falls: None  Imaging: Radiographs: EXAMINATION:  XR KNEE 3 VIEW RIGHT     TECHNIQUE:  Three views of the right knee were obtained, with AP, lateral, and patellar projections submitted.     COMPARISON:  No relevant comparison examinations are currently available.  Clinical information obtained from the electronic medical record indicates pain, with no history of recent trauma.     FINDINGS:  Allowing for positioning on the AP projection I do not believe that there is a  "significant degree of tibiofemoral joint space narrowing, and no patellofemoral joint space narrowing is seen.  Osseous structures are unremarkable, with no evidence of recent or healing fracture, lytic destructive process, or osteochondral defect appreciated.  No joint effusion is demonstrated.     Impression:     No significant abnormality.    Prior Therapy: Yes, but not for this current issue.  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)  Prior Level of Function: Independent  Current Level of Function: Is unable to exercise, or perform higher level activities including    Pain:  Current 4/10, worst 8/10, best 4/10   Location: Right Anterior Knee (occasionally "shoots" up the thigh); has not happened since the injection)  Description: Aching and Dull, Throbbing (sometimes burning)  Aggravating Factors: Sitting to Standing, Stairs, Squatting  Easing Factors: Corticosteroid (helped for 4 days), Sitting Down    Patients goals: To be able to return to the gym, and to regular activities without pain, as she feels like "I'm much older than I am."     Medical History:   Past Medical History:   Diagnosis Date    Abnormal cervical Papanicolaou smear 2010, 2016, 2017    Colposcopy    Anxiety     GERD (gastroesophageal reflux disease)     Hypertension     Murmur     Scoliosis        Surgical History:   Madyson Calle  has a past surgical history that includes Esophagogastroduodenoscopy (N/A, 3/12/2024) and Colonoscopy (N/A, 7/8/2024).    Medications:   Madyson has a current medication list which includes the following prescription(s): amlodipine, arazlo, boric acid (bulk) Powd, b-12 compliance, dapsone, gabapentin, ibuprofen, linaclotide, metoprolol succinate, omeprazole, ondansetron, prednisone, and terconazole, and the following Facility-Administered Medications: levonorgestrel.    Allergies:   Review of patient's allergies indicates:   Allergen Reactions    Reglan " [metoclopramide]      Pt reports muscle twitching when last administered      Large Joint Aspiration/Injection: R knee     Date/Time: 12/2/2024 5:30 PM     Performed by: Allison Oh PA-C  Authorized by: Allison Oh PA-C    Consent Done?:  Yes (Verbal)  Indications:  Pain  Timeout: prior to procedure the correct patient, procedure, and site was verified    Prep: patient was prepped and draped in usual sterile fashion    Local anesthetic:  Topical anesthetic     Details:  Needle Size:  22 G  Approach:  Anterolateral  Location:  Knee  Site:  R knee  Medications:  40 mg triamcinolone acetonide 40 mg/mL; 2 mL LIDOcaine HCL 10 mg/ml (1%) 10 mg/mL (1 %)  Patient tolerance:  Patient tolerated the procedure well with no immediate complications        Objective    Gait: Madyson maintains ~20 degrees of knee flexion during ambulation, without reaching terminal knee extension with antalgic gait pattern in an effort to offload the right leg.    Sit to Stand: Favoring away from the right leg to sit and to stand, with increased pain noted upon rising from chair. Patient notes decreased pain with therapist facilitating femoral external rotation and abduction of the femur, albeit inconsistently.    Single Leg Stance: ~10 seconds, bilaterally    Circumference:   Right Left   Knee Joint Line 37 cm 36.5 cm     Knee Active Range of Motion   Right Left   Flexion 110 degrees 140 degrees   Extension 0 degrees (posterior tightness) +5 degrees     Knee Passive Range of Motion   Right  End-Feel   Flexion (140 degrees) 130 degrees    Extension (0-5 degrees) 0 degrees    E.R. in Flexion (45) Unable to measure due to guarding    I.R in Flexion (30) Unable to measure due to guarding      Tibiofemoral Joint Mobility   Right Left   Anterior Glide (extension) Hypomobility Normal   Posterior Glide (flexion) Hypomobility Normal   Medial Glide (flexion) Normal Normal   Lateral Glide (extension) Hypomobility Normal     Patellofemoral Joint  "Mobility  Superior Glide (extension) Hypomobility   Inferior Glide (flexion) Hypomobility   Medial Glide (flexion) Hypomobility   Lateral Glide (extension) Hypomobility   Medial/Lateral Tilting    *Increased guarding and pain noted during all mobilizations of the patella.    Manual Muscle Testing   Right Left   Knee Flexion (uniplanar) 4/5 4/5   Knee Extension  Unable to exert greater than 3# of pressure on Microfet 5/5     Special Testing : Unable to perform due to fear and increased sensitivity.    Intake Outcome Measure for FOTO Knee Survey    Therapist reviewed FOTO scores for Madyson Nicholas on 12/19/2024.   FOTO report - see Media section or FOTO account episode details.    Intake Score: 36.9%%         Treatment     Total Treatment time (time-based codes) separate from Evaluation: 28 minutes     Madyson received the treatments listed below:      therapeutic exercises to develop strength, endurance, ROM, flexibility, posture, and core stabilization for 12 minutes including:  Heel Slides with Strap, 10x5" hold  Heel Prop for Knee Extension, 3 minutes  Self-Patellofemoral Joint Mobilizations  *Education regarding safe pain, and pain not always equalling harm.    manual therapy techniques: Joint mobilizations and Manual traction were applied to the: - for 10 minutes, including:  Fat Pad Mobilizations  Patellofemoral Joint Mobilizations, all planes    neuromuscular re-education activities to improve: Balance, Kinesthetic, Proprioception, and Posture for 8 minutes. The following activities were included:  Quadriceps Sets, tactile stimulation of quadriceps    Patient Education and Home Exercises     Education provided:   - -Findings of evaluation and examination, and affect of these on plan for treatment  -Prognosis and expectations  -Role of PT and team-centered care for patient  -Home exercise program and expectations of therapy    Written Home Exercises Provided: Yes. Exercises were reviewed and Madyson was able to " "demonstrate them prior to the end of the session.  Madyson demonstrated good  understanding of the education provided. See EMR under Patient Instructions for exercises provided during therapy sessions.    Assessment     Madyson is a 39 y.o. female referred to outpatient Physical Therapy with a medical diagnosis of  M25.561 (ICD-10-CM) - Patellofemoral joint pain, right. Patient presents with limitation in sitting to standing, bending, navigating step, stair or curb, normalized gait pattern and single leg standing due to significantly impaired quadriceps activation, decreased active and passive range of motion, decreased patellofemoral and tibiofemoral joint range of motion, fear and guarding of pain. Functional limitation is significant at this time given symptom onset and presentation, with education provided to the patient that "stretching in the back of the knee is normal" and not all pain means danger. Many tests and assessments unable to be completed today due to fear, guarding and irritability. Madyson will benefit from a customized physical therapy plan of care  to address the aforementioned impairments in an effort to improve human function and quality of life.      Patient prognosis is Good.   Patient will benefit from skilled outpatient Physical Therapy to address the deficits stated above and in the chart below, provide patient /family education, and to maximize patientt's level of independence.     Plan of care discussed with patient: Yes  Patient's spiritual, cultural and educational needs considered and patient is agreeable to the plan of care and goals as stated below:     Anticipated Barriers for therapy: Fear, Guarding, Understanding of Pain    Medical Necessity is demonstrated by the following  History  Co-morbidities and personal factors that may impact the plan of care [x] LOW: no personal factors / co-morbidities  [] MODERATE: 1-2 personal factors / co-morbidities  [] HIGH: 3+ personal factors / " co-morbidities    Moderate / High Support Documentation:   Co-morbidities affecting plan of care: See Medical History    Personal Factors:   age  education level  coping style  lifestyle     Examination  Body Structures and Functions, activity limitations and participation restrictions that may impact the plan of care [x] LOW: addressing 1-2 elements  [] MODERATE: 3+ elements  [] HIGH: 4+ elements (please support below)    Moderate / High Support Documentation: None     Clinical Presentation [x] LOW: stable  [] MODERATE: Evolving  [] HIGH: Unstable     Decision Making/ Complexity Score: low       Short Term Goals: 2 weeks   1.) Patient will demonstrate independence in compliance and technique of home exercise program provided as per teach-back method of assessment.  2.) Patient will achieve 0-120 degrees of knee flexion to demonstrate progressing range of motion for improved functional mobility.  3.) Patient will ambulate for 200 feet with LRAD with supervision-level assistance and normalized gait pattern.  4.) Patient will demonstrate good quality quadriceps set with the ability to complete 10x straight-leg raises without quadriceps lag.     Long Term Goals: 6 weeks   1.) Patient will demonstrate independence in compliance and technique of home exercise program provided as per teach-back method of assessment.  2.) Patient will achieve 0-130 degrees of knee flexion to demonstrate progressing range of motion for improved functional mobility.  3.) Patient will ambulate for 200 feet with normalized gait pattern.  4.) Patient will demonstrate good quality quadriceps set with the ability to exert >20# of pressure as per Tindeq or Microfet.  5.) Patient will demonstrate <14 seconds as per TUG Test to demonstrate improved functional mobility and decreased fall risk.  6.) Patient will demonstrate 5xSTS Test in <16 seconds  to demonstrate improved functional mobility and decreased fall risk.  7.) Patient will note <10%  disability as per FOTO score.    Plan     Plan of care Certification: 12/19/2024 to 2/19/2025.    Outpatient Physical Therapy 2 times weekly for 6 weeks to include the following interventions: Gait Training, Manual Therapy, Moist Heat/ Ice, Neuromuscular Re-ed, Patient Education, Self Care, Therapeutic Activities, and Therapeutic Exercise.     Renee Villa PT DPT  Board Certified in Orthopedic Physical Therapy          Physician's Signature: _________________________________________ Date: ________________

## 2025-01-02 ENCOUNTER — PATIENT MESSAGE (OUTPATIENT)
Dept: OBSTETRICS AND GYNECOLOGY | Facility: CLINIC | Age: 40
End: 2025-01-02
Payer: COMMERCIAL

## 2025-01-02 ENCOUNTER — OFFICE VISIT (OUTPATIENT)
Dept: OBSTETRICS AND GYNECOLOGY | Facility: CLINIC | Age: 40
End: 2025-01-02
Payer: COMMERCIAL

## 2025-01-02 VITALS — WEIGHT: 140.19 LBS | HEIGHT: 63 IN | BODY MASS INDEX: 24.84 KG/M2

## 2025-01-02 DIAGNOSIS — N76.0 ACUTE VAGINITIS: ICD-10-CM

## 2025-01-02 DIAGNOSIS — N64.4 BREAST PAIN: ICD-10-CM

## 2025-01-02 DIAGNOSIS — Z72.89 OTHER PROBLEMS RELATED TO LIFESTYLE: ICD-10-CM

## 2025-01-02 DIAGNOSIS — Z11.3 SCREEN FOR STD (SEXUALLY TRANSMITTED DISEASE): ICD-10-CM

## 2025-01-02 DIAGNOSIS — N89.8 VAGINAL DISCHARGE: Primary | ICD-10-CM

## 2025-01-02 PROCEDURE — 87491 CHLMYD TRACH DNA AMP PROBE: CPT

## 2025-01-02 PROCEDURE — 99999 PR PBB SHADOW E&M-EST. PATIENT-LVL III: CPT | Mod: PBBFAC,,,

## 2025-01-02 RX ORDER — METRONIDAZOLE 500 MG/1
500 TABLET ORAL EVERY 12 HOURS
Qty: 14 TABLET | Refills: 0 | Status: SHIPPED | OUTPATIENT
Start: 2025-01-02 | End: 2025-01-09

## 2025-01-02 RX ORDER — TERCONAZOLE 4 MG/G
1 CREAM VAGINAL NIGHTLY
Qty: 45 G | Refills: 0 | Status: SHIPPED | OUTPATIENT
Start: 2025-01-02 | End: 2025-01-09

## 2025-01-02 NOTE — PROGRESS NOTES
CC: vaginal discharge     HPI:  Madyson Calle is a 39 y.o. female  presents with complaint of vaginal discharge for about 1 week. Reports discharge and odor. Does have hx of frequent BV. She is SA with one partner, does not use condoms. Would like STD swabs. Also reports left sided breast pain- ongoing for the past 2 months. States it is daily pain. Mostly on side of breast and around nipple. Does report some tenderness to right side, but mostly left sided pain.     Past Medical History:   Diagnosis Date    Abnormal cervical Papanicolaou smear , ,     Colposcopy    Anxiety     GERD (gastroesophageal reflux disease)     Hypertension     Murmur     Scoliosis      Past Surgical History:   Procedure Laterality Date    COLONOSCOPY N/A 2024    Procedure: COLONOSCOPY;  Surgeon: Nakul Beltran MD;  Location: [facility] ENDOSCOPY;  Service: Endoscopy;  Laterality: N/A;  clinic pt of dr beltran; instr via portal; peg sent to [facility]; AP   precall complete -ml    ESOPHAGOGASTRODUODENOSCOPY N/A 3/12/2024    Procedure: EGD (ESOPHAGOGASTRODUODENOSCOPY);  Surgeon: Nakul Beltran MD;  Location: [facility] ENDOSCOPY;  Service: Endoscopy;  Laterality: N/A;  2-20 [facility] pt; inst sent via portal [facility]  3/6- pc complete. DBM  3/11     Social History     Tobacco Use    Smoking status: Never     Passive exposure: Never    Smokeless tobacco: Never   Substance Use Topics    Alcohol use: Yes     Alcohol/week: 7.0 standard drinks of alcohol     Types: 7 Glasses of wine per week     Comment: socially    Drug use: Not Currently     Types: Marijuana     Family History   Problem Relation Name Age of Onset    No Known Problems Paternal Grandfather      Heart attack Paternal Grandmother      Hypertension Maternal Grandmother      Stroke Maternal Grandfather      Heart attack Maternal Grandfather      Diabetes Maternal Grandfather      Hyperlipidemia Maternal Grandfather      Hypertension Father      Diabetes Father       "Heart murmur Father      Prostate cancer Father      No Known Problems Mother      Bipolar disorder Sister Prema     No Known Problems Daughter Natacha     Colon cancer Paternal Aunt      Breast cancer Neg Hx       OB History    Para Term  AB Living   1 1 1     1   SAB IAB Ectopic Multiple Live Births           1      # Outcome Date GA Lbr Pedro/2nd Weight Sex Type Anes PTL Lv   1 Term 11    F Vag-Spont   SIDRA       Ht 5' 3" (1.6 m)   Wt 63.6 kg (140 lb 3.4 oz)   BMI 24.84 kg/m²     ROS:  GENERAL: No fever, chills, fatigability or weight loss.  VULVAR: No pain, no lesions and no itching.  VAGINAL: No relaxation, no itching, + discharge, +odor no abnormal bleeding and no lesions.  ABDOMEN: No abdominal pain. Denies nausea. Denies vomiting. No diarrhea. No constipation  BREAST: + pain. No lumps. No discharge.  URINARY: No incontinence, no nocturia, no frequency and no dysuria.  CARDIOVASCULAR: No chest pain. No shortness of breath. No leg cramps.  NEUROLOGICAL: No headaches. No vision changes.    PHYSICAL EXAM:  VULVA: normal appearing vulva with no masses, tenderness or lesions   VAGINA: normal appearing vagina with normal color. Thin white discharge, no lesions   CERVIX: normal appearing cervix without discharge or lesions   UTERUS: uterus is normal size, shape, consistency and nontender   ADNEXA: normal adnexa in size, nontender and no masses  BREASTS: No masses, skin changes, nipple discharge or adenopathy bilaterally. +TTP to left breast     ASSESSMENT and PLAN:    ICD-10-CM ICD-9-CM    1. Vaginal discharge  N89.8 623.5 Vaginosis Screen by DNA Probe      2. Screen for STD (sexually transmitted disease)  Z11.3 V74.5 C. trachomatis/N. gonorrhoeae by AMP DNA [facility]; Cervicovaginal      3. Other problems related to lifestyle  Z72.89 V69.8 C. trachomatis/N. gonorrhoeae by AMP DNA [facility]; Cervicovaginal      4. Acute vaginitis  N76.0 616.10 boric acid (PH-D) 600 mg vaginal suppository      " metroNIDAZOLE (FLAGYL) 500 MG tablet      terconazole (TERAZOL 7) 0.4 % Crea      5. Breast pain  N64.4 611.71 Mammo Digital Diagnostic Bilat with Anthony        - AFFIRM and GC/CT collected   - Flagyl sent for suspected BV  - Terazol PRN yeast  - Boric acid sent  - Diagnostic mammo for breast pain    Patient was counseled today on vaginitis prevention including :  a. avoiding feminine products such as deoderant soaps, body wash, bubble bath, douches, scented toilet paper, deoderant tampons or pads, feminine wipes, chronic pad use, etc.  b. avoiding other vulvovaginal irritants such as long hot baths, humidity, tight, synthetic clothing, chlorine and sitting around in wet bathing suits  c. wearing cotton underwear, avoiding thong underwear and no underwear to bed  d. taking showers instead of baths and use a hair dryer on cool setting afterwards to dry  e. wearing cotton to exercise and shower immediately after exercise and change clothes  f. using polyurethane condoms without spermicide if sexually active and symptoms are triggered by intercourse    Discussed OTC therapies such as acetaminophen or nonsteroidal anti-inflammatory drugs (NSAIDs). They can both be used to relieve breast pain. Topical NSAIDS such as OTC Diclofenac (Volteran) gel can be used. Other recommendations include use of a supportive bra. Wearing a soft, supportive bra at night prevents the breasts from pulling down on the chest wall. Some women obtain relief from application of warm compresses or ice packs.      Discussed lifestyle recommendations such as decrease or elimination of caffeine. Also low-fat diet, high complex carbohydrate diet has been shown to be effective.        FOLLOW UP: PRN lack of improvement.    I spent a total of 30 minutes on the day of the visit.This includes face to face time and non-face to face time preparing to see the patient (eg, review of tests), Obtaining and/or reviewing separately obtained history, Documenting  clinical information in the electronic or other health record, Independently interpreting resultsand communicating results to the patient/family/caregiver, or Care coordination     ARIANA Rich

## 2025-01-03 LAB
BACTERIAL VAGINOSIS DNA: DETECTED
C TRACH DNA SPEC QL NAA+PROBE: NOT DETECTED
CANDIDA GLABRATA/KRUSEI: NOT DETECTED
CANDIDA RRNA VAG QL PROBE: DETECTED
N GONORRHOEA DNA SPEC QL NAA+PROBE: NOT DETECTED
TRICHOMONAS VAGINALIS: NOT DETECTED

## 2025-01-08 ENCOUNTER — CLINICAL SUPPORT (OUTPATIENT)
Dept: REHABILITATION | Facility: HOSPITAL | Age: 40
End: 2025-01-08
Payer: COMMERCIAL

## 2025-01-08 DIAGNOSIS — M62.81 MUSCLE WEAKNESS OF EXTREMITY: ICD-10-CM

## 2025-01-08 DIAGNOSIS — M25.561 PATELLOFEMORAL JOINT PAIN, RIGHT: Primary | ICD-10-CM

## 2025-01-08 DIAGNOSIS — F40.298 FEAR OF PAIN: ICD-10-CM

## 2025-01-08 PROCEDURE — 97140 MANUAL THERAPY 1/> REGIONS: CPT | Performed by: PHYSICAL THERAPIST

## 2025-01-08 PROCEDURE — 97112 NEUROMUSCULAR REEDUCATION: CPT | Performed by: PHYSICAL THERAPIST

## 2025-01-08 PROCEDURE — 97110 THERAPEUTIC EXERCISES: CPT | Performed by: PHYSICAL THERAPIST

## 2025-01-08 NOTE — PROGRESS NOTES
Physical Therapy Daily Treatment Note     Name: Madyson Calle  Clinic Number: [id number]    Therapy Diagnosis:   Encounter Diagnoses   Name Primary?    Patellofemoral joint pain, right Yes    Fear of pain     Muscle weakness of extremity      Physician: Allison Oh PA-C    Visit Date: 1/8/2025  Physician Orders: PT Eval and Treat   Medical Diagnosis from Referral: M25.561 (ICD-10-CM) - Patellofemoral joint pain, right  Evaluation Date: 12/19/2024  Authorization Period Expiration: 12/31/2024  Plan of Care Expiration: 2/19/2024  Progress Note Due: 1/19/2024  Date of Surgery: -  Visit # / Visits authorized: 1/ 1   FOTO: 1/ 3 (12/19/2024): 36.9%  Precautions: Standard     Time In: 7:10 am  Time Out: 7:55 am  Total Billable Time: 45 minutes  Precautions: Standard    Subjective   Pt reports: her knee has been feeling better; the pain is not gone, it is still there, but it is better. She notes that she was given a list of things to do for a work-out, but is unsure if she should do these or do them with a knee brace as advised by a friend.  She was compliant with home exercise program.  Response to previous treatment: Initial Evaluation  Functional change: Ongoing; improved walking    Pain: 2/10  Location: Right Knee (anterior)   Objective      Right Left   Knee Flexion 110 degrees 130 degrees   Knee Extension 0 degrees 0 degrees     Madyson received therapeutic exercises to develop strength, endurance, ROM, flexibility, posture, and core stabilization for 23 minutes including:  Blood Flow Restriction Training; 222 mmHg at LOP; 178 mmHg at PTP 80%, Green Cuff, 8 minutes (patient cleared for contraindication)  *Pressure dropped to 140 mmHg at patient's request due to discomfort  Quad Sets, 30 repetitions, 15 repetitions, 15 repetitions, 15 repetitions  Short Arc Quads, 2# cuff weight, 2x10  Upright Bike, 5 minutes, Level 2,    Madyson received the following manual therapy techniques: Joint mobilizations and Manual  traction were applied to the: right knee for 14 minutes, including:  Fat Pad Mobilizations, right  Patellofemoral Joint Mobilizations, all planes  Grade V Talocrural Joint Mobilizations  Tibiofemoral I.R and Posterior Gliding, Grade IV    Madyson participated in neuromuscular re-education activities to improve: Balance, Coordination, Kinesthetic, Sense, Proprioception, and Posture for 8 minutes. The following activities were included:  Bridges, 3x8, blue band    Madyson participated in dynamic functional therapeutic activities to improve functional performance for -  minutes, including:  -  Home Exercises Provided and Patient Education Provided     Education provided:   - Home exercise program     Written Home Exercises Provided: Patient instructed to cont prior HEP.  Exercises were reviewed and Madyson was able to demonstrate them prior to the end of the session.  Madyson demonstrated good  understanding of the education provided.     See EMR under Patient Instructions for exercises provided prior visit.    Assessment   Madyson demonstrates improved patellofemoral mobility, with continued limitation of right fat pad mobility. She is able to reach terminal knee extension this visit and 110 degrees of knee flexion prior to guarding. She gives good effort, but due to fear she benefits from encouragement and education as to what she is feeling and if it is normal to feel exertion. She continues to demonstrate limitation in quality of quadriceps contraction. She was advised accordingly as to the exercises her friend set forth based on tolerance to exercise in physical therapy.  Madyson Is progressing well towards her goals.   Pt prognosis is Good.     Pt will continue to benefit from skilled outpatient physical therapy to address the deficits listed in the problem list box on initial evaluation, provide pt/family education and to maximize pt's level of independence in the home and community environment.     Pt's  spiritual, cultural and educational needs considered and pt agreeable to plan of care and goals.     Anticipated barriers to physical therapy: Fear, Guarding, Understanding of Pain    Goals: Short Term Goals: 2 weeks   1.) Patient will demonstrate independence in compliance and technique of home exercise program provided as per teach-back method of assessment.  2.) Patient will achieve 0-120 degrees of knee flexion to demonstrate progressing range of motion for improved functional mobility.  3.) Patient will ambulate for 200 feet with LRAD with supervision-level assistance and normalized gait pattern.  4.) Patient will demonstrate good quality quadriceps set with the ability to complete 10x straight-leg raises without quadriceps lag.      Long Term Goals: 6 weeks   1.) Patient will demonstrate independence in compliance and technique of home exercise program provided as per teach-back method of assessment.  2.) Patient will achieve 0-130 degrees of knee flexion to demonstrate progressing range of motion for improved functional mobility.  3.) Patient will ambulate for 200 feet with normalized gait pattern.  4.) Patient will demonstrate good quality quadriceps set with the ability to exert >20# of pressure as per Tindeq or Microfet.  5.) Patient will demonstrate <14 seconds as per TUG Test to demonstrate improved functional mobility and decreased fall risk.  6.) Patient will demonstrate 5xSTS Test in <16 seconds  to demonstrate improved functional mobility and decreased fall risk.  7.) Patient will note <10% disability as per FOTO score.    Plan   Continue to progress as per plan of care.    Renee Villa, PT, DPT  Board Certified in Orthopedic Physical Therapy

## 2025-01-09 ENCOUNTER — CLINICAL SUPPORT (OUTPATIENT)
Dept: REHABILITATION | Facility: HOSPITAL | Age: 40
End: 2025-01-09
Payer: COMMERCIAL

## 2025-01-09 ENCOUNTER — PATIENT MESSAGE (OUTPATIENT)
Dept: ADMINISTRATIVE | Facility: HOSPITAL | Age: 40
End: 2025-01-09
Payer: COMMERCIAL

## 2025-01-09 DIAGNOSIS — M25.561 CHRONIC PAIN OF RIGHT KNEE: Primary | ICD-10-CM

## 2025-01-09 DIAGNOSIS — G89.29 CHRONIC PAIN OF RIGHT KNEE: Primary | ICD-10-CM

## 2025-01-09 PROCEDURE — 97110 THERAPEUTIC EXERCISES: CPT

## 2025-01-09 PROCEDURE — 97112 NEUROMUSCULAR REEDUCATION: CPT

## 2025-01-09 PROCEDURE — 97140 MANUAL THERAPY 1/> REGIONS: CPT

## 2025-01-09 NOTE — PROGRESS NOTES
Physical Therapy Daily Treatment Note     Name: Madyson Calle  Clinic Number: [id number]    Therapy Diagnosis:   Encounter Diagnosis   Name Primary?    Chronic pain of right knee Yes     Physician: Allison Oh PA-C    Visit Date: 1/9/2025  Physician Orders: PT Eval and Treat   Medical Diagnosis from Referral: M25.561 (ICD-10-CM) - Patellofemoral joint pain, right  Evaluation Date: 12/19/2024  Authorization Period Expiration: 12/31/2024  Plan of Care Expiration: 2/19/2024  Progress Note Due: 1/19/2024  Date of Surgery: -  Visit # / Visits authorized: 1/1, 2/20  FOTO: 1/ 3 (12/19/2024): 36.9%  Precautions: Standard     Time In: 1:00 pm  Time Out: 2:00 pm  Total Billable Time: 60 minutes  Precautions: Standard    Subjective   Pt reports: her knee has been feeling better. She is still fearful of squatting and returning back to gym movements. Will begin next week going to the gym and doing upper body workouts.    She was compliant with home exercise program.  Response to previous treatment: Initial Evaluation  Functional change: Ongoing; improved walking    Pain: 2/10  Location: Right Knee (anterior)   Objective      Right Left   Knee Flexion 115 degrees 130 degrees   Knee Extension +3 degrees 0 degrees     Limited tibial internal rotation     Madyson received therapeutic exercises to develop strength, endurance, ROM, flexibility, posture, and core stabilization for 20 minutes including:    Blood Flow Restriction Training; 240 mmHg at LOP; 200 mmHg at PTP 80%, Green Cuff, 16 minutes (patient cleared for contraindication)  *Pressure dropped to 180 mmHg at patient's request due to discomfort  30 repetitions, 15 repetitions, 15 repetitions, 15 repetitions for the following   - quad sets   - LAQ    NT:  Upright Bike, 5 minutes, Level 2,    Madyson received the following manual therapy techniques: Joint mobilizations and Manual traction were applied to the: right knee for 15 minutes, including:    Fat Pad Mobilizations,  right  Patellofemoral Joint Mobilizations, all planes (superior focused)  Tibiofemoral I.R grade 3-4    NT:  Grade V Talocrural Joint Mobilizations    Madyson participated in neuromuscular re-education activities to improve: Balance, Coordination, Kinesthetic, Sense, Proprioception, and Posture for 25 minutes. The following activities were included:    Bridges, 3x8, blue band  Supine SLR 3x8  Mini Squat to edge of mat 2x10  Squat tap to chair with 2 air ex pads blue thera band around knees 2x10    Madyson participated in dynamic functional therapeutic activities to improve functional performance for -  minutes, including:  -  Home Exercises Provided and Patient Education Provided     Education provided:   - Home exercise program     Written Home Exercises Provided: Patient instructed to cont prior HEP.  Exercises were reviewed and Madyson was able to demonstrate them prior to the end of the session.  Madyson demonstrated good  understanding of the education provided.     See EMR under Patient Instructions for exercises provided prior visit.    Assessment   Madyson presenting with decreased knee flexion and limited tibial internal rotation. Patient knee flexion progressed to 115 following manual interventions. Patient able to tolerate progressions in BFR today and greater [place] knee loading. Patient able to tolerate introductory squat pattern without reproduction of symptoms reduced height utilized. Patient continues to be fearful of movement, but is improving. Plan to continue to progress as tolerated.    Madyson Is progressing well towards her goals.   Pt prognosis is Good.     Pt will continue to benefit from skilled outpatient physical therapy to address the deficits listed in the problem list box on initial evaluation, provide pt/family education and to maximize pt's level of independence in the home and community environment.     Pt's spiritual, cultural and educational needs considered and pt agreeable to plan  of care and goals.     Anticipated barriers to physical therapy: Fear, Guarding, Understanding of Pain    Goals: Short Term Goals: 2 weeks   1.) Patient will demonstrate independence in compliance and technique of home exercise program provided as per teach-back method of assessment.  2.) Patient will achieve 0-120 degrees of knee flexion to demonstrate progressing range of motion for improved functional mobility.  3.) Patient will ambulate for 200 feet with LRAD with supervision-level assistance and normalized gait pattern.  4.) Patient will demonstrate good quality quadriceps set with the ability to complete 10x straight-leg raises without quadriceps lag.      Long Term Goals: 6 weeks   1.) Patient will demonstrate independence in compliance and technique of home exercise program provided as per teach-back method of assessment.  2.) Patient will achieve 0-130 degrees of knee flexion to demonstrate progressing range of motion for improved functional mobility.  3.) Patient will ambulate for 200 feet with normalized gait pattern.  4.) Patient will demonstrate good quality quadriceps set with the ability to exert >20# of pressure as per Tindeq or Microfet.  5.) Patient will demonstrate <14 seconds as per TUG Test to demonstrate improved functional mobility and decreased fall risk.  6.) Patient will demonstrate 5xSTS Test in <16 seconds  to demonstrate improved functional mobility and decreased fall risk.  7.) Patient will note <10% disability as per FOTO score.    Plan   Continue to progress as per plan of care.    Eduardo Evans, PT, DPT

## 2025-01-14 ENCOUNTER — CLINICAL SUPPORT (OUTPATIENT)
Dept: REHABILITATION | Facility: HOSPITAL | Age: 40
End: 2025-01-14
Payer: COMMERCIAL

## 2025-01-14 DIAGNOSIS — M25.561 CHRONIC PAIN OF RIGHT KNEE: Primary | ICD-10-CM

## 2025-01-14 DIAGNOSIS — F40.298 FEAR OF PAIN: ICD-10-CM

## 2025-01-14 DIAGNOSIS — G89.29 CHRONIC PAIN OF RIGHT KNEE: Primary | ICD-10-CM

## 2025-01-14 DIAGNOSIS — M25.561 PATELLOFEMORAL JOINT PAIN, RIGHT: ICD-10-CM

## 2025-01-14 DIAGNOSIS — M62.81 MUSCLE WEAKNESS OF EXTREMITY: ICD-10-CM

## 2025-01-14 PROCEDURE — 97112 NEUROMUSCULAR REEDUCATION: CPT | Performed by: PHYSICAL THERAPIST

## 2025-01-14 PROCEDURE — 97110 THERAPEUTIC EXERCISES: CPT | Performed by: PHYSICAL THERAPIST

## 2025-01-14 PROCEDURE — 97140 MANUAL THERAPY 1/> REGIONS: CPT | Performed by: PHYSICAL THERAPIST

## 2025-01-14 NOTE — PROGRESS NOTES
Physical Therapy Daily Treatment Note     Name: Madyson Calle  Clinic Number: [id number]    Therapy Diagnosis:   Encounter Diagnoses   Name Primary?    Chronic pain of right knee Yes    Patellofemoral joint pain, right     Fear of pain     Muscle weakness of extremity        Physician: Allison Oh PA-C    Visit Date: 1/14/2025  Physician Orders: PT Eval and Treat   Medical Diagnosis from Referral: M25.561 (ICD-10-CM) - Patellofemoral joint pain, right  Evaluation Date: 12/19/2024  Authorization Period Expiration: 12/31/2024  Plan of Care Expiration: 2/19/2024  Progress Note Due: 1/19/2024  Date of Surgery: -  Visit # / Visits authorized: 1/ 1   FOTO: 1/ 3 (12/19/2024): 36.9%  Precautions: Standard   Time In: 7:10 am  Time Out: 7:55 am  Total Billable Time: 45 minutes  Precautions: Standard  Subjective   Pt reports: she feels that the knee pain gets better with each session. She still gets pain going down the stairs; up is fine.  She was compliant with home exercise program.  Response to previous treatment: Initial Evaluation  Functional change: Ongoing; improved walking  Pain: 2/10  Location: Right Knee (anterior)   Objective      Right Left   Knee Flexion 110 degrees 130 degrees   Knee Extension 0 degrees 0 degrees     Madyson received therapeutic exercises to develop strength, endurance, ROM, flexibility, posture, and core stabilization for 14 minutes including:  Upright Bike, 5 minutes, Level 3  Straight Leg Raise, 3x10, 1# cuff weight  Shuttle Double Leg Press, 25#, 1 minute (x2)    Madyson received the following manual therapy techniques: Joint mobilizations and Manual traction were applied to the: right knee for 16 minutes, including:  Fat Pad Mobilizations, right  Patellofemoral Joint Mobilizations, all planes  Grade V Talocrural Joint Mobilizations  Tibiofemoral I.R and Posterior Gliding, Grade IV    Madyson participated in neuromuscular re-education activities to improve: Balance, Coordination,  [facility]    Phone Message    May a detailed message be left on voicemail: yes     Reason for Call: Other: Patient is calling back to follow-up on message below. Please call patient ASAP. Thank you.      Action Taken: Message routed to:  [facility] ID    Travel Screening: Not Applicable                                                                           "Kinesthetic, Sense, Proprioception, and Posture for 25 minutes. The following activities were included:  Bridges, 2x12, blue band  Clamshells, yellow band, 3x8  Knee Extension Isometrics with Tindeq, right only, 2x10, 5" julio césar Coughlin participated in dynamic functional therapeutic activities to improve functional performance for -  minutes, including:  -  Home Exercises Provided and Patient Education Provided     Education provided:   - Home exercise program     Written Home Exercises Provided: Patient instructed to cont prior HEP.  Exercises were reviewed and Madyson was able to demonstrate them prior to the end of the session.  Madyson demonstrated good  understanding of the education provided.     See EMR under Patient Instructions for exercises provided prior visit.    Assessment   Madyson continues to demonstrate quad-dominance during step-down, with increased patellar irritation. Concordant pain is noted with palpation to infrapatellar fat pad, albeit increased mobility of the pad and patellofemoral mobility is noted. Range of motion is at +5 degrees and 120 degrees of range of motion into flexion. She was challenged with progression to quadriceps exercises but overall tolerated well without increased pain.  Madyson Is progressing well towards her goals.   Pt prognosis is Good.     Pt will continue to benefit from skilled outpatient physical therapy to address the deficits listed in the problem list box on initial evaluation, provide pt/family education and to maximize pt's level of independence in the home and community environment.     Pt's spiritual, cultural and educational needs considered and pt agreeable to plan of care and goals.     Anticipated barriers to physical therapy: Fear, Guarding, Understanding of Pain    Goals: Short Term Goals: 2 weeks   1.) Patient will demonstrate independence in compliance and technique of home exercise program provided as per teach-back method of assessment. " Ongoing  2.) Patient will achieve 0-120 degrees of knee flexion to demonstrate progressing range of motion for improved functional mobility. Ongoing  3.) Patient will ambulate for 200 feet with LRAD with supervision-level assistance and normalized gait pattern. Ongoing  4.) Patient will demonstrate good quality quadriceps set with the ability to complete 10x straight-leg raises without quadriceps lag.  Ongoing     Long Term Goals: 6 weeks   1.) Patient will demonstrate independence in compliance and technique of home exercise program provided as per teach-back method of assessment. Ongoing  2.) Patient will achieve 0-130 degrees of knee flexion to demonstrate progressing range of motion for improved functional mobility. Ongoing  3.) Patient will ambulate for 200 feet with normalized gait pattern. Ongoing  4.) Patient will demonstrate good quality quadriceps set with the ability to exert >20# of pressure as per Tindeq or Microfet. Ongoing  5.) Patient will demonstrate <14 seconds as per TUG Test to demonstrate improved functional mobility and decreased fall risk. Ongoing  6.) Patient will demonstrate 5xSTS Test in <16 seconds  to demonstrate improved functional mobility and decreased fall risk. Ongoing  7.) Patient will note <10% disability as per FOTO score. Ongoing  Plan   Continue to progress as per plan of care.  Renee Villa, PT, DPT  Board Certified in Orthopedic Physical Therapy

## 2025-01-16 ENCOUNTER — CLINICAL SUPPORT (OUTPATIENT)
Dept: REHABILITATION | Facility: HOSPITAL | Age: 40
End: 2025-01-16
Payer: COMMERCIAL

## 2025-01-16 DIAGNOSIS — M62.81 MUSCLE WEAKNESS OF EXTREMITY: ICD-10-CM

## 2025-01-16 DIAGNOSIS — M25.561 CHRONIC PAIN OF RIGHT KNEE: Primary | ICD-10-CM

## 2025-01-16 DIAGNOSIS — M25.561 PATELLOFEMORAL JOINT PAIN, RIGHT: ICD-10-CM

## 2025-01-16 DIAGNOSIS — G89.29 CHRONIC PAIN OF RIGHT KNEE: Primary | ICD-10-CM

## 2025-01-16 DIAGNOSIS — R52 PAIN: ICD-10-CM

## 2025-01-16 DIAGNOSIS — F40.298 FEAR OF PAIN: ICD-10-CM

## 2025-01-16 PROCEDURE — 97112 NEUROMUSCULAR REEDUCATION: CPT | Performed by: PHYSICAL THERAPIST

## 2025-01-16 PROCEDURE — 97140 MANUAL THERAPY 1/> REGIONS: CPT | Performed by: PHYSICAL THERAPIST

## 2025-01-16 PROCEDURE — 97110 THERAPEUTIC EXERCISES: CPT | Performed by: PHYSICAL THERAPIST

## 2025-01-16 PROCEDURE — 97530 THERAPEUTIC ACTIVITIES: CPT | Performed by: PHYSICAL THERAPIST
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
## 2025-01-17 ENCOUNTER — HOSPITAL ENCOUNTER (OUTPATIENT)
Dept: RADIOLOGY | Facility: OTHER | Age: 40
Discharge: HOME | End: 2025-01-17
Payer: COMMERCIAL

## 2025-01-17 DIAGNOSIS — N64.4 BREAST PAIN: ICD-10-CM

## 2025-01-17 PROCEDURE — 77066 DX MAMMO INCL CAD BI: CPT | Mod: 26,,, | Performed by: RADIOLOGY

## 2025-01-17 PROCEDURE — 77062 BREAST TOMOSYNTHESIS BI: CPT | Mod: 26,,, | Performed by: RADIOLOGY

## 2025-01-17 PROCEDURE — 77066 DX MAMMO INCL CAD BI: CPT | Mod: TC

## 2025-01-17 PROCEDURE — 76642 ULTRASOUND BREAST LIMITED: CPT | Mod: 26,LT,, | Performed by: RADIOLOGY

## 2025-01-17 PROCEDURE — 76642 ULTRASOUND BREAST LIMITED: CPT | Mod: TC,LT

## 2025-01-21 ENCOUNTER — PATIENT MESSAGE (OUTPATIENT)
Dept: REHABILITATION | Facility: HOSPITAL | Age: 40
End: 2025-01-21
Payer: COMMERCIAL

## 2025-01-22 ENCOUNTER — PATIENT MESSAGE (OUTPATIENT)
Dept: REHABILITATION | Facility: HOSPITAL | Age: 40
End: 2025-01-22
Payer: COMMERCIAL

## 2025-01-23 ENCOUNTER — PATIENT MESSAGE (OUTPATIENT)
Dept: REHABILITATION | Facility: HOSPITAL | Age: 40
End: 2025-01-23
Payer: COMMERCIAL

## 2025-02-06 ENCOUNTER — CLINICAL SUPPORT (OUTPATIENT)
Dept: REHABILITATION | Facility: HOSPITAL | Age: 40
End: 2025-02-06
Payer: COMMERCIAL

## 2025-02-06 DIAGNOSIS — M25.561 CHRONIC PAIN OF RIGHT KNEE: Primary | ICD-10-CM

## 2025-02-06 DIAGNOSIS — G89.29 CHRONIC PAIN OF RIGHT KNEE: Primary | ICD-10-CM

## 2025-02-06 DIAGNOSIS — M25.561 PATELLOFEMORAL JOINT PAIN, RIGHT: ICD-10-CM

## 2025-02-06 DIAGNOSIS — M62.81 MUSCLE WEAKNESS OF EXTREMITY: ICD-10-CM

## 2025-02-06 DIAGNOSIS — F40.298 FEAR OF PAIN: ICD-10-CM

## 2025-02-06 PROCEDURE — 97112 NEUROMUSCULAR REEDUCATION: CPT | Performed by: PHYSICAL THERAPIST

## 2025-02-06 PROCEDURE — 97110 THERAPEUTIC EXERCISES: CPT | Performed by: PHYSICAL THERAPIST

## 2025-02-06 NOTE — PROGRESS NOTES
"  Physical Therapy Daily Treatment and Progress Note     Name: Madyson Calle  Clinic Number: [id number]    Therapy Diagnosis:   Encounter Diagnoses   Name Primary?    Chronic pain of right knee Yes    Patellofemoral joint pain, right     Fear of pain     Muscle weakness of extremity        Physician: Allison Oh PA-C  Visit Date: 2/6/2025  Physician Orders: PT Eval and Treat   Medical Diagnosis from Referral: M25.561 (ICD-10-CM) - Patellofemoral joint pain, right  Evaluation Date: 12/19/2024  Authorization Period Expiration: 12/31/2024  Plan of Care Expiration: 2/19/2024  Progress Note Due: 3/6/2025  Most Recent Progress Note: 2/6/2025  Date of Surgery: -  Visit # / Visits authorized: 5/20   FOTO: 1/ 3 (12/19/2024): 36.9%  Precautions: Standard   Time In: 1:10 pm  Time Out: 2:00 pm  Total Billable Time: 50 minutes  Precautions: Standard  Subjective   Pt reports:she has not really had any pain or been limited by any activity at this time; the only thing she notes is some slight aching with prolonged standing at her second job, but that's not really a "pain."  She was compliant with home exercise program.  Response to previous treatment: Initial Evaluation  Functional change: Ongoing; improved walking  Pain: 2/10  Location: Right Knee (anterior)   Objective      Right Left   Knee Flexion 145 degrees 130 degrees   Knee Extension +5 degrees 0 degrees   *Able to complete 10 repetitions of straight leg raise without increased pain.   Tindeq Maximal Force: 37.4 #    Madyson received therapeutic exercises to develop strength, endurance, ROM, flexibility, posture, and core stabilization for 24 minutes including:  Upright Bike, 7 minutes, Level 4, Hill Setting  Precor Double Leg Press, 20#, 2x12 (second set with green band into femoral abduction and external rotation)  [place] Single Leg Heel  Squat 2x12  Objective measures taken (see above)    Madyson received the following manual therapy techniques: Joint mobilizations " and Manual traction were applied to the: right knee for 0 minutes, including:      Madyson participated in neuromuscular re-education activities to improve: Balance, Coordination, Kinesthetic, Sense, Proprioception, and Posture for 10 minutes. The following activities were included:  Bridges, 2x12, blue band,   Clamshells, yellow band, 2x12    Madyson participated in dynamic functional therapeutic activities to improve functional performance for 8  minutes, including:    Home Exercises Provided and Patient Education Provided     Education provided:   - Home exercise program     Written Home Exercises Provided: Patient instructed to cont prior HEP.  Exercises were reviewed and Madyson was able to demonstrate them prior to the end of the session.  Madyson demonstrated good  understanding of the education provided.     See EMR under Patient Instructions for exercises provided prior visit.    Assessment   Madyson has undergone 5 visits in the care of her right knee. She ambulates with normal gait at this time, without increased apprehension. She demonstrates single leg squat without pain, albeit continued femoral internal rotation and adduction that remains to be corrected. She is now able to exert 37.4# of quadriceps force isometrically, when at evaluation she was only able to exert 5# without increased significant pain. Madyson will benefit from a customized physical therapy plan of care  to address the aforementioned impairments in an effort to improve human function and quality of life.    Madyson Is progressing well towards her goals.   Pt prognosis is Good.     Pt will continue to benefit from skilled outpatient physical therapy to address the deficits listed in the problem list box on initial evaluation, provide pt/family education and to maximize pt's level of independence in the home and community environment.     Pt's spiritual, cultural and educational needs considered and pt agreeable to plan of care and  goals.     Anticipated barriers to physical therapy: Fear, Guarding, Understanding of Pain    Goals: Short Term Goals: 2 weeks   1.) Patient will demonstrate independence in compliance and technique of home exercise program provided as per teach-back method of assessment. Ongoing  2.) Patient will achieve 0-120 degrees of knee flexion to demonstrate progressing range of motion for improved functional mobility. Ongoing  3.) Patient will ambulate for 200 feet with LRAD with supervision-level assistance and normalized gait pattern. Ongoing  4.) Patient will demonstrate good quality quadriceps set with the ability to complete 10x straight-leg raises without quadriceps lag.  Ongoing     Long Term Goals: 6 weeks   1.) Patient will demonstrate independence in compliance and technique of home exercise program provided as per teach-back method of assessment. Ongoing  2.) Patient will achieve 0-130 degrees of knee flexion to demonstrate progressing range of motion for improved functional mobility. Ongoing  3.) Patient will ambulate for 200 feet with normalized gait pattern. Ongoing  4.) Patient will demonstrate good quality quadriceps set with the ability to exert >20# of pressure as per Tindeq or Microfet. Ongoing  5.) Patient will demonstrate <14 seconds as per TUG Test to demonstrate improved functional mobility and decreased fall risk. Ongoing  6.) Patient will demonstrate 5xSTS Test in <16 seconds  to demonstrate improved functional mobility and decreased fall risk. Ongoing  7.) Patient will note <10% disability as per FOTO score. Ongoing  Plan   Continue as per plan of care.    Renee Villa, PT, DPT  Board Certified in Orthopedic Physical Therapy

## 2025-02-12 DIAGNOSIS — I10 HYPERTENSION, UNSPECIFIED TYPE: Primary | ICD-10-CM

## 2025-02-12 RX ORDER — VALSARTAN 160 MG/1
160 TABLET ORAL DAILY
COMMUNITY
Start: 2024-10-03 | End: 2025-02-12 | Stop reason: SDUPTHER

## 2025-02-12 RX ORDER — VALSARTAN 160 MG/1
160 TABLET ORAL DAILY
Qty: 90 TABLET | Refills: 0 | Status: SHIPPED | OUTPATIENT
Start: 2025-02-12

## 2025-02-12 NOTE — TELEPHONE ENCOUNTER
----- Message from Jatin sent at 2/12/2025 11:22 AM CST -----  Regarding: Refill  Contact: Pt [phone]  Requesting an RX refill or new RX.    Is this a refill or new RX: Refill    RX name and strength (copy/paste from chart):  Valsartan 160 MG    Is this a 30 day or 90 day RX: 60    Pharmacy name and phone # (copy/paste from chart):      [id number] - [place] - [address] AT [facility] [address]  [address]  [place] [address]  Phone: [phone] Fax: [phone]       Comments: Patient is out of medication; out of town.   Spoke with pt and relayed PCP message.  Pt understanding and has been using compression stockings.  Pt agreeable to referral.

## 2025-02-20 ENCOUNTER — OFFICE VISIT (OUTPATIENT)
Dept: URGENT CARE | Facility: CLINIC | Age: 40
End: 2025-02-20
Payer: COMMERCIAL

## 2025-02-20 VITALS
BODY MASS INDEX: 23.91 KG/M2 | SYSTOLIC BLOOD PRESSURE: 135 MMHG | RESPIRATION RATE: 16 BRPM | WEIGHT: 135 LBS | TEMPERATURE: 98 F | DIASTOLIC BLOOD PRESSURE: 89 MMHG | OXYGEN SATURATION: 99 % | HEART RATE: 79 BPM

## 2025-02-20 DIAGNOSIS — S99.922A FOOT INJURY, LEFT, INITIAL ENCOUNTER: ICD-10-CM

## 2025-02-20 DIAGNOSIS — S93.602A SPRAIN OF LEFT FOOT, INITIAL ENCOUNTER: Primary | ICD-10-CM

## 2025-02-20 NOTE — PATIENT INSTRUCTIONS
Apply a compressive ACE bandage.   Rest and elevate the affected painful area.    Apply cold compresses intermittently as needed.    Avoid activities that cause pain.   As pain recedes, begin normal activities slowly as tolerated.      Alternate tylenol and ibuprofen every 4 hours as needed for pain. Always take ibuprofen with food and water to avoid GI upset. Stop taking if you develop abdominal pain or blood in stool.     Follow up with your PCP in 1-2 weeks if symptoms worsen/persist.

## 2025-02-20 NOTE — PROGRESS NOTES
Subjective:      Patient ID: Madyson Calle is a 39 y.o. female.    Vitals:  weight is 61.2 kg (135 lb). Her oral temperature is 98.3 °F (36.8 °C). Her blood pressure is 135/89 and her pulse is 79. Her respiration is 16 and oxygen saturation is 99%.     Chief Complaint: Injury (Need foot x ray -left - Entered by patient)    This is a 39 y.o. female who presents today with a chief complaint of L foot px x 4 days post fall at home with worsening px. Pt says she missed the last step and fell. Pt has px on the lateral aspect of the L foot. Pt has full ROM of the L foot, but feels px when rotating foot laterally. Pt did not limp to exam room, but pt did walk slowly. Slight bruising and moderate px with palpation. No swelling or redness.     Home tx: none    [facility]: Fx of L foot in past (at age 12)    Injury  This is a new problem. The current episode started in the past 7 days. The problem occurs constantly. Associated symptoms include arthralgias and joint swelling. Pertinent negatives include no abdominal pain, fever, headaches, nausea, numbness, vomiting or weakness.       Constitution: Negative for fever.   Gastrointestinal:  Negative for abdominal pain, nausea and vomiting.   Musculoskeletal:  Positive for pain, joint pain, joint swelling and abnormal ROM of joint.   Skin:  Negative for erythema.   Neurological:  Negative for headaches and numbness.      Objective:     Physical Exam   Constitutional: She is oriented to person, place, and time. She appears well-developed.   HENT:   Head: Normocephalic and atraumatic. Head is without abrasion, without contusion and without laceration.   Ears:   Right Ear: External ear normal.   Left Ear: External ear normal.   Nose: Nose normal.   Mouth/Throat: Oropharynx is clear and moist and mucous membranes are normal.   Eyes: Conjunctivae, EOM and lids are normal. Pupils are equal, round, and reactive to light.   Neck: Trachea normal and phonation normal. Neck supple.    Cardiovascular: Normal rate, regular rhythm and normal heart sounds.   Pulmonary/Chest: Effort normal and breath sounds normal. No stridor. No respiratory distress.   Musculoskeletal:      Left foot: Decreased range of motion. Tenderness and swelling present.        Feet:    Neurological: She is alert and oriented to person, place, and time.   Skin: Skin is warm, dry, intact and no rash. Capillary refill takes less than 2 seconds. No abrasion, No burn, No bruising, No erythema and No ecchymosis   Psychiatric: Her speech is normal and behavior is normal. Judgment and thought content normal.   Nursing note and vitals reviewed.      Assessment:     1. Sprain of left foot, initial encounter    2. Foot injury, left, initial encounter        Plan:       Sprain of left foot, initial encounter  -     BANDAGE ELASTIC 4IN ACE NS    Foot injury, left, initial encounter  -     X-Ray Foot Complete 3 view Left; Future; Expected date: 02/20/2025            Discussed results/diagnosis/plan with patient in clinic. Strict precautions given to patient to monitor for worsening signs and symptoms. Advised to follow up with PCP or specialist.  Explained side effects of medications prescribed with patient and informed him/her to discontinue use if he/she has any side effects and to inform UC or PCP if this occurs. All questions answered. Strict ED verses clinic return precautions stressed and given in depth. Advised if symptoms worsens of fail to improve he/she should go to the Emergency Room. Discharge and follow-up instructions given verbally/printed with the patient who expressed understanding and willingness to comply with my recommendations. Patient voiced understanding and in agreement with current treatment plan. Patient exits the exam room in no acute distress. Conversant and engaged during discharge discussion, verbalized understanding.

## 2025-02-24 ENCOUNTER — PATIENT OUTREACH (OUTPATIENT)
Dept: INTERNAL MEDICINE | Facility: CLINIC | Age: 40
End: 2025-02-24
Payer: COMMERCIAL

## 2025-02-24 VITALS — DIASTOLIC BLOOD PRESSURE: 89 MMHG | SYSTOLIC BLOOD PRESSURE: 135 MMHG

## 2025-02-26 ENCOUNTER — TELEPHONE (OUTPATIENT)
Dept: PHARMACY | Facility: CLINIC | Age: 40
End: 2025-02-26
Payer: COMMERCIAL

## 2025-02-27 NOTE — TELEPHONE ENCOUNTER
[facility]/Population Health Chart Review & Patient Outreach Details For Medication Adherence Project    Reason for Outreach Encounter: 3rd Party payor non-compliance report ([employer], [employer], [employer], etc)  2.  Patient Outreach Method: Reviewed patient chart   3.   Medication in question:    Hypertension Medications              amLODIPine (NORVASC) 5 MG tablet Take 1 tablet (5 mg total) by mouth once daily.    metoprolol succinate (TOPROL-XL) 50 MG 24 hr tablet Take 1 tablet (50 mg total) by mouth once daily.    valsartan (DIOVAN) 160 MG tablet Take 1 tablet (160 mg total) by mouth once daily.                 LF 90 ds 2/12/25  Losartan dc'ed    4.  Reviewed and or Updates Made To: Patient Chart  5. Outreach Outcomes and/or actions taken: Patient filled medication and is on track to be adherent and Medication discontinued  Additional Notes:

## 2025-04-08 ENCOUNTER — HOSPITAL ENCOUNTER (OUTPATIENT)
Dept: RADIOLOGY | Facility: HOSPITAL | Age: 40
Discharge: HOME | End: 2025-04-08
Attending: OBSTETRICS & GYNECOLOGY
Payer: COMMERCIAL

## 2025-04-08 DIAGNOSIS — N83.209 CYST OF OVARY, UNSPECIFIED LATERALITY: ICD-10-CM

## 2025-04-08 DIAGNOSIS — D21.9 FIBROIDS: ICD-10-CM

## 2025-04-08 PROCEDURE — 76856 US EXAM PELVIC COMPLETE: CPT | Mod: TC

## 2025-04-08 PROCEDURE — 76830 TRANSVAGINAL US NON-OB: CPT | Mod: 26,,, | Performed by: RADIOLOGY

## 2025-04-08 PROCEDURE — 76856 US EXAM PELVIC COMPLETE: CPT | Mod: 26,,, | Performed by: RADIOLOGY

## 2025-04-15 ENCOUNTER — OFFICE VISIT (OUTPATIENT)
Dept: OBSTETRICS AND GYNECOLOGY | Facility: CLINIC | Age: 40
End: 2025-04-15
Payer: COMMERCIAL

## 2025-04-15 ENCOUNTER — HOSPITAL ENCOUNTER (OUTPATIENT)
Dept: RADIOLOGY | Facility: OTHER | Age: 40
Discharge: HOME | End: 2025-04-15
Attending: OBSTETRICS & GYNECOLOGY
Payer: COMMERCIAL

## 2025-04-15 VITALS
DIASTOLIC BLOOD PRESSURE: 103 MMHG | HEIGHT: 63 IN | SYSTOLIC BLOOD PRESSURE: 142 MMHG | WEIGHT: 138.44 LBS | BODY MASS INDEX: 24.53 KG/M2

## 2025-04-15 DIAGNOSIS — K43.9 HERNIA OF ABDOMINAL WALL: ICD-10-CM

## 2025-04-15 DIAGNOSIS — R10.2 PELVIC PAIN: Primary | ICD-10-CM

## 2025-04-15 DIAGNOSIS — R10.2 PELVIC PAIN: ICD-10-CM

## 2025-04-15 DIAGNOSIS — R10.32 LEFT LOWER QUADRANT PAIN: ICD-10-CM

## 2025-04-15 DIAGNOSIS — Z30.9 ENCOUNTER FOR CONTRACEPTIVE MANAGEMENT, UNSPECIFIED TYPE: ICD-10-CM

## 2025-04-15 PROCEDURE — 3008F BODY MASS INDEX DOCD: CPT | Mod: CPTII,S$GLB,, | Performed by: OBSTETRICS & GYNECOLOGY

## 2025-04-15 PROCEDURE — 74176 CT ABD & PELVIS W/O CONTRAST: CPT | Mod: TC

## 2025-04-15 PROCEDURE — 1159F MED LIST DOCD IN RCRD: CPT | Mod: CPTII,S$GLB,, | Performed by: OBSTETRICS & GYNECOLOGY

## 2025-04-15 PROCEDURE — 1160F RVW MEDS BY RX/DR IN RCRD: CPT | Mod: CPTII,S$GLB,, | Performed by: OBSTETRICS & GYNECOLOGY

## 2025-04-15 PROCEDURE — 3080F DIAST BP >= 90 MM HG: CPT | Mod: CPTII,S$GLB,, | Performed by: OBSTETRICS & GYNECOLOGY

## 2025-04-15 PROCEDURE — 4010F ACE/ARB THERAPY RXD/TAKEN: CPT | Mod: CPTII,S$GLB,, | Performed by: OBSTETRICS & GYNECOLOGY

## 2025-04-15 PROCEDURE — 99214 OFFICE O/P EST MOD 30 MIN: CPT | Mod: S$GLB,,, | Performed by: OBSTETRICS & GYNECOLOGY

## 2025-04-15 PROCEDURE — 74176 CT ABD & PELVIS W/O CONTRAST: CPT | Mod: 26,,, | Performed by: RADIOLOGY

## 2025-04-15 PROCEDURE — 99999 PR PBB SHADOW E&M-EST. PATIENT-LVL IV: CPT | Mod: PBBFAC,,, | Performed by: OBSTETRICS & GYNECOLOGY

## 2025-04-15 PROCEDURE — 3077F SYST BP >= 140 MM HG: CPT | Mod: CPTII,S$GLB,, | Performed by: OBSTETRICS & GYNECOLOGY

## 2025-04-15 RX ORDER — LACTIC ACID, L-, CITRIC ACID MONOHYDRATE, AND POTASSIUM BITARTRATE 90; 50; 20 MG/5G; MG/5G; MG/5G
1 GEL VAGINAL
Qty: 180 G | Refills: 11 | Status: SHIPPED | OUTPATIENT
Start: 2025-04-15

## 2025-04-15 NOTE — PROGRESS NOTES
Subjective     Patient ID: Madyson Calle is a 39 y.o. female.    Chief Complaint:  Painful [place] (Pain and bleeding during sex/)      History of Present Illness  HPI  38 y/o  presents for evaluation of two episodes of bleeding with intercourse. She has a Mirena IUD in place since 2023. She had bleeding with intercourse about 3 weeks ago and then again 2 weeks ago. This has not occurred every time she has intercourse. She is still having persistent left lower quadrant pelvic pain, sometimes without any movement while just sitting. Occurs daily several times a day. Feels like a fullness/bulge.    She did PFPT and had improvement in diffuse lower pelvic pain but still has persistent LLQ pain.     She was seen in the ED in July - thought she had colitis, had colonoscopy that was normal. BMs had improved but for the past couple of weeks has had a few days of no BM then will have normal BMs for several days. Sometimes has to strain. She has seen GI. She tried lynzess but that did not help.    Also feels like she gets BV symptoms with intercourse/exposure to semen. They do not use condoms.    EXAMINATION:  US PELVIS COMP WITH TRANSVAG NON-OB (XPD)     CLINICAL HISTORY:  Benign neoplasm of connective and other soft tissue, unspecified     TECHNIQUE:  Transabdominal and transvaginal pelvic ultrasound.     COMPARISON:  2024     FINDINGS:  Uterus: Measures 8.1 x 4.8 x 5.6 cm.  IUD appropriately position within the endometrial canal.  Endometrial echo complex measures 2 mm, unremarkable.  There are multiple uterine leiomyomas.  Subserosal fundal leiomyoma measures 3.7 cm.  Anterior left-sided subserosal leiomyoma measures 2.6 cm.  Posterior intramural leiomyoma measures 1.8 cm.     Right ovary: Measures 3.2 x 2.5 x 1.8 cm.  Appearance is unremarkable.  Blood flow is present.     Left ovary: Measures 2.9 x 1.6 x 1.5 cm.  Appearance is unremarkable.  Blood flow is present.     Miscellaneous: Small volume free  fluid, likely physiologic.     Impression:     1. Leiomyomatous uterus.  2. IUD appropriately position within the endometrial canal.    GYN & OB History  No LMP recorded. Patient has had an implant.   Date of Last Pap: 2022    OB History    Para Term  AB Living   1 1 1   1   SAB IAB Ectopic Multiple Live Births       1      # Outcome Date GA Lbr Pdero/2nd Weight Sex Type Anes PTL Lv   1 Term 11    F Vag-Spont   SIDRA       Review of Systems  Review of Systems       Objective   Physical Exam         Assessment and Plan     1. Pelvic pain    2. Left lower quadrant pain    3. Hernia of abdominal wall    4. Encounter for contraceptive management, unspecified type          Plan:  Madyson was seen today for painful intercourse.    Diagnoses and all orders for this visit:    Pelvic pain  -     Cancel: US Abdomen Limited; Future  -     CT Abdomen Pelvis  Without Contrast; Future  -     Vaginosis Screen by DNA Probe  -     C. trachomatis/N. gonorrhoeae by AMP DNA    Left lower quadrant pain  -     CT Abdomen Pelvis  Without Contrast; Future    Hernia of abdominal wall  -     CT Abdomen Pelvis  Without Contrast; Future    Encounter for contraceptive management, unspecified type  -     lactic acid-citric-potassium (PHEXXI) 1.8-1-0.4 % Gel; Place 1 applicator vaginally as needed.    Coital bleeding - IUD strings in place, EMS thin which may potentially causing the bleeding. Could consider a course of estrogen but bleeding only occurring with intercourse.   Reviewed pelvic US - small fibroids noted, 2 subserosal and 1 intramural, given size less likely to be causing pain.   Report of a sensation of bulge in LLQ - no definite hernia on exam so CT abd/pelvis ordered.   Potentially could consider IUD removal or replacement if this may be contributing to pain.   Recommended optimizing bowel movements as this could also be contributing to pain.   Consider diagnostic laparoscopy for further workup. Patient done  with childbearing so also could consider hysterectomy.  Phexxi for back up contraception/help with pH changes with intercourse.     Orders Placed This Encounter   Procedures    CT Abdomen Pelvis  Without Contrast    Vaginosis Screen by DNA Probe    C. trachomatis/N. gonorrhoeae by AMP DNA       Follow up if symptoms worsen or fail to improve.

## 2025-04-16 ENCOUNTER — PATIENT MESSAGE (OUTPATIENT)
Dept: OBSTETRICS AND GYNECOLOGY | Facility: CLINIC | Age: 40
End: 2025-04-16
Payer: COMMERCIAL

## 2025-04-21 ENCOUNTER — RESULTS FOLLOW-UP (OUTPATIENT)
Dept: OBSTETRICS AND GYNECOLOGY | Facility: CLINIC | Age: 40
End: 2025-04-21

## 2025-04-21 DIAGNOSIS — N76.0 VAGINOSIS: Primary | ICD-10-CM

## 2025-04-21 RX ORDER — FLUCONAZOLE 150 MG/1
150 TABLET ORAL ONCE
Qty: 1 TABLET | Refills: 0 | Status: SHIPPED | OUTPATIENT
Start: 2025-04-21 | End: 2025-04-21

## 2025-04-21 RX ORDER — METRONIDAZOLE 500 MG/1
500 TABLET ORAL EVERY 12 HOURS
Qty: 14 TABLET | Refills: 0 | Status: SHIPPED | OUTPATIENT
Start: 2025-04-21

## 2025-06-12 ENCOUNTER — HOSPITAL ENCOUNTER (EMERGENCY)
Facility: HOSPITAL | Age: 40
Discharge: HOME | End: 2025-06-12
Attending: STUDENT IN AN ORGANIZED HEALTH CARE EDUCATION/TRAINING PROGRAM
Payer: COMMERCIAL

## 2025-06-12 VITALS
BODY MASS INDEX: 24.63 KG/M2 | DIASTOLIC BLOOD PRESSURE: 92 MMHG | TEMPERATURE: 98 F | SYSTOLIC BLOOD PRESSURE: 142 MMHG | HEIGHT: 63 IN | WEIGHT: 139 LBS | RESPIRATION RATE: 20 BRPM | OXYGEN SATURATION: 100 % | HEART RATE: 66 BPM

## 2025-06-12 DIAGNOSIS — M62.838 NECK MUSCLE SPASM: Primary | ICD-10-CM

## 2025-06-12 LAB
B-HCG UR QL: NEGATIVE
CTP QC/QA: YES

## 2025-06-12 PROCEDURE — 63600175 PHARM REV CODE 636 W HCPCS: Mod: JZ,TB | Performed by: STUDENT IN AN ORGANIZED HEALTH CARE EDUCATION/TRAINING PROGRAM

## 2025-06-12 PROCEDURE — 99284 EMERGENCY DEPT VISIT MOD MDM: CPT | Mod: 25

## 2025-06-12 PROCEDURE — 25000003 PHARM REV CODE 250: Performed by: STUDENT IN AN ORGANIZED HEALTH CARE EDUCATION/TRAINING PROGRAM

## 2025-06-12 PROCEDURE — 81025 URINE PREGNANCY TEST: CPT | Performed by: STUDENT IN AN ORGANIZED HEALTH CARE EDUCATION/TRAINING PROGRAM

## 2025-06-12 PROCEDURE — 96374 THER/PROPH/DIAG INJ IV PUSH: CPT

## 2025-06-12 RX ORDER — METHOCARBAMOL 750 MG/1
1500 TABLET, FILM COATED ORAL
Status: COMPLETED | OUTPATIENT
Start: 2025-06-12 | End: 2025-06-12

## 2025-06-12 RX ORDER — METHOCARBAMOL 500 MG/1
1500 TABLET, FILM COATED ORAL 3 TIMES DAILY
Qty: 45 TABLET | Refills: 0 | Status: SHIPPED | OUTPATIENT
Start: 2025-06-12 | End: 2025-06-17

## 2025-06-12 RX ORDER — KETOROLAC TROMETHAMINE 30 MG/ML
10 INJECTION, SOLUTION INTRAMUSCULAR; INTRAVENOUS
Status: COMPLETED | OUTPATIENT
Start: 2025-06-12 | End: 2025-06-12

## 2025-06-12 RX ORDER — CEFTRIAXONE 1 G/1
1 INJECTION, POWDER, FOR SOLUTION INTRAMUSCULAR; INTRAVENOUS
Status: DISCONTINUED | OUTPATIENT
Start: 2025-06-12 | End: 2025-06-12

## 2025-06-12 RX ORDER — ACETAMINOPHEN 325 MG/1
650 TABLET ORAL
Status: COMPLETED | OUTPATIENT
Start: 2025-06-12 | End: 2025-06-12

## 2025-06-12 RX ORDER — FLUCONAZOLE 150 MG/1
150 TABLET ORAL
Status: DISCONTINUED | OUTPATIENT
Start: 2025-06-12 | End: 2025-06-12

## 2025-06-12 RX ORDER — IBUPROFEN 600 MG/1
600 TABLET, FILM COATED ORAL EVERY 6 HOURS PRN
Qty: 20 TABLET | Refills: 0 | Status: SHIPPED | OUTPATIENT
Start: 2025-06-12 | End: 2025-06-12

## 2025-06-12 RX ORDER — IBUPROFEN 600 MG/1
600 TABLET, FILM COATED ORAL EVERY 6 HOURS PRN
Qty: 20 TABLET | Refills: 0 | Status: SHIPPED | OUTPATIENT
Start: 2025-06-12

## 2025-06-12 RX ORDER — METHOCARBAMOL 500 MG/1
1500 TABLET, FILM COATED ORAL 3 TIMES DAILY
Qty: 45 TABLET | Refills: 0 | Status: SHIPPED | OUTPATIENT
Start: 2025-06-12 | End: 2025-06-12

## 2025-06-12 RX ADMIN — KETOROLAC TROMETHAMINE 10 MG: 30 INJECTION, SOLUTION INTRAMUSCULAR; INTRAVENOUS at 12:06

## 2025-06-12 RX ADMIN — METHOCARBAMOL 1500 MG: 750 TABLET ORAL at 12:06

## 2025-06-12 RX ADMIN — ACETAMINOPHEN 650 MG: 325 TABLET ORAL at 12:06

## 2025-06-12 NOTE — DISCHARGE INSTRUCTIONS
You were seen in the Emergency Department today for shoulder and neck pain. Your symptoms improved with NSAIDs, tylenol, and muscle relaxers.     We will discharge you with:    Robaxin (muscle relaxer) 1500 mg up to every 8 hours as needed for pain/spasm.  Do not drive or operate machinery if you are taking this medication as it may make you drowsy.    You may apply the topical lidocaine ointment up to twice a day as needed to the areas of pain.    You may take Tylenol (Acetaminophen) 650 mg every 6 hours (no more than 3000 mg in 24 hours) and Motrin (Ibuprofen) 400 mg every 6 hours as needed for aches/pains/fevers.    Please follow up with your primary doctor in the next 3-5 days for a repeat evaluation and further management.    Please return to the Emergency Department immediately for any continued or worsening symptoms such as numbness, tingling, weakness.

## 2025-06-12 NOTE — ED PROVIDER NOTES
Encounter Date: 6/12/2025       History     Chief Complaint   Patient presents with    Neck Pain     Started since tues 'crick in neck     39 year old female with a history of GERD, hypertension, scoliosis presenting to the emergency department for neck and shoulder pain bilaterally.  She states this started upon waking yesterday but has been progressive and worsening since then.  She has not taken any medications at home because she thought it would eventually go away on its own.  She reports a significant stiffness and pain in the bilateral shoulders, and the muscles of the neck.  Denies any numbness, tingling, or weakness in the hands or arms.  She has been dealing with chronic pain in the back of her hands and she feels like this is making it worse.  No recent injuries.  No recent fevers, chills.        Review of patient's allergies indicates:   Allergen Reactions    Reglan [metoclopramide]      Pt reports muscle twitching when last administered     Past Medical History:   Diagnosis Date    Abnormal cervical Papanicolaou smear 2010, 2016, 2017    Colposcopy    Anxiety     GERD (gastroesophageal reflux disease)     Hypertension     Murmur     Scoliosis      Past Surgical History:   Procedure Laterality Date    COLONOSCOPY N/A 7/8/2024    Procedure: COLONOSCOPY;  Surgeon: Nakul Beltran MD;  Location: [facility] ENDOSCOPY;  Service: Endoscopy;  Laterality: N/A;  clinic pt of dr beltran; instr via portal; peg sent to stefani; JULISSA  7/5 precall complete -ml    ESOPHAGOGASTRODUODENOSCOPY N/A 3/12/2024    Procedure: EGD (ESOPHAGOGASTRODUODENOSCOPY);  Surgeon: Nakul Beltran MD;  Location: [facility] ENDOSCOPY;  Service: Endoscopy;  Laterality: N/A;  2-20 [facility] pt; inst sent via portal [facility]  3/6- pc complete. DBM  3/11     Family History   Problem Relation Name Age of Onset    No Known Problems Paternal Grandfather      Heart attack Paternal Grandmother      Hypertension Maternal Grandmother      Stroke Maternal  Grandfather      Heart attack Maternal Grandfather      Diabetes Maternal Grandfather      Hyperlipidemia Maternal Grandfather      Hypertension Father      Diabetes Father      Heart murmur Father      Prostate cancer Father      No Known Problems Mother      Bipolar disorder Sister Prema     No Known Problems Daughter Natacha     Colon cancer Paternal Aunt      Breast cancer Neg Hx       Social History[1]  Review of Systems   Constitutional:  Negative for fever.   HENT:  Negative for sore throat.    Respiratory:  Negative for shortness of breath.    Cardiovascular:  Negative for chest pain.   Gastrointestinal:  Negative for nausea.   Genitourinary:  Negative for dysuria.   Musculoskeletal:  Positive for arthralgias, back pain, myalgias and neck pain.   Skin:  Negative for rash.   Neurological:  Negative for weakness.   Hematological:  Does not bruise/bleed easily.       Physical Exam     Initial Vitals [06/12/25 1000]   BP Pulse Resp Temp SpO2   (!) 171/112 81 18 98.4 °F (36.9 °C) 100 %      MAP       --         Physical Exam    Nursing note and vitals reviewed.  Constitutional: She appears well-developed and well-nourished. She is not diaphoretic. No distress.   Uncomfortable appearing   HENT:   Head: Normocephalic and atraumatic.   Eyes: EOM are normal. Pupils are equal, round, and reactive to light.   Neck: Neck supple.   Normal range of motion.  Cardiovascular:  Normal rate and regular rhythm.           Pulmonary/Chest: Breath sounds normal.   Abdominal: Abdomen is soft. Bowel sounds are normal. She exhibits no distension. There is no abdominal tenderness. There is no rebound.   Musculoskeletal:         General: No edema. Normal range of motion.      Cervical back: Normal range of motion and neck supple.      Comments: Diffuse tenderness to the trapezius muscles bilaterally, as well as the cervical and thoracic paraspinal muscles.  No midline tenderness to palpation of the spine. Tender to left occipital  condyle     Neurological: She is alert and oriented to person, place, and time.   Alert and oriented to person place and time.  CN 2-12 intact bilaterally.  Sensation is equal and intact in all extremities.  Strength:   5/5 right elbow flexion, extension, .  5/5 left elbow flexion, extension, .  5/5 right hip extension, flexion, plantar and dorsiflexion.  5/5 left hip extension, flexion, plantar and dorsiflexion.  Normal finger-to-nose.   Skin: Skin is warm and dry. Capillary refill takes less than 2 seconds.         ED Course   Procedures  Labs Reviewed   POCT URINE PREGNANCY       Result Value    POC Preg Test, Ur Negative       Acceptable Yes            Imaging Results    None          Medications   acetaminophen tablet 650 mg (650 mg Oral Given 6/12/25 1205)   methocarbamoL tablet 1,500 mg (1,500 mg Oral Given 6/12/25 1205)   ketorolac injection 9.999 mg (9.999 mg Intravenous Given 6/12/25 1205)     Medical Decision Making  39 year old female with a history of GERD, hypertension, scoliosis presenting to the emergency department for neck and shoulder pain bilaterally.     Initial vital signs: hypertensive    Initial physical exam: Normal neuro exam. Significant muscular stiffness and tenderness in the cervical paraspinal muscles and bilateral trapezius muscles.    Differential Diagnosis: muscle spasm, migraine, occipital neuralgia. Feel unlikely to be meningitis/encephalitis due to lack of infectious symptoms, fevers, chills.    Plan: Pain control, consideration for occipital nerve block if still with significant pain      Risk  OTC drugs.  Prescription drug management.               ED Course as of 06/12/25 2131   Thu Jun 12, 2025   1345 Patient feeling better, muscle stiffness in pressure is gone, pain is improving.  She feels comfortable with plan for discharge home and outpatient follow up.  She is curious about her ongoing pain in the both of her hands.  This has been ongoing for a  couple of months now.  I recommended she follow up closely with her PCP, and given symmetric nature of symptoms, with consideration for arthritis or autoimmune workup as indicated. Strict return precautions were discussed, patient verbalized understanding and agreed with plan.  Patient discharged home. [MB]      ED Course User Index  [MB] Steven Cortes MD                           Clinical Impression:  Final diagnoses:  [M62.838] Neck muscle spasm (Primary)          ED Disposition Condition    Discharge Stable          ED Prescriptions       Medication Sig Dispense Start Date End Date Auth. Provider    methocarbamoL (ROBAXIN) 500 MG Tab  (Status: Discontinued) Take 3 tablets (1,500 mg total) by mouth 3 (three) times daily. for 5 days 45 tablet 6/12/2025 6/12/2025 Steven Cortes MD    ibuprofen (ADVIL,MOTRIN) 600 MG tablet  (Status: Discontinued) Take 1 tablet (600 mg total) by mouth every 6 (six) hours as needed for Pain. 20 tablet 6/12/2025 6/12/2025 Steven Cortes MD    LIDOcaine 4 % Gel  (Status: Discontinued) Apply 1 Application topically 2 (two) times a day. 113 g 6/12/2025 6/12/2025 Steven Cortes MD    ibuprofen (ADVIL,MOTRIN) 600 MG tablet Take 1 tablet (600 mg total) by mouth every 6 (six) hours as needed for Pain. 20 tablet 6/12/2025 -- Steven Cortes MD    LIDOcaine 4 % Gel Apply 1 Application topically 2 (two) times a day. -- 6/12/2025 -- Steven Cortes MD    methocarbamoL (ROBAXIN) 500 MG Tab Take 3 tablets (1,500 mg total) by mouth 3 (three) times daily. for 5 days 45 tablet 6/12/2025 6/17/2025 Steven Cortes MD          Follow-up Information       Follow up With Specialties Details Why Contact Info    Karin Oneill, NP Internal Medicine Schedule an appointment as soon as possible for a visit in 3 days For a follow up appointment, As soon as possible [address] [place] [address]  [phone]                     [1]    Social History  Tobacco Use    Smoking status: Never     Passive exposure: Never    Smokeless tobacco: Never   Substance Use Topics    Alcohol use: Yes     Alcohol/week: 7.0 standard drinks of alcohol     Types: 7 Glasses of wine per week     Comment: socially    Drug use: Not Currently     Types: Marijuana        Steven Cortes MD  06/12/25 5315

## 2025-06-12 NOTE — ED NOTES
Patient comes into the emergency department by POV with complaints of neck pain. Patient states that 2 days ago woke up with neck pain, tried massages and stretching which provided some relief. Pt states this morning pain increased, ROM decreased, extremely painful to look side to side or up and down. 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 Patient denies SOB, CP, N/V/D, vision changes, numbness/tingling, extremity weakness.

## 2025-06-16 ENCOUNTER — OFFICE VISIT (OUTPATIENT)
Dept: PRIMARY CARE CLINIC | Facility: CLINIC | Age: 40
End: 2025-06-16
Payer: COMMERCIAL

## 2025-06-16 ENCOUNTER — HOSPITAL ENCOUNTER (EMERGENCY)
Facility: HOSPITAL | Age: 40
Discharge: HOME | End: 2025-06-16
Attending: EMERGENCY MEDICINE
Payer: COMMERCIAL

## 2025-06-16 ENCOUNTER — HOSPITAL ENCOUNTER (OUTPATIENT)
Dept: RADIOLOGY | Facility: OTHER | Age: 40
Discharge: HOME | End: 2025-06-16
Attending: NURSE PRACTITIONER
Payer: COMMERCIAL

## 2025-06-16 VITALS
DIASTOLIC BLOOD PRESSURE: 73 MMHG | HEART RATE: 87 BPM | TEMPERATURE: 98 F | OXYGEN SATURATION: 99 % | BODY MASS INDEX: 24.63 KG/M2 | SYSTOLIC BLOOD PRESSURE: 125 MMHG | RESPIRATION RATE: 16 BRPM | HEIGHT: 63 IN | WEIGHT: 139 LBS

## 2025-06-16 DIAGNOSIS — M54.2 NECK PAIN: Primary | ICD-10-CM

## 2025-06-16 DIAGNOSIS — M54.2 NECK PAIN: ICD-10-CM

## 2025-06-16 DIAGNOSIS — M25.512 ACUTE PAIN OF LEFT SHOULDER: ICD-10-CM

## 2025-06-16 LAB
B-HCG UR QL: NEGATIVE
CTP QC/QA: YES

## 2025-06-16 PROCEDURE — 1159F MED LIST DOCD IN RCRD: CPT | Mod: CPTII,95,, | Performed by: NURSE PRACTITIONER

## 2025-06-16 PROCEDURE — 98005 SYNCH AUDIO-VIDEO EST LOW 20: CPT | Mod: 95,,, | Performed by: NURSE PRACTITIONER

## 2025-06-16 PROCEDURE — 1160F RVW MEDS BY RX/DR IN RCRD: CPT | Mod: CPTII,95,, | Performed by: NURSE PRACTITIONER

## 2025-06-16 PROCEDURE — 72040 X-RAY EXAM NECK SPINE 2-3 VW: CPT | Mod: 26,,, | Performed by: STUDENT IN AN ORGANIZED HEALTH CARE EDUCATION/TRAINING PROGRAM

## 2025-06-16 PROCEDURE — 72040 X-RAY EXAM NECK SPINE 2-3 VW: CPT | Mod: TC,FY

## 2025-06-16 PROCEDURE — 96372 THER/PROPH/DIAG INJ SC/IM: CPT | Performed by: PHYSICIAN ASSISTANT

## 2025-06-16 PROCEDURE — 63600175 PHARM REV CODE 636 W HCPCS: Mod: JZ,TB | Performed by: PHYSICIAN ASSISTANT

## 2025-06-16 PROCEDURE — 81025 URINE PREGNANCY TEST: CPT | Performed by: PHYSICIAN ASSISTANT

## 2025-06-16 PROCEDURE — 99284 EMERGENCY DEPT VISIT MOD MDM: CPT | Mod: 25

## 2025-06-16 PROCEDURE — 4010F ACE/ARB THERAPY RXD/TAKEN: CPT | Mod: CPTII,95,, | Performed by: NURSE PRACTITIONER

## 2025-06-16 PROCEDURE — 20552 NJX 1/MLT TRIGGER POINT 1/2: CPT

## 2025-06-16 RX ORDER — NAPROXEN 500 MG/1
500 TABLET ORAL 2 TIMES DAILY WITH MEALS
Qty: 30 TABLET | Refills: 0 | Status: SHIPPED | OUTPATIENT
Start: 2025-06-16

## 2025-06-16 RX ORDER — KETOROLAC TROMETHAMINE 30 MG/ML
10 INJECTION, SOLUTION INTRAMUSCULAR; INTRAVENOUS
Status: COMPLETED | OUTPATIENT
Start: 2025-06-16 | End: 2025-06-16

## 2025-06-16 RX ORDER — LIDOCAINE HYDROCHLORIDE 10 MG/ML
10 INJECTION, SOLUTION INFILTRATION; PERINEURAL
Status: COMPLETED | OUTPATIENT
Start: 2025-06-16 | End: 2025-06-16

## 2025-06-16 RX ORDER — LIDOCAINE HYDROCHLORIDE 10 MG/ML
7.5 INJECTION, SOLUTION EPIDURAL; INFILTRATION; INTRACAUDAL; PERINEURAL
Status: DISCONTINUED | OUTPATIENT
Start: 2025-06-16 | End: 2025-06-16 | Stop reason: HOSPADM

## 2025-06-16 RX ORDER — CYCLOBENZAPRINE HCL 10 MG
10 TABLET ORAL 3 TIMES DAILY PRN
Qty: 30 TABLET | Refills: 0 | Status: SHIPPED | OUTPATIENT
Start: 2025-06-16 | End: 2025-06-26

## 2025-06-16 RX ADMIN — KETOROLAC TROMETHAMINE 9.9 MG: 30 INJECTION, SOLUTION INTRAMUSCULAR; INTRAVENOUS at 10:06

## 2025-06-16 RX ADMIN — LIDOCAINE HYDROCHLORIDE 7.5 ML: 10 INJECTION, SOLUTION EPIDURAL; INFILTRATION; INTRACAUDAL; PERINEURAL at 10:06

## 2025-06-16 RX ADMIN — LIDOCAINE HYDROCHLORIDE 10 ML: 10 INJECTION, SOLUTION INFILTRATION; PERINEURAL at 10:06

## 2025-06-16 NOTE — ED PROVIDER NOTES
Encounter Date: 6/16/2025       History     Chief Complaint   Patient presents with    Shoulder Pain     Seen here thurs, L shoulder neck, upper back pain , still hurting     The history is provided by the patient and medical records. No  was used.     Madyson Calle is a 39 y.o. female with medical history of scoliosis, hemiplegic migraine, GERD, HTN presenting to the ED with the chief complaint of shoulder pain.     Reports posterior neck pain for the past 4 days. Sometimes radiates to her L shoulder. Pain worsens with turning her head either way. Believes she slept awkwardly. Denies falls or trauma. Denies numbness or weakness in her extremities. No fever, headache, vision changes, speech changes, chest pain, SOB, abdominal pain, vomiting, urinary or bowel movement changes. She was seen here 4 days ago. Given Tylenol, Toradol, Robaxin. D/C on Lidoderm, Robaxin, IBU.       Review of patient's allergies indicates:   Allergen Reactions    Reglan [metoclopramide]      Pt reports muscle twitching when last administered     Past Medical History:   Diagnosis Date    Abnormal cervical Papanicolaou smear 2010, 2016, 2017    Colposcopy    Anxiety     GERD (gastroesophageal reflux disease)     Hypertension     Murmur     Scoliosis      Past Surgical History:   Procedure Laterality Date    COLONOSCOPY N/A 7/8/2024    Procedure: COLONOSCOPY;  Surgeon: Nakul Beltran MD;  Location: [facility] ENDOSCOPY;  Service: Endoscopy;  Laterality: N/A;  clinic pt of dr beltran; instr via portal; peg sent to [facility]; AP  7/5 precall complete -ml    ESOPHAGOGASTRODUODENOSCOPY N/A 3/12/2024    Procedure: EGD (ESOPHAGOGASTRODUODENOSCOPY);  Surgeon: Nakul Beltran MD;  Location: [facility] ENDOSCOPY;  Service: Endoscopy;  Laterality: N/A;  2-20 [facility] pt; inst sent via portal [facility]  3/6- pc complete. DBM  3/11     Family History   Problem Relation Name Age of Onset    No Known Problems Paternal Grandfather      Heart  attack Paternal Grandmother      Hypertension Maternal Grandmother      Stroke Maternal Grandfather      Heart attack Maternal Grandfather      Diabetes Maternal Grandfather      Hyperlipidemia Maternal Grandfather      Hypertension Father      Diabetes Father      Heart murmur Father      Prostate cancer Father      No Known Problems Mother      Bipolar disorder Sister Prema     No Known Problems Daughter Natacha     Colon cancer Paternal Aunt      Breast cancer Neg Hx       Social History[1]  Review of Systems   Musculoskeletal:  Positive for neck pain.       Physical Exam     Initial Vitals [06/16/25 0914]   BP Pulse Resp Temp SpO2   128/61 92 16 98.2 °F (36.8 °C) 100 %      MAP       --         Physical Exam    Constitutional: She appears well-developed and well-nourished. She is not diaphoretic. No distress.   HENT:   Head: Normocephalic and atraumatic. Mouth/Throat: Oropharynx is clear and moist. No oropharyngeal exudate.   Eyes: Conjunctivae and EOM are normal. Pupils are equal, round, and reactive to light. No scleral icterus.   Neck: Neck supple.   Normal range of motion.  Cardiovascular:  Normal rate and regular rhythm.           +2 radial pulses   Pulmonary/Chest: Breath sounds normal. No respiratory distress. She has no wheezes.   Abdominal: Abdomen is soft. She exhibits no distension. There is no abdominal tenderness. There is no rebound.   Musculoskeletal:         General: No tenderness or edema. Normal range of motion.      Cervical back: Normal range of motion and neck supple.      Comments: BL paraspinal c-spine tenderness and L trapezius. No overlying skin changes.   Full A/P ROM of extremities       Neurological: She is alert and oriented to person, place, and time. She has normal strength. No sensory deficit.   No focal weakness or sensory deficit   Skin: Skin is warm and dry. No rash noted. No erythema.   Psychiatric: She has a normal mood and affect.       ED Course   Procedures  Labs Reviewed    POCT URINE PREGNANCY       Result Value    POC Preg Test, Ur Negative       Acceptable Yes            Imaging Results    None          Medications   LIDOcaine (PF) 10 mg/ml (1%) injection 7.5 mL (7.5 mLs  Given 6/16/25 1031)   ketorolac injection 9.9 mg (9.9 mg Intramuscular Given 6/16/25 1030)   LIDOcaine HCL 10 mg/ml (1%) injection 10 mL (10 mLs Infiltration Given by Provider 6/16/25 1030)     Medical Decision Making  39 y.o. female with medical history of scoliosis, hemiplegic migraine, GERD, HTN presenting to the ED c/o neck pain beginning 4 days ago. Believes she slept awkwardly. Seen here for similar 4 days ago.     DDx includes but not limited to muscular strain, spasm, cervical radiculopathy. I have considered but do not suspect vertebral fracture, central cord compression, meningitis, carotid artery dissection.     Etiology most consistent with muscular etiology. Will give Toradol for her pain. Trigger point additionally given. See procedure note by my attending. Do not see an indication for labs or imaging indicated at this time. RX for Naprosyn provided. Advised PCP follow-up. Work excuse given. Patient expresses understanding and agreeable to the plan. Return to ED precautions given for new, worsening, or concerning symptoms. I have discussed the care of this patient with my supervising physician.         Amount and/or Complexity of Data Reviewed  External Data Reviewed: labs, radiology and notes.  Labs: ordered. Decision-making details documented in ED Course.    Risk  Prescription drug management.                                      Clinical Impression:  Final diagnoses:  [M54.2] Neck pain (Primary)  [M25.512] Acute pain of left shoulder          ED Disposition Condition    Discharge Stable          ED Prescriptions       Medication Sig Dispense Start Date End Date Auth. Provider    naproxen (NAPROSYN) 500 MG tablet Take 1 tablet (500 mg total) by mouth 2 (two) times daily with meals.  30 tablet 6/16/2025 -- Emir Brambila PA-C          Follow-up Information       Follow up With Specialties Details Why Contact Info    Karin Oneill NP Internal Medicine   [address] [place] [address]  [phone]                     [1]   Social History  Tobacco Use    Smoking status: Never     Passive exposure: Never    Smokeless tobacco: Never   Substance Use Topics    Alcohol use: Yes     Alcohol/week: 7.0 standard drinks of alcohol     Types: 7 Glasses of wine per week     Comment: socially    Drug use: Not Currently     Types: Marijuana        Emir Brambila PA-C  06/16/25 1102

## 2025-06-16 NOTE — Clinical Note
"Madyson Marshkira" Fayette was seen and treated in our emergency department on 6/16/2025.  She may return to work on 06/18/2025.       If you have any questions or concerns, please don't hesitate to call.      Emir Brambila PA-C"

## 2025-06-16 NOTE — ED NOTES
Patient identifiers for Madyson Calle 39 y.o. female checked and correct.  Chief Complaint   Patient presents with    Shoulder Pain     Seen here thseth, L shoulder neck, upper back pain , still hurting     Past Medical History:   Diagnosis Date    Abnormal cervical Papanicolaou smear 2010, 2016, 2017    Colposcopy    Anxiety     GERD (gastroesophageal reflux disease)     Hypertension     Murmur     Scoliosis      Allergies reported:   Review of patient's allergies indicates:   Allergen Reactions    Reglan [metoclopramide]      Pt reports muscle twitching when last administered         LOC: Patient is awake, alert, and aware of environment with an appropriate affect. Patient is oriented x 4 and speaking appropriately.  APPEARANCE: Patient resting comfortably and in no acute distress. Patient is clean and well groomed, patient's clothing is properly fastened.  HEENT: WDL  SKIN: The skin is warm and dry. Patient has normal skin turgor and moist mucus membranes.   MUSCULOSKELETAL: Patient is moving all extremities well, no obvious deformities noted. Pulses intact.   RESPIRATORY: Airway is open and patent. Respirations are spontaneous and non-labored with normal effort and rate.  CARDIAC: Patient has a normal rate and rhythm. 88 on cardiac monitor. No peripheral edema noted. Denies chest pain and SOB at at time of assessment.   ABDOMEN: No distention noted. Soft and non-tender upon palpation.  NEUROLOGICAL: pupils 3mm, PERRL. Facial expression is symmetrical. Hand grasps are equal bilaterally. Normal sensation in all extremities when touched with finger.

## 2025-06-16 NOTE — PROGRESS NOTES
"[facility] Note    Chief Complaint      Chief Complaint   Patient presents with    Neck Pain       History of Present Illness      Madyson Calle is a 39 y.o. female who presents today via virtual visit for   Chief Complaint   Patient presents with    Neck Pain         Patient presents via virtual visit to discuss neck pain.  She reports neck pain has been present for the past 2 weeks.  She denies any injury to her neck.  She states that she went to the emergency room this morning and received a steroid injection to her neck and a lidocaine patch.  She states this helped decrease the pain but has "worn off" and now she feels pain once again.  There are no other complaints at this time.  Otherwise she is feeling well.  She will be traveling to [place] in 4 days.    Back Pain  This is a new problem. The current episode started in the past 7 days. The problem occurs constantly. The problem has been rapidly worsening since onset. The pain is present in the thoracic spine and costovertebral angle. The quality of the pain is described as aching, burning, shooting and stabbing. The pain does not radiate. The pain is at a severity of 10/10. The pain is severe. The pain is The same all the time. The symptoms are aggravated by bending, coughing, position, lying down, sitting, standing and twisting. Stiffness is present In the morning, at night and all day. Associated symptoms include headaches and paresthesias. Pertinent negatives include no abdominal pain, bladder incontinence, bowel incontinence, chest pain, dysuria, fever, leg pain, numbness, paresis, pelvic pain, perianal numbness, tingling, weakness or weight loss. The treatment provided mild relief.        Review of Systems   Constitutional:  Negative for fever and weight loss.   Cardiovascular:  Negative for chest pain.   Gastrointestinal:  Negative for abdominal pain and bowel incontinence.   Genitourinary:  Negative for bladder incontinence, dysuria " and pelvic pain.   Musculoskeletal:  Positive for neck pain. Negative for back pain.   Neurological:  Positive for headaches and paresthesias. Negative for tingling, weakness and numbness.        Family History:  family history includes Bipolar disorder in her sister; Colon cancer in her paternal aunt; Diabetes in her father and maternal grandfather; Heart attack in her maternal grandfather and paternal grandmother; Heart murmur in her father; Hyperlipidemia in her maternal grandfather; Hypertension in her father and maternal grandmother; No Known Problems in her daughter, mother, and paternal grandfather; Prostate cancer in her father; Stroke in her maternal grandfather.   Family history was reviewed with patient.     Medications:  Encounter Medications[1]    Allergies:  Review of patient's allergies indicates:   Allergen Reactions    Reglan [metoclopramide]      Pt reports muscle twitching when last administered       Health Maintenance:  Health Maintenance   Topic Date Due    TETANUS VACCINE  Never done    Pneumococcal Vaccines (Age 0-49) (1 of 2 - PCV) Never done    COVID-19 Vaccine (3 - 2024-25 season) 09/01/2024    Influenza Vaccine (Season Ended) 09/01/2025    Cervical Cancer Screening  06/14/2027    RSV Vaccine (Age 60+ and Pregnant patients) (1 - 1-dose 75+ series) 09/04/2062    Hepatitis C Screening  Completed    HIV Screening  Completed    Lipid Panel  Completed     Health Maintenance Topics with due status: Not Due       Topic Last Completion Date    Cervical Cancer Screening 06/14/2022    Influenza Vaccine Not Due    RSV Vaccine (Age 60+ and Pregnant patients) Not Due       Physical Exam       ]        Assessment/Plan     Madyson Calle is a 39 y.o.female with:    Neck pain  -     X-Ray Cervical Spine AP And Lateral; Future; Expected date: 06/16/2025  -     cyclobenzaprine (FLEXERIL) 10 MG tablet; Take 1 tablet (10 mg total) by mouth 3 (three) times daily as needed for Muscle spasms.  Dispense: 30  tablet; Refill: 0  -     Ambulatory referral/consult to Pain Clinic; Future; Expected date: 06/23/2025        As above, continue current medications and maintain follow up with specialists.  Return to clinic as needed.    Greater than 50% of this time was spent face to face via virtual visit with patient.  All questions were answered to patient's satisfaction.    The patient location is: home  The chief complaint leading to consultation is: neck pain    Visit type: audiovisual    Face to Face time with patient:   Ten minutes of total time spent on the encounter, which includes face to face time and non-face to face time preparing to see the patient (eg, review of tests), Obtaining and/or reviewing separately obtained history, Documenting clinical information in the electronic or other health record, Independently interpreting results (not separately reported) and communicating results to the patient/family/caregiver, or Care coordination (not separately reported).         Each patient to whom he or she provides medical services by telemedicine is:  (1) informed of the relationship between the physician and patient and the respective role of any other health care provider with respect to management of the patient; and (2) notified that he or she may decline to receive medical services by telemedicine and may withdraw from such care at any time.       Karen L Spencer, NP-C [facility]  Answers submitted by the patient for this visit:  Back Pain Questionnaire (Submitted on 6/16/2025)  Chief Complaint: Back pain  genital pain: No  hematuria: No         [1]   Outpatient Encounter Medications as of 6/16/2025   Medication Sig Dispense Refill    amLODIPine (NORVASC) 5 MG tablet Take 1 tablet (5 mg total) by mouth once daily. 90 tablet 3    boric acid (bulk) Powd Insert one Gelatin Capsule filled with 600 mg of Boric Acid Powder H.S. (Patient not taking: Reported on 4/15/2025) 100 capsule 4    cyanocobalamin,  vitamin B-12, (B-12 COMPLIANCE) 1,000 mcg/mL Kit Inject 1 mL as directed every 14 (fourteen) days. 2 kit 5    cyclobenzaprine (FLEXERIL) 10 MG tablet Take 1 tablet (10 mg total) by mouth 3 (three) times daily as needed for Muscle spasms. 30 tablet 0    gabapentin (NEURONTIN) 300 MG capsule Take 1 capsule (300 mg total) by mouth every evening. 30 capsule 11    ibuprofen (ADVIL,MOTRIN) 600 MG tablet Take 1 tablet (600 mg total) by mouth every 6 (six) hours as needed for Pain. 20 tablet 0    lactic acid-citric-potassium (PHEXXI) 1.8-1-0.4 % Gel Place 1 applicator vaginally as needed. 180 g 11    LIDOcaine 4 % Gel Apply 1 Application topically 2 (two) times a day.      linaCLOtide (LINZESS) 290 mcg Cap capsule Take 1 capsule (290 mcg total) by mouth before breakfast. 90 capsule 3    methocarbamoL (ROBAXIN) 500 MG Tab Take 3 tablets (1,500 mg total) by mouth 3 (three) times daily. for 5 days 45 tablet 0    metoprolol succinate (TOPROL-XL) 50 MG 24 hr tablet Take 1 tablet (50 mg total) by mouth once daily. 30 tablet 5    metroNIDAZOLE (FLAGYL) 500 MG tablet Take 1 tablet (500 mg total) by mouth every 12 (twelve) hours. 14 tablet 0    naproxen (NAPROSYN) 500 MG tablet Take 1 tablet (500 mg total) by mouth 2 (two) times daily with meals. 30 tablet 0    omeprazole (PRILOSEC) 40 MG capsule Take 1 capsule (40 mg total) by mouth every morning. 30 capsule 2    valsartan (DIOVAN) 160 MG tablet Take 1 tablet (160 mg total) by mouth once daily. 90 tablet 0    [DISCONTINUED] ARAZLO 0.045 % Lotn  (Patient not taking: Reported on 4/15/2025)      [DISCONTINUED] dapsone (ACZONE) 7.5 % GlwP  (Patient not taking: Reported on 4/15/2025)      [DISCONTINUED] ibuprofen (ADVIL,MOTRIN) 800 MG tablet Take 1 tablet (800 mg total) by mouth 3 (three) times daily as needed for Pain. (Patient not taking: Reported on 4/15/2025) 90 tablet 0    [DISCONTINUED] ondansetron (ZOFRAN-ODT) 4 MG TbDL Take 1 tablet (4 mg total) by mouth every 8 (eight) hours as  needed (nausea). (Patient not taking: Reported on 2/20/2025) 20 tablet 6    [DISCONTINUED] predniSONE (DELTASONE) 20 MG tablet Take 1 tablet (20 mg total) by mouth once daily. (Patient not taking: Reported on 2/20/2025) 5 tablet 0    [DISCONTINUED] terconazole (TERAZOL 7) 0.4 % Crea Place 1 applicator vaginally every evening. (Patient not taking: Reported on 4/15/2025) 45 g 0     Facility-Administered Encounter Medications as of 6/16/2025   Medication Dose Route Frequency Provider Last Rate Last Admin    [COMPLETED] ketorolac injection 9.9 mg  9.9 mg Intramuscular ED 1 Time Emir Brambila PA-C   9.9 mg at 06/16/25 1030    levonorgestreL (MIRENA) 20 mcg/24 hours (8 yrs) 52 mg IUD 1 Intra Uterine Device  1 Intra Uterine Device Intrauterine  Juan Curiel MD   1 Intra Uterine Device at 01/03/23 1545    [COMPLETED] LIDOcaine HCL 10 mg/ml (1%) injection 10 mL  10 mL Infiltration ED 1 Time Emir Brambila PA-C   10 mL at 06/16/25 1030    [DISCONTINUED] LIDOcaine (PF) 10 mg/ml (1%) injection 7.5 mL  7.5 mL   Casimiro Orr MD   7.5 mL at 06/16/25 1031

## 2025-06-16 NOTE — DISCHARGE INSTRUCTIONS
Follow-up with your primary care provider for further evaluation  Take the prescribed Naprosyn as directed for pain. Take this with food. Do not take other NSAIDs while on this medicine (Advil, Motrin, etc).    Return to the emergency room for new, worsening, or concerning symptoms.     Future Appointments   Date Time Provider Department Center   6/16/2025  3:30 PM Karin Oneill NP [facility]

## 2025-06-16 NOTE — PROCEDURES - EMERGENCY DEPT.
Trigger Point Injection    Performed by: Casimiro Orr MD  Authorized by: Casimiro Orr MD    Trapezus:  Left    Consent Done?:  Yes (Verbal)    Pre-Procedure:   Indications:  Pain relief  Site marked: the procedure site was marked     Timeout: prior to procedure the correct patient, procedure, and site was verified        Local anesthesia used?: No    Medications: 7.5 mL LIDOcaine (PF) 10 mg/ml (1%) 10 mg/mL (1 %)     No complications or issues

## 2025-06-17 ENCOUNTER — RESULTS FOLLOW-UP (OUTPATIENT)
Dept: PRIMARY CARE CLINIC | Facility: CLINIC | Age: 40
End: 2025-06-17

## 2025-06-17 ENCOUNTER — OFFICE VISIT (OUTPATIENT)
Dept: PAIN MEDICINE | Facility: CLINIC | Age: 40
End: 2025-06-17
Payer: COMMERCIAL

## 2025-06-17 VITALS
HEART RATE: 80 BPM | BODY MASS INDEX: 24.8 KG/M2 | HEIGHT: 63 IN | WEIGHT: 140 LBS | DIASTOLIC BLOOD PRESSURE: 95 MMHG | SYSTOLIC BLOOD PRESSURE: 140 MMHG

## 2025-06-17 DIAGNOSIS — M54.2 CERVICALGIA: Primary | ICD-10-CM

## 2025-06-17 DIAGNOSIS — M54.2 NECK PAIN: ICD-10-CM

## 2025-06-17 DIAGNOSIS — M54.12 CERVICAL RADICULOPATHY: ICD-10-CM

## 2025-06-17 DIAGNOSIS — M50.30 DDD (DEGENERATIVE DISC DISEASE), CERVICAL: ICD-10-CM

## 2025-06-17 DIAGNOSIS — M54.2 NECK PAIN: Primary | ICD-10-CM

## 2025-06-17 PROCEDURE — 99204 OFFICE O/P NEW MOD 45 MIN: CPT | Mod: S$GLB,,, | Performed by: EMERGENCY MEDICINE

## 2025-06-17 RX ORDER — GABAPENTIN 300 MG/1
CAPSULE ORAL
Qty: 69 CAPSULE | Refills: 0 | Status: SHIPPED | OUTPATIENT
Start: 2025-06-17 | End: 2025-07-17

## 2025-06-17 NOTE — PROGRESS NOTES
Interventional Pain Management - New Patient Visit    Chief Complaint:  Neck pain with radiculopathy    Original HPI 06/17/2025: Madyson Calle  presents to the clinic for the evaluation of the above pain. The pain started one week ago. Original Pain Description: The pain is located in the neck area and is radiating to the left shoulder, left upper extremity, and dorsum of hand. The pain is described as numbing, tight band, and tingling. Exacerbating factors: turning the head and walking. Mitigating factors medications. Symptoms interfere with daily activity, sleeping, and work. The patient feels like symptoms have been unchanged. Patient denies significant motor weakness. The patient denies myelopathic symptoms such as handwriting changes or difficulty with buttons/coins/keys.  Patient reports that she recently started taking golf and is not sure if that is causing her hand symptoms.     PAIN SCORES:  Best: Pain is 6  Current: Pain is 6  Worst: Pain is 10    6 weeks of Conservative therapy:  PT: Not yet  Chiro:  HEP: Participating    Treatments / Medications:   Flexeril 10mg - has not taken yet   Gabapentin 300mg - stopped, was previously for numbness due to low B12  Advil 600  Robaxin 500      Antiplatelets/Anticoagulants/Immunosuppressants:  NA    Interventional Pain Procedures:   NA    IMAGING:    XR CERVICAL SPINE AP LATERAL    06/17/2025  Straightening of the normal cervical lordosis. Mild disc space height loss at C4-C5.  Facet joints are unremarkable.]    Past Surgical History:   Procedure Laterality Date    COLONOSCOPY N/A 7/8/2024    Procedure: COLONOSCOPY;  Surgeon: Nakul Beltran MD;  Location: [facility] ENDOSCOPY;  Service: Endoscopy;  Laterality: N/A;  clinic pt of dr beltran; instr via portal; peg sent to [facility]; AP  7/5 precall complete -ml    ESOPHAGOGASTRODUODENOSCOPY N/A 3/12/2024    Procedure: EGD (ESOPHAGOGASTRODUODENOSCOPY);  Surgeon: Nakul Beltran MD;  Location: [facility] ENDOSCOPY;  Service:  "Endoscopy;  Laterality: N/A;  2-20 [facility] pt; inst sent via portal [facility]  3/6-  complete. DB  3/11       Social History     Socioeconomic History    Marital status: Single   Tobacco Use    Smoking status: Never     Passive exposure: Never    Smokeless tobacco: Never   Substance and Sexual Activity    Alcohol use: Yes     Alcohol/week: 7.0 standard drinks of alcohol     Types: 7 Glasses of wine per week     Comment: socially    Drug use: Not Currently     Types: Marijuana    Sexual activity: Yes     Partners: Male     Birth control/protection: I.U.D.     Social Drivers of Health     Financial Resource Strain: Low Risk  (6/16/2025)    Overall Financial Resource Strain (CARDIA)     Difficulty of Paying Living Expenses: Not very hard   Food Insecurity: Unknown (6/16/2025)    Hunger Vital Sign     Worried About Running Out of Food in the Last Year: Never true     Ran Out of Food in the Last Year: Patient declined   Transportation Needs: No Transportation Needs (6/16/2025)    PRAPARE - Transportation     Lack of Transportation (Medical): No     Lack of Transportation (Non-Medical): No   Physical Activity: Inactive (6/16/2025)    Exercise Vital Sign     Days of Exercise per Week: 0 days     Minutes of Exercise per Session: 0 min   Stress: No Stress Concern Present (6/16/2025)    [employer] [place] - Occupational Stress Questionnaire     Feeling of Stress : Not at all   Housing Stability: Low Risk  (6/16/2025)    Housing Stability Vital Sign     Unable to Pay for Housing in the Last Year: No     Homeless in the Last Year: No       Medications/Allergies: See med card    ROS:  GENERAL: No fever. No chills. No fatigue. Denies weight loss. Denies weight gain.  Back / musculoskeletal / neuro : See HPI    VITALS:   Vitals:    06/17/25 1131   BP: (!) 140/95   Pulse: 80   Weight: 63.5 kg (139 lb 15.9 oz)   Height: 5' 3" (1.6 m)   PainSc:   6   PainLoc: Neck     Body mass index is 24.8 kg/m².      " 6/17/2025    11:41 AM   Last 3 PDI Scores   Pain Disability Index (PDI) 42       PHYSICAL EXAM:   GENERAL: Well appearing, in no acute distress, alert and oriented x3.  PSYCH:  Mood and affect appropriate.  SKIN: Skin color, texture, turgor normal, no rashes or lesions.  HEENT:  Normocephalic, atraumatic. Cranial nerves grossly intact.  NECK: Positive Spurling's to the left, negative pain with axial loading bilaterally.   PULM: No evidence of respiratory difficulty, symmetric chest rise.  GI:  Non-distended  EXTREMITIES: No deformities, edema, or skin discoloration.   MUSCULOSKELETAL: Shoulder, hip, and knee provocative maneuvers are negative. No atrophy is noted.  NEURO: Sensation is equal and appropriate bilaterally. Bilateral upper and lower extremity strength is normal and symmetric. Bilateral upper and lower extremity coordination and muscle stretch reflexes are physiologic and symmetric. Plantar response are downgoing. Straight leg raising in the supine position is negative to radicular pain. Negative Carl's bilaterally  GAIT: normal.      LABS:    Lab Results   Component Value Date    HGBA1C 5.2 04/11/2023       Lab Results   Component Value Date    CREATININE 0.8 06/17/2024         ASSESSMENT: 39 y.o. year old female with pain, consistent with:    Encounter Diagnoses   Name Primary?    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  - Patient can continue with medications for now since they are providing benefits, using them appropriately, and without side effects.  - Counseled patient regarding the importance of activity modification and physical therapy.  - Medications:Start Gabapentin 300mg qHS and  gradually increasing to TID if tolerating.   - Therapy: Referral to Physical therapy for Cervical ROM, stretching, strengthening, and a home exercise program.  - Imaging: Reviewed available imaging with patient and answered any questions they had regarding study.Order MRI of the cervical spine to help evaluate possible source pain  - The patient's pathophysiology was explained in detail with reference to x-rays, models, other visual aids as appropriate.   - Follow up visit: return to clinic in 1 week after MRI      I would like to thank Karin Oneill NP for the opportunity to assist in the care of this patient. We had a very nice visit and I look forward to continuing their care. Please let me know if I can be of further assistance.     Mitchell Holloway MD  06/17/2025

## 2025-06-18 ENCOUNTER — CLINICAL SUPPORT (OUTPATIENT)
Dept: REHABILITATION | Facility: HOSPITAL | Age: 40
End: 2025-06-18
Attending: EMERGENCY MEDICINE
Payer: COMMERCIAL

## 2025-06-18 DIAGNOSIS — M50.30 DDD (DEGENERATIVE DISC DISEASE), CERVICAL: ICD-10-CM

## 2025-06-18 DIAGNOSIS — R29.898 DECREASED RANGE OF MOTION OF NECK: Primary | ICD-10-CM

## 2025-06-18 DIAGNOSIS — M54.12 CERVICAL RADICULOPATHY: ICD-10-CM

## 2025-06-18 PROCEDURE — 97110 THERAPEUTIC EXERCISES: CPT

## 2025-06-18 PROCEDURE — 97162 PT EVAL MOD COMPLEX 30 MIN: CPT
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
## 2025-06-26 ENCOUNTER — CLINICAL SUPPORT (OUTPATIENT)
Dept: REHABILITATION | Facility: HOSPITAL | Age: 40
End: 2025-06-26
Attending: EMERGENCY MEDICINE
Payer: COMMERCIAL

## 2025-06-26 DIAGNOSIS — R29.898 DECREASED RANGE OF MOTION OF NECK: Primary | ICD-10-CM

## 2025-06-26 PROCEDURE — 97140 MANUAL THERAPY 1/> REGIONS: CPT | Performed by: PHYSICAL THERAPIST

## 2025-06-26 PROCEDURE — 97112 NEUROMUSCULAR REEDUCATION: CPT | Performed by: PHYSICAL THERAPIST

## 2025-06-26 PROCEDURE — 97110 THERAPEUTIC EXERCISES: CPT | Performed by: PHYSICAL THERAPIST

## 2025-06-26 NOTE — PROGRESS NOTES
Outpatient Rehab    Physical Therapy Visit    Patient Name: Madyson Calle  MRN: [id number]  YOB: 1985  Encounter Date: 6/26/2025    Therapy Diagnosis:   Encounter Diagnosis   Name Primary?    Decreased range of motion of neck Yes     Physician: Mitchell Holloway MD    Physician Orders: Eval and Treat  Medical Diagnosis: Cervical radiculopathy  Other cervical disc degeneration, unspecified cervical region  Surgical Diagnosis: Not applicable for this Episode   Surgical Date: Not applicable for this Episode  Days Since Last Surgery: Not applicable for this Episode    Visit # / Visits Authorized:  6 / 20  Insurance Authorization Period: 1/1/2025 to 12/31/2025  Date of Evaluation: 6/18/2025  Plan of Care Certification: 6/18/2025 to 9/18/2025      PT/PTA:     Number of PTA visits since last PT visit:   Time In: 1304   Time Out: 1400  Total Time (in minutes): 56   Total Billable Time (in minutes): 56 minutes    FOTO:  Intake Score:  %  Survey Score 2:  %  Survey Score 3:  %    Precautions:       Subjective      Pain reported as 5/10.      Objective          Special Testing:  Cervical Instability    Alar Ligament Test -   Sharp-Brendan Test -   Transverse Ligament Test    Vertebrobasilar Insufficiency      Postural Observation: Left spine of scapula located at ~T4-T5; right spine of scapula at T4. Bilateral scapular depression and downward rotation are noted with slight anterior tipping and forward head movement.    Cervical AROM Pain/Dysfunction with movement   Flexion  (45-50 degrees) 60 degrees -   Extension  (80 degrees) 40 degrees -   Right Side Bending  (40 degrees) 30 degrees -   Left Side Bending  (40 degrees) 25 degrees -   Right Rotation  (90 degrees) 50 degrees    Left Rotation  (90 degrees) 42 degrees +       Joint Mobility:   Right Left   [place]-Occipital Joint Hypomobility into Flexion Hypomobility into Flexion   C1-C2 Joint (Cervical Sidebending/Rotation) (-) (-)   C2-C3 (Cervical  "Flexion-Rotation) (-) (-)   C3-C4 Normal Normal   C4-C5 Normal Normal   C5-C6 Hypomobility + Concordant Pain Hypomobility + Concordant Pain   C6-C7 Hypomobility+ Concordant Pain Hypomobility + Concordant Pain   C7-CT1 Hypomobility        Treatment:  Therapeutic Exercise  TE 1: Cervical Assessment Measures Taken (see above)  TE 2: UBE, 5" (3 minutes backwards, 2 minutes forwards)  TE 3: Cervicothoracic Self-Mobility on Stool, 12x3-5" hold  TE 4: Upper Trapezius, Level 2, 3x5  Manual Therapy  MT 1: Supine Middle and Upper Thoracic Spine Grade V Manipulation  MT 2: C5-C6 Sideglide with MET, 4x6" hold  MT 3: Seated Cervicothoracic Extension Distraction Mobilization  Balance/Neuromuscular Re-Education  NMR 1: Deep Neck Flexor Retraining, 20-26 mmHg, 5" hold, 20x    Time Entry(in minutes):       Assessment & Plan   Assessment: Madyson demonstrates improved irritability as per what is noted on her evaluation with Dr. Jatin Law. Limitation is primarily into left rotation, that improves with both passive left scapular elevation as well as with facilitation of left-sided CTJ mobility. Supine assessment of PIVM noted C5 and C6 to be segments of greatest symptom generation. She is able to reach to 60 degrees of left sided rotation after manual intervention, and is provided home exercises to facilitate postural balance as well as joint accessory mobility.  Evaluation/Treatment Tolerance: Patient tolerated treatment well    The patient will continue to benefit from skilled outpatient physical therapy in order to address the deficits listed in the problem list on the initial evaluation, provide patient and family education, and maximize the patients level of independence in the home and community environments.     The patient's spiritual, cultural, and educational needs were considered, and the patient is agreeable to the plan of care and goals.     Education  Education was done with Patient.           -Findings of evaluation " and examination, and affect of these on plan for treatment -Prognosis and expectations -Role of PT and team-centered care for patient -Home exercise program and expectations of therapy         Plan: Continue as per plan of care.    Goals:   Active       Long Term Goals       - Pt will report no disruption of sleep due to pain for >/= 3 days       Start:  06/18/25    Expected End:  09/18/25            - Pt will have cervical range of motion WFL in all directions.       Start:  06/18/25    Expected End:  09/18/25            - Pt will demonstrate FOTO score improvement of >/= 6 points to demonstrate significant improvement in function and quality of life.       Start:  06/18/25    Expected End:  09/18/25               Short Term Goals       - Pt will demonstrate minimal limitation or neck range of motion WFL in all directions (Progressing)       Start:  06/18/25    Expected End:  08/18/25            - Pt will report decrease in worst pain of last week by 1-2 points on 10-point scale       Start:  06/18/25    Expected End:  08/18/25                Renee Villa, PT DPT  Board Certified in Orthopedic Physical Therapy

## 2025-07-01 ENCOUNTER — HOSPITAL ENCOUNTER (OUTPATIENT)
Dept: RADIOLOGY | Facility: HOSPITAL | Age: 40
Discharge: HOME | End: 2025-07-01
Attending: EMERGENCY MEDICINE
Payer: COMMERCIAL

## 2025-07-01 DIAGNOSIS — M50.30 DDD (DEGENERATIVE DISC DISEASE), CERVICAL: ICD-10-CM

## 2025-07-01 DIAGNOSIS — M54.12 CERVICAL RADICULOPATHY: ICD-10-CM

## 2025-07-01 DIAGNOSIS — M54.2 CERVICALGIA: ICD-10-CM

## 2025-07-01 PROCEDURE — 72141 MRI NECK SPINE W/O DYE: CPT | Mod: TC

## 2025-07-01 PROCEDURE — 72141 MRI NECK SPINE W/O DYE: CPT | Mod: 26,,, | Performed by: RADIOLOGY

## 2025-07-08 ENCOUNTER — OFFICE VISIT (OUTPATIENT)
Dept: PAIN MEDICINE | Facility: CLINIC | Age: 40
End: 2025-07-08
Payer: COMMERCIAL

## 2025-07-08 ENCOUNTER — TELEPHONE (OUTPATIENT)
Dept: PAIN MEDICINE | Facility: CLINIC | Age: 40
End: 2025-07-08

## 2025-07-08 VITALS
DIASTOLIC BLOOD PRESSURE: 89 MMHG | HEART RATE: 65 BPM | BODY MASS INDEX: 24.64 KG/M2 | WEIGHT: 139.13 LBS | SYSTOLIC BLOOD PRESSURE: 133 MMHG

## 2025-07-08 DIAGNOSIS — M50.30 DDD (DEGENERATIVE DISC DISEASE), CERVICAL: ICD-10-CM

## 2025-07-08 DIAGNOSIS — M54.12 CERVICAL RADICULOPATHY: Primary | ICD-10-CM

## 2025-07-08 PROCEDURE — 99214 OFFICE O/P EST MOD 30 MIN: CPT | Mod: S$GLB,,, | Performed by: EMERGENCY MEDICINE

## 2025-07-08 PROCEDURE — 4010F ACE/ARB THERAPY RXD/TAKEN: CPT | Mod: CPTII,S$GLB,, | Performed by: EMERGENCY MEDICINE

## 2025-07-08 PROCEDURE — 1159F MED LIST DOCD IN RCRD: CPT | Mod: CPTII,S$GLB,, | Performed by: EMERGENCY MEDICINE

## 2025-07-08 PROCEDURE — 3079F DIAST BP 80-89 MM HG: CPT | Mod: CPTII,S$GLB,, | Performed by: EMERGENCY MEDICINE

## 2025-07-08 PROCEDURE — 99999 PR PBB SHADOW E&M-EST. PATIENT-LVL III: CPT | Mod: PBBFAC,,, | Performed by: EMERGENCY MEDICINE

## 2025-07-08 PROCEDURE — 3075F SYST BP GE 130 - 139MM HG: CPT | Mod: CPTII,S$GLB,, | Performed by: EMERGENCY MEDICINE

## 2025-07-08 PROCEDURE — 3008F BODY MASS INDEX DOCD: CPT | Mod: CPTII,S$GLB,, | Performed by: EMERGENCY MEDICINE

## 2025-07-08 NOTE — H&P (VIEW-ONLY)
Interventional Pain Management - Established       Chief Complaint:  Neck pain with radiculopathy    Interval History 07/08/2025:  Since their last visit the pain has been unchanged. Patient was started on gabapentin  during their last visit and they have been taking it, but she did not like how it made her feel.  Patient was referred to physical therapy for her neck and participating in physical therapy and are participating in home exercise plan. She reports that the pain is 3-4/10.     Original HPI 06/17/2025: Madyson Calle  presents to the clinic for the evaluation of the above pain. The pain started one week ago. Original Pain Description: The pain is located in the neck area and is radiating to the left shoulder, left upper extremity, and dorsum of hand. The pain is described as numbing, tight band, and tingling. Exacerbating factors: turning the head and walking. Mitigating factors medications. Symptoms interfere with daily activity, sleeping, and work. The patient feels like symptoms have been unchanged. Patient denies significant motor weakness. The patient denies myelopathic symptoms such as handwriting changes or difficulty with buttons/coins/keys.  Patient reports that she recently started taking golf and is not sure if that is causing her hand symptoms.     PAIN SCORES:  Best: Pain is 6  Current: Pain is 6  Worst: Pain is 10    6 weeks of Conservative therapy:  PT: Not yet  Chiro:  HEP: Participating    Treatments / Medications:   Flexeril 10mg - has not taken yet   Gabapentin 300mg - stopped, was previously for numbness due to low B12  Advil 600  Robaxin 500      Antiplatelets/Anticoagulants/Immunosuppressants:  NA    Interventional Pain Procedures:   NA    IMAGING:    MRI CERVICAL SPINE WITHOUT CONTRAST  07/01/2025  C3-C4: Small PDOC.  C4-C5: Small PDOC.   C5-C6: PDOC->mild spinal canal stenosis w/mild effacement of left lateral recess.    XR CERVICAL SPINE AP  LATERAL    06/17/2025  Straightening of the normal cervical lordosis. Mild disc space height loss at C4-C5.  Facet joints are unremarkable.]    Medications/Allergies: See med card    ROS:  GENERAL: No fever. No chills. No fatigue. Denies weight loss. Denies weight gain.  Back / musculoskeletal / neuro : See HPI    VITALS:   Vitals:    07/08/25 1403   BP: 133/89   Pulse: 65   Weight: 63.1 kg (139 lb 1.8 oz)   PainSc:   3   PainLoc: Neck       Body mass index is 24.64 kg/m².      7/8/2025     2:06 PM 6/17/2025    11:41 AM   Last 3 PDI Scores   Pain Disability Index (PDI) 21 42       PHYSICAL EXAM:   GENERAL: Well appearing, in no acute distress, alert and oriented x3.  PSYCH:  Mood and affect appropriate.  SKIN: Skin color, texture, turgor normal, no rashes or lesions.  HEENT:  Normocephalic, atraumatic. Cranial nerves grossly intact.  NECK: Positive Spurling's to the left, negative pain with axial loading bilaterally.   PULM: No evidence of respiratory difficulty, symmetric chest rise.  GI:  Non-distended  EXTREMITIES: No deformities, edema, or skin discoloration.   MUSCULOSKELETAL: Shoulder, hip, and knee provocative maneuvers are negative. No atrophy is noted.  NEURO: Sensation is equal and appropriate bilaterally. Bilateral upper and lower extremity strength is normal and symmetric. Bilateral upper and lower extremity coordination and muscle stretch reflexes are physiologic and symmetric. Plantar response are downgoing. Straight leg raising in the supine position is negative to radicular pain. Negative Carl's bilaterally  GAIT: normal.      LABS:    Lab Results   Component Value Date    HGBA1C 5.2 04/11/2023       Lab Results   Component Value Date    CREATININE 0.8 06/17/2024       ASSESSMENT: 39 y.o. year old female with pain, consistent with:    Encounter Diagnoses   Name Primary?    Cervical radiculopathy Yes    DDD (degenerative disc disease), cervical        DISCUSSION: Madyson Calle is a  at a  financial office who comes to us with chronic neck pain with radiculopathy.      PLAN:  - I have stressed the importance of physical activity and a home exercise plan to help with pain and improve health.  - Patient can continue with medications for now since they are providing benefits, using them appropriately, and without side effects.  - Counseled patient regarding the importance of activity modification and physical therapy.  - Interventions: Schedule for cervical interlaminar epidural steroid injection at C7-T1 to help with pain and to progress with a home exercise program. Explained the risks and benefits of the procedure in detail with the patient today in clinic along with alternative treatment options, and the patient elected to pursue the intervention at this time.   - Imaging: Reviewed available imaging with patient and answered any questions they had regarding study.  - The patient's pathophysiology was explained in detail with reference to x-rays, models, other visual aids as appropriate.   - Follow up visit: return to clinic in 3-4 weeks after procedure     Mitchell Holloway MD  07/08/2025

## 2025-07-08 NOTE — TELEPHONE ENCOUNTER
----- Message from Mitchell Holloway MD sent at 2025  2:21 PM CDT -----  Regarding: Order for JON ADDISON    Patient Name: JON ADDISON([id number])  Sex: Female  : 1985      PCP: ALANA VARELA    Center: [facility]     Types of orders made on 2025: Procedure Request    Order Date:2025  Ordering User:MITCHELL HOLLOWAY [[id number]]  Encounter Provider:Mitchell Holloway MD [[id number]]  Authorizing Provider: Mitchell Holloway MD [[id number]]  Department:[facility][[id number]]    Common Order Information  Procedure -> Epidural Injection (specify level) Cmt: C7/T1 aim left    Order Specific Information  Order: Procedure Request Order for Pain Management [Custom: [id number]]  Order #:          [id number]: 1 FUTURE    Priority: Routine  Class: Clinic Performed    Future Order Information      Expires on:2026            Expected by:2025                   Associated Diagnoses      M54.12 Cervical radiculopathy      M50.30 DDD (degenerative disc disease), cervical      Physician -> Jewel         Facility Name: -> [facility]           Priority: Routine  Class: Clinic Performed    Future Order Information      Expires on:2026            Expected by:2025                   Associated Diagnoses      M54.12 Cervical radiculopathy      M50.30 DDD (degenerative disc disease), cervical      Procedure -> Epidural Injection (specify level) Cmt: C7/T1 aim left        Physician -> Jewel         Facility Name: -> [facility]

## 2025-07-14 ENCOUNTER — CLINICAL SUPPORT (OUTPATIENT)
Dept: REHABILITATION | Facility: HOSPITAL | Age: 40
End: 2025-07-14
Attending: EMERGENCY MEDICINE
Payer: COMMERCIAL

## 2025-07-14 ENCOUNTER — TELEPHONE (OUTPATIENT)
Dept: PAIN MEDICINE | Facility: CLINIC | Age: 40
End: 2025-07-14
Payer: COMMERCIAL

## 2025-07-14 DIAGNOSIS — M50.30 DDD (DEGENERATIVE DISC DISEASE), CERVICAL: ICD-10-CM

## 2025-07-14 DIAGNOSIS — M54.12 CERVICAL RADICULOPATHY: ICD-10-CM

## 2025-07-14 DIAGNOSIS — R29.898 DECREASED RANGE OF MOTION OF NECK: Primary | ICD-10-CM

## 2025-07-14 PROCEDURE — 97110 THERAPEUTIC EXERCISES: CPT | Performed by: PHYSICAL THERAPIST

## 2025-07-14 PROCEDURE — 97140 MANUAL THERAPY 1/> REGIONS: CPT | Performed by: PHYSICAL THERAPIST

## 2025-07-14 PROCEDURE — 97112 NEUROMUSCULAR REEDUCATION: CPT | Performed by: PHYSICAL THERAPIST

## 2025-07-14 NOTE — PROGRESS NOTES
Outpatient Rehab  Physical Therapy Visit    Patient Name: Madyson Calle  MRN: [id number]  YOB: 1985  Encounter Date: 7/14/2025    Therapy Diagnosis:   Encounter Diagnoses   Name Primary?    Decreased range of motion of neck Yes    DDD (degenerative disc disease), cervical     Cervical radiculopathy        Physician: Mitchell Holloway MD    Physician Orders: Eval and Treat  Medical Diagnosis: Cervical radiculopathy  Other cervical disc degeneration, unspecified cervical region  Surgical Diagnosis: Not applicable for this Episode   Surgical Date: Not applicable for this Episode  Days Since Last Surgery: Not applicable for this Episode    Visit # / Visits Authorized:  7 / 20  Insurance Authorization Period: 1/1/2025 to 12/31/2025  Date of Evaluation: 6/18/2025  Plan of Care Certification: 6/18/2025 to 9/18/2025      PT/PTA:     Number of PTA visits since last PT visit:   Time In: 1307   Time Out: 1400  Total Time (in minutes): 53   Total Billable Time (in minutes): 53 minutes    FOTO:  Intake Score:  %  Survey Score 2:  %  Survey Score 3:  %    Precautions:       Subjective   Madyson states that she is not having a good day; she states that the left side of her neck from the base of her skull to the edge of her shoulder (acromion) is in pain, and she thinks that it may have something to do with how she is sleeping..  Pain reported as 4/10.      Objective          Special Testing:    Postural Observation: Left spine of scapula located at ~T4-T5; right spine of scapula at T4. Bilateral scapular depression and downward rotation are noted with slight anterior tipping and forward head movement.    Cervical AROM Pain/Dysfunction with movement   Right Rotation  (90 degrees) 60 degrees    Left Rotation  (90 degrees) 50 degrees +       Joint Mobility:   Right Left   Atlanto-Occipital Joint Hypomobility into Flexion Hypomobility into Flexion   C1-C2 Joint (Cervical Sidebending/Rotation) (-) (-)   C2-C3 (Cervical  "Flexion-Rotation) (-) (-)   C3-C4 Normal Normal   C4-C5 Normal Normal   C5-C6 Hypomobility + Concordant Pain Hypomobility + Concordant Pain   C6-C7 Hypomobility+ Concordant Pain Hypomobility + Concordant Pain   C7-CT1 Hypomobility        Treatment:  Therapeutic Exercise  TE 1: Cervical Assessment Measures Taken (see above)  TE 2: UBE, 6" (3 minutes backwards, 2 minutes forwards)  TE 3: Cervicothoracic Self-Mobility on Stool, 12x3-5" hold  TE 4: Sleeping Modifications with Pillows  Manual Therapy  MT 1: Supine Middle and Upper Thoracic Spine Grade V Manipulation  MT 2: C5-C6 Sideglide with MET, 4x6" hold  MT 3: Seated Cervicothoracic Extension Distraction Mobilization  Balance/Neuromuscular Re-Education  NMR 1: Deep Neck Flexor Retraining, 20-26 mmHg, 5" hold, 20x  NMR 2: Upper Trapezius, Level 2, 3x5  NMR 3: Pivot Prone at Wall, 5x5 pulses at wall.    Time Entry(in minutes):       Assessment & Plan   Assessment: Madyson demonstrates limitation that is improved from last visit but still is primarily into left rotation, that improves with both passive left scapular elevation as well as with facilitation of left-sided CTJ mobility. Supine assessment of PIVM noted C5 and C6 to be segments of greatest symptom generation. She is able to reach to 60 degrees of left sided rotation after manual intervention, and is provided home exercises to facilitate postural balance as well as joint accessory mobility, as well as provided with education on sleeping modifications.  Evaluation/Treatment Tolerance: Patient tolerated treatment well    The patient will continue to benefit from skilled outpatient physical therapy in order to address the deficits listed in the problem list on the initial evaluation, provide patient and family education, and maximize the patients level of independence in the home and community environments.     The patient's spiritual, cultural, and educational needs were considered, and the patient is agreeable " to the plan of care and goals.             Plan: Continue as per plan of care.    Goals:   Active       Long Term Goals       - Pt will report no disruption of sleep due to pain for >/= 3 days (Progressing)       Start:  06/18/25    Expected End:  09/18/25            - Pt will have cervical range of motion WFL in all directions. (Progressing)       Start:  06/18/25    Expected End:  09/18/25            - Pt will demonstrate FOTO score improvement of >/= 6 points to demonstrate significant improvement in function and quality of life. (Progressing)       Start:  06/18/25    Expected End:  09/18/25               Short Term Goals       - Pt will demonstrate minimal limitation or neck range of motion WFL in all directions (Progressing)       Start:  06/18/25    Expected End:  08/18/25            - Pt will report decrease in worst pain of last week by 1-2 points on 10-point scale (Progressing)       Start:  06/18/25    Expected End:  08/18/25                  Renee Villa, PT DPT  Board Certified in Orthopedic Physical Therapy

## 2025-07-16 ENCOUNTER — TELEPHONE (OUTPATIENT)
Dept: PAIN MEDICINE | Facility: CLINIC | Age: 40
End: 2025-07-16
Payer: COMMERCIAL

## 2025-07-17 NOTE — DISCHARGE INSTRUCTIONS
[facility] [place]  Dr. Mitchell Holloway  Messaging service # [phone]    POST-PROCEDURE INSTRUCTIONS:    Today you had an injection that included a steroid medications.  The steroid may or may not have been mixed with a local anesthetic when it was injected.   If the injection was in the neck, you may feel some pressure, numbness, or slight weakness in the arm after the procedure for a short period of time (this is a normal response), if this persists for longer than 1 day please contact our office or go to the emergency room.  If the injection was in the low back, you may feel some pressure, numbness, or slight weakness in the leg after the procedure for a short period of time (this is a normal response), if this persists for longer than 1 day please contact our office or go to the emergency room.  You may get side effects from the steroid.  This is not uncommon.  Symptoms include: elevated blood sugar, elevated blood pressure, headache, flushing, nausea, insomnia.  These symptoms are transient and will resolve within 1-3 days.  If symptoms last longer than this please contact our office or head to the emergency room.  Steroid medications can take anywhere from 3-14 days to take effect (rarely longer).  You may notice that your pain worsens for a short period of time after the injection, this would not be unusual due to the pressure and trauma from the needle.    If you do not have a follow up appointment scheduled, please contact my office (or the office of the physician who referred you for the procedure) to get a post-procedure follow up scheduled 2-4 weeks after the procedure.  This can be done as a virtual visit if that is more convenient for you.      What you need to do:    Keep a record of your response to the injection you had today.    How much relief did you get?   When did the relief start and how long did it last?  Were you able to decrease the use of any of your pain  medications?  Were you able to increase your level of activity?  How long did the relief last?    What to watch out for:    If you experience any of the following symptoms after your procedure, please notify the messaging service immediately (see above for contact information):   fever (increased oral temperature)   bleeding or swelling at the injection site,    drainage, rash or redness at the injection site    possible signs of infection    increased pain at the injection site   worsening of your usual pain   severe headache   new or worsening numbness    new arm and/or leg weakness, or    changes in bowel and/or bladder function: urinating or defecating on yourself and not knowing that you did it.    PLEASE FOLLOW ALL INSTRUCTIONS CAREFULLY     Do not engage in strenuous activity (e.g., lifting or pushing heavy objects or repeated bending) for 24 hours.     Do not take a bath, swim or use Jacuzzi for 24 hours after procedure. (A shower is fine).   Remove any Band-Aids when you get home.    Use cold/ice, as needed for comfort.  We recommend the use of cold therapy alternating on for 20 minutes, off for 20 minutes.    Do not apply direct heat (heating pad or heat packs) to the injection site for 24 hours.     Resume your usual medications, unless instructed otherwise by your Pain Physician.     If you are on warfarin (Coumadin) or other blood thinner, resume this medication as instructed by your prescribing Physician.    IF AT ANY POINT YOU ARE VERY CONCERNED ABOUT YOUR SYMPTOMS, or you have an urgent or emergent issue after between 5pm and 7am or on weekends, please contact  the on call provider at [phone].    If you develop worsening pain, weakness, numbness, lose bowel or bladder control (i.e., having an accident where you did not even know you had to go to the bathroom and suddenly noticed you soiled yourself), saddle anesthesia (a loss of sensation restricted to the area of the buttocks, anus and between  the legs -- i.e., those parts of your body that would touch a saddle if you were sitting on one) you need to go immediately to the emergency department for evaluation and treatment.    ----------------------------------------------------------------------------------------------------------------------------------------------------------------  If you received Sedation please read the following instructions:  POST SEDATION INSTRUCTIONS    Today you received intravenous medication (also known as sedation) that was used to help you relax and/or decrease discomfort during your procedure. This medication will be acting in your body for the next 24 hours, so you might feel a little tired or sleepy. This feeling will slowly wear off.   Common side effects associated with these medications include: drowsiness, dizziness, sleepiness, confusion, feeling excited, difficulty remembering things, lack of steadiness with walking or balance, loss of fine muscle control, slowed reflexes, difficulty focusing, and blurred vision.  Some over-the-counter and prescription medications (e.g., muscle relaxants, opioids, mood-altering medications, sedatives/hypnotics, antihistamines) can interact with the intravenous medication you received and cause an increased risk of the side effects listed above in addition to other potentially life threatening side effects. Use extreme caution if you are taking such medications, and consult with your Pain Physician or prescribing physician if you have any questions.  For the next 12-24 hours:    DO NOT--Drive a car, operate machinery or power tools   DO NOT--Drink any alcoholic beverages (not even beer), they may dangerously increase the risk of side effects.    DO NOT--Make any important legal or business decisions or sign important documents.  We advise you to have someone to assist you at home. Move slowly and carefully. Do not make sudden changes in position. Be aware of dizziness or  light-headedness and move accordingly.   If you seek medical treatment within 24 hours, let the nurse or doctor caring for you know that you have received the above medications. If you have any questions or concerns related to your sedation or treatment today please contact us.

## 2025-07-21 ENCOUNTER — HOSPITAL ENCOUNTER (OUTPATIENT)
Facility: HOSPITAL | Age: 40
Discharge: HOME | End: 2025-07-21
Attending: EMERGENCY MEDICINE | Admitting: EMERGENCY MEDICINE
Payer: COMMERCIAL

## 2025-07-21 VITALS
WEIGHT: 139 LBS | DIASTOLIC BLOOD PRESSURE: 79 MMHG | RESPIRATION RATE: 17 BRPM | BODY MASS INDEX: 24.63 KG/M2 | HEIGHT: 63 IN | TEMPERATURE: 98 F | OXYGEN SATURATION: 99 % | HEART RATE: 73 BPM | SYSTOLIC BLOOD PRESSURE: 143 MMHG

## 2025-07-21 DIAGNOSIS — G89.29 CHRONIC PAIN: ICD-10-CM

## 2025-07-21 LAB
B-HCG UR QL: NEGATIVE
CTP QC/QA: YES

## 2025-07-21 PROCEDURE — 25500020 PHARM REV CODE 255: Performed by: EMERGENCY MEDICINE

## 2025-07-21 PROCEDURE — 62321 NJX INTERLAMINAR CRV/THRC: CPT | Mod: ,,, | Performed by: EMERGENCY MEDICINE

## 2025-07-21 PROCEDURE — 81025 URINE PREGNANCY TEST: CPT | Performed by: EMERGENCY MEDICINE

## 2025-07-21 PROCEDURE — 63600175 PHARM REV CODE 636 W HCPCS: Performed by: EMERGENCY MEDICINE

## 2025-07-21 PROCEDURE — 62321 NJX INTERLAMINAR CRV/THRC: CPT | Performed by: EMERGENCY MEDICINE

## 2025-07-21 RX ORDER — LIDOCAINE HYDROCHLORIDE 20 MG/ML
INJECTION, SOLUTION EPIDURAL; INFILTRATION; INTRACAUDAL; PERINEURAL
Status: DISCONTINUED | OUTPATIENT
Start: 2025-07-21 | End: 2025-07-21 | Stop reason: HOSPADM

## 2025-07-21 RX ORDER — FENTANYL CITRATE 50 UG/ML
INJECTION, SOLUTION INTRAMUSCULAR; INTRAVENOUS
Status: DISCONTINUED | OUTPATIENT
Start: 2025-07-21 | End: 2025-07-21 | Stop reason: HOSPADM

## 2025-07-21 RX ORDER — MIDAZOLAM HYDROCHLORIDE 1 MG/ML
INJECTION INTRAMUSCULAR; INTRAVENOUS
Status: DISCONTINUED | OUTPATIENT
Start: 2025-07-21 | End: 2025-07-21 | Stop reason: HOSPADM

## 2025-07-21 RX ORDER — DEXAMETHASONE SODIUM PHOSPHATE 10 MG/ML
INJECTION, SOLUTION INTRA-ARTICULAR; INTRALESIONAL; INTRAMUSCULAR; INTRAVENOUS; SOFT TISSUE
Status: DISCONTINUED | OUTPATIENT
Start: 2025-07-21 | End: 2025-07-21 | Stop reason: HOSPADM

## 2025-07-21 NOTE — PLAN OF CARE
Bandage(s) are intact to the procedural site. Scant to no drainage noted. No edema or skin discoloration outside of skin antiseptic noted in perimeter of site.  Pt. Instructed to keep area dry and free from heat for 24 hrs, but may apply ice packs for 20 minutes as needed.      Discharge instructions given and explained to patient with verbalization of understanding all instructions. Patients v/s stable, denies n/v and tolerating po, rates pain level tolerable, IV removed, and ready for patient discharge home.

## 2025-07-21 NOTE — PLAN OF CARE
Pt states pain was spasming on both sides of neck and upper back prior to ER visit but now predominantly on left side.

## 2025-07-21 NOTE — OP NOTE
Cervical Interlaminar Epidural Steroid Injection under Fluoroscopic Guidance    The procedure, risks, benefits, and options were discussed with the patient. There are no contraindications to the procedure. The patent expressed understanding and agreed to the procedure. Informed written consent was obtained prior to the start of the procedure and can be found in the patient's chart.     PATIENT NAME: Madyson Calle   MRN: [id number]     DATE OF PROCEDURE: 07/21/2025    PROCEDURE: Cervical Interlaminar Epidural Steroid Injection C7/T1 under Fluoroscopic Guidance    PRE-OP DIAGNOSIS: Cervical radiculopathy [M54.12]  DDD (degenerative disc disease), cervical [M50.30] Cervical radiculopathy [M54.12]    POST-OP DIAGNOSIS: Same    PHYSICIAN: Mitchell Holloway MD     MEDICATIONS INJECTED: Preservative-free Decadron 10mg with 3cc of preservative free normal saline    LOCAL ANESTHETIC INJECTED: Xylocaine 2%     SEDATION: Versed 2mg and Fentanyl 50mcg                                                                                                                                                                                     Conscious sedation ordered by M.D. Patient re-evaluation prior to administration of conscious sedation. No changes noted in patient's status from initial evaluation. The patient's vital signs were monitored by RN and patient remained hemodynamically stable throughout the procedure.    Event Time In   Sedation Start 1234   Sedation End 1242       ESTIMATED BLOOD LOSS: None    COMPLICATIONS: None    TECHNIQUE: Time-out was performed to identify the patient and procedure to be performed. With the patient laying in a prone position, the surgical area was prepped and draped in the usual sterile fashion using ChloraPrep and a fenestrated drape. The level was determined under fluoroscopy guidance. Skin anesthesia was achieved by injecting Lidocaine 2% over the injection site.  The interlaminar space was then  approached with a 20 gauge, 3.5 inch Tuohy needle that was introduced under fluoroscopic guidance with AP, lateral and/or contralateral oblique imaging. Once the Ligamentum flavum was encountered loss of resistance to saline was used to enter the epidural space. With positive loss of resistance and negative aspiration for CSF or Blood, contrast dye  Omnipaque (300mg/mL) was injected to confirm placement and there was no vascular runoff. Then 4 mL of the medication mixture listed above was then injected slowly. Displacement of the radio opaque contrast after injection of the medication confirmed that the medication went into the area of the epidural space. The needles were removed, and bleeding was nil. A sterile dressing was applied. No specimens collected. The patient tolerated the procedure well.     The patient was monitored after the procedure in the recovery area. They were given post-procedure and discharge instructions to follow at home. The patient was discharged in a stable condition.    Mitchell Holloway MD

## 2025-07-28 ENCOUNTER — TELEPHONE (OUTPATIENT)
Dept: PHARMACY | Facility: CLINIC | Age: 40
End: 2025-07-28
Payer: COMMERCIAL

## 2025-07-29 DIAGNOSIS — I10 HYPERTENSION, UNSPECIFIED TYPE: ICD-10-CM

## 2025-07-29 RX ORDER — VALSARTAN 160 MG/1
160 TABLET ORAL DAILY
Qty: 90 TABLET | Refills: 3 | Status: SHIPPED | OUTPATIENT
Start: 2025-07-29

## 2025-07-29 NOTE — TELEPHONE ENCOUNTER
Medication Adherence [facility]     I am reaching out to request a refill authorization for Ms. Calle for their prescribed medication listed below.     Hypertension Medications                        valsartan (DIOVAN) 160 MG tablet Take 1 tablet (160 mg total) by mouth once daily.                Please send a refill over to [facility]. Let me know if any additional information is needed.     Khushbu Pickett  Clinical Pharmacist, [facility]     Encounter Provider ALANA VARELA is live with [facility]:

## 2025-07-29 NOTE — TELEPHONE ENCOUNTER
[facility] Chart Review & Patient Outreach Details For Medication Adherence Project    Reason for Outreach Encounter: Follow up to a previous patient outreach  2.  Patient Outreach Method: Reviewed patient chart  and [employer] message  3.   Medication in question:    Hypertension Medications              amLODIPine (NORVASC) 5 MG tablet Take 1 tablet (5 mg total) by mouth once daily.    metoprolol succinate (TOPROL-XL) 50 MG 24 hr tablet Take 1 tablet (50 mg total) by mouth once daily.    valsartan (DIOVAN) 160 MG tablet Take 1 tablet (160 mg total) by mouth once daily.                  4.  Reviewed and or Updates Made To: Patient Chart  5. Outreach Outcomes and/or actions taken: Patient requested refill to be sent to their pharmacy  Additional Notes:

## 2025-07-29 NOTE — TELEPHONE ENCOUNTER
[facility] Chart Review & Patient Outreach Details For Medication Adherence Project    Reason for Outreach Encounter: 3rd Party payor non-compliance report ([employer], [employer], C, etc)  2.  Patient Outreach Method: Submittablehart message  3.   Medication in question: valsartan   LAST FILLED: 2/12/25 for 90 day supply  Hypertension Medications              amLODIPine (NORVASC) 5 MG tablet Take 1 tablet (5 mg total) by mouth once daily.    metoprolol succinate (TOPROL-XL) 50 MG 24 hr tablet Take 1 tablet (50 mg total) by mouth once daily.    valsartan (DIOVAN) 160 MG tablet Take 1 tablet (160 mg total) by mouth once daily.              4.  Reviewed and or Updates Made To: Patient Chart  5. Outreach Outcomes and/or actions taken: Sent inquiry to patient: Waiting for response.

## 2025-08-05 ENCOUNTER — CLINICAL SUPPORT (OUTPATIENT)
Dept: REHABILITATION | Facility: HOSPITAL | Age: 40
End: 2025-08-05
Attending: EMERGENCY MEDICINE
Payer: COMMERCIAL

## 2025-08-05 DIAGNOSIS — M50.30 DDD (DEGENERATIVE DISC DISEASE), CERVICAL: ICD-10-CM

## 2025-08-05 DIAGNOSIS — R29.898 DECREASED RANGE OF MOTION OF NECK: Primary | ICD-10-CM

## 2025-08-05 DIAGNOSIS — M54.12 CERVICAL RADICULOPATHY: ICD-10-CM

## 2025-08-05 PROCEDURE — 97110 THERAPEUTIC EXERCISES: CPT | Performed by: PHYSICAL THERAPIST

## 2025-08-05 PROCEDURE — 97140 MANUAL THERAPY 1/> REGIONS: CPT | Performed by: PHYSICAL THERAPIST

## 2025-08-05 PROCEDURE — 97112 NEUROMUSCULAR REEDUCATION: CPT | Performed by: PHYSICAL THERAPIST

## 2025-08-05 NOTE — PROGRESS NOTES
"  Outpatient Rehab  Physical Therapy Visit    Patient Name: Madyson Calle  MRN: [id number]  YOB: 1985  Encounter Date: 8/5/2025    Therapy Diagnosis:   Encounter Diagnoses   Name Primary?    Decreased range of motion of neck Yes    DDD (degenerative disc disease), cervical     Cervical radiculopathy      Physician: Mitchell Holloway MD  Physician Orders: Eval and Treat  Medical Diagnosis: Cervical radiculopathy  Other cervical disc degeneration, unspecified cervical region  Surgical Diagnosis: Not applicable for this Episode   Surgical Date: Not applicable for this Episode  Days Since Last Surgery: Not applicable for this Episode  Visit # / Visits Authorized:  8 / 20  Insurance Authorization Period: 1/1/2025 to 12/31/2025  Date of Evaluation: 6/18/2025  Plan of Care Certification: 6/18/2025 to 9/18/2025      PT/PTA:     Number of PTA visits since last PT visit:   Time In: 1310   Time Out: 1400  Total Time (in minutes): 50   Total Billable Time (in minutes): 50 minutes    FOTO:  Intake Score:  %  Survey Score 2:  %  Survey Score 3:  %    Precautions: Standard     Standard      Subjective   Madyson state that since undergoing injection, (C7-T1 on 7/21) and since then her pain has bee more managable; she states that she no longer has "shooting, referred pain" but that the "soreness is still there." She states that her biggest factor for soreness, she feels is sleeping and still gets this pain even with the sleeping modifications provided by PT..  Pain reported as 2/10.      Objective          Special Testing:    Postural Observation: Left spine of scapula located at ~T4-T5; right spine of scapula at T4. Bilateral scapular depression and downward rotation are noted with slight anterior tipping and forward head movement.    Cervical AROM Pain/Dysfunction with movement   Cervical Extension  Painful; Improved with facilitation of C6-C7 and C7-T1   Right Rotation  (90 degrees) 60 degrees    Left Rotation  (90 " "degrees) 50 degrees +       Joint Mobility:   Right Left   C6-C7 Hypomobility+ Concordant Pain Hypomobility + Concordant Pain   C7-CT1  Hypomobility + Concordant Pain     Middle Trapezius 3+/5 3-/5   Lower Trapezius 3+/5 3-/5     Treatment:  Therapeutic Exercise  TE 1: Cervical Assessment Measures Taken (see above)  TE 2: NuStep, 5 minutes, primary use of upper extremities  TE 3: Cervicothoracic Self-Mobility Butterflies, 10x3-5" hold  TE 4: Self-Thoracic Extension Mobilizations with Full Foam Roller, 15x3" hold  Manual Therapy  MT 1: Supine Middle and Upper Thoracic Spine Grade V Manipulation  MT 2: Seated Cervicothoracic Unilateral Mobilization, left side (C6-C7 and C7-T1)  Balance/Neuromuscular Re-Education  NMR 1: Deep Neck Flexor Retraining, 20-24 mmHg, 5" hold, 20x  NMR 2: Upper Trapezius + Wall Slides, Level 2, 3x5  NMR 3: Prone Middle Trapezius, Level 1, 2x12  NMR 4: Prone Lower Trapezius, Level 1, 2x12    Time Entry(in minutes):       Assessment & Plan   Assessment: Madyson has undergone 3 visits in the most recent episode of care for her left-sided neck pain, with findings consistent with C7-T1 cervicothoracic junction dysfunction. She demonstrates improved cervical rotation range of motion with facilitation of C7-T1 this visit and this continues to be addressed with manual therapy, as well as motor coordination retraining of deep neck flexors, and parascapular activation. Madyson will benefit from a customized physical therapy plan of care  to address the aforementioned impairments in an effort to improve human function and quality of life.   Evaluation/Treatment Tolerance: Patient tolerated treatment well    The patient will continue to benefit from skilled outpatient physical therapy in order to address the deficits listed in the problem list on the initial evaluation, provide patient and family education, and maximize the patients level of independence in the home and community environments.     The " patient's spiritual, cultural, and educational needs were considered, and the patient is agreeable to the plan of care and goals.             Plan: Continue as per plan of care.    Goals:   Active       Long Term Goals       - Pt will report no disruption of sleep due to pain for >/= 3 days (Progressing)       Start:  06/18/25    Expected End:  09/18/25            - Pt will have cervical range of motion WFL in all directions. (Progressing)       Start:  06/18/25    Expected End:  09/18/25            - Pt will demonstrate FOTO score improvement of >/= 6 points to demonstrate significant improvement in function and quality of life. (Progressing)       Start:  06/18/25    Expected End:  09/18/25               Short Term Goals       - Pt will demonstrate minimal limitation or neck range of motion WFL in all directions (Progressing)       Start:  06/18/25    Expected End:  08/18/25            - Pt will report decrease in worst pain of last week by 1-2 points on 10-point scale (Progressing)       Start:  06/18/25    Expected End:  08/18/25                    Renee Villa PT DPT  Board Certified in Orthopedic Physical Therapy

## 2025-08-08 ENCOUNTER — LAB VISIT (OUTPATIENT)
Dept: LAB | Facility: HOSPITAL | Age: 40
End: 2025-08-08
Attending: NURSE PRACTITIONER
Payer: COMMERCIAL

## 2025-08-08 ENCOUNTER — OFFICE VISIT (OUTPATIENT)
Dept: PRIMARY CARE CLINIC | Facility: CLINIC | Age: 40
End: 2025-08-08
Payer: COMMERCIAL

## 2025-08-08 VITALS
OXYGEN SATURATION: 99 % | HEIGHT: 63 IN | BODY MASS INDEX: 24.5 KG/M2 | SYSTOLIC BLOOD PRESSURE: 112 MMHG | WEIGHT: 138.25 LBS | HEART RATE: 60 BPM | DIASTOLIC BLOOD PRESSURE: 82 MMHG

## 2025-08-08 DIAGNOSIS — E53.8 VITAMIN B12 DEFICIENCY: ICD-10-CM

## 2025-08-08 DIAGNOSIS — Z00.00 ROUTINE MEDICAL EXAM: Primary | ICD-10-CM

## 2025-08-08 DIAGNOSIS — R53.83 FATIGUE, UNSPECIFIED TYPE: ICD-10-CM

## 2025-08-08 DIAGNOSIS — N76.0 ACUTE VAGINITIS: ICD-10-CM

## 2025-08-08 DIAGNOSIS — Z00.00 ROUTINE MEDICAL EXAM: ICD-10-CM

## 2025-08-08 DIAGNOSIS — R11.0 NAUSEA: ICD-10-CM

## 2025-08-08 LAB
ABSOLUTE EOSINOPHIL (OHS): 0.05 K/UL
ABSOLUTE MONOCYTE (OHS): 0.5 K/UL (ref 0.3–1)
ABSOLUTE NEUTROPHIL COUNT (OHS): 2.05 K/UL (ref 1.8–7.7)
ALBUMIN SERPL BCP-MCNC: 4.3 G/DL (ref 3.5–5.2)
ALP SERPL-CCNC: 33 UNIT/L (ref 40–150)
ALT SERPL W/O P-5'-P-CCNC: 21 UNIT/L (ref 0–55)
ANION GAP (OHS): 9 MMOL/L (ref 8–16)
AST SERPL-CCNC: 25 UNIT/L (ref 0–50)
BASOPHILS # BLD AUTO: 0.04 K/UL
BASOPHILS NFR BLD AUTO: 0.8 %
BILIRUB SERPL-MCNC: 0.6 MG/DL (ref 0.1–1)
BUN SERPL-MCNC: 13 MG/DL (ref 6–20)
CALCIUM SERPL-MCNC: 9.2 MG/DL (ref 8.7–10.5)
CHLORIDE SERPL-SCNC: 108 MMOL/L (ref 95–110)
CHOLEST SERPL-MCNC: 152 MG/DL (ref 120–199)
CHOLEST/HDLC SERPL: 3.3 {RATIO} (ref 2–5)
CO2 SERPL-SCNC: 21 MMOL/L (ref 23–29)
CREAT SERPL-MCNC: 0.9 MG/DL (ref 0.5–1.4)
EAG (OHS): 100 MG/DL (ref 68–131)
ERYTHROCYTE [DISTWIDTH] IN BLOOD BY AUTOMATED COUNT: 12 % (ref 11.5–14.5)
GFR SERPLBLD CREATININE-BSD FMLA CKD-EPI: >60 ML/MIN/1.73/M2
GLUCOSE SERPL-MCNC: 82 MG/DL (ref 70–110)
HBA1C MFR BLD: 5.1 % (ref 4–5.6)
HCT VFR BLD AUTO: 36.5 % (ref 37–48.5)
HDLC SERPL-MCNC: 46 MG/DL (ref 40–75)
HDLC SERPL: 30.3 % (ref 20–50)
HGB BLD-MCNC: 12 GM/DL (ref 12–16)
IMM GRANULOCYTES # BLD AUTO: 0.01 K/UL (ref 0–0.04)
IMM GRANULOCYTES NFR BLD AUTO: 0.2 % (ref 0–0.5)
LDLC SERPL CALC-MCNC: 91.2 MG/DL (ref 63–159)
LYMPHOCYTES # BLD AUTO: 2.13 K/UL (ref 1–4.8)
MCH RBC QN AUTO: 30.5 PG (ref 27–31)
MCHC RBC AUTO-ENTMCNC: 32.9 G/DL (ref 32–36)
MCV RBC AUTO: 93 FL (ref 82–98)
NONHDLC SERPL-MCNC: 106 MG/DL
NUCLEATED RBC (/100WBC) (OHS): 0 /100 WBC
PLATELET # BLD AUTO: 247 K/UL (ref 150–450)
PMV BLD AUTO: 11.7 FL (ref 9.2–12.9)
POTASSIUM SERPL-SCNC: 4.7 MMOL/L (ref 3.5–5.1)
PROT SERPL-MCNC: 8 GM/DL (ref 6–8.4)
RBC # BLD AUTO: 3.94 M/UL (ref 4–5.4)
RELATIVE EOSINOPHIL (OHS): 1 %
RELATIVE LYMPHOCYTE (OHS): 44.6 % (ref 18–48)
RELATIVE MONOCYTE (OHS): 10.5 % (ref 4–15)
RELATIVE NEUTROPHIL (OHS): 42.9 % (ref 38–73)
SODIUM SERPL-SCNC: 138 MMOL/L (ref 136–145)
T4 FREE SERPL-MCNC: 0.92 NG/DL (ref 0.71–1.51)
TRIGL SERPL-MCNC: 74 MG/DL (ref 30–150)
TSH SERPL-ACNC: 0.55 UIU/ML (ref 0.4–4)
VIT B12 SERPL-MCNC: 230 PG/ML (ref 210–950)
WBC # BLD AUTO: 4.78 K/UL (ref 3.9–12.7)

## 2025-08-08 PROCEDURE — 85025 COMPLETE CBC W/AUTO DIFF WBC: CPT

## 2025-08-08 PROCEDURE — 83036 HEMOGLOBIN GLYCOSYLATED A1C: CPT

## 2025-08-08 PROCEDURE — 99999 PR PBB SHADOW E&M-EST. PATIENT-LVL IV: CPT | Mod: PBBFAC,,, | Performed by: NURSE PRACTITIONER

## 2025-08-08 PROCEDURE — 80053 COMPREHEN METABOLIC PANEL: CPT

## 2025-08-08 PROCEDURE — 84443 ASSAY THYROID STIM HORMONE: CPT

## 2025-08-08 PROCEDURE — 36415 COLL VENOUS BLD VENIPUNCTURE: CPT | Mod: PN

## 2025-08-08 PROCEDURE — 82607 VITAMIN B-12: CPT

## 2025-08-08 PROCEDURE — 80061 LIPID PANEL: CPT

## 2025-08-08 PROCEDURE — 84439 ASSAY OF FREE THYROXINE: CPT

## 2025-08-08 RX ORDER — ONDANSETRON 4 MG/1
4 TABLET, FILM COATED ORAL EVERY 12 HOURS PRN
Qty: 8 TABLET | Refills: 0 | Status: SHIPPED | OUTPATIENT
Start: 2025-08-08

## 2025-08-08 RX ORDER — FLUCONAZOLE 150 MG/1
150 TABLET ORAL DAILY
Qty: 2 TABLET | Refills: 0 | Status: SHIPPED | OUTPATIENT
Start: 2025-08-08 | End: 2025-08-10

## 2025-08-08 RX ORDER — FLUCONAZOLE 150 MG/1
150 TABLET ORAL DAILY
Qty: 1 TABLET | Refills: 0 | Status: SHIPPED | OUTPATIENT
Start: 2025-08-08 | End: 2025-08-08

## 2025-08-08 NOTE — PROGRESS NOTES
[facility] Note    Chief Complaint      Chief Complaint   Patient presents with    Follow-up     Back pain       History of Present Illness      Madyson Calle is a 39 y.o. female who presents today for   Chief Complaint   Patient presents with    Follow-up     Back pain         CHIEF COMPLAINT:  Patient presents today for annual exam.    NECK AND BACK PAIN:  She reports ongoing neck and back pain originating from a neck injury 8 years ago. She currently experiences pain radiating from back of ear down neck, attributed to nerve pinching at C5-C6 level. She had a recent ER visit due to neck stiffness that progressed to swelling and muscle spasms in upper back and neck region. She is currently undergoing physical therapy, which temporarily increases pain during treatment but is perceived as potentially corrective. She rates current pain level at 1-2 out of 10. The low back pain limits her ability to exercise, though she expresses improved motivation and interest in physical activity. Previous cervical spine MRI showed mild disc height changes at C4-C6 with no evidence of disc inflammation.    BLOOD PRESSURE:  She reports fluctuating blood pressure readings that vary based on factors such as diet and fluid intake. She notes that consuming salty foods and extra fluids can impact her blood pressure levels.    FAMILY HISTORY:  Maternal grandfather has a history of heart attack, stroke, diabetes, high cholesterol, and was on dialysis. Maternal grandmother had diabetes and hypertension. Maternal great aunt Lindsay  of colon cancer approximately 4 years ago. Maternal great uncle Carline is currently alive with colon cancer.      ROS:  Cardiovascular: +orthostatic symptoms  Musculoskeletal: +neck pain, +neck stiffness, +muscle spasms, +neck swelling, +difficulty walking, +limited movement  Neurological: +nerve pain    Follow-up               Family History:  family history includes Bipolar disorder in her  "sister; Colon cancer in her maternal great-aunt and maternal great-uncle; Diabetes in her father, maternal grandfather, and paternal grandmother; Heart attack in her maternal grandfather and paternal grandmother; Heart murmur in her father; Hyperlipidemia in her maternal grandfather; Hypertension in her father, maternal grandmother, and paternal grandmother; Kidney failure in her maternal grandfather; No Known Problems in her daughter, mother, and paternal grandfather; Prostate cancer in her father; Stroke in her maternal grandfather.   Family history was reviewed with patient.     Medications:  Encounter Medications[1]    Allergies:  Review of patient's allergies indicates:   Allergen Reactions    Reglan [metoclopramide]      Pt reports muscle twitching when last administered       Health Maintenance:  Health Maintenance   Topic Date Due    TETANUS VACCINE  Never done    Pneumococcal Vaccines (Age 0-49) (1 of 2 - PCV) Never done    COVID-19 Vaccine (3 - 2024-25 season) 09/01/2024    Influenza Vaccine (1) 09/01/2025    Cervical Cancer Screening  06/14/2027    RSV Vaccine (Age 60+ and Pregnant patients) (1 - 1-dose 75+ series) 09/04/2062    Hepatitis C Screening  Completed    HIV Screening  Completed    Lipid Panel  Completed     Health Maintenance Topics with due status: Not Due       Topic Last Completion Date    Cervical Cancer Screening 06/14/2022    Influenza Vaccine Not Due    RSV Vaccine (Age 60+ and Pregnant patients) Not Due       Physical Exam      Vital Signs  Pulse: 60  SpO2: 99 %  BP: 112/82  BP Location: Right arm  Patient Position: Sitting  Pain Score:   2  Pain Loc: Shoulder  Height and Weight  Height: 5' 3" (160 cm)  Weight: 62.7 kg (138 lb 3.7 oz)  BSA (Calculated - sq m): 1.67 sq meters  BMI (Calculated): 24.5  Weight in (lb) to have BMI = 25: 140.8]    Physical Exam  Vitals reviewed.   Constitutional:       Appearance: Normal appearance. She is normal weight.   HENT:      Head: Normocephalic and " atraumatic.      Right Ear: Tympanic membrane, ear canal and external ear normal.      Left Ear: Tympanic membrane, ear canal and external ear normal.      Nose: Nose normal.      Mouth/Throat:      Mouth: Mucous membranes are moist.      Pharynx: Oropharynx is clear.   Eyes:      Extraocular Movements: Extraocular movements intact.      Conjunctiva/sclera: Conjunctivae normal.      Pupils: Pupils are equal, round, and reactive to light.   Cardiovascular:      Rate and Rhythm: Normal rate and regular rhythm.      Pulses: Normal pulses.      Heart sounds: Normal heart sounds.   Pulmonary:      Effort: Pulmonary effort is normal.      Breath sounds: Normal breath sounds.   Abdominal:      General: Abdomen is flat. Bowel sounds are normal.      Palpations: Abdomen is soft.   Musculoskeletal:         General: Normal range of motion.      Cervical back: Normal range of motion and neck supple.   Skin:     General: Skin is warm and dry.      Capillary Refill: Capillary refill takes less than 2 seconds.   Neurological:      General: No focal deficit present.      Mental Status: She is alert and oriented to person, place, and time. Mental status is at baseline.   Psychiatric:         Mood and Affect: Mood normal.         Behavior: Behavior normal.         Thought Content: Thought content normal.         Judgment: Judgment normal.            Assessment/Plan     Madyson Calle is a 39 y.o.female with:    Routine medical exam  -     CBC Auto Differential; Future; Expected date: 08/08/2025  -     Comprehensive Metabolic Panel; Future; Expected date: 08/08/2025  -     Hemoglobin A1C; Future; Expected date: 08/08/2025  -     Lipid Panel; Future; Expected date: 08/08/2025  -     T4, Free; Future; Expected date: 08/08/2025  -     TSH; Future; Expected date: 08/08/2025    Vitamin B12 deficiency  -     VITAMIN B12; Future; Expected date: 08/08/2025    Fatigue, unspecified type  -     VITAMIN B12; Future; Expected date:  08/08/2025    Nausea  -     ondansetron (ZOFRAN) 4 MG tablet; Take 1 tablet (4 mg total) by mouth every 12 (twelve) hours as needed for Nausea.  Dispense: 8 tablet; Refill: 0    Acute vaginitis  -     Discontinue: fluconazole (DIFLUCAN) 150 MG Tab; Take 1 tablet (150 mg total) by mouth once daily. for 1 day  Dispense: 1 tablet; Refill: 0  -     fluconazole (DIFLUCAN) 150 MG Tab; Take 1 tablet (150 mg total) by mouth once daily. for 2 days  Dispense: 2 tablet; Refill: 0        As above, continue current medications and maintain follow up with specialists.  Return to clinic as needed.    Greater than 50% of visit was spent face to face with patient.  All questions were answered to patient's satisfaction.          Karen L Spencer, NP-C [facility]                     [1]   Outpatient Encounter Medications as of 8/8/2025   Medication Sig Dispense Refill    amLODIPine (NORVASC) 5 MG tablet Take 1 tablet (5 mg total) by mouth once daily. 90 tablet 3    boric acid (bulk) Powd Insert one Gelatin Capsule filled with 600 mg of Boric Acid Powder H.S. 100 capsule 4    cyanocobalamin, vitamin B-12, (B-12 COMPLIANCE) 1,000 mcg/mL Kit Inject 1 mL as directed every 14 (fourteen) days. 2 kit 5    ibuprofen (ADVIL,MOTRIN) 600 MG tablet Take 1 tablet (600 mg total) by mouth every 6 (six) hours as needed for Pain. 20 tablet 0    metoprolol succinate (TOPROL-XL) 50 MG 24 hr tablet Take 1 tablet (50 mg total) by mouth once daily. 30 tablet 5    metroNIDAZOLE (FLAGYL) 500 MG tablet Take 1 tablet (500 mg total) by mouth every 12 (twelve) hours. 14 tablet 0    omeprazole (PRILOSEC) 40 MG capsule Take 1 capsule (40 mg total) by mouth every morning. 30 capsule 2    valsartan (DIOVAN) 160 MG tablet Take 1 tablet (160 mg total) by mouth once daily. 90 tablet 3    fluconazole (DIFLUCAN) 150 MG Tab Take 1 tablet (150 mg total) by mouth once daily. for 2 days 2 tablet 0    ondansetron (ZOFRAN) 4 MG tablet Take 1 tablet (4 mg total) by  mouth every 12 (twelve) hours as needed for Nausea. 8 tablet 0    [DISCONTINUED] fluconazole (DIFLUCAN) 150 MG Tab Take 1 tablet (150 mg total) by mouth once daily. for 1 day 1 tablet 0    [DISCONTINUED] gabapentin (NEURONTIN) 300 MG capsule Take 1 capsule (300 mg total) by mouth every evening for 7 days, THEN 1 capsule (300 mg total) 2 (two) times daily for 7 days, THEN 1 capsule (300 mg total) 3 (three) times daily for 16 days. (Patient not taking: Reported on 8/8/2025) 69 capsule 0    [DISCONTINUED] lactic acid-citric-potassium (PHEXXI) 1.8-1-0.4 % Gel Place 1 applicator vaginally as needed. (Patient not taking: Reported on 8/8/2025) 180 g 11    [DISCONTINUED] LIDOcaine 4 % Gel Apply 1 Application topically 2 (two) times a day. (Patient not taking: Reported on 8/8/2025)      [DISCONTINUED] linaCLOtide (LINZESS) 290 mcg Cap capsule Take 1 capsule (290 mcg total) by mouth before breakfast. (Patient not taking: Reported on 8/8/2025) 90 capsule 3    [DISCONTINUED] naproxen (NAPROSYN) 500 MG tablet Take 1 tablet (500 mg total) by mouth 2 (two) times daily with meals. (Patient not taking: Reported on 8/8/2025) 30 tablet 0     Facility-Administered Encounter Medications as of 8/8/2025   Medication Dose Route Frequency Provider Last Rate Last Admin    levonorgestreL (MIRENA) 20 mcg/24 hours (8 yrs) 52 mg IUD 1 Intra Uterine Device  1 Intra Uterine Device Intrauterine  Juan Curiel MD   1 Intra Uterine Device at 01/03/23 5736

## 2025-08-13 ENCOUNTER — PATIENT MESSAGE (OUTPATIENT)
Dept: HEMATOLOGY/ONCOLOGY | Facility: CLINIC | Age: 40
End: 2025-08-13
Payer: COMMERCIAL

## 2025-08-14 ENCOUNTER — CLINICAL SUPPORT (OUTPATIENT)
Dept: REHABILITATION | Facility: HOSPITAL | Age: 40
End: 2025-08-14
Attending: EMERGENCY MEDICINE
Payer: COMMERCIAL

## 2025-08-14 DIAGNOSIS — R29.898 DECREASED RANGE OF MOTION OF NECK: Primary | ICD-10-CM

## 2025-08-14 DIAGNOSIS — F40.298 FEAR OF PAIN: ICD-10-CM

## 2025-08-14 PROCEDURE — 97110 THERAPEUTIC EXERCISES: CPT | Performed by: PHYSICAL THERAPIST

## 2025-08-14 PROCEDURE — 97140 MANUAL THERAPY 1/> REGIONS: CPT | Performed by: PHYSICAL THERAPIST

## 2025-08-14 PROCEDURE — 97112 NEUROMUSCULAR REEDUCATION: CPT | Performed by: PHYSICAL THERAPIST

## 2025-08-15 ENCOUNTER — TELEPHONE (OUTPATIENT)
Dept: PAIN MEDICINE | Facility: CLINIC | Age: 40
End: 2025-08-15
Payer: COMMERCIAL

## 2025-08-18 ENCOUNTER — OFFICE VISIT (OUTPATIENT)
Dept: PAIN MEDICINE | Facility: CLINIC | Age: 40
End: 2025-08-18
Payer: COMMERCIAL

## 2025-08-18 ENCOUNTER — TELEPHONE (OUTPATIENT)
Dept: PHARMACY | Facility: CLINIC | Age: 40
End: 2025-08-18
Payer: COMMERCIAL

## 2025-08-18 DIAGNOSIS — M54.12 CERVICAL RADICULOPATHY: Primary | ICD-10-CM

## 2025-08-18 PROCEDURE — 98004 SYNCH AUDIO-VIDEO EST SF 10: CPT | Mod: 95,,, | Performed by: EMERGENCY MEDICINE

## 2025-08-24 DIAGNOSIS — N76.0 VAGINOSIS: ICD-10-CM

## 2025-08-25 RX ORDER — METRONIDAZOLE 500 MG/1
500 TABLET ORAL EVERY 12 HOURS
Qty: 14 TABLET | Refills: 0 | Status: SHIPPED | OUTPATIENT
Start: 2025-08-25